# Patient Record
Sex: FEMALE | Race: WHITE | NOT HISPANIC OR LATINO
[De-identification: names, ages, dates, MRNs, and addresses within clinical notes are randomized per-mention and may not be internally consistent; named-entity substitution may affect disease eponyms.]

---

## 2017-01-26 ENCOUNTER — APPOINTMENT (OUTPATIENT)
Dept: HEMATOLOGY ONCOLOGY | Facility: CLINIC | Age: 72
End: 2017-01-26

## 2017-01-26 VITALS
SYSTOLIC BLOOD PRESSURE: 150 MMHG | WEIGHT: 175 LBS | DIASTOLIC BLOOD PRESSURE: 63 MMHG | TEMPERATURE: 98.2 F | RESPIRATION RATE: 12 BRPM

## 2017-01-26 DIAGNOSIS — Z87.09 PERSONAL HISTORY OF OTHER DISEASES OF THE RESPIRATORY SYSTEM: ICD-10-CM

## 2017-01-26 LAB
BASOPHILS # BLD: 0.05 TH/MM3
BASOPHILS NFR BLD: 0.6 %
EOSINOPHIL # BLD: 0.22 TH/MM3
EOSINOPHIL NFR BLD: 2.5 %
ERYTHROCYTE [DISTWIDTH] IN BLOOD BY AUTOMATED COUNT: 12.6 %
GRANULOCYTES # BLD: 4.49 TH/MM3
GRANULOCYTES NFR BLD: 51.9 %
HCT VFR BLD AUTO: 35.1 %
HGB BLD-MCNC: 12.2 G/DL
IMM GRANULOCYTES # BLD: 0.02 TH/MM3
IMM GRANULOCYTES NFR BLD: 0.2 %
LYMPHOCYTES # BLD: 2.78 TH/MM3
LYMPHOCYTES NFR BLD: 32.1 %
MCH RBC QN AUTO: 36.4 PG
MCHC RBC AUTO-ENTMCNC: 34.8 G/DL
MCV RBC AUTO: 104.8 FL
MONOCYTES # BLD: 1.1 TH/MM3
MONOCYTES NFR BLD: 12.7 %
PLATELET # BLD: 384 TH/MM3
PMV BLD AUTO: 9.9 FL
RBC # BLD AUTO: 3.35 MIL/MM3
WBC # BLD: 8.66 TH/MM3

## 2017-01-26 RX ORDER — ATENOLOL 50 MG/1
TABLET ORAL
Refills: 0 | Status: ACTIVE | COMMUNITY

## 2017-01-27 LAB
ALBUMIN SERPL-MCNC: 4 G/DL
ALBUMIN/GLOB SERPL: 1.03
ALP SERPL-CCNC: 54 IU/L
ALT SERPL-CCNC: 19 IU/L
ANION GAP SERPL CALC-SCNC: 10 MEQ/L
AST SERPL-CCNC: 25 IU/L
BILIRUB SERPL-MCNC: 0.6 MG/DL
BUN SERPL-MCNC: 27 MG/DL
BUN/CREAT SERPL: 24.8 %
CALCIUM SERPL-MCNC: 9.8 MG/DL
CHLORIDE SERPL-SCNC: 101 MEQ/L
CO2 SERPL-SCNC: 27 MEQ/L
CREAT SERPL-MCNC: 1.09 MG/DL
GFR SERPL CREATININE-BSD FRML MDRD: 49
GLUCOSE SERPL-MCNC: 94 MG/DL
POTASSIUM SERPL-SCNC: 4.8 MMOL/L
PROT SERPL-MCNC: 7.9 G/DL
SODIUM SERPL-SCNC: 138 MEQ/L

## 2017-03-07 ENCOUNTER — RX RENEWAL (OUTPATIENT)
Age: 72
End: 2017-03-07

## 2017-04-27 ENCOUNTER — APPOINTMENT (OUTPATIENT)
Dept: HEMATOLOGY ONCOLOGY | Facility: CLINIC | Age: 72
End: 2017-04-27

## 2017-04-27 VITALS
HEIGHT: 60 IN | SYSTOLIC BLOOD PRESSURE: 167 MMHG | RESPIRATION RATE: 14 BRPM | DIASTOLIC BLOOD PRESSURE: 73 MMHG | BODY MASS INDEX: 34.95 KG/M2 | TEMPERATURE: 97.3 F | HEART RATE: 79 BPM | WEIGHT: 178 LBS

## 2017-04-27 LAB
BASOPHILS # BLD: 0.1 TH/MM3
BASOPHILS NFR BLD: 0.8 %
EOSINOPHIL # BLD: 0.39 TH/MM3
EOSINOPHIL NFR BLD: 3 %
ERYTHROCYTE [DISTWIDTH] IN BLOOD BY AUTOMATED COUNT: 13.1 %
GRANULOCYTES # BLD: 7.4 TH/MM3
GRANULOCYTES NFR BLD: 57.9 %
HCT VFR BLD AUTO: 37.6 %
HGB BLD-MCNC: 13 G/DL
IMM GRANULOCYTES # BLD: 0.08 TH/MM3
IMM GRANULOCYTES NFR BLD: 0.6 %
LYMPHOCYTES # BLD: 3.27 TH/MM3
LYMPHOCYTES NFR BLD: 25.6 %
MCH RBC QN AUTO: 36.2 PG
MCHC RBC AUTO-ENTMCNC: 34.6 G/DL
MCV RBC AUTO: 104.7 FL
MONOCYTES # BLD: 1.55 TH/MM3
MONOCYTES NFR BLD: 12.1 %
PLATELET # BLD: 439 TH/MM3
PMV BLD AUTO: 10.5 FL
RBC # BLD AUTO: 3.59 MIL/MM3
WBC # BLD: 12.79 TH/MM3

## 2017-05-11 ENCOUNTER — APPOINTMENT (OUTPATIENT)
Dept: HEMATOLOGY ONCOLOGY | Facility: CLINIC | Age: 72
End: 2017-05-11

## 2017-05-11 LAB
BASOPHILS # BLD: 0.1 TH/MM3
BASOPHILS NFR BLD: 0.7 %
EOSINOPHIL # BLD: 0.41 TH/MM3
EOSINOPHIL NFR BLD: 3 %
ERYTHROCYTE [DISTWIDTH] IN BLOOD BY AUTOMATED COUNT: 12.9 %
GRANULOCYTES # BLD: 8.21 TH/MM3
GRANULOCYTES NFR BLD: 60.6 %
HCT VFR BLD AUTO: 35.6 %
HGB BLD-MCNC: 12.2 G/DL
IMM GRANULOCYTES # BLD: 0.14 TH/MM3
IMM GRANULOCYTES NFR BLD: 1 %
LYMPHOCYTES # BLD: 3.29 TH/MM3
LYMPHOCYTES NFR BLD: 24.2 %
MCH RBC QN AUTO: 36.3 PG
MCHC RBC AUTO-ENTMCNC: 34.3 G/DL
MCV RBC AUTO: 106 FL
MONOCYTES # BLD: 1.42 TH/MM3
MONOCYTES NFR BLD: 10.5 %
PLATELET # BLD: 489 TH/MM3
PMV BLD AUTO: 10 FL
RBC # BLD AUTO: 3.36 MIL/MM3
WBC # BLD: 13.57 TH/MM3

## 2017-05-25 ENCOUNTER — APPOINTMENT (OUTPATIENT)
Dept: HEMATOLOGY ONCOLOGY | Facility: CLINIC | Age: 72
End: 2017-05-25

## 2017-05-25 LAB
BASOPHILS # BLD: 0.06 TH/MM3
BASOPHILS NFR BLD: 0.6 %
EOSINOPHIL # BLD: 0.29 TH/MM3
EOSINOPHIL NFR BLD: 3.1 %
ERYTHROCYTE [DISTWIDTH] IN BLOOD BY AUTOMATED COUNT: 14 %
GRANULOCYTES # BLD: 5.61 TH/MM3
GRANULOCYTES NFR BLD: 59.6 %
HCT VFR BLD AUTO: 35.3 %
HGB BLD-MCNC: 12.2 G/DL
IMM GRANULOCYTES # BLD: 0.07 TH/MM3
IMM GRANULOCYTES NFR BLD: 0.7 %
LYMPHOCYTES # BLD: 2.45 TH/MM3
LYMPHOCYTES NFR BLD: 26 %
MCH RBC QN AUTO: 36.7 PG
MCHC RBC AUTO-ENTMCNC: 34.6 G/DL
MCV RBC AUTO: 106.3 FL
MONOCYTES # BLD: 0.94 TH/MM3
MONOCYTES NFR BLD: 10 %
PLATELET # BLD: 383 TH/MM3
PMV BLD AUTO: 10.2 FL
RBC # BLD AUTO: 3.32 MIL/MM3
WBC # BLD: 9.42 TH/MM3

## 2017-06-07 ENCOUNTER — OUTPATIENT (OUTPATIENT)
Dept: OUTPATIENT SERVICES | Facility: HOSPITAL | Age: 72
LOS: 1 days | Discharge: HOME | End: 2017-06-07

## 2017-06-28 DIAGNOSIS — R05 COUGH: ICD-10-CM

## 2017-07-06 ENCOUNTER — OUTPATIENT (OUTPATIENT)
Dept: OUTPATIENT SERVICES | Facility: HOSPITAL | Age: 72
LOS: 1 days | Discharge: HOME | End: 2017-07-06

## 2017-07-06 ENCOUNTER — APPOINTMENT (OUTPATIENT)
Dept: HEMATOLOGY ONCOLOGY | Facility: CLINIC | Age: 72
End: 2017-07-06

## 2017-07-06 DIAGNOSIS — D47.3 ESSENTIAL (HEMORRHAGIC) THROMBOCYTHEMIA: ICD-10-CM

## 2017-07-12 LAB
BASOPHILS # BLD: 0.05 TH/MM3
BASOPHILS NFR BLD: 0.7 %
EOSINOPHIL # BLD: 0.16 TH/MM3
EOSINOPHIL NFR BLD: 2.2 %
ERYTHROCYTE [DISTWIDTH] IN BLOOD BY AUTOMATED COUNT: 15.1 %
GRANULOCYTES # BLD: 3.74 TH/MM3
GRANULOCYTES NFR BLD: 50.3 %
HCT VFR BLD AUTO: 33.8 %
HGB BLD-MCNC: 11.6 G/DL
IMM GRANULOCYTES # BLD: 0.04 TH/MM3
IMM GRANULOCYTES NFR BLD: 0.5 %
LYMPHOCYTES # BLD: 2.64 TH/MM3
LYMPHOCYTES NFR BLD: 35.5 %
MCH RBC QN AUTO: 37.9 PG
MCHC RBC AUTO-ENTMCNC: 34.3 G/DL
MCV RBC AUTO: 110.5 FL
MONOCYTES # BLD: 0.8 TH/MM3
MONOCYTES NFR BLD: 10.8 %
PLATELET # BLD: 310 TH/MM3
PMV BLD AUTO: 9.9 FL
RBC # BLD AUTO: 3.06 MIL/MM3
WBC # BLD: 7.43 TH/MM3

## 2017-08-17 ENCOUNTER — APPOINTMENT (OUTPATIENT)
Dept: HEMATOLOGY ONCOLOGY | Facility: CLINIC | Age: 72
End: 2017-08-17

## 2017-08-17 LAB
BASOPHILS # BLD: 0.05 TH/MM3
BASOPHILS NFR BLD: 0.7 %
EOSINOPHIL # BLD: 0.14 TH/MM3
EOSINOPHIL NFR BLD: 2.1 %
ERYTHROCYTE [DISTWIDTH] IN BLOOD BY AUTOMATED COUNT: 13.3 %
GRANULOCYTES # BLD: 3.75 TH/MM3
GRANULOCYTES NFR BLD: 55.4 %
HCT VFR BLD AUTO: 33.5 %
HGB BLD-MCNC: 11.7 G/DL
IMM GRANULOCYTES # BLD: 0.08 TH/MM3
IMM GRANULOCYTES NFR BLD: 1.2 %
LYMPHOCYTES # BLD: 2.08 TH/MM3
LYMPHOCYTES NFR BLD: 30.7 %
MCH RBC QN AUTO: 39.8 PG
MCHC RBC AUTO-ENTMCNC: 34.9 G/DL
MCV RBC AUTO: 113.9 FL
MONOCYTES # BLD: 0.67 TH/MM3
MONOCYTES NFR BLD: 9.9 %
PLATELET # BLD: 290 TH/MM3
PMV BLD AUTO: 10.3 FL
RBC # BLD AUTO: 2.94 MIL/MM3
WBC # BLD: 6.77 TH/MM3

## 2017-10-12 ENCOUNTER — OUTPATIENT (OUTPATIENT)
Dept: OUTPATIENT SERVICES | Facility: HOSPITAL | Age: 72
LOS: 1 days | Discharge: HOME | End: 2017-10-12

## 2017-10-12 ENCOUNTER — APPOINTMENT (OUTPATIENT)
Dept: HEMATOLOGY ONCOLOGY | Facility: CLINIC | Age: 72
End: 2017-10-12

## 2017-10-12 DIAGNOSIS — D47.3 ESSENTIAL (HEMORRHAGIC) THROMBOCYTHEMIA: ICD-10-CM

## 2017-10-12 LAB
BASOPHILS # BLD: 0.04 TH/MM3
BASOPHILS NFR BLD: 0.5 %
EOSINOPHIL # BLD: 0.11 TH/MM3
EOSINOPHIL NFR BLD: 1.3 %
ERYTHROCYTE [DISTWIDTH] IN BLOOD BY AUTOMATED COUNT: 12.3 %
GRANULOCYTES # BLD: 4.27 TH/MM3
GRANULOCYTES NFR BLD: 50.9 %
HCT VFR BLD AUTO: 32.5 %
HGB BLD-MCNC: 11.5 G/DL
IMM GRANULOCYTES # BLD: 0.05 TH/MM3
IMM GRANULOCYTES NFR BLD: 0.6 %
LYMPHOCYTES # BLD: 2.83 TH/MM3
LYMPHOCYTES NFR BLD: 33.7 %
MCH RBC QN AUTO: 41.2 PG
MCHC RBC AUTO-ENTMCNC: 35.4 G/DL
MCV RBC AUTO: 116.5 FL
MONOCYTES # BLD: 1.09 TH/MM3
MONOCYTES NFR BLD: 13 %
PLATELET # BLD: 349 TH/MM3
PMV BLD AUTO: 10 FL
RBC # BLD AUTO: 2.79 MIL/MM3
WBC # BLD: 8.39 TH/MM3

## 2017-11-09 ENCOUNTER — OUTPATIENT (OUTPATIENT)
Dept: OUTPATIENT SERVICES | Facility: HOSPITAL | Age: 72
LOS: 1 days | Discharge: HOME | End: 2017-11-09

## 2017-11-09 DIAGNOSIS — H25.89 OTHER AGE-RELATED CATARACT: ICD-10-CM

## 2017-11-09 DIAGNOSIS — I10 ESSENTIAL (PRIMARY) HYPERTENSION: ICD-10-CM

## 2017-11-09 DIAGNOSIS — E66.9 OBESITY, UNSPECIFIED: ICD-10-CM

## 2017-11-16 ENCOUNTER — OUTPATIENT (OUTPATIENT)
Dept: OUTPATIENT SERVICES | Facility: HOSPITAL | Age: 72
LOS: 1 days | Discharge: HOME | End: 2017-11-16

## 2017-11-22 ENCOUNTER — RESULT REVIEW (OUTPATIENT)
Age: 72
End: 2017-11-22

## 2017-11-22 ENCOUNTER — APPOINTMENT (OUTPATIENT)
Dept: HEMATOLOGY ONCOLOGY | Facility: CLINIC | Age: 72
End: 2017-11-22

## 2017-11-27 DIAGNOSIS — I10 ESSENTIAL (PRIMARY) HYPERTENSION: ICD-10-CM

## 2017-11-27 DIAGNOSIS — H25.89 OTHER AGE-RELATED CATARACT: ICD-10-CM

## 2017-11-27 DIAGNOSIS — D69.6 THROMBOCYTOPENIA, UNSPECIFIED: ICD-10-CM

## 2017-11-27 DIAGNOSIS — E03.9 HYPOTHYROIDISM, UNSPECIFIED: ICD-10-CM

## 2018-02-01 ENCOUNTER — APPOINTMENT (OUTPATIENT)
Dept: HEMATOLOGY ONCOLOGY | Facility: CLINIC | Age: 73
End: 2018-02-01

## 2018-02-01 ENCOUNTER — RESULT REVIEW (OUTPATIENT)
Age: 73
End: 2018-02-01

## 2018-02-01 VITALS
WEIGHT: 179 LBS | SYSTOLIC BLOOD PRESSURE: 173 MMHG | BODY MASS INDEX: 35.14 KG/M2 | DIASTOLIC BLOOD PRESSURE: 79 MMHG | HEART RATE: 95 BPM | HEIGHT: 60 IN | RESPIRATION RATE: 15 BRPM | TEMPERATURE: 98.1 F

## 2018-03-20 ENCOUNTER — RX RENEWAL (OUTPATIENT)
Age: 73
End: 2018-03-20

## 2018-05-05 ENCOUNTER — OUTPATIENT (OUTPATIENT)
Dept: OUTPATIENT SERVICES | Facility: HOSPITAL | Age: 73
LOS: 1 days | Discharge: HOME | End: 2018-05-05

## 2018-05-05 DIAGNOSIS — Z12.31 ENCOUNTER FOR SCREENING MAMMOGRAM FOR MALIGNANT NEOPLASM OF BREAST: ICD-10-CM

## 2018-05-10 ENCOUNTER — LABORATORY RESULT (OUTPATIENT)
Age: 73
End: 2018-05-10

## 2018-05-10 ENCOUNTER — APPOINTMENT (OUTPATIENT)
Dept: HEMATOLOGY ONCOLOGY | Facility: CLINIC | Age: 73
End: 2018-05-10

## 2018-05-10 ENCOUNTER — OUTPATIENT (OUTPATIENT)
Dept: OUTPATIENT SERVICES | Facility: HOSPITAL | Age: 73
LOS: 1 days | Discharge: HOME | End: 2018-05-10

## 2018-05-10 VITALS
HEART RATE: 78 BPM | BODY MASS INDEX: 33.42 KG/M2 | SYSTOLIC BLOOD PRESSURE: 151 MMHG | DIASTOLIC BLOOD PRESSURE: 68 MMHG | RESPIRATION RATE: 14 BRPM | HEIGHT: 61 IN | WEIGHT: 177 LBS | TEMPERATURE: 97.7 F

## 2018-05-10 DIAGNOSIS — D47.3 ESSENTIAL (HEMORRHAGIC) THROMBOCYTHEMIA: ICD-10-CM

## 2018-05-10 DIAGNOSIS — D69.9 HEMORRHAGIC CONDITION, UNSPECIFIED: ICD-10-CM

## 2018-05-10 LAB
HCT VFR BLD CALC: 33.8 %
HGB BLD-MCNC: 12.2 G/DL
MCHC RBC-ENTMCNC: 36.1 G/DL
MCHC RBC-ENTMCNC: 41.5 PG
MCV RBC AUTO: 115 FL
PLATELET # BLD AUTO: 346 K/UL
PMV BLD: 10.7 FL
RBC # BLD: 2.94 M/UL
RBC # FLD: 12.2 %
WBC # FLD AUTO: 7.88 K/UL

## 2018-05-16 ENCOUNTER — OUTPATIENT (OUTPATIENT)
Dept: OUTPATIENT SERVICES | Facility: HOSPITAL | Age: 73
LOS: 1 days | Discharge: HOME | End: 2018-05-16

## 2018-05-16 DIAGNOSIS — R91.8 OTHER NONSPECIFIC ABNORMAL FINDING OF LUNG FIELD: ICD-10-CM

## 2018-06-15 ENCOUNTER — OUTPATIENT (OUTPATIENT)
Dept: OUTPATIENT SERVICES | Facility: HOSPITAL | Age: 73
LOS: 1 days | Discharge: HOME | End: 2018-06-15

## 2018-06-15 ENCOUNTER — RESULT REVIEW (OUTPATIENT)
Age: 73
End: 2018-06-15

## 2018-06-27 ENCOUNTER — RX RENEWAL (OUTPATIENT)
Age: 73
End: 2018-06-27

## 2018-06-27 DIAGNOSIS — Z01.419 ENCOUNTER FOR GYNECOLOGICAL EXAMINATION (GENERAL) (ROUTINE) WITHOUT ABNORMAL FINDINGS: ICD-10-CM

## 2018-09-13 ENCOUNTER — APPOINTMENT (OUTPATIENT)
Dept: HEMATOLOGY ONCOLOGY | Facility: CLINIC | Age: 73
End: 2018-09-13

## 2018-09-13 ENCOUNTER — LABORATORY RESULT (OUTPATIENT)
Age: 73
End: 2018-09-13

## 2018-09-13 VITALS
DIASTOLIC BLOOD PRESSURE: 67 MMHG | TEMPERATURE: 97.9 F | WEIGHT: 174 LBS | HEART RATE: 80 BPM | RESPIRATION RATE: 14 BRPM | BODY MASS INDEX: 32.85 KG/M2 | SYSTOLIC BLOOD PRESSURE: 168 MMHG | HEIGHT: 61 IN

## 2018-09-13 DIAGNOSIS — Z86.2 PERSONAL HISTORY OF DISEASES OF THE BLOOD AND BLOOD-FORMING ORGANS AND CERTAIN DISORDERS INVOLVING THE IMMUNE MECHANISM: ICD-10-CM

## 2018-09-13 LAB
HCT VFR BLD CALC: 32.6 %
HGB BLD-MCNC: 11.7 G/DL
MCHC RBC-ENTMCNC: 35.9 G/DL
MCHC RBC-ENTMCNC: 42.5 PG
MCV RBC AUTO: 118.5 FL
PLATELET # BLD AUTO: 375 K/UL
PMV BLD: 10.6 FL
RBC # BLD: 2.75 M/UL
RBC # FLD: 12.5 %
WBC # FLD AUTO: 7.57 K/UL

## 2018-11-15 ENCOUNTER — APPOINTMENT (OUTPATIENT)
Dept: HEMATOLOGY ONCOLOGY | Facility: CLINIC | Age: 73
End: 2018-11-15

## 2018-11-15 ENCOUNTER — LABORATORY RESULT (OUTPATIENT)
Age: 73
End: 2018-11-15

## 2018-11-15 LAB
HCT VFR BLD CALC: 33.1 %
HGB BLD-MCNC: 11.6 G/DL
MCHC RBC-ENTMCNC: 35 G/DL
MCHC RBC-ENTMCNC: 41.3 PG
MCV RBC AUTO: 117.8 FL
PLATELET # BLD AUTO: 397 K/UL
PMV BLD: 10.7 FL
RBC # BLD: 2.81 M/UL
RBC # FLD: 11.8 %
RETICS # AUTO: 2.5 %
RETICS AGGREG/RBC NFR: 71 K/UL
WBC # FLD AUTO: 8.7 K/UL

## 2018-11-16 LAB
FOLATE RBC-MCNC: 1404 NG/ML
FOLATE SERPL-MCNC: >20 NG/ML
HCT VFR BLD CALC: 33 %
VIT B12 SERPL-MCNC: 615 PG/ML

## 2018-11-19 LAB — METHYLMALONATE SERPL-SCNC: 173 NMOL/L

## 2018-12-18 ENCOUNTER — OUTPATIENT (OUTPATIENT)
Dept: OUTPATIENT SERVICES | Facility: HOSPITAL | Age: 73
LOS: 1 days | Discharge: HOME | End: 2018-12-18

## 2018-12-18 ENCOUNTER — LABORATORY RESULT (OUTPATIENT)
Age: 73
End: 2018-12-18

## 2018-12-18 ENCOUNTER — APPOINTMENT (OUTPATIENT)
Dept: HEMATOLOGY ONCOLOGY | Facility: CLINIC | Age: 73
End: 2018-12-18

## 2018-12-18 DIAGNOSIS — D47.3 ESSENTIAL (HEMORRHAGIC) THROMBOCYTHEMIA: ICD-10-CM

## 2018-12-18 LAB
HCT VFR BLD CALC: 33 %
HGB BLD-MCNC: 11.6 G/DL
MCHC RBC-ENTMCNC: 35.2 G/DL
MCHC RBC-ENTMCNC: 41.1 PG
MCV RBC AUTO: 117 FL
PLATELET # BLD AUTO: 339 K/UL
PMV BLD: 10.5 FL
RBC # BLD: 2.82 M/UL
RBC # FLD: 12.4 %
WBC # FLD AUTO: 7.48 K/UL

## 2018-12-18 NOTE — HISTORY OF PRESENT ILLNESS
[de-identified] : T [de-identified] : The patient is coming for her regularly scheduled follow up for her essential thrombocythemia.\par She has no new particular complaints. However, on occasion, she has been missing taking the Hydrea.\par Her CBC today showed a WBC count of 8.97, Hgb of 11.6, platelets of 397. The MCV has increased to above 117.\par \par The patient was told to continue with Hydrea 500 mg twice a day and try not to miss any doses.\par I will not increase the dose of the medication for the time being.\par \par She will be seen again in 3 months for follow up. All questions answered.

## 2018-12-18 NOTE — HISTORY OF PRESENT ILLNESS
[de-identified] : The patient is coming for her regularly scheduled follow up for her essential thrombocythemia. She feels a little tired and is concerned about some hair loss. She is on different medications also, including atenolol, for her high blood pressure. \par The CBC today showed a WBC count of 7.48, Hgb of 11.6 (stable) and platelets 339 K.\par The patient was reassured. \par She will continue on Hydrea 500 mg PO BID.\par \par All her questions answered.

## 2018-12-18 NOTE — REASON FOR VISIT
[Follow-Up Visit] : a follow-up visit for [Myeloproliferative Disorder] : myeloproliferative disorder [Family Member] : family member [FreeTextEntry2] : ET

## 2018-12-19 ENCOUNTER — RX RENEWAL (OUTPATIENT)
Age: 73
End: 2018-12-19

## 2019-02-14 ENCOUNTER — LABORATORY RESULT (OUTPATIENT)
Age: 74
End: 2019-02-14

## 2019-02-14 ENCOUNTER — APPOINTMENT (OUTPATIENT)
Dept: HEMATOLOGY ONCOLOGY | Facility: CLINIC | Age: 74
End: 2019-02-14

## 2019-02-14 VITALS
HEART RATE: 86 BPM | BODY MASS INDEX: 37.19 KG/M2 | SYSTOLIC BLOOD PRESSURE: 189 MMHG | DIASTOLIC BLOOD PRESSURE: 84 MMHG | WEIGHT: 197 LBS | RESPIRATION RATE: 14 BRPM | TEMPERATURE: 97 F | HEIGHT: 61 IN

## 2019-02-14 DIAGNOSIS — I10 ESSENTIAL (PRIMARY) HYPERTENSION: ICD-10-CM

## 2019-02-14 LAB
HCT VFR BLD CALC: 34.8 %
HGB BLD-MCNC: 12.2 G/DL
MCHC RBC-ENTMCNC: 35.1 G/DL
MCHC RBC-ENTMCNC: 41.4 PG
MCV RBC AUTO: 118 FL
PLATELET # BLD AUTO: 440 K/UL
PMV BLD: 9.8 FL
RBC # BLD: 2.95 M/UL
RBC # FLD: 12.4 %
WBC # FLD AUTO: 9.36 K/UL

## 2019-02-14 NOTE — REASON FOR VISIT
[Follow-Up Visit] : a follow-up visit for [Thrombocytosis] : thrombocytosis [Myeloproliferative Disorder] : myeloproliferative disorder [Family Member] : family member [FreeTextEntry2] : ET

## 2019-02-14 NOTE — ASSESSMENT
[FreeTextEntry1] : 72 yo F with Essential Thrombocytosis on Hydrea. Patient admits to skipping some doses of Hydrea as her supply was low.\par \par PLAN:\par Essential Thrombocytosis:\par - Patient to continue with Hydrea 500mg BID.\par - Today, platelet count increased to 440 from 339.\par \par Hair thinning\par - Possibly related to hypothyroidism. Doubt that it's from Hydrea although this cannot be ruled out with certainty.\par - Will check TSH, T4. \par \par Patient to follow up in 3 months for repeat CBC.\par  \par Patient seen and examined with Dr Denise, who agreed with the above assessment and plan.

## 2019-02-14 NOTE — HISTORY OF PRESENT ILLNESS
[de-identified] : The patient is coming for her regularly scheduled follow up for her essential thrombocythemia. She feels a little tired and is concerned about some hair loss. She is on different medications also, including atenolol, for her high blood pressure. \par The CBC today showed a WBC count of 7.48, Hgb of 11.6 (stable) and platelets 339 K.\par The patient was reassured. \par She will continue on Hydrea 500 mg PO BID.\par \par All her questions answered.\par \par 2/14/19\par Patient here for follow up of ET.\par She continues in Hydrea 500 mg PO BID, but has been running low on the medication and has skipped a few doses.\par Complains of hair thinning, chronic back pain, shortness of breath on exertion.

## 2019-02-14 NOTE — REVIEW OF SYSTEMS
[SOB on Exertion] : shortness of breath during exertion [Joint Pain] : joint pain [Negative] : Heme/Lymph [FreeTextEntry2] : Hair thinning.

## 2019-02-14 NOTE — PHYSICAL EXAM
[Ambulatory and capable of all self care but unable to carry out any work activities] : Status 2- Ambulatory and capable of all self care but unable to carry out any work activities. Up and about more than 50% of waking hours [Obese] : obese [Normal] : grossly intact [de-identified] : Mild edema bilaterally

## 2019-02-15 DIAGNOSIS — E03.9 HYPOTHYROIDISM, UNSPECIFIED: ICD-10-CM

## 2019-02-15 LAB — TSH SERPL-ACNC: 27.1 UIU/ML

## 2019-02-15 RX ORDER — LEVOTHYROXINE SODIUM 112 UG/1
112 TABLET ORAL DAILY
Qty: 30 | Refills: 3 | Status: ACTIVE | COMMUNITY
Start: 2019-02-15 | End: 1900-01-01

## 2019-05-07 ENCOUNTER — OUTPATIENT (OUTPATIENT)
Dept: OUTPATIENT SERVICES | Facility: HOSPITAL | Age: 74
LOS: 1 days | Discharge: HOME | End: 2019-05-07
Payer: MEDICARE

## 2019-05-07 DIAGNOSIS — Z12.31 ENCOUNTER FOR SCREENING MAMMOGRAM FOR MALIGNANT NEOPLASM OF BREAST: ICD-10-CM

## 2019-05-07 PROCEDURE — 77063 BREAST TOMOSYNTHESIS BI: CPT | Mod: 26

## 2019-05-07 PROCEDURE — 77067 SCR MAMMO BI INCL CAD: CPT | Mod: 26

## 2019-05-16 ENCOUNTER — APPOINTMENT (OUTPATIENT)
Dept: HEMATOLOGY ONCOLOGY | Facility: CLINIC | Age: 74
End: 2019-05-16

## 2019-05-16 ENCOUNTER — LABORATORY RESULT (OUTPATIENT)
Age: 74
End: 2019-05-16

## 2019-05-16 VITALS
SYSTOLIC BLOOD PRESSURE: 129 MMHG | RESPIRATION RATE: 14 BRPM | DIASTOLIC BLOOD PRESSURE: 63 MMHG | BODY MASS INDEX: 33.23 KG/M2 | WEIGHT: 176 LBS | HEART RATE: 83 BPM | TEMPERATURE: 97.3 F | HEIGHT: 61 IN

## 2019-05-16 DIAGNOSIS — Z63.4 DISAPPEARANCE AND DEATH OF FAMILY MEMBER: ICD-10-CM

## 2019-05-16 LAB
HCT VFR BLD CALC: 33.7 %
HGB BLD-MCNC: 11.7 G/DL
MCHC RBC-ENTMCNC: 34.7 G/DL
MCHC RBC-ENTMCNC: 40.6 PG
MCV RBC AUTO: 117 FL
PLATELET # BLD AUTO: 432 K/UL
PMV BLD: 10.2 FL
RBC # BLD: 2.88 M/UL
RBC # FLD: 13.2 %
WBC # FLD AUTO: 8.25 K/UL

## 2019-05-16 RX ORDER — LOSARTAN POTASSIUM AND HYDROCHLOROTHIAZIDE 25; 100 MG/1; MG/1
100-25 TABLET ORAL
Refills: 0 | Status: ACTIVE | COMMUNITY

## 2019-05-16 RX ORDER — AMLODIPINE BESYLATE 5 MG/1
5 TABLET ORAL
Refills: 0 | Status: ACTIVE | COMMUNITY

## 2019-05-16 SDOH — SOCIAL STABILITY - SOCIAL INSECURITY: DISSAPEARANCE AND DEATH OF FAMILY MEMBER: Z63.4

## 2019-05-16 NOTE — REVIEW OF SYSTEMS
[SOB on Exertion] : shortness of breath during exertion [Joint Pain] : no joint pain [Negative] : Heme/Lymph

## 2019-05-16 NOTE — PHYSICAL EXAM
[Fully active, able to carry on all pre-disease performance without restriction] : Status 0 - Fully active, able to carry on all pre-disease performance without restriction [Normal] : grossly intact [de-identified] : But somewhat overweight [de-identified] : Some arthritic changes

## 2019-05-16 NOTE — HISTORY OF PRESENT ILLNESS
[Disease:__________________________] : Disease: [unfilled] [de-identified] : The patient is coming for her regularly scheduled follow up for her essential thrombocythemia.\par Presently, she has no new particular complaints.\par She has her chronic mild dyspnea on exertion when she climbs stairs.\par Her Synthroid has been increased to 125 microgram. She is no longer feeling as tired as before.\par No problems with urine or bowel movements.

## 2019-05-16 NOTE — CONSULT LETTER
[Courtesy Letter:] : I had the pleasure of seeing your patient, [unfilled], in my office today. [Dear  ___] : Dear  [unfilled], [Please see my note below.] : Please see my note below. [Consult Closing:] : Thank you very much for allowing me to participate in the care of this patient.  If you have any questions, please do not hesitate to contact me. [Sincerely,] : Sincerely, [FreeTextEntry2] : Dr. Box [FreeTextEntry3] : Dr. SCOTTY Denise

## 2019-05-16 NOTE — REASON FOR VISIT
[Follow-Up Visit] : a follow-up visit for [Myeloproliferative Disorder] : myeloproliferative disorder [FreeTextEntry2] : ET

## 2019-05-16 NOTE — ASSESSMENT
[FreeTextEntry1] : Essential thrombocythemia, reasonably well controlled but still platelet slightly above 400 K.\par The situation was discussed with the patient. She was told to continue taking Hydrea 500 mg twice a day and 3 on Sundays. The patient is aware about the drug side effects.\par \par Will repeat the CBC in 2 months.\par \par She states that she is up to date with all her screenings including mammogram and colonoscopy.\par \par All questions answered.\par \par The patient doesn't need to be examined more often than twice a year and as needed.

## 2019-05-17 LAB
T4 SERPL-MCNC: 9.9 UG/DL
TSH SERPL-ACNC: 0.11 UIU/ML

## 2019-06-04 ENCOUNTER — OUTPATIENT (OUTPATIENT)
Dept: OUTPATIENT SERVICES | Facility: HOSPITAL | Age: 74
LOS: 1 days | Discharge: HOME | End: 2019-06-04

## 2019-06-04 DIAGNOSIS — D50.0 IRON DEFICIENCY ANEMIA SECONDARY TO BLOOD LOSS (CHRONIC): ICD-10-CM

## 2019-06-04 DIAGNOSIS — D64.9 ANEMIA, UNSPECIFIED: ICD-10-CM

## 2019-07-18 ENCOUNTER — LABORATORY RESULT (OUTPATIENT)
Age: 74
End: 2019-07-18

## 2019-07-18 ENCOUNTER — OUTPATIENT (OUTPATIENT)
Dept: OUTPATIENT SERVICES | Facility: HOSPITAL | Age: 74
LOS: 1 days | Discharge: HOME | End: 2019-07-18

## 2019-07-18 ENCOUNTER — APPOINTMENT (OUTPATIENT)
Dept: HEMATOLOGY ONCOLOGY | Facility: CLINIC | Age: 74
End: 2019-07-18

## 2019-07-18 DIAGNOSIS — D47.3 ESSENTIAL (HEMORRHAGIC) THROMBOCYTHEMIA: ICD-10-CM

## 2019-07-18 LAB
HCT VFR BLD CALC: 31 %
HGB BLD-MCNC: 11.1 G/DL
MCHC RBC-ENTMCNC: 35.8 G/DL
MCHC RBC-ENTMCNC: 42.5 PG
MCV RBC AUTO: 118.8 FL
PLATELET # BLD AUTO: 368 K/UL
PMV BLD: 10.2 FL
RBC # BLD: 2.61 M/UL
RBC # FLD: 12.4 %
WBC # FLD AUTO: 7.4 K/UL

## 2019-09-19 ENCOUNTER — APPOINTMENT (OUTPATIENT)
Dept: HEMATOLOGY ONCOLOGY | Facility: CLINIC | Age: 74
End: 2019-09-19

## 2019-10-17 ENCOUNTER — APPOINTMENT (OUTPATIENT)
Dept: HEMATOLOGY ONCOLOGY | Facility: CLINIC | Age: 74
End: 2019-10-17
Payer: MEDICARE

## 2019-10-17 ENCOUNTER — LABORATORY RESULT (OUTPATIENT)
Age: 74
End: 2019-10-17

## 2019-10-17 ENCOUNTER — OUTPATIENT (OUTPATIENT)
Dept: OUTPATIENT SERVICES | Facility: HOSPITAL | Age: 74
LOS: 1 days | Discharge: HOME | End: 2019-10-17

## 2019-10-17 DIAGNOSIS — Z00.00 ENCOUNTER FOR GENERAL ADULT MEDICAL EXAMINATION W/OUT ABNORMAL FINDINGS: ICD-10-CM

## 2019-10-17 LAB
ALBUMIN SERPL ELPH-MCNC: 4.6 G/DL
ALP BLD-CCNC: 54 U/L
ALT SERPL-CCNC: 15 U/L
ANION GAP SERPL CALC-SCNC: 15 MMOL/L
AST SERPL-CCNC: 26 U/L
BILIRUB SERPL-MCNC: 0.4 MG/DL
BUN SERPL-MCNC: 19 MG/DL
CALCIUM SERPL-MCNC: 9.7 MG/DL
CHLORIDE SERPL-SCNC: 100 MMOL/L
CO2 SERPL-SCNC: 24 MMOL/L
CREAT SERPL-MCNC: 1.1 MG/DL
GLUCOSE SERPL-MCNC: 132 MG/DL
HCT VFR BLD CALC: 30 %
HGB BLD-MCNC: 10.6 G/DL
LDH SERPL-CCNC: 291
MCHC RBC-ENTMCNC: 35.3 G/DL
MCHC RBC-ENTMCNC: 43.1 PG
MCV RBC AUTO: 122 FL
PLATELET # BLD AUTO: 339 K/UL
PMV BLD: 9.4 FL
POTASSIUM SERPL-SCNC: 4.6 MMOL/L
PROT SERPL-MCNC: 8 G/DL
RBC # BLD: 2.46 M/UL
RBC # FLD: 13 %
RETICS # AUTO: 2.5 %
RETICS AGGREG/RBC NFR: 59.5 K/UL
SODIUM SERPL-SCNC: 139 MMOL/L
WBC # FLD AUTO: 6.42 K/UL

## 2019-10-17 PROCEDURE — 99213 OFFICE O/P EST LOW 20 MIN: CPT

## 2019-10-17 NOTE — CONSULT LETTER
[Dear  ___] : Dear  [unfilled], [Courtesy Letter:] : I had the pleasure of seeing your patient, [unfilled], in my office today. [Please see my note below.] : Please see my note below. [Sincerely,] : Sincerely, [Consult Closing:] : Thank you very much for allowing me to participate in the care of this patient.  If you have any questions, please do not hesitate to contact me. [FreeTextEntry2] : Dr. VIELKA Blunt [FreeTextEntry3] : Dr. SCOTTY Denise

## 2019-10-17 NOTE — REASON FOR VISIT
[Myeloproliferative Disorder] : myeloproliferative disorder [Follow-Up Visit] : a follow-up visit for

## 2019-10-18 LAB — VIT B12 SERPL-MCNC: 733 PG/ML

## 2019-10-21 DIAGNOSIS — D64.9 ANEMIA, UNSPECIFIED: ICD-10-CM

## 2019-10-21 DIAGNOSIS — D47.3 ESSENTIAL (HEMORRHAGIC) THROMBOCYTHEMIA: ICD-10-CM

## 2019-10-22 LAB — METHYLMALONATE SERPL-SCNC: 180 NMOL/L

## 2019-11-18 ENCOUNTER — RX RENEWAL (OUTPATIENT)
Age: 74
End: 2019-11-18

## 2019-11-30 ENCOUNTER — OUTPATIENT (OUTPATIENT)
Dept: OUTPATIENT SERVICES | Facility: HOSPITAL | Age: 74
LOS: 1 days | Discharge: HOME | End: 2019-11-30

## 2019-12-03 DIAGNOSIS — N95.9 UNSPECIFIED MENOPAUSAL AND PERIMENOPAUSAL DISORDER: ICD-10-CM

## 2019-12-03 DIAGNOSIS — Z13.820 ENCOUNTER FOR SCREENING FOR OSTEOPOROSIS: ICD-10-CM

## 2020-01-23 ENCOUNTER — APPOINTMENT (OUTPATIENT)
Dept: HEMATOLOGY ONCOLOGY | Facility: CLINIC | Age: 75
End: 2020-01-23
Payer: MEDICARE

## 2020-01-23 ENCOUNTER — LABORATORY RESULT (OUTPATIENT)
Age: 75
End: 2020-01-23

## 2020-01-23 VITALS
HEART RATE: 88 BPM | WEIGHT: 171 LBS | SYSTOLIC BLOOD PRESSURE: 151 MMHG | TEMPERATURE: 97.9 F | HEIGHT: 61 IN | RESPIRATION RATE: 14 BRPM | BODY MASS INDEX: 32.28 KG/M2 | DIASTOLIC BLOOD PRESSURE: 68 MMHG

## 2020-01-23 LAB
ALBUMIN SERPL ELPH-MCNC: 4.7 G/DL
ALP BLD-CCNC: 54 U/L
ALT SERPL-CCNC: 16 U/L
ANION GAP SERPL CALC-SCNC: 15 MMOL/L
AST SERPL-CCNC: 29 U/L
BILIRUB SERPL-MCNC: 0.4 MG/DL
BUN SERPL-MCNC: 21 MG/DL
CALCIUM SERPL-MCNC: 9.8 MG/DL
CHLORIDE SERPL-SCNC: 101 MMOL/L
CO2 SERPL-SCNC: 25 MMOL/L
CREAT SERPL-MCNC: 1.2 MG/DL
GLUCOSE SERPL-MCNC: 105 MG/DL
HCT VFR BLD CALC: 31.4 %
HGB BLD-MCNC: 10.9 G/DL
LDH SERPL-CCNC: 323
MCHC RBC-ENTMCNC: 34.7 G/DL
MCHC RBC-ENTMCNC: 44.5 PG
MCV RBC AUTO: 128.2 FL
PLATELET # BLD AUTO: 335 K/UL
PMV BLD: 10.1 FL
POTASSIUM SERPL-SCNC: 4.8 MMOL/L
PROT SERPL-MCNC: 8.2 G/DL
RBC # BLD: 2.45 M/UL
RBC # FLD: 12.5 %
SODIUM SERPL-SCNC: 141 MMOL/L
WBC # FLD AUTO: 6.31 K/UL

## 2020-01-23 PROCEDURE — 99213 OFFICE O/P EST LOW 20 MIN: CPT

## 2020-01-23 NOTE — PHYSICAL EXAM
[Fully active, able to carry on all pre-disease performance without restriction] : Status 0 - Fully active, able to carry on all pre-disease performance without restriction [Obese] : obese [Normal] : affect appropriate [de-identified] : LE edema, may be trace (not new). [de-identified] : E [de-identified] : Eyeglasses noted.

## 2020-01-23 NOTE — HISTORY OF PRESENT ILLNESS
[de-identified] : P [de-identified] : The patient is coming for her regularly scheduled follow up for her ET.\par She has no new significant complaints. She is feeling well and remaining active.\par She is on no new medications.\par The CBC today showed a platelet count of 395 K, well-controlled.\par Her Hgb is stable at 10.7 with MCV risen to 129  the WBC count remaining within normal limits at 6.09. \par \par Her recent iron studies, B12, folate and methylmalonic acid were within normal.\par \par  Ferritin: 148, B12 845, folate > 20 and .

## 2020-01-23 NOTE — CONSULT LETTER
[Dear  ___] : Dear  [unfilled], [Courtesy Letter:] : I had the pleasure of seeing your patient, [unfilled], in my office today. [Consult Closing:] : Thank you very much for allowing me to participate in the care of this patient.  If you have any questions, please do not hesitate to contact me. [Please see my note below.] : Please see my note below. [FreeTextEntry2] : Dr. Angel Blunt [FreeTextEntry3] : Dr. SCOTTY Denise [Sincerely,] : Sincerely,

## 2020-01-23 NOTE — RESULTS/DATA
[FreeTextEntry1] : #Essential thrombocythemia: Will continue Hydrea at the same dose of 500 mg BID for 6 days and 3 capsules on Sunday\par -- Will repeat CBC, CMP, LDH\par \par #Macrocytic anemia: Most likely from Hydrea use. B 12, folate, ferritin, MMA were all within normal limits.\par --Will check myeloma panel today \par \par Recheck CBC in 3 months.\par Return to clinic for a full follow up in 6 months.\par \par All questions answered.\par

## 2020-01-24 LAB
ALBUMIN MFR SERPL ELPH: 53.5 %
ALBUMIN SERPL-MCNC: 4.2 G/DL
ALBUMIN/GLOB SERPL: 1.2 RATIO
ALPHA1 GLOB MFR SERPL ELPH: 3.5 %
ALPHA1 GLOB SERPL ELPH-MCNC: 0.3 G/DL
ALPHA2 GLOB MFR SERPL ELPH: 11.2 %
ALPHA2 GLOB SERPL ELPH-MCNC: 0.9 G/DL
B-GLOBULIN MFR SERPL ELPH: 12.7 %
B-GLOBULIN SERPL ELPH-MCNC: 1 G/DL
DEPRECATED KAPPA LC FREE/LAMBDA SER: 2.51 RATIO
GAMMA GLOB FLD ELPH-MCNC: 1.5 G/DL
GAMMA GLOB MFR SERPL ELPH: 19.1 %
IGA SER QL IEP: 358 MG/DL
IGG SER QL IEP: 1745 MG/DL
IGM SER QL IEP: 60 MG/DL
INTERPRETATION SERPL IEP-IMP: NORMAL
KAPPA LC CSF-MCNC: 2.37 MG/DL
KAPPA LC SERPL-MCNC: 5.94 MG/DL
M PROTEIN SPEC IFE-MCNC: NORMAL
PROT SERPL-MCNC: 7.8 G/DL
PROT SERPL-MCNC: 7.8 G/DL

## 2020-04-30 ENCOUNTER — APPOINTMENT (OUTPATIENT)
Dept: HEMATOLOGY ONCOLOGY | Facility: CLINIC | Age: 75
End: 2020-04-30

## 2020-04-30 ENCOUNTER — LABORATORY RESULT (OUTPATIENT)
Age: 75
End: 2020-04-30

## 2020-04-30 LAB
HCT VFR BLD CALC: 32.8 %
HGB BLD-MCNC: 11.4 G/DL
MCHC RBC-ENTMCNC: 34.8 G/DL
MCHC RBC-ENTMCNC: 42.1 PG
MCV RBC AUTO: 121 FL
PLATELET # BLD AUTO: 407 K/UL
PMV BLD: 10.7 FL
RBC # BLD: 2.71 M/UL
RBC # FLD: 12.6 %
WBC # FLD AUTO: 6.93 K/UL

## 2020-06-04 ENCOUNTER — OUTPATIENT (OUTPATIENT)
Dept: OUTPATIENT SERVICES | Facility: HOSPITAL | Age: 75
LOS: 1 days | Discharge: HOME | End: 2020-06-04
Payer: MEDICARE

## 2020-06-04 ENCOUNTER — RESULT REVIEW (OUTPATIENT)
Age: 75
End: 2020-06-04

## 2020-06-04 DIAGNOSIS — Z12.31 ENCOUNTER FOR SCREENING MAMMOGRAM FOR MALIGNANT NEOPLASM OF BREAST: ICD-10-CM

## 2020-06-04 PROCEDURE — 77067 SCR MAMMO BI INCL CAD: CPT | Mod: 26

## 2020-06-04 PROCEDURE — 77063 BREAST TOMOSYNTHESIS BI: CPT | Mod: 26

## 2020-07-13 ENCOUNTER — RX RENEWAL (OUTPATIENT)
Age: 75
End: 2020-07-13

## 2020-07-23 ENCOUNTER — LABORATORY RESULT (OUTPATIENT)
Age: 75
End: 2020-07-23

## 2020-07-23 ENCOUNTER — OUTPATIENT (OUTPATIENT)
Dept: OUTPATIENT SERVICES | Facility: HOSPITAL | Age: 75
LOS: 1 days | Discharge: HOME | End: 2020-07-23

## 2020-07-23 ENCOUNTER — APPOINTMENT (OUTPATIENT)
Dept: HEMATOLOGY ONCOLOGY | Facility: CLINIC | Age: 75
End: 2020-07-23
Payer: MEDICARE

## 2020-07-23 VITALS
RESPIRATION RATE: 14 BRPM | BODY MASS INDEX: 32.66 KG/M2 | SYSTOLIC BLOOD PRESSURE: 158 MMHG | HEART RATE: 91 BPM | WEIGHT: 173 LBS | DIASTOLIC BLOOD PRESSURE: 67 MMHG | HEIGHT: 61 IN | TEMPERATURE: 98.1 F

## 2020-07-23 DIAGNOSIS — D47.3 ESSENTIAL (HEMORRHAGIC) THROMBOCYTHEMIA: ICD-10-CM

## 2020-07-23 LAB
HCT VFR BLD CALC: 32.8 %
HGB BLD-MCNC: 11.5 G/DL
MCHC RBC-ENTMCNC: 35.1 G/DL
MCHC RBC-ENTMCNC: 41.7 PG
MCV RBC AUTO: 118.8 FL
PLATELET # BLD AUTO: 447 K/UL
PMV BLD: 10.2 FL
RBC # BLD: 2.76 M/UL
RBC # FLD: 13 %
WBC # FLD AUTO: 6.98 K/UL

## 2020-07-23 PROCEDURE — 99213 OFFICE O/P EST LOW 20 MIN: CPT

## 2020-07-23 RX ORDER — LEVOTHYROXINE SODIUM 0.17 MG/1
TABLET ORAL
Refills: 0 | Status: DISCONTINUED | COMMUNITY
End: 2020-07-23

## 2020-07-23 RX ORDER — LOSARTAN POTASSIUM AND HYDROCHLOROTHIAZIDE 100; 12.5 MG/1; MG/1
TABLET, FILM COATED ORAL
Refills: 0 | Status: DISCONTINUED | COMMUNITY
End: 2020-07-23

## 2020-07-23 NOTE — CONSULT LETTER
[Dear  ___] : Dear  [unfilled], [Courtesy Letter:] : I had the pleasure of seeing your patient, [unfilled], in my office today. [Please see my note below.] : Please see my note below. [Consult Closing:] : Thank you very much for allowing me to participate in the care of this patient.  If you have any questions, please do not hesitate to contact me. [Sincerely,] : Sincerely, [FreeTextEntry2] : Dr. VILEKA Blunt [FreeTextEntry3] : Dr. SCOTTY Denise

## 2020-07-23 NOTE — ASSESSMENT
[FreeTextEntry1] : # Essential thrombocythemia: Platelet count increased from 407 to 447 from April 2020 to July 2020. Will increase Hydrea to 1500 mg on Sundays and Thursdays and 1000 mg on the other 5 days.\par \par # Macrocytic anemia: Stable, likely secondary to Hydrea use. Will periodically monitor B12, folate, MMA, ferritin levels.\par \par Recheck CBC in 3 months. Follow up with Dr. Denise in 6 months.\par \par All questions answered.

## 2020-07-23 NOTE — HISTORY OF PRESENT ILLNESS
[de-identified] : The patient is coming for her regularly scheduled follow up for her ET. She has no new complaints and is taking no new medications. Has been staying at home and only going to doctor's appointments during COVID-19 pandemic. She is compliant with Hydrea 1000 mg 6 days a week and Hydrea 1500 mg on Sundays. Her platelet count increased from 407 to 447 from 4/30/2020 to 7/23/2020. Her WBC and hemoglobin are essentially stable. Mammogram is up-to-date. She was told that she did not need a repeat colonoscopy.\par \par Patient brought results of June 2020 labs performed by PMD Dr. Blunt. CMP, B12, and TSH within normal limits.

## 2020-07-23 NOTE — PHYSICAL EXAM
[Fully active, able to carry on all pre-disease performance without restriction] : Status 0 - Fully active, able to carry on all pre-disease performance without restriction [Obese] : obese [Normal] : normal appearance, no rash, nodules, vesicles, ulcers, erythema [de-identified] : Questionable mild thyromegaly, followed by her endocrinologist. [de-identified] : Some arthritic changes noted [de-identified] : Chronic LE edema, not significantly changed per patient. [de-identified] : But whole skin not examined

## 2020-07-23 NOTE — REVIEW OF SYSTEMS
[Negative] : Allergic/Immunologic [Recent Change In Weight] : ~T recent weight change [FreeTextEntry2] : Gained some weight while remaining at home during COVID-19 pandemic [FreeTextEntry5] : Chronic LE edema, almost no-pitting.

## 2020-07-24 DIAGNOSIS — D53.9 NUTRITIONAL ANEMIA, UNSPECIFIED: ICD-10-CM

## 2020-10-07 ENCOUNTER — RX RENEWAL (OUTPATIENT)
Age: 75
End: 2020-10-07

## 2020-10-22 ENCOUNTER — OUTPATIENT (OUTPATIENT)
Dept: OUTPATIENT SERVICES | Facility: HOSPITAL | Age: 75
LOS: 1 days | Discharge: HOME | End: 2020-10-22

## 2020-10-22 ENCOUNTER — LABORATORY RESULT (OUTPATIENT)
Age: 75
End: 2020-10-22

## 2020-10-22 ENCOUNTER — APPOINTMENT (OUTPATIENT)
Dept: HEMATOLOGY ONCOLOGY | Facility: CLINIC | Age: 75
End: 2020-10-22
Payer: MEDICARE

## 2020-10-22 LAB
HCT VFR BLD CALC: 30.5 %
HGB BLD-MCNC: 10.6 G/DL
MCHC RBC-ENTMCNC: 34.8 G/DL
MCHC RBC-ENTMCNC: 42.4 PG
MCV RBC AUTO: 122 FL
PLATELET # BLD AUTO: 349 K/UL
PMV BLD: 10.5 FL
RBC # BLD: 2.5 M/UL
RBC # FLD: 14.1 %
WBC # FLD AUTO: 6.6 K/UL

## 2020-10-22 PROCEDURE — 99212 OFFICE O/P EST SF 10 MIN: CPT

## 2020-10-22 NOTE — CONSULT LETTER
[Dear  ___] : Dear  [unfilled], [Please see my note below.] : Please see my note below. [Sincerely,] : Sincerely, [FreeTextEntry2] : Dr. VIELKA Blunt [FreeTextEntry3] : Dr. SCOTTY Denise

## 2020-10-22 NOTE — HISTORY OF PRESENT ILLNESS
[de-identified] : The patient is coming for her regularly scheduled follow up for her ET.\par Presently she has no new particular complaints.\par The CBC showed a WBC count of 6.6, Hgb 10.6 and platelets of 349 K.\par The situation was discussed with the patient.\par She should continue with Hydrea 500 mg PO TID 2 days a week and BID 5 days a week. \par F/U CBC in 3 months.\par \par All questions answered.

## 2020-11-02 DIAGNOSIS — D47.3 ESSENTIAL (HEMORRHAGIC) THROMBOCYTHEMIA: ICD-10-CM

## 2020-12-14 ENCOUNTER — RX RENEWAL (OUTPATIENT)
Age: 75
End: 2020-12-14

## 2021-01-28 ENCOUNTER — LABORATORY RESULT (OUTPATIENT)
Age: 76
End: 2021-01-28

## 2021-01-28 ENCOUNTER — OUTPATIENT (OUTPATIENT)
Dept: OUTPATIENT SERVICES | Facility: HOSPITAL | Age: 76
LOS: 1 days | Discharge: HOME | End: 2021-01-28

## 2021-01-28 ENCOUNTER — APPOINTMENT (OUTPATIENT)
Dept: HEMATOLOGY ONCOLOGY | Facility: CLINIC | Age: 76
End: 2021-01-28
Payer: MEDICARE

## 2021-01-28 VITALS
HEIGHT: 61 IN | RESPIRATION RATE: 14 BRPM | WEIGHT: 171 LBS | BODY MASS INDEX: 32.28 KG/M2 | DIASTOLIC BLOOD PRESSURE: 87 MMHG | SYSTOLIC BLOOD PRESSURE: 162 MMHG | TEMPERATURE: 97.3 F | HEART RATE: 96 BPM

## 2021-01-28 LAB
HCT VFR BLD CALC: 30.7 %
HGB BLD-MCNC: 10.5 G/DL
MCHC RBC-ENTMCNC: 34.2 G/DL
MCHC RBC-ENTMCNC: 40.5 PG
MCV RBC AUTO: 118.5 FL
PLATELET # BLD AUTO: 384 K/UL
PMV BLD: 10.8 FL
RBC # BLD: 2.59 M/UL
RBC # FLD: 17.6 %
WBC # FLD AUTO: 6.37 K/UL

## 2021-01-28 PROCEDURE — 99213 OFFICE O/P EST LOW 20 MIN: CPT

## 2021-01-28 NOTE — HISTORY OF PRESENT ILLNESS
[de-identified] : 1/28/2021: The patient is coming for her regularly scheduled follow up for her ET. \par The CBC today showed a WBC count of 6.37 , Hgb 10.5  and platelets of 384 K.\par She is currently on Hydrea 500 mg PO TID 2 days a week and BID 5 days a week. She has no complaints today\par \par \par

## 2021-01-28 NOTE — REVIEW OF SYSTEMS
[Recent Change In Weight] : ~T recent weight change [SOB on Exertion] : shortness of breath during exertion [Negative] : Psychiatric [FreeTextEntry2] : Nori gain

## 2021-01-28 NOTE — PHYSICAL EXAM
[Restricted in physically strenuous activity but ambulatory and able to carry out work of a light or sedentary nature] : Status 1- Restricted in physically strenuous activity but ambulatory and able to carry out work of a light or sedentary nature, e.g., light house work, office work [Normal] : affect appropriate [de-identified] : Arthritic changes noted.

## 2021-01-28 NOTE — ASSESSMENT
[FreeTextEntry1] : # Essential thrombocythemia.\par - Today' CBC showing stable counts, with macrocytic anemia secondary to Hydrea.\par - Continue with Hydrea 500 mg PO TID 2 days a week and BID 5 days a week since this dose is keeping the platelet count at slightly below 400 K.\par - F/U CBC in 3 months.\par \par All questions answered.\par \par The patient was seen and examined by Dr Denise who agreed with the above plan.

## 2021-01-28 NOTE — CONSULT LETTER
[Dear  ___] : Dear  [unfilled], [Please see my note below.] : Please see my note below. [Sincerely,] : Sincerely, [Courtesy Letter:] : I had the pleasure of seeing your patient, [unfilled], in my office today. [FreeTextEntry2] : Dr. VIELKA Blunt [FreeTextEntry3] : Dr. SCOTTY Denise

## 2021-02-03 DIAGNOSIS — D47.3 ESSENTIAL (HEMORRHAGIC) THROMBOCYTHEMIA: ICD-10-CM

## 2021-02-03 DIAGNOSIS — D53.9 NUTRITIONAL ANEMIA, UNSPECIFIED: ICD-10-CM

## 2021-03-15 ENCOUNTER — RX RENEWAL (OUTPATIENT)
Age: 76
End: 2021-03-15

## 2021-03-18 ENCOUNTER — APPOINTMENT (OUTPATIENT)
Dept: PULMONOLOGY | Facility: CLINIC | Age: 76
End: 2021-03-18
Payer: MEDICARE

## 2021-03-18 VITALS
HEART RATE: 109 BPM | BODY MASS INDEX: 34.27 KG/M2 | OXYGEN SATURATION: 92 % | HEIGHT: 59 IN | WEIGHT: 170 LBS | SYSTOLIC BLOOD PRESSURE: 134 MMHG | RESPIRATION RATE: 14 BRPM | DIASTOLIC BLOOD PRESSURE: 80 MMHG

## 2021-03-18 PROCEDURE — 99203 OFFICE O/P NEW LOW 30 MIN: CPT

## 2021-03-18 NOTE — HISTORY OF PRESENT ILLNESS
[Dyspnea on Exertion] : dyspnea on exertion [Shortness of Breath] : Shortness of Breath [Initial Evaluation] : an initial evaluation of [Excessive Daytime Sleepiness] : excessive daytime sleepiness [Snoring] : snoring [Unrefreshing Sleep] : unrefreshing sleep [Sleepy When Sedentary] : sleepy when sedentary [Currently Experiencing] : The patient is currently experiencing symptoms. [None] : No associated symptoms are reported [Good Sleep Hygiene] : good sleep hygiene

## 2021-03-18 NOTE — REASON FOR VISIT
[Initial] : an initial visit [Abnormal CXR/ Chest CT] : an abnormal CXR/ chest CT [Shortness of Breath] : shortness of breath [ILD] : ILD [Family Member] : family member

## 2021-03-19 ENCOUNTER — LABORATORY RESULT (OUTPATIENT)
Age: 76
End: 2021-03-19

## 2021-04-07 ENCOUNTER — OUTPATIENT (OUTPATIENT)
Dept: OUTPATIENT SERVICES | Facility: HOSPITAL | Age: 76
LOS: 1 days | Discharge: HOME | End: 2021-04-07
Payer: MEDICARE

## 2021-04-07 ENCOUNTER — RESULT REVIEW (OUTPATIENT)
Age: 76
End: 2021-04-07

## 2021-04-07 DIAGNOSIS — J84.112 IDIOPATHIC PULMONARY FIBROSIS: ICD-10-CM

## 2021-04-07 PROCEDURE — 71250 CT THORAX DX C-: CPT | Mod: 26,MH

## 2021-04-13 ENCOUNTER — LABORATORY RESULT (OUTPATIENT)
Age: 76
End: 2021-04-13

## 2021-04-13 ENCOUNTER — OUTPATIENT (OUTPATIENT)
Dept: OUTPATIENT SERVICES | Facility: HOSPITAL | Age: 76
LOS: 1 days | Discharge: HOME | End: 2021-04-13

## 2021-04-13 DIAGNOSIS — Z11.59 ENCOUNTER FOR SCREENING FOR OTHER VIRAL DISEASES: ICD-10-CM

## 2021-04-16 ENCOUNTER — OUTPATIENT (OUTPATIENT)
Dept: OUTPATIENT SERVICES | Facility: HOSPITAL | Age: 76
LOS: 1 days | Discharge: HOME | End: 2021-04-16
Payer: MEDICARE

## 2021-04-16 DIAGNOSIS — R06.02 SHORTNESS OF BREATH: ICD-10-CM

## 2021-04-16 PROCEDURE — 94727 GAS DIL/WSHOT DETER LNG VOL: CPT | Mod: 26

## 2021-04-16 PROCEDURE — 94060 EVALUATION OF WHEEZING: CPT | Mod: 26

## 2021-04-16 PROCEDURE — 94729 DIFFUSING CAPACITY: CPT | Mod: 26

## 2021-04-25 ENCOUNTER — EMERGENCY (EMERGENCY)
Facility: HOSPITAL | Age: 76
LOS: 0 days | Discharge: HOME | End: 2021-04-25
Attending: EMERGENCY MEDICINE | Admitting: EMERGENCY MEDICINE
Payer: MEDICARE

## 2021-04-25 VITALS
OXYGEN SATURATION: 91 % | TEMPERATURE: 98 F | DIASTOLIC BLOOD PRESSURE: 66 MMHG | SYSTOLIC BLOOD PRESSURE: 146 MMHG | HEART RATE: 95 BPM | RESPIRATION RATE: 18 BRPM

## 2021-04-25 DIAGNOSIS — Z86.2 PERSONAL HISTORY OF DISEASES OF THE BLOOD AND BLOOD-FORMING ORGANS AND CERTAIN DISORDERS INVOLVING THE IMMUNE MECHANISM: ICD-10-CM

## 2021-04-25 DIAGNOSIS — I10 ESSENTIAL (PRIMARY) HYPERTENSION: ICD-10-CM

## 2021-04-25 DIAGNOSIS — Z86.59 PERSONAL HISTORY OF OTHER MENTAL AND BEHAVIORAL DISORDERS: ICD-10-CM

## 2021-04-25 DIAGNOSIS — R42 DIZZINESS AND GIDDINESS: ICD-10-CM

## 2021-04-25 DIAGNOSIS — E03.9 HYPOTHYROIDISM, UNSPECIFIED: ICD-10-CM

## 2021-04-25 PROCEDURE — 70450 CT HEAD/BRAIN W/O DYE: CPT | Mod: 26,MA

## 2021-04-25 PROCEDURE — 99284 EMERGENCY DEPT VISIT MOD MDM: CPT

## 2021-04-25 RX ORDER — MECLIZINE HCL 12.5 MG
1 TABLET ORAL
Qty: 14 | Refills: 0
Start: 2021-04-25 | End: 2021-05-01

## 2021-04-25 NOTE — ED ADULT NURSE NOTE - NSIMPLEMENTINTERV_GEN_ALL_ED
Implemented All Fall with Harm Risk Interventions:  [employer] to call system. Call bell, personal items and telephone within reach. Instruct patient to call for assistance. Room bathroom lighting operational. Non-slip footwear when patient is off stretcher. Physically safe environment: no spills, clutter or unnecessary equipment. Stretcher in lowest position, wheels locked, appropriate side rails in place. Provide visual cue, wrist band, yellow gown, etc. Monitor gait and stability. Monitor for mental status changes and reorient to person, place, and time. Review medications for side effects contributing to fall risk. Reinforce activity limits and safety measures with patient and family. Provide visual clues: red socks.

## 2021-04-25 NOTE — ED PROVIDER NOTE - PATIENT PORTAL LINK FT
You can access the FollowMyHealth Patient Portal offered by [facility] by registering at the following website: [email or web address]. By joining [facility]’s FollowMyHealth portal, you will also be able to view your health information using other applications (apps) compatible with our system.

## 2021-04-25 NOTE — ED PROVIDER NOTE - CLINICAL SUMMARY MEDICAL DECISION MAKING FREE TEXT BOX
Patient presented with episode of vertigo last night which has since resolved. Otherwise afebrile, HD stable, neurovascularly intact. Obtained CT head which was negative for emergent findings. Patient remained asymptomatic during ED course. Ambulatory, tolerates PO. Given the above, will discharge home with outpatient follow up. Patient agreeable with plan. Agrees to return to ED for any new or worsening symptoms.

## 2021-04-25 NOTE — ED PROVIDER NOTE - PROVIDER TOKENS
PROVIDER:[TOKEN:[[id number]],FOLLOWUP:[Routine]],PROVIDER:[TOKEN:[[id number]],FOLLOWUP:[Routine]]

## 2021-04-25 NOTE — ED PROVIDER NOTE - NS ED ROS FT
Review of Systems:  CONSTITUTIONAL: No fever, No diaphoresis   SKIN: No rash  HEMATOLOGIC: No abnormal bleeding or bruising  EYES: No eye pain, No blurred vision  ENT:  No sore throat, No neck pain, No rhinorrhea   RESPIRATORY: No shortness of breath, No cough  CARDIAC: No chest pain, No palpitations  GI: No abdominal pain, No nausea, No vomiting, No diarrhea, No constipation, No bright red blood per rectum or melena. No flank pain  : No dysuria, frequency, hematuria.   MUSCULOSKELETAL: No joint paint, No swelling, No back pain  NEUROLOGIC: No numbness, No focal weakness, No headache, +dizziness  All other systems negative, unless specified in HPI

## 2021-04-25 NOTE — ED PROVIDER NOTE - PHYSICAL EXAMINATION
CONSTITUTIONAL: Well-developed; well-nourished; in no acute distress.   SKIN: warm, dry  HEAD: Normocephalic; atraumatic.  EYES: no conjunctival injection. PERRLA. EOMI.   ENT: No nasal discharge; airway clear.  NECK: Supple; non tender.  CARD: S1, S2 normal; Regular rate and rhythm.   RESP: No wheezes, rales or rhonchi.  ABD: soft ntnd. No CVA TTP.   EXT: Normal ROM. No LE edema.   LYMPH: No acute cervical adenopathy.  NEURO: AOx3, CN 2-12 grossly intact. +5/5 strength and sensation wnl in all extremities. No pronator drift, no dysarthria, no ataxia, normal gait, no DM/DDK, negative romberg.   PSYCH: Cooperative, appropriate.

## 2021-04-25 NOTE — ED PROVIDER NOTE - OBJECTIVE STATEMENT
74 y/o F PMHx HTN, essential thrombocytosis on hydroxyurea, hypothyroidism, anemia, anxiety/depression presents to ED after a brief episode of dizziness last night. Patient reports she was going back to her bed from the bathroom, felt like she layed down too quickly then felt the room spinning with associated nausea for about 5 minutes until it self resolved. Patient currently is asymptomatic. Denies fevers, chills, vision changes, dizziness, nausea, vomiting.

## 2021-04-25 NOTE — ED PROVIDER NOTE - CARE PROVIDER_API CALL
Angel Blunt  INFECTIOUS DISEASE  [address] [place] [id number]  Phone: [phone]  Fax: [phone]  Follow Up Time: Routine    Bay Contreras  NEUROLOGY  [address] [place] [id number]  Phone: [phone]  Fax: [phone]  Follow Up Time: Routine

## 2021-04-25 NOTE — ED PROVIDER NOTE - ATTENDING CONTRIBUTION TO CARE
75 year old female, pmhx as documented presenting with episode of dizziness (room spinning) last night which has since resolved. States she laid down too quickly and felt the symptoms which resolved after a few minutes. (+) nausea at the time but otherwise denies headache, vision changes, numbness/weakness, neck pain, ear fullness, vomiting or any other symptoms. Currently asymptomatic.    Vital Signs: I have reviewed the initial vital signs.  Constitutional: NAD, well-nourished, appears stated age, no acute distress.  HEENT: Airway patent, moist MM, no erythema/swelling/deformity of oral structures. EOMI, PERRLA.  CV: regular rate, regular rhythm, well-perfused extremities, 2+ b/l DP and radial pulses equal.  Lungs: BCTA, no increased WOB.  ABD: NTND, no guarding or rebound, no pulsatile mass, no hernias.   MSK: Neck supple, nontender, nl ROM, no stepoff. Chest nontender. Back nontender in TLS spine or to b/l bony structures or flanks. Ext nontender, nl rom, no deformity.   INTEG: Skin warm, dry, no rash.  NEURO: A&Ox3, normal strength, nl sensation throughout, normal speech.   PSYCH: Calm, cooperative, normal affect and interaction.    Neurovascularly intact. Will obtain CT head, re-eval.

## 2021-04-29 ENCOUNTER — LABORATORY RESULT (OUTPATIENT)
Age: 76
End: 2021-04-29

## 2021-04-29 ENCOUNTER — APPOINTMENT (OUTPATIENT)
Dept: HEMATOLOGY ONCOLOGY | Facility: CLINIC | Age: 76
End: 2021-04-29
Payer: MEDICARE

## 2021-04-29 PROBLEM — E03.9 HYPOTHYROIDISM, UNSPECIFIED: Chronic | Status: ACTIVE | Noted: 2021-04-25

## 2021-04-29 PROBLEM — F32.9 MAJOR DEPRESSIVE DISORDER, SINGLE EPISODE, UNSPECIFIED: Chronic | Status: ACTIVE | Noted: 2021-04-25

## 2021-04-29 PROBLEM — I10 ESSENTIAL (PRIMARY) HYPERTENSION: Chronic | Status: ACTIVE | Noted: 2021-04-25

## 2021-04-29 LAB
HCT VFR BLD CALC: 29.1 %
HGB BLD-MCNC: 10 G/DL
MCHC RBC-ENTMCNC: 34.4 G/DL
MCHC RBC-ENTMCNC: 38.8 PG
MCV RBC AUTO: 112.8 FL
PLATELET # BLD AUTO: 255 K/UL
PMV BLD: 10.9 FL
RBC # BLD: 2.58 M/UL
RBC # FLD: 24.7 %
WBC # FLD AUTO: 7.16 K/UL

## 2021-04-29 PROCEDURE — 99212 OFFICE O/P EST SF 10 MIN: CPT

## 2021-04-29 RX ORDER — ASPIRIN 81 MG
81 TABLET,CHEWABLE ORAL
Refills: 0 | Status: DISCONTINUED | COMMUNITY
End: 2021-04-29

## 2021-04-29 NOTE — ASSESSMENT
[FreeTextEntry1] : # Essential thrombocythemia.\par - Today' CBC showing stable counts, with macrocytic anemia secondary to Hydrea.\par - Continue with Hydrea 500 mg PO TID 1 day a week and BID 6 days a week since this dose is keeping the platelet count at slightly below 400 K.\par - F/U CBC in 3 months.\par \par All questions answered.\par \par The patient was seen and examined by Dr Denise who agreed with the above plan.

## 2021-04-29 NOTE — HISTORY OF PRESENT ILLNESS
[de-identified] : 4/29/2021: The patient is coming for her regularly scheduled follow up for her ET. \par The CBC today showed a WBC count of  , Hgb   and platelets of  K.\par She is currently on Hydrea 500 mg PO TID 2 days a week and BID 5 days a week. She has no complaints today.\par She is following regularly with all her other physicians. Recently, she was told by her cardiologist to take an iron pill 3 times a week for her anemia (although that is most likely from the Hydrea since her iron studies were within acceptable limits.\par The CBC today showed a WBC count of 7.16, Hgb of 10.0 with .8, platelets 255 K.\par The patient was told to continue with Hydrea 500 mg PO BID except on Sunday when she will continue taking 3 times a day. We are essentially decreasing the Saturday dose from 3 to 2 a day.\par Will repeat the counts in 3 months.\par The patient was told to take her medication regularly more or less at the same time every day to avoid artifactual differences.\par \par All questions answered.\par \par \par \par \par

## 2021-04-29 NOTE — PHYSICAL EXAM
[Restricted in physically strenuous activity but ambulatory and able to carry out work of a light or sedentary nature] : Status 1- Restricted in physically strenuous activity but ambulatory and able to carry out work of a light or sedentary nature, e.g., light house work, office work [Normal] : affect appropriate [de-identified] : Arthritic changes noted.

## 2021-04-29 NOTE — REVIEW OF SYSTEMS
[Recent Change In Weight] : ~T recent weight change [SOB on Exertion] : shortness of breath during exertion [Negative] : Heme/Lymph [FreeTextEntry2] : Nori gain

## 2021-04-29 NOTE — CONSULT LETTER
[Dear  ___] : Dear  [unfilled], [Courtesy Letter:] : I had the pleasure of seeing your patient, [unfilled], in my office today. [Please see my note below.] : Please see my note below. [Sincerely,] : Sincerely, [FreeTextEntry2] : Dr. VIELKA Blunt [FreeTextEntry3] : Dr. SCOTTY Denise

## 2021-05-10 ENCOUNTER — APPOINTMENT (OUTPATIENT)
Dept: PULMONOLOGY | Facility: CLINIC | Age: 76
End: 2021-05-10
Payer: MEDICARE

## 2021-05-10 VITALS
WEIGHT: 170 LBS | RESPIRATION RATE: 14 BRPM | HEART RATE: 80 BPM | OXYGEN SATURATION: 94 % | SYSTOLIC BLOOD PRESSURE: 126 MMHG | DIASTOLIC BLOOD PRESSURE: 80 MMHG | HEIGHT: 59 IN | BODY MASS INDEX: 34.27 KG/M2

## 2021-05-10 PROCEDURE — 99214 OFFICE O/P EST MOD 30 MIN: CPT

## 2021-05-10 NOTE — HISTORY OF PRESENT ILLNESS
[Follow-Up - Routine Clinic] : a routine clinic follow-up of [Dyspnea on Exertion] : dyspnea on exertion [Shortness of Breath] : Shortness of Breath [Initial Evaluation] : an initial evaluation of [Excessive Daytime Sleepiness] : excessive daytime sleepiness [Snoring] : snoring [Unrefreshing Sleep] : unrefreshing sleep [Sleepy When Sedentary] : sleepy when sedentary [Currently Experiencing] : The patient is currently experiencing symptoms. [None] : No associated symptoms are reported [Good Sleep Hygiene] : good sleep hygiene

## 2021-05-10 NOTE — ASSESSMENT
[FreeTextEntry1] : ILD likely NSIP ( could be related to Hydrourea?) \par Rheum screen negative \par Possible SEEMA

## 2021-05-11 ENCOUNTER — LABORATORY RESULT (OUTPATIENT)
Age: 76
End: 2021-05-11

## 2021-05-11 ENCOUNTER — APPOINTMENT (OUTPATIENT)
Dept: OBGYN | Facility: CLINIC | Age: 76
End: 2021-05-11
Payer: MEDICARE

## 2021-05-11 VITALS
SYSTOLIC BLOOD PRESSURE: 131 MMHG | WEIGHT: 170 LBS | BODY MASS INDEX: 34.27 KG/M2 | DIASTOLIC BLOOD PRESSURE: 58 MMHG | HEIGHT: 59 IN

## 2021-05-11 DIAGNOSIS — N95.2 POSTMENOPAUSAL ATROPHIC VAGINITIS: ICD-10-CM

## 2021-05-11 DIAGNOSIS — Z01.419 ENCOUNTER FOR GYNECOLOGICAL EXAMINATION (GENERAL) (ROUTINE) W/OUT ABNORMAL FINDINGS: ICD-10-CM

## 2021-05-11 PROCEDURE — 99203 OFFICE O/P NEW LOW 30 MIN: CPT

## 2021-05-11 PROCEDURE — 99213 OFFICE O/P EST LOW 20 MIN: CPT

## 2021-05-15 ENCOUNTER — LABORATORY RESULT (OUTPATIENT)
Age: 76
End: 2021-05-15

## 2021-05-15 ENCOUNTER — OUTPATIENT (OUTPATIENT)
Dept: OUTPATIENT SERVICES | Facility: HOSPITAL | Age: 76
LOS: 1 days | Discharge: HOME | End: 2021-05-15

## 2021-05-15 DIAGNOSIS — Z11.59 ENCOUNTER FOR SCREENING FOR OTHER VIRAL DISEASES: ICD-10-CM

## 2021-05-18 ENCOUNTER — TRANSCRIPTION ENCOUNTER (OUTPATIENT)
Age: 76
End: 2021-05-18

## 2021-05-18 ENCOUNTER — RESULT REVIEW (OUTPATIENT)
Age: 76
End: 2021-05-18

## 2021-05-18 ENCOUNTER — OUTPATIENT (OUTPATIENT)
Dept: OUTPATIENT SERVICES | Facility: HOSPITAL | Age: 76
LOS: 1 days | Discharge: HOME | End: 2021-05-18
Payer: MEDICARE

## 2021-05-18 VITALS
TEMPERATURE: 98 F | WEIGHT: 169.98 LBS | RESPIRATION RATE: 18 BRPM | HEART RATE: 94 BPM | DIASTOLIC BLOOD PRESSURE: 72 MMHG | SYSTOLIC BLOOD PRESSURE: 152 MMHG

## 2021-05-18 VITALS
SYSTOLIC BLOOD PRESSURE: 101 MMHG | RESPIRATION RATE: 18 BRPM | HEART RATE: 86 BPM | DIASTOLIC BLOOD PRESSURE: 52 MMHG | OXYGEN SATURATION: 95 %

## 2021-05-18 DIAGNOSIS — Z87.42 PERSONAL HISTORY OF OTHER DISEASES OF THE FEMALE GENITAL TRACT: Chronic | ICD-10-CM

## 2021-05-18 LAB
B PERT IGG+IGM PNL SER: CLEAR — SIGNIFICANT CHANGE UP
COLOR FLD: SIGNIFICANT CHANGE UP
FLUID INTAKE SUBSTANCE CLASS: SIGNIFICANT CHANGE UP
FLUID SEGMENTED GRANULOCYTES: 77 % — SIGNIFICANT CHANGE UP
LYMPHOCYTES # FLD: 20 — SIGNIFICANT CHANGE UP
MONOS+MACROS # FLD: 3 % — SIGNIFICANT CHANGE UP
RCV VOL RI: 0 /UL — SIGNIFICANT CHANGE UP (ref 0–0)
SPECIMEN SOURCE FLD: SIGNIFICANT CHANGE UP
TOTAL NUCLEATED CELL COUNT, BODY FLUID: 78 /UL — SIGNIFICANT CHANGE UP
TUBE TYPE: SIGNIFICANT CHANGE UP

## 2021-05-18 PROCEDURE — 88112 CYTOPATH CELL ENHANCE TECH: CPT | Mod: 26

## 2021-05-18 PROCEDURE — 31624 DX BRONCHOSCOPE/LAVAGE: CPT

## 2021-05-18 PROCEDURE — 88312 SPECIAL STAINS GROUP 1: CPT | Mod: 26

## 2021-05-18 RX ORDER — MEPERIDINE HYDROCHLORIDE 50 MG/ML
50 INJECTION INTRAMUSCULAR; INTRAVENOUS; SUBCUTANEOUS ONCE
Refills: 0 | Status: COMPLETED | OUTPATIENT
Start: 2021-05-18 | End: 2021-05-18

## 2021-05-18 RX ORDER — MIDAZOLAM HYDROCHLORIDE 1 MG/ML
3 INJECTION, SOLUTION INTRAMUSCULAR; INTRAVENOUS ONCE
Refills: 0 | Status: COMPLETED | OUTPATIENT
Start: 2021-05-18 | End: 2021-05-18

## 2021-05-18 NOTE — H&P PST ADULT - RADIOLOGY RESULTS AND INTERPRETATION
< from: CT Chest No Cont (04.07.21 @ 08:24) >  Lungs, Pleura, and Airways: Patent central tracheobronchial tree.  Diffuse widespread mosaic attenuation/architectural distortion/subpleural reticulations without dominant honeycombing or central traction bronchiectasis is noted, increased significantly from previous study of May 16, 2018. No significant pattern difference on expiratory imaging is noted. The possibility of fibrotic hypersensitivity pneumonitis should be considered in differential diagnosis.  Stable calcified pleural plaque formation is noted  No suspicious pulmonary nodules.  Mediastinum/Lymph Nodes: Stable scattered mediastinal lymph nodes largest measuring approximately 1.2 x 0.9 cm, right paratracheal compartment (series 4/74). Stable calcified subcarinal lymph nodes.  Heart/Great Vessels: No pericardial effusions. Great vessels are normal in caliber. Scattered aortic calcifications. Great vessels are normal in caliber.  Abdomen: Punctate cholelithiasis (4/205). Stable approximate 1.6 cm gastrohepatic lymph node (7/23  Bones and soft tissues: Severe degenerative changes thoracic spine. Stable left chest wall calcification.

## 2021-05-18 NOTE — ASU PATIENT PROFILE, ADULT - PSH
Use warm compresses over the affected area to promote drainage. Take the antibiotics as prescribed. Keep the area clean with soap and water. Follow-up with your pediatrician early next week for repeat evaluation. Keep it covered while working out. History of ovarian cyst

## 2021-05-18 NOTE — ASU DISCHARGE PLAN (ADULT/PEDIATRIC) - CARE PROVIDER_API CALL
Cameron Stephens)  Critical Care Medicine; Pulmonary Disease; Sleep Medicine  [address] [place] [address]  Phone: [phone]  Fax: [phone]  Established Patient  Follow Up Time: 2 weeks   Cameron Stephens)  Critical Care Medicine; Pulmonary Disease; Sleep Medicine  [address] [place] [address]  Phone: [phone]  Fax: [phone]  Established Patient  Scheduled Appointment: 06/07/2021 01:45 PM

## 2021-05-18 NOTE — H&P PST ADULT - HISTORY OF PRESENT ILLNESS
75 year old female with a past medical history of ILD likely NSIP, possible SEEMA, Essential Thrombocytosis on Hydroxyurea, Hypothyroidism, Anemia, presenting for Bronchoscopy with BAL.  Patient has been complaining of mild dyspnea on exertion.  Recent CT chest shows diffuse widespread mosaic attenuation/architectural distortion/subpleural reticulations without dominant honeycombing or central traction bronchiectasis.

## 2021-05-18 NOTE — H&P PST ADULT - NSICDXPASTMEDICALHX_GEN_ALL_CORE_FT
PAST MEDICAL HISTORY:  Depression     Essential thrombocytopenia     HTN (hypertension)     Hypothyroid     Interstitial lung disease

## 2021-05-18 NOTE — ASU DISCHARGE PLAN (ADULT/PEDIATRIC) - PROVIDER TOKENS
PROVIDER:[TOKEN:[[id number]],FOLLOWUP:[2 weeks],ESTABLISHEDPATIENT:[T]] PROVIDER:[TOKEN:[[id number]],SCHEDULEDAPPT:[06/07/2021],SCHEDULEDAPPTTIME:[01:45 PM],ESTABLISHEDPATIENT:[T]]

## 2021-05-19 LAB
GRAM STN FLD: SIGNIFICANT CHANGE UP
GRAM STN FLD: SIGNIFICANT CHANGE UP
NIGHT BLUE STAIN TISS: SIGNIFICANT CHANGE UP
NIGHT BLUE STAIN TISS: SIGNIFICANT CHANGE UP
NON-GYNECOLOGICAL CYTOLOGY STUDY: SIGNIFICANT CHANGE UP
SPECIMEN SOURCE: SIGNIFICANT CHANGE UP

## 2021-05-20 ENCOUNTER — OUTPATIENT (OUTPATIENT)
Dept: OUTPATIENT SERVICES | Facility: HOSPITAL | Age: 76
LOS: 1 days | Discharge: HOME | End: 2021-05-20
Payer: MEDICARE

## 2021-05-20 DIAGNOSIS — Z87.42 PERSONAL HISTORY OF OTHER DISEASES OF THE FEMALE GENITAL TRACT: Chronic | ICD-10-CM

## 2021-05-20 LAB
CULTURE RESULTS: SIGNIFICANT CHANGE UP
CULTURE RESULTS: SIGNIFICANT CHANGE UP
SPECIMEN SOURCE: SIGNIFICANT CHANGE UP
SPECIMEN SOURCE: SIGNIFICANT CHANGE UP

## 2021-05-20 PROCEDURE — 95810 POLYSOM 6/> YRS 4/> PARAM: CPT | Mod: 26

## 2021-05-21 DIAGNOSIS — G47.33 OBSTRUCTIVE SLEEP APNEA (ADULT) (PEDIATRIC): ICD-10-CM

## 2021-05-21 PROBLEM — D69.3 IMMUNE THROMBOCYTOPENIC PURPURA: Chronic | Status: ACTIVE | Noted: 2021-05-18

## 2021-05-21 PROBLEM — J84.9 INTERSTITIAL PULMONARY DISEASE, UNSPECIFIED: Chronic | Status: ACTIVE | Noted: 2021-05-18

## 2021-05-25 LAB — CYTOLOGY CVX/VAG DOC THIN PREP: ABNORMAL

## 2021-05-27 DIAGNOSIS — J84.9 INTERSTITIAL PULMONARY DISEASE, UNSPECIFIED: ICD-10-CM

## 2021-05-27 DIAGNOSIS — F32.9 MAJOR DEPRESSIVE DISORDER, SINGLE EPISODE, UNSPECIFIED: ICD-10-CM

## 2021-05-27 DIAGNOSIS — I10 ESSENTIAL (PRIMARY) HYPERTENSION: ICD-10-CM

## 2021-05-27 DIAGNOSIS — E03.9 HYPOTHYROIDISM, UNSPECIFIED: ICD-10-CM

## 2021-05-27 DIAGNOSIS — D64.9 ANEMIA, UNSPECIFIED: ICD-10-CM

## 2021-06-07 ENCOUNTER — APPOINTMENT (OUTPATIENT)
Age: 76
End: 2021-06-07

## 2021-06-07 ENCOUNTER — RX RENEWAL (OUTPATIENT)
Age: 76
End: 2021-06-07

## 2021-07-01 ENCOUNTER — APPOINTMENT (OUTPATIENT)
Age: 76
End: 2021-07-01
Payer: MEDICARE

## 2021-07-01 VITALS
SYSTOLIC BLOOD PRESSURE: 140 MMHG | BODY MASS INDEX: 34.07 KG/M2 | HEIGHT: 59 IN | RESPIRATION RATE: 12 BRPM | OXYGEN SATURATION: 93 % | DIASTOLIC BLOOD PRESSURE: 70 MMHG | WEIGHT: 169 LBS | HEART RATE: 92 BPM

## 2021-07-01 PROCEDURE — 99214 OFFICE O/P EST MOD 30 MIN: CPT

## 2021-07-01 NOTE — ASSESSMENT
[FreeTextEntry1] : ILD likely NSIP ( could be related to Hydrourea?) \par Rheum screen negative \par Severe SEEMA

## 2021-07-01 NOTE — REASON FOR VISIT
[Follow-Up] : a follow-up visit [Abnormal CXR/ Chest CT] : an abnormal CXR/ chest CT [Shortness of Breath] : shortness of breath [ILD] : ILD [Family Member] : family member

## 2021-07-12 ENCOUNTER — APPOINTMENT (OUTPATIENT)
Dept: HEMATOLOGY ONCOLOGY | Facility: CLINIC | Age: 76
End: 2021-07-12
Payer: MEDICARE

## 2021-07-12 ENCOUNTER — LABORATORY RESULT (OUTPATIENT)
Age: 76
End: 2021-07-12

## 2021-07-12 VITALS
SYSTOLIC BLOOD PRESSURE: 168 MMHG | HEIGHT: 58 IN | BODY MASS INDEX: 36.53 KG/M2 | TEMPERATURE: 97.3 F | DIASTOLIC BLOOD PRESSURE: 77 MMHG | WEIGHT: 174 LBS | HEART RATE: 89 BPM

## 2021-07-12 LAB
HCT VFR BLD CALC: 32.8 %
HGB BLD-MCNC: 10.8 G/DL
MCHC RBC-ENTMCNC: 32.9 G/DL
MCHC RBC-ENTMCNC: 35.9 PG
MCV RBC AUTO: 109 FL
PLATELET # BLD AUTO: 501 K/UL
PMV BLD: 10.4 FL
RBC # BLD: 3.01 M/UL
RBC # FLD: 28 %
WBC # FLD AUTO: 12.42 K/UL

## 2021-07-12 PROCEDURE — 99214 OFFICE O/P EST MOD 30 MIN: CPT

## 2021-07-12 NOTE — HISTORY OF PRESENT ILLNESS
[Disease:__________________________] : Disease: [unfilled] [de-identified] : The patient is coming for her regularly scheduled follow up for her ET.\par She was recently seen by the pulmonary specialist, Dr. Stephens, for her pulmonary infiltrates. She was investigated extensively, including lung biopsy, and she was found to have changes that could be associated with Hydrea. There was no malignancy.\par The patient complains of SOB on exertion but not different from a few months ago. The patient has not stopped the Hydrea yet.\par Apparently, the patient was also found to have sleep apnea.

## 2021-07-12 NOTE — REVIEW OF SYSTEMS
[Fatigue] : fatigue [Recent Change In Weight] : ~T recent weight change [Loss of Hearing] : loss of hearing [Lower Ext Edema] : lower extremity edema [SOB on Exertion] : shortness of breath during exertion [Joint Pain] : joint pain [Joint Stiffness] : joint stiffness [Insomnia] : insomnia [Negative] : Heme/Lymph [FreeTextEntry2] : Gained more weight [FreeTextEntry9] : Low back especially when she walks a distance

## 2021-07-12 NOTE — PHYSICAL EXAM
[Restricted in physically strenuous activity but ambulatory and able to carry out work of a light or sedentary nature] : Status 1- Restricted in physically strenuous activity but ambulatory and able to carry out work of a light or sedentary nature, e.g., light house work, office work [Obese] : obese [Normal] : affect appropriate [de-identified] : Some arthritic changes

## 2021-07-12 NOTE — REASON FOR VISIT
[Follow-Up Visit] : a follow-up visit for [Thrombocytosis] : thrombocytosis [Family Member] : family member [FreeTextEntry2] : ET

## 2021-07-12 NOTE — ASSESSMENT
[FreeTextEntry1] : 1. Essential Thrombocythemia. The platelets are up today to 501 K.\par 2. Pulmonary infiltrates, thought to be secondary to Hydrea.\par \par Will discontinue Hydrea and switch the patient to anagrelide 0.5 mg PO BID. The patient will have her CBC monitored 2 weeks into the treatment with anagrelide. Depending on the response, we will decide about the periodicity of the follow ups.\par \par All questions answered.

## 2021-07-13 ENCOUNTER — OUTPATIENT (OUTPATIENT)
Dept: OUTPATIENT SERVICES | Facility: HOSPITAL | Age: 76
LOS: 1 days | Discharge: HOME | End: 2021-07-13
Payer: MEDICARE

## 2021-07-13 DIAGNOSIS — Z87.42 PERSONAL HISTORY OF OTHER DISEASES OF THE FEMALE GENITAL TRACT: Chronic | ICD-10-CM

## 2021-07-13 LAB
ALBUMIN SERPL ELPH-MCNC: 4.7 G/DL
ALP BLD-CCNC: 43 U/L
ALT SERPL-CCNC: 13 U/L
ANION GAP SERPL CALC-SCNC: 14 MMOL/L
AST SERPL-CCNC: 23 U/L
BILIRUB SERPL-MCNC: 0.4 MG/DL
BUN SERPL-MCNC: 30 MG/DL
CALCIUM SERPL-MCNC: 10.1 MG/DL
CHLORIDE SERPL-SCNC: 94 MMOL/L
CO2 SERPL-SCNC: 25 MMOL/L
CREAT SERPL-MCNC: 1.6 MG/DL
GLUCOSE SERPL-MCNC: 104 MG/DL
LDH SERPL-CCNC: 372
POTASSIUM SERPL-SCNC: 5.4 MMOL/L
PROT SERPL-MCNC: 8.1 G/DL
SODIUM SERPL-SCNC: 133 MMOL/L

## 2021-07-13 PROCEDURE — 95811 POLYSOM 6/>YRS CPAP 4/> PARM: CPT | Mod: 26

## 2021-07-14 DIAGNOSIS — G47.33 OBSTRUCTIVE SLEEP APNEA (ADULT) (PEDIATRIC): ICD-10-CM

## 2021-07-16 ENCOUNTER — APPOINTMENT (OUTPATIENT)
Age: 76
End: 2021-07-16

## 2021-07-29 ENCOUNTER — LABORATORY RESULT (OUTPATIENT)
Age: 76
End: 2021-07-29

## 2021-07-29 ENCOUNTER — APPOINTMENT (OUTPATIENT)
Dept: HEMATOLOGY ONCOLOGY | Facility: CLINIC | Age: 76
End: 2021-07-29
Payer: MEDICARE

## 2021-07-29 ENCOUNTER — OUTPATIENT (OUTPATIENT)
Dept: OUTPATIENT SERVICES | Facility: HOSPITAL | Age: 76
LOS: 1 days | Discharge: HOME | End: 2021-07-29

## 2021-07-29 DIAGNOSIS — D47.3 ESSENTIAL (HEMORRHAGIC) THROMBOCYTHEMIA: ICD-10-CM

## 2021-07-29 DIAGNOSIS — Z87.42 PERSONAL HISTORY OF OTHER DISEASES OF THE FEMALE GENITAL TRACT: Chronic | ICD-10-CM

## 2021-07-29 LAB
HCT VFR BLD CALC: 32 %
HGB BLD-MCNC: 10.5 G/DL
MCHC RBC-ENTMCNC: 32.8 G/DL
MCHC RBC-ENTMCNC: 35.1 PG
MCV RBC AUTO: 107 FL
PLATELET # BLD AUTO: 416 K/UL
PMV BLD: 10.3 FL
RBC # BLD: 2.99 M/UL
RBC # FLD: 28.1 %
WBC # FLD AUTO: 7.32 K/UL

## 2021-07-29 PROCEDURE — 99212 OFFICE O/P EST SF 10 MIN: CPT

## 2021-07-29 NOTE — HISTORY OF PRESENT ILLNESS
[de-identified] : The patient came today for monitoring of her CBC. \par She has not started taking anagrelide yet although it was prescribed. She has been continuing with Hydrea.\par At present, she has no new complaints.\par The CBC today showed a WBC count of 7.32, Hgb 10.5 and platelets 416 K.\par She was told to stop the Hydrea when she starts taking the anagrelide at 0.5 PO BID.\par We will repeat the CBC in 2-3 weeks to adjust the dose of anagrelide as needed.\par \par All questions answered.\par \par

## 2021-08-10 ENCOUNTER — RESULT REVIEW (OUTPATIENT)
Age: 76
End: 2021-08-10

## 2021-08-10 ENCOUNTER — OUTPATIENT (OUTPATIENT)
Dept: OUTPATIENT SERVICES | Facility: HOSPITAL | Age: 76
LOS: 1 days | Discharge: HOME | End: 2021-08-10
Payer: MEDICARE

## 2021-08-10 DIAGNOSIS — Z87.42 PERSONAL HISTORY OF OTHER DISEASES OF THE FEMALE GENITAL TRACT: Chronic | ICD-10-CM

## 2021-08-10 DIAGNOSIS — Z12.31 ENCOUNTER FOR SCREENING MAMMOGRAM FOR MALIGNANT NEOPLASM OF BREAST: ICD-10-CM

## 2021-08-10 PROCEDURE — 77063 BREAST TOMOSYNTHESIS BI: CPT | Mod: 26

## 2021-08-10 PROCEDURE — 77067 SCR MAMMO BI INCL CAD: CPT | Mod: 26

## 2021-08-20 ENCOUNTER — APPOINTMENT (OUTPATIENT)
Dept: HEMATOLOGY ONCOLOGY | Facility: CLINIC | Age: 76
End: 2021-08-20

## 2021-08-20 ENCOUNTER — LABORATORY RESULT (OUTPATIENT)
Age: 76
End: 2021-08-20

## 2021-08-20 LAB
HCT VFR BLD CALC: 32.6 %
HGB BLD-MCNC: 10.7 G/DL
MCHC RBC-ENTMCNC: 32.8 G/DL
MCHC RBC-ENTMCNC: 33.8 PG
MCV RBC AUTO: 102.8 FL
PLATELET # BLD AUTO: 269 K/UL
PMV BLD: NORMAL
RBC # BLD: 3.17 M/UL
RBC # FLD: 26.9 %
WBC # FLD AUTO: 14.37 K/UL

## 2021-08-20 RX ORDER — ANAGRELIDE 0.5 MG/1
0.5 CAPSULE ORAL
Qty: 180 | Refills: 3 | Status: ACTIVE | COMMUNITY
Start: 2021-08-20 | End: 1900-01-01

## 2021-08-23 ENCOUNTER — APPOINTMENT (OUTPATIENT)
Age: 76
End: 2021-08-23
Payer: MEDICARE

## 2021-08-23 VITALS
HEART RATE: 95 BPM | RESPIRATION RATE: 16 BRPM | BODY MASS INDEX: 35.68 KG/M2 | HEIGHT: 58 IN | DIASTOLIC BLOOD PRESSURE: 80 MMHG | WEIGHT: 170 LBS | SYSTOLIC BLOOD PRESSURE: 130 MMHG | OXYGEN SATURATION: 91 %

## 2021-08-23 PROCEDURE — 99214 OFFICE O/P EST MOD 30 MIN: CPT | Mod: 25

## 2021-08-23 PROCEDURE — G0447 BEHAVIOR COUNSEL OBESITY 15M: CPT | Mod: 59

## 2021-08-23 RX ORDER — HYDROXYUREA 500 MG/1
500 CAPSULE ORAL
Qty: 180 | Refills: 1 | Status: DISCONTINUED | COMMUNITY
Start: 2017-10-12 | End: 2021-08-23

## 2021-08-23 RX ORDER — PREDNISONE 20 MG/1
20 TABLET ORAL
Qty: 40 | Refills: 0 | Status: ACTIVE | COMMUNITY
Start: 2021-08-23 | End: 1900-01-01

## 2021-08-23 NOTE — COUNSELING
[Potential consequences of obesity discussed] : Potential consequences of obesity discussed [Benefits of weight loss discussed] : Benefits of weight loss discussed [Encouraged to maintain food diary] : Encouraged to maintain food diary [Good understanding] : Patient has a good understanding of disease, goals and obesity follow-up plan [FreeTextEntry4] : 15

## 2021-08-23 NOTE — PROCEDURE
[FreeTextEntry1] : CXR PA and Lateral \par The costophrenic and cardiophrenic angles are sharp\par The xochilt parenchyma shows no infiltrates, consolidations, or nodules \par The Mediastinum is within normal limits\par No pleural effusions\par

## 2021-08-23 NOTE — ASSESSMENT
[FreeTextEntry1] : ILD likely NSIP ( could be related to Hydroxyurea?) \par Hydroxyurea stopped 3 weeks ago \par Rheum screen negative \par Severe SEEMA optimum CPAP 10 cm H2O.  Awaiting CPAP Delivery

## 2021-09-09 ENCOUNTER — APPOINTMENT (OUTPATIENT)
Age: 76
End: 2021-09-09
Payer: MEDICARE

## 2021-09-09 VITALS
HEIGHT: 58 IN | HEART RATE: 91 BPM | WEIGHT: 173 LBS | OXYGEN SATURATION: 93 % | RESPIRATION RATE: 12 BRPM | BODY MASS INDEX: 36.31 KG/M2 | DIASTOLIC BLOOD PRESSURE: 60 MMHG | SYSTOLIC BLOOD PRESSURE: 120 MMHG

## 2021-09-09 DIAGNOSIS — R91.8 OTHER NONSPECIFIC ABNORMAL FINDING OF LUNG FIELD: ICD-10-CM

## 2021-09-09 DIAGNOSIS — R06.02 SHORTNESS OF BREATH: ICD-10-CM

## 2021-09-09 PROCEDURE — 99214 OFFICE O/P EST MOD 30 MIN: CPT | Mod: 25

## 2021-09-09 PROCEDURE — G0447 BEHAVIOR COUNSEL OBESITY 15M: CPT | Mod: 59

## 2021-09-09 NOTE — ASSESSMENT
[FreeTextEntry1] : ILD likely NSIP ( could be related to Hydroxyurea?) \par Significantly improved symptoms with Prednisone \par Hydroxyurea stopped End July  \par Rheum screen negative \par Severe SEEMA optimum CPAP 10 cm H2O.  Awaiting CPAP Delivery

## 2021-09-09 NOTE — COUNSELING
[Potential consequences of obesity discussed] : Potential consequences of obesity discussed [Benefits of weight loss discussed] : Benefits of weight loss discussed [Encouraged to maintain food diary] : Encouraged to maintain food diary [FreeTextEntry4] : 15 [Good understanding] : Patient has a good understanding of disease, goals and obesity follow-up plan

## 2021-09-13 ENCOUNTER — APPOINTMENT (OUTPATIENT)
Dept: HEMATOLOGY ONCOLOGY | Facility: CLINIC | Age: 76
End: 2021-09-13

## 2021-09-13 ENCOUNTER — LABORATORY RESULT (OUTPATIENT)
Age: 76
End: 2021-09-13

## 2021-09-13 LAB
HCT VFR BLD CALC: 35.1 %
HGB BLD-MCNC: 11.3 G/DL
MCHC RBC-ENTMCNC: 32.2 G/DL
MCHC RBC-ENTMCNC: 32.2 PG
MCV RBC AUTO: 100 FL
PLATELET # BLD AUTO: 825 K/UL
PMV BLD: 10.8 FL
RBC # BLD: 3.51 M/UL
RBC # FLD: 26.3 %
WBC # FLD AUTO: 18.63 K/UL

## 2021-09-13 NOTE — HISTORY OF PRESENT ILLNESS
[de-identified] : Spoke with daughter. Results of CBC given. Recommended increasing the dose of anagrelide to 0.5 BID every day.\par Repeat platelet count in a week. We may have to go higher.

## 2021-09-14 LAB
ANION GAP SERPL CALC-SCNC: 16 MMOL/L
BUN SERPL-MCNC: 41 MG/DL
CALCIUM SERPL-MCNC: 10.2 MG/DL
CHLORIDE SERPL-SCNC: 101 MMOL/L
CO2 SERPL-SCNC: 23 MMOL/L
CREAT SERPL-MCNC: 1.5 MG/DL
DEPRECATED KAPPA LC FREE/LAMBDA SER: 1.23 RATIO
GLUCOSE SERPL-MCNC: 103 MG/DL
KAPPA LC CSF-MCNC: 1.51 MG/DL
KAPPA LC SERPL-MCNC: 1.86 MG/DL
POTASSIUM SERPL-SCNC: 4.6 MMOL/L
SODIUM SERPL-SCNC: 140 MMOL/L

## 2021-09-23 ENCOUNTER — LABORATORY RESULT (OUTPATIENT)
Age: 76
End: 2021-09-23

## 2021-09-23 ENCOUNTER — APPOINTMENT (OUTPATIENT)
Dept: HEMATOLOGY ONCOLOGY | Facility: CLINIC | Age: 76
End: 2021-09-23

## 2021-09-23 ENCOUNTER — RX RENEWAL (OUTPATIENT)
Age: 76
End: 2021-09-23

## 2021-09-23 LAB
HCT VFR BLD CALC: 35.3 %
HGB BLD-MCNC: 11.4 G/DL
MCHC RBC-ENTMCNC: 31.9 PG
MCHC RBC-ENTMCNC: 32.3 G/DL
MCV RBC AUTO: 98.9 FL
PLATELET # BLD AUTO: 586 K/UL
PMV BLD: 11.6 FL
RBC # BLD: 3.57 M/UL
RBC # FLD: 25.5 %
WBC # FLD AUTO: 21.11 K/UL

## 2021-09-23 RX ORDER — PREDNISONE 10 MG/1
10 TABLET ORAL
Qty: 21 | Refills: 0 | Status: ACTIVE | COMMUNITY
Start: 2021-09-09 | End: 1900-01-01

## 2021-10-06 PROBLEM — I10 ESSENTIAL HYPERTENSION: Status: ACTIVE | Noted: 2017-01-26

## 2021-10-13 ENCOUNTER — APPOINTMENT (OUTPATIENT)
Age: 76
End: 2021-10-13
Payer: MEDICARE

## 2021-10-13 VITALS
OXYGEN SATURATION: 95 % | WEIGHT: 173 LBS | SYSTOLIC BLOOD PRESSURE: 150 MMHG | BODY MASS INDEX: 33.96 KG/M2 | HEIGHT: 60 IN | DIASTOLIC BLOOD PRESSURE: 70 MMHG | HEART RATE: 101 BPM

## 2021-10-13 PROCEDURE — 99213 OFFICE O/P EST LOW 20 MIN: CPT

## 2021-10-21 ENCOUNTER — APPOINTMENT (OUTPATIENT)
Dept: HEMATOLOGY ONCOLOGY | Facility: CLINIC | Age: 76
End: 2021-10-21

## 2021-10-21 ENCOUNTER — LABORATORY RESULT (OUTPATIENT)
Age: 76
End: 2021-10-21

## 2021-10-21 ENCOUNTER — OUTPATIENT (OUTPATIENT)
Dept: OUTPATIENT SERVICES | Facility: HOSPITAL | Age: 76
LOS: 1 days | Discharge: HOME | End: 2021-10-21

## 2021-10-21 DIAGNOSIS — D47.3 ESSENTIAL (HEMORRHAGIC) THROMBOCYTHEMIA: ICD-10-CM

## 2021-10-21 DIAGNOSIS — Z87.42 PERSONAL HISTORY OF OTHER DISEASES OF THE FEMALE GENITAL TRACT: Chronic | ICD-10-CM

## 2021-10-21 LAB
HCT VFR BLD CALC: 36 %
HGB BLD-MCNC: 11.8 G/DL
MCHC RBC-ENTMCNC: 31.2 PG
MCHC RBC-ENTMCNC: 32.8 G/DL
MCV RBC AUTO: 95.2 FL
PLATELET # BLD AUTO: 679 K/UL
PMV BLD: 11.1 FL
RBC # BLD: 3.78 M/UL
RBC # FLD: 23.7 %
WBC # FLD AUTO: 25.12 K/UL

## 2021-10-27 NOTE — ASSESSMENT
[FreeTextEntry1] : ILD likely NSIP ( could be related to Hydroxyurea?) \par Significantly improved symptoms with Prednisone.  Now off Prednisone \par Hydroxyurea stopped End July  \par Rheum screen negative \par Severe SEEMA optimum CPAP 10 cm H2O.  Awaiting CPAP Delivery

## 2021-11-02 RX ORDER — ANAGRELIDE 0.5 MG/1
0.5 CAPSULE ORAL
Qty: 60 | Refills: 3 | Status: ACTIVE | COMMUNITY
Start: 2021-07-28 | End: 1900-01-01

## 2021-11-04 ENCOUNTER — LABORATORY RESULT (OUTPATIENT)
Age: 76
End: 2021-11-04

## 2021-11-04 ENCOUNTER — APPOINTMENT (OUTPATIENT)
Dept: HEMATOLOGY ONCOLOGY | Facility: CLINIC | Age: 76
End: 2021-11-04
Payer: MEDICARE

## 2021-11-04 DIAGNOSIS — D53.9 NUTRITIONAL ANEMIA, UNSPECIFIED: ICD-10-CM

## 2021-11-04 LAB
HCT VFR BLD CALC: 35.4 %
HGB BLD-MCNC: 11.4 G/DL
MCHC RBC-ENTMCNC: 31.1 PG
MCHC RBC-ENTMCNC: 32.2 G/DL
MCV RBC AUTO: 96.5 FL
PLATELET # BLD AUTO: 451 K/UL
PMV BLD: 11.6 FL
RBC # BLD: 3.67 M/UL
RBC # FLD: 22.1 %
WBC # FLD AUTO: 24.7 K/UL

## 2021-11-04 PROCEDURE — 99212 OFFICE O/P EST SF 10 MIN: CPT

## 2021-11-18 ENCOUNTER — APPOINTMENT (OUTPATIENT)
Dept: HEMATOLOGY ONCOLOGY | Facility: CLINIC | Age: 76
End: 2021-11-18

## 2021-11-18 ENCOUNTER — LABORATORY RESULT (OUTPATIENT)
Age: 76
End: 2021-11-18

## 2021-11-18 LAB
HCT VFR BLD CALC: 33.2 %
HGB BLD-MCNC: 11.5 G/DL
MCHC RBC-ENTMCNC: 30.7 PG
MCHC RBC-ENTMCNC: 34.6 G/DL
MCV RBC AUTO: 88.8 FL
PLATELET # BLD AUTO: 346 K/UL
PMV BLD: NORMAL
RBC # BLD: 3.74 M/UL
RBC # FLD: 19.5 %
WBC # FLD AUTO: 21.37 K/UL

## 2021-11-23 ENCOUNTER — OUTPATIENT (OUTPATIENT)
Dept: OUTPATIENT SERVICES | Facility: HOSPITAL | Age: 76
LOS: 1 days | Discharge: HOME | End: 2021-11-23
Payer: MEDICARE

## 2021-11-23 ENCOUNTER — RESULT REVIEW (OUTPATIENT)
Age: 76
End: 2021-11-23

## 2021-11-23 DIAGNOSIS — Z87.42 PERSONAL HISTORY OF OTHER DISEASES OF THE FEMALE GENITAL TRACT: Chronic | ICD-10-CM

## 2021-11-23 DIAGNOSIS — R91.8 OTHER NONSPECIFIC ABNORMAL FINDING OF LUNG FIELD: ICD-10-CM

## 2021-11-23 PROCEDURE — 71250 CT THORAX DX C-: CPT | Mod: 26,MH

## 2021-12-02 ENCOUNTER — LABORATORY RESULT (OUTPATIENT)
Age: 76
End: 2021-12-02

## 2021-12-02 ENCOUNTER — APPOINTMENT (OUTPATIENT)
Age: 76
End: 2021-12-02
Payer: MEDICARE

## 2021-12-02 ENCOUNTER — APPOINTMENT (OUTPATIENT)
Dept: HEMATOLOGY ONCOLOGY | Facility: CLINIC | Age: 76
End: 2021-12-02
Payer: MEDICARE

## 2021-12-02 VITALS
HEIGHT: 60 IN | RESPIRATION RATE: 14 BRPM | WEIGHT: 176 LBS | BODY MASS INDEX: 34.55 KG/M2 | DIASTOLIC BLOOD PRESSURE: 76 MMHG | SYSTOLIC BLOOD PRESSURE: 124 MMHG

## 2021-12-02 DIAGNOSIS — D47.3 ESSENTIAL (HEMORRHAGIC) THROMBOCYTHEMIA: ICD-10-CM

## 2021-12-02 LAB
HCT VFR BLD CALC: 31.7 %
HGB BLD-MCNC: 10.8 G/DL
MCHC RBC-ENTMCNC: 30.5 PG
MCHC RBC-ENTMCNC: 34.1 G/DL
MCV RBC AUTO: 89.5 FL
PLATELET # BLD AUTO: 239 K/UL
PMV BLD: NORMAL
RBC # BLD: 3.54 M/UL
RBC # FLD: 19.1 %
WBC # FLD AUTO: 20.81 K/UL

## 2021-12-02 PROCEDURE — 99214 OFFICE O/P EST MOD 30 MIN: CPT

## 2021-12-02 PROCEDURE — 99212 OFFICE O/P EST SF 10 MIN: CPT

## 2021-12-02 NOTE — ASSESSMENT
[FreeTextEntry1] : ILD likely NSIP.   \par Significantly improved symptoms with Prednisone.  Now off Prednisone \par Hydroxyurea stopped End July  \par Rheum screen negative \par Severe SEEMA optimum CPAP 10 cm H2O.  Returned her CPAP

## 2021-12-16 ENCOUNTER — APPOINTMENT (OUTPATIENT)
Dept: HEMATOLOGY ONCOLOGY | Facility: CLINIC | Age: 76
End: 2021-12-16
Payer: MEDICARE

## 2021-12-16 ENCOUNTER — OUTPATIENT (OUTPATIENT)
Dept: OUTPATIENT SERVICES | Facility: HOSPITAL | Age: 76
LOS: 1 days | Discharge: HOME | End: 2021-12-16

## 2021-12-16 ENCOUNTER — LABORATORY RESULT (OUTPATIENT)
Age: 76
End: 2021-12-16

## 2021-12-16 ENCOUNTER — RESULT REVIEW (OUTPATIENT)
Age: 76
End: 2021-12-16

## 2021-12-16 DIAGNOSIS — Z87.42 PERSONAL HISTORY OF OTHER DISEASES OF THE FEMALE GENITAL TRACT: Chronic | ICD-10-CM

## 2021-12-16 DIAGNOSIS — D53.9 NUTRITIONAL ANEMIA, UNSPECIFIED: ICD-10-CM

## 2021-12-16 DIAGNOSIS — D47.3 ESSENTIAL (HEMORRHAGIC) THROMBOCYTHEMIA: ICD-10-CM

## 2021-12-16 LAB
ALBUMIN SERPL ELPH-MCNC: 4.2 G/DL
ALP BLD-CCNC: 46 U/L
ALT SERPL-CCNC: 21 U/L
ANION GAP SERPL CALC-SCNC: 19 MMOL/L
AST SERPL-CCNC: 29 U/L
BILIRUB SERPL-MCNC: 0.4 MG/DL
BUN SERPL-MCNC: 33 MG/DL
CALCIUM SERPL-MCNC: 9.7 MG/DL
CHLORIDE SERPL-SCNC: 95 MMOL/L
CO2 SERPL-SCNC: 20 MMOL/L
CREAT SERPL-MCNC: 1.2 MG/DL
GLUCOSE SERPL-MCNC: 88 MG/DL
HCT VFR BLD CALC: 32.4 %
HGB BLD-MCNC: 10.9 G/DL
LDH SERPL-CCNC: 585
MCHC RBC-ENTMCNC: 30.6 PG
MCHC RBC-ENTMCNC: 33.6 G/DL
MCV RBC AUTO: 91 FL
PLATELET # BLD AUTO: 460 K/UL
PMV BLD: 12.8 FL
POTASSIUM SERPL-SCNC: 4.7 MMOL/L
PROT SERPL-MCNC: 7 G/DL
RBC # BLD: 3.56 M/UL
RBC # FLD: 19.9 %
SODIUM SERPL-SCNC: 134 MMOL/L
WBC # FLD AUTO: 34.17 K/UL

## 2021-12-16 PROCEDURE — 99213 OFFICE O/P EST LOW 20 MIN: CPT

## 2021-12-17 DIAGNOSIS — Z13.820 ENCOUNTER FOR SCREENING FOR OSTEOPOROSIS: ICD-10-CM

## 2021-12-17 DIAGNOSIS — D72.829 ELEVATED WHITE BLOOD CELL COUNT, UNSPECIFIED: ICD-10-CM

## 2021-12-17 DIAGNOSIS — N95.9 UNSPECIFIED MENOPAUSAL AND PERIMENOPAUSAL DISORDER: ICD-10-CM

## 2021-12-20 DIAGNOSIS — Z02.9 ENCOUNTER FOR ADMINISTRATIVE EXAMINATIONS, UNSPECIFIED: ICD-10-CM

## 2021-12-20 DIAGNOSIS — Z13.820 ENCOUNTER FOR SCREENING FOR OSTEOPOROSIS: ICD-10-CM

## 2021-12-20 DIAGNOSIS — N95.9 UNSPECIFIED MENOPAUSAL AND PERIMENOPAUSAL DISORDER: ICD-10-CM

## 2021-12-20 LAB — T(9;22)(ABL1,BCR)/CONTROL BLD/T: NORMAL

## 2021-12-30 NOTE — HISTORY OF PRESENT ILLNESS
[de-identified] : The patient is coming for her regularly scheduled follow up for her ET and monitoring of the hemogram.\par At present, she feels a little tired and weak with mild SOB on exertion, although she states that she has been feeling a little better over the last few days. She has been followed by her pulmonary specialist. A CT scan of the chest was ordered and no change was reported. She was put on prednisone for her interstitial lung disease.\par She has no pains or aches. No fever but has an occasional night sweat.\par She has completed her Covid-19 vaccinations including the booster shot.\par The CBC today shows a WBC count of 34.17, Hgb 10.9, platelets 460 K.\par The patient was given the results and recommended to increase her anagrelide 0.5 to 2 tab twice a day.\par Will repeat the CBC in 1 month.\par Unfortunately we could not continue with Hydrea because of pulmonary toxicity. Alternative treatments will be IFN or busulfan. \par Will also draw BCR-ABL,  CMP and LDH, although the likelihood of having both JAK2 and BCR-ABL is extremely rare since they may be mutually exclusive.\par Further recommendations after the above completed.\par Otherwise, follow up CBC in 1 month.\par All questions answered.

## 2022-02-24 ENCOUNTER — APPOINTMENT (OUTPATIENT)
Age: 77
End: 2022-02-24
Payer: MEDICARE

## 2022-02-24 VITALS
HEIGHT: 60 IN | OXYGEN SATURATION: 93 % | DIASTOLIC BLOOD PRESSURE: 80 MMHG | RESPIRATION RATE: 12 BRPM | HEART RATE: 94 BPM | BODY MASS INDEX: 34.36 KG/M2 | SYSTOLIC BLOOD PRESSURE: 120 MMHG | WEIGHT: 175 LBS

## 2022-02-24 DIAGNOSIS — G47.33 OBSTRUCTIVE SLEEP APNEA (ADULT) (PEDIATRIC): ICD-10-CM

## 2022-02-24 DIAGNOSIS — J84.9 INTERSTITIAL PULMONARY DISEASE, UNSPECIFIED: ICD-10-CM

## 2022-02-24 PROCEDURE — 99214 OFFICE O/P EST MOD 30 MIN: CPT

## 2022-02-24 RX ORDER — PREDNISONE 10 MG/1
10 TABLET ORAL
Qty: 90 | Refills: 2 | Status: ACTIVE | COMMUNITY
Start: 2022-02-24 | End: 1900-01-01

## 2022-02-24 NOTE — ASSESSMENT
[FreeTextEntry1] : ILD likely NSIP.   \par Significantly improved symptoms with Prednisone\par Hydroxyurea stopped End July  \par Rheum screen negative.  HP Panel negative \par Severe SEEMA optimum CPAP 10 cm H2O.  Returned her CPAP

## 2022-03-07 ENCOUNTER — APPOINTMENT (OUTPATIENT)
Dept: HEMATOLOGY ONCOLOGY | Facility: CLINIC | Age: 77
End: 2022-03-07
Payer: MEDICARE

## 2022-03-07 ENCOUNTER — LABORATORY RESULT (OUTPATIENT)
Age: 77
End: 2022-03-07

## 2022-03-07 ENCOUNTER — OUTPATIENT (OUTPATIENT)
Dept: OUTPATIENT SERVICES | Facility: HOSPITAL | Age: 77
LOS: 1 days | Discharge: HOME | End: 2022-03-07

## 2022-03-07 VITALS
BODY MASS INDEX: 34.95 KG/M2 | WEIGHT: 178 LBS | DIASTOLIC BLOOD PRESSURE: 70 MMHG | RESPIRATION RATE: 14 BRPM | HEIGHT: 60 IN | HEART RATE: 97 BPM | TEMPERATURE: 97.3 F | SYSTOLIC BLOOD PRESSURE: 156 MMHG

## 2022-03-07 DIAGNOSIS — Z87.42 PERSONAL HISTORY OF OTHER DISEASES OF THE FEMALE GENITAL TRACT: Chronic | ICD-10-CM

## 2022-03-07 DIAGNOSIS — K27.9 PEPTIC ULCER, SITE UNSPECIFIED, UNSPECIFIED AS ACUTE OR CHRONIC, W/OUT HEMORRHAGE OR PERFORATION: ICD-10-CM

## 2022-03-07 LAB
HCT VFR BLD CALC: 32.2 %
HGB BLD-MCNC: 10.7 G/DL
MCHC RBC-ENTMCNC: 29.6 PG
MCHC RBC-ENTMCNC: 33.2 G/DL
MCV RBC AUTO: 89 FL
PLATELET # BLD AUTO: 232 K/UL
PMV BLD: 13.6 FL
RBC # BLD: 3.62 M/UL
RBC # FLD: 20.2 %
WBC # FLD AUTO: 21.22 K/UL

## 2022-03-07 PROCEDURE — 99213 OFFICE O/P EST LOW 20 MIN: CPT

## 2022-03-08 DIAGNOSIS — D47.3 ESSENTIAL (HEMORRHAGIC) THROMBOCYTHEMIA: ICD-10-CM

## 2022-03-08 NOTE — HISTORY OF PRESENT ILLNESS
[Disease:__________________________] : Disease: [unfilled] [de-identified] : The patient is coming for her regularly scheduled follow up for her ET.\par She was hospitalized this past February with a bleeding ulcer. She bled apparently massively from the rectum. She was taken to [facility]. She was transfused with 2 units of PRBC. She then had an EGD which showed an ulcer which was benign. She was put on Sucralfate for a month and pantoprazole which she will be finishing this week.\par Now, she is feeling better. She was weak and recovered. She is taking also Ensure. She did not have a colonoscopy.\par Recent Hgb from a week ago was 11. 5 and platelets were 281 K. The WBC count was 28 K. \par

## 2022-03-08 NOTE — REVIEW OF SYSTEMS
[Fatigue] : fatigue [Lower Ext Edema] : lower extremity edema [SOB on Exertion] : shortness of breath during exertion [Joint Pain] : joint pain [Joint Stiffness] : joint stiffness [Negative] : Heme/Lymph [FreeTextEntry5] : Swelling went away after stopping amlodipine and switching to Losartan with HCTZ. [de-identified] : Pinched nerve at lumbar spine

## 2022-03-08 NOTE — CONSULT LETTER
[Dear  ___] : Dear  [unfilled], [Courtesy Letter:] : I had the pleasure of seeing your patient, [unfilled], in my office today. [Please see my note below.] : Please see my note below. [Consult Closing:] : Thank you very much for allowing me to participate in the care of this patient.  If you have any questions, please do not hesitate to contact me. [Sincerely,] : Sincerely, [FreeTextEntry3] : Dr. SCOTTY Denise [FreeTextEntry2] : Dr. VIELKA Blunt

## 2022-03-08 NOTE — ASSESSMENT
[FreeTextEntry1] : 1. ET, clinically now stable, tolerating anagrelide 0.5 mg 2 tab twice a day.\par 2. Recent duodenal bleeding ulcer, followed by GI, presently stable. On sucralfate and pantoprazole.\par \par The situation was discussed with the patient and her niece who was accompanying her.\par Will obtain CBC and CMP.\par Further recommendations after those results are available.\par If all stable, the patient will be seen in 2 months for monitoring of her hemogram.\par \par All questions answered.

## 2022-03-08 NOTE — PHYSICAL EXAM
[Ambulatory and capable of all self care but unable to carry out any work activities] : Status 2- Ambulatory and capable of all self care but unable to carry out any work activities. Up and about more than 50% of waking hours [Obese] : obese [Normal] : affect appropriate [de-identified] : Rare basilar crackles [de-identified] : 2 + peripheral edema [de-identified] : But obesity limiting the sensitivity of the exan [de-identified] : Some arthritic changes.

## 2022-04-05 ENCOUNTER — RX RENEWAL (OUTPATIENT)
Age: 77
End: 2022-04-05

## 2022-04-05 RX ORDER — PREDNISONE 20 MG/1
20 TABLET ORAL
Qty: 90 | Refills: 3 | Status: ACTIVE | COMMUNITY
Start: 2021-12-02 | End: 1900-01-01

## 2022-04-09 ENCOUNTER — EMERGENCY (EMERGENCY)
Facility: HOSPITAL | Age: 77
LOS: 0 days | Discharge: HOME | End: 2022-04-09
Attending: EMERGENCY MEDICINE | Admitting: EMERGENCY MEDICINE
Payer: MEDICARE

## 2022-04-09 VITALS
SYSTOLIC BLOOD PRESSURE: 199 MMHG | RESPIRATION RATE: 16 BRPM | OXYGEN SATURATION: 98 % | WEIGHT: 169.98 LBS | HEART RATE: 80 BPM | TEMPERATURE: 98 F | DIASTOLIC BLOOD PRESSURE: 92 MMHG

## 2022-04-09 VITALS
DIASTOLIC BLOOD PRESSURE: 81 MMHG | SYSTOLIC BLOOD PRESSURE: 188 MMHG | RESPIRATION RATE: 16 BRPM | HEART RATE: 79 BPM | TEMPERATURE: 97 F

## 2022-04-09 DIAGNOSIS — R06.00 DYSPNEA, UNSPECIFIED: ICD-10-CM

## 2022-04-09 DIAGNOSIS — M54.9 DORSALGIA, UNSPECIFIED: ICD-10-CM

## 2022-04-09 DIAGNOSIS — R10.9 UNSPECIFIED ABDOMINAL PAIN: ICD-10-CM

## 2022-04-09 DIAGNOSIS — E03.9 HYPOTHYROIDISM, UNSPECIFIED: ICD-10-CM

## 2022-04-09 DIAGNOSIS — Z87.42 PERSONAL HISTORY OF OTHER DISEASES OF THE FEMALE GENITAL TRACT: Chronic | ICD-10-CM

## 2022-04-09 DIAGNOSIS — D64.9 ANEMIA, UNSPECIFIED: ICD-10-CM

## 2022-04-09 DIAGNOSIS — I10 ESSENTIAL (PRIMARY) HYPERTENSION: ICD-10-CM

## 2022-04-09 DIAGNOSIS — D47.3 ESSENTIAL (HEMORRHAGIC) THROMBOCYTHEMIA: ICD-10-CM

## 2022-04-09 LAB
ALBUMIN SERPL ELPH-MCNC: 4.3 G/DL — SIGNIFICANT CHANGE UP (ref 3.5–5.2)
ALP SERPL-CCNC: 55 U/L — SIGNIFICANT CHANGE UP (ref 30–115)
ALT FLD-CCNC: 29 U/L — SIGNIFICANT CHANGE UP (ref 0–41)
ANION GAP SERPL CALC-SCNC: 17 MMOL/L — HIGH (ref 7–14)
ANISOCYTOSIS BLD QL: SLIGHT — SIGNIFICANT CHANGE UP
AST SERPL-CCNC: 85 U/L — HIGH (ref 0–41)
BASOPHILS # BLD AUTO: 0.18 K/UL — SIGNIFICANT CHANGE UP (ref 0–0.2)
BASOPHILS NFR BLD AUTO: 0.9 % — SIGNIFICANT CHANGE UP (ref 0–1)
BILIRUB SERPL-MCNC: 0.6 MG/DL — SIGNIFICANT CHANGE UP (ref 0.2–1.2)
BUN SERPL-MCNC: 24 MG/DL — HIGH (ref 10–20)
CALCIUM SERPL-MCNC: 10 MG/DL — SIGNIFICANT CHANGE UP (ref 8.5–10.1)
CHLORIDE SERPL-SCNC: 90 MMOL/L — LOW (ref 98–110)
CO2 SERPL-SCNC: 28 MMOL/L — SIGNIFICANT CHANGE UP (ref 17–32)
CREAT SERPL-MCNC: 1.1 MG/DL — SIGNIFICANT CHANGE UP (ref 0.7–1.5)
EGFR: 52 ML/MIN/1.73M2 — LOW
EOSINOPHIL # BLD AUTO: 0 K/UL — SIGNIFICANT CHANGE UP (ref 0–0.7)
EOSINOPHIL NFR BLD AUTO: 0 % — SIGNIFICANT CHANGE UP (ref 0–8)
GIANT PLATELETS BLD QL SMEAR: PRESENT — SIGNIFICANT CHANGE UP
GLUCOSE SERPL-MCNC: 99 MG/DL — SIGNIFICANT CHANGE UP (ref 70–99)
HCT VFR BLD CALC: 36 % — LOW (ref 37–47)
HGB BLD-MCNC: 12 G/DL — SIGNIFICANT CHANGE UP (ref 12–16)
LYMPHOCYTES # BLD AUTO: 1.04 K/UL — LOW (ref 1.2–3.4)
LYMPHOCYTES # BLD AUTO: 5.2 % — LOW (ref 20.5–51.1)
MANUAL SMEAR VERIFICATION: SIGNIFICANT CHANGE UP
MCHC RBC-ENTMCNC: 30.1 PG — SIGNIFICANT CHANGE UP (ref 27–31)
MCHC RBC-ENTMCNC: 33.3 G/DL — SIGNIFICANT CHANGE UP (ref 32–37)
MCV RBC AUTO: 90.2 FL — SIGNIFICANT CHANGE UP (ref 81–99)
METAMYELOCYTES # FLD: 0.9 % — HIGH (ref 0–0)
MICROCYTES BLD QL: SLIGHT — SIGNIFICANT CHANGE UP
MONOCYTES # BLD AUTO: 1.54 K/UL — HIGH (ref 0.1–0.6)
MONOCYTES NFR BLD AUTO: 7.7 % — SIGNIFICANT CHANGE UP (ref 1.7–9.3)
MYELOCYTES NFR BLD: 3.4 % — HIGH (ref 0–0)
NEUTROPHILS # BLD AUTO: 16.43 K/UL — HIGH (ref 1.4–6.5)
NEUTROPHILS NFR BLD AUTO: 81.9 % — HIGH (ref 42.2–75.2)
PLAT MORPH BLD: NORMAL — SIGNIFICANT CHANGE UP
PLATELET # BLD AUTO: 262 K/UL — SIGNIFICANT CHANGE UP (ref 130–400)
POLYCHROMASIA BLD QL SMEAR: SIGNIFICANT CHANGE UP
POTASSIUM SERPL-MCNC: 5.7 MMOL/L — HIGH (ref 3.5–5)
POTASSIUM SERPL-SCNC: 5.7 MMOL/L — HIGH (ref 3.5–5)
PROT SERPL-MCNC: 7.5 G/DL — SIGNIFICANT CHANGE UP (ref 6–8)
RBC # BLD: 3.99 M/UL — LOW (ref 4.2–5.4)
RBC # FLD: 19.7 % — HIGH (ref 11.5–14.5)
RBC BLD AUTO: ABNORMAL
SODIUM SERPL-SCNC: 135 MMOL/L — SIGNIFICANT CHANGE UP (ref 135–146)
TROPONIN T SERPL-MCNC: <0.01 NG/ML — SIGNIFICANT CHANGE UP
WBC # BLD: 20.06 K/UL — HIGH (ref 4.8–10.8)
WBC # FLD AUTO: 20.06 K/UL — HIGH (ref 4.8–10.8)

## 2022-04-09 PROCEDURE — 74177 CT ABD & PELVIS W/CONTRAST: CPT | Mod: 26,MA

## 2022-04-09 PROCEDURE — 99285 EMERGENCY DEPT VISIT HI MDM: CPT

## 2022-04-09 PROCEDURE — 93970 EXTREMITY STUDY: CPT | Mod: 26

## 2022-04-09 PROCEDURE — 93010 ELECTROCARDIOGRAM REPORT: CPT

## 2022-04-09 RX ORDER — ONDANSETRON 8 MG/1
4 TABLET, FILM COATED ORAL ONCE
Refills: 0 | Status: COMPLETED | OUTPATIENT
Start: 2022-04-09 | End: 2022-04-09

## 2022-04-09 RX ORDER — ACETAMINOPHEN 500 MG
650 TABLET ORAL ONCE
Refills: 0 | Status: DISCONTINUED | OUTPATIENT
Start: 2022-04-09 | End: 2022-04-09

## 2022-04-09 RX ORDER — MORPHINE SULFATE 50 MG/1
4 CAPSULE, EXTENDED RELEASE ORAL ONCE
Refills: 0 | Status: DISCONTINUED | OUTPATIENT
Start: 2022-04-09 | End: 2022-04-09

## 2022-04-09 RX ORDER — ACETAMINOPHEN 500 MG
975 TABLET ORAL ONCE
Refills: 0 | Status: COMPLETED | OUTPATIENT
Start: 2022-04-09 | End: 2022-04-09

## 2022-04-09 RX ORDER — METHOCARBAMOL 500 MG/1
1500 TABLET, FILM COATED ORAL ONCE
Refills: 0 | Status: COMPLETED | OUTPATIENT
Start: 2022-04-09 | End: 2022-04-09

## 2022-04-09 RX ORDER — SODIUM CHLORIDE 9 MG/ML
500 INJECTION INTRAMUSCULAR; INTRAVENOUS; SUBCUTANEOUS ONCE
Refills: 0 | Status: COMPLETED | OUTPATIENT
Start: 2022-04-09 | End: 2022-04-09

## 2022-04-09 RX ADMIN — ONDANSETRON 4 MILLIGRAM(S): 8 TABLET, FILM COATED ORAL at 10:29

## 2022-04-09 RX ADMIN — Medication 975 MILLIGRAM(S): at 11:02

## 2022-04-09 RX ADMIN — MORPHINE SULFATE 4 MILLIGRAM(S): 50 CAPSULE, EXTENDED RELEASE ORAL at 12:54

## 2022-04-09 RX ADMIN — Medication 975 MILLIGRAM(S): at 10:29

## 2022-04-09 RX ADMIN — SODIUM CHLORIDE 500 MILLILITER(S): 9 INJECTION INTRAMUSCULAR; INTRAVENOUS; SUBCUTANEOUS at 11:39

## 2022-04-09 RX ADMIN — METHOCARBAMOL 1500 MILLIGRAM(S): 500 TABLET, FILM COATED ORAL at 10:29

## 2022-04-09 NOTE — ED PROVIDER NOTE - ADDITIONAL NOTES AND INSTRUCTIONS:
This patient was seen in our Emergency Department today for an urgent issue.   Please excuse her niece from work today.

## 2022-04-09 NOTE — ED PROVIDER NOTE - ATTENDING CONTRIBUTION TO CARE
76 y.o. F, PMH of essential thrombocytosis, hypothyroidism, anemia, upper GI ulcer, ILD (?NSIP), ?SEEMA, comes in c/o exacerbation of her chronic back pain. Pt follows with pain management doctor and had an epidural injection done on 3/31, the pain did not improve, and she has an additional injection scheduled for 4/18 to address a possible SI joint pain. In a meantime her PMR doctor gave her tramadol 100mg for pain. Pt states every time she takes it she gets very nauseous and gags or vomits. No headache, fever/chills, CP/SOB/abdominal pain. Pain is mostly in her right lower back, going into groin and RLE. No urinary symptoms. No bowel/bladder incontinence. Ambulation at baseline. No numbness to LE. On exam, pt in NAD, AAOx3, head NC/AT, CN II-XII intact, lungs CTA B/L, CV S1S2 regular, abdomen soft/NT/ND/(+)BS, back (+) TTP over right SI joint, (+) TTP over right hip, FROM of B/L LE at the hip/knee/ankle, (-) saddle anesthesia, ext (-) edema, skin (-) bruising/rash, motor 5/5x4, sensation intact, pulses intact. Labs/CT/UA reviewed. Pain controlled. Will d/c. Advised to follow up with PMR and PMD. Pt has Zofran at home. Advised to return for worsening of symptoms.

## 2022-04-09 NOTE — ED PROVIDER NOTE - PATIENT PORTAL LINK FT
You can access the FollowMyHealth Patient Portal offered by [facility] by registering at the following website: [email or web address]. By joining [email or web address]’s FollowMyHealth portal, you will also be able to view your health information using other applications (apps) compatible with our system.

## 2022-04-09 NOTE — ED PROVIDER NOTE - PHYSICAL EXAMINATION
_  Vital signs reviewed; ABCs intact  GENERAL: Well nourished, comfortable  SKIN: Warm, dry  HEAD & NECK: NCAT, supple neck  EYES: EOMI, PER B/L, no scleral icterus, no conjunctival injection  ENT: MMM; uvula midline; no oropharyngeal erythema or exudates  CARD: RRR, S1, S2; no murmurs, no rubs, no gallops  RESP: Normal respiratory effort, CTAB, no rales, no wheezing  ABD: Soft, ND, NT, no rebound, no guarding, +BS; no CVAT  EXT: +B/L LE non-pitting edema; pulses palpable distally; no calf tenderness; symmetrical calf circumferences  MSK: Normal tone and bulk, no bony tenderness, full ROM, motor strength 5/5 of B/L LEs  NEURO: CN II-XII intact; normal cerebellar testing (finger-to-nose, heel-to-shin); no pronator drift; sensation intact (b/l C5-T1, L3-S1)  PSYCH: AAOx3, cooperative, appropriate

## 2022-04-09 NOTE — ED PROVIDER NOTE - CARE PLAN
1 Principal Discharge DX:	Groin pain, right   Principal Discharge DX:	Groin pain, right  Secondary Diagnosis:	Back pain

## 2022-04-09 NOTE — ED PROVIDER NOTE - PROGRESS NOTE DETAILS
Resident AO: Patient has received Tylenol 975 mg & Robaxin 1500 mg without relief. Given morphine 4 mg IVP without improvement. Reassessed, R groin is now tender - ordered CT abdomen (performed, pending read). Contraindication for Toradol given h/o GI ulcer with bleeding.   Wet read of Duplex is negative for DVT (verbally obtained from technician). Resident AO: CT abdomen pelvis without any acute findings. Discussed with patient and niece - agreeable with discharge and follow-up with PMD and pain management. Return precautions given. Resident AO: CT abdomen pelvis without any acute findings. Discussed with patient - agreeable with discharge and follow-up with PMD and pain management. Return precautions given.

## 2022-04-09 NOTE — ED ADULT TRIAGE NOTE - CHIEF COMPLAINT QUOTE
pt had epidural last Thursday for spinal compression pain , pt states since last Thursday her back pain has worsened and going down right leg; pt having increased trouble ambulating

## 2022-04-09 NOTE — ED PROVIDER NOTE - OBJECTIVE STATEMENT
Pt is a 77 yo F, h/o essential thrombocytosis, hypothyroidism, anemia, upper GI ulcer, ILD (?NSIP), ?SEEMA. Patient presents for back pain x ~3mos, and nausea/vomiting x 2d. Pt follows pain management and had an epidural done on 3/31 (9d ago); the pain did not improve, and she has an additional injection scheduled for 4/18 (cannot have it sooner) to address a possible SI joint pain. In the meanwhile, she has been prescribed tramadol 100 mg, which she took for the first time yesterday morning, then yesterday evening, and this morning. Since then, she has been vomiting (NBNB), primarily in the morning (awakened at ~0400 today, vomited once in the ED during interview). No headache, FND, seizures, ataxia. No abdominal pain.   On further questioning, the patient revealed that the pain is in the right groin, radiating down her anterior thigh. No personal or known family h/o VTE, hormone or tobacco use, prolonged immobilization, recent surgeries, malignancy, new LE swelling, hemoptysis. Patient does endorse +RUBIO, but states this is chronic secondary to her ILD. She reports having had a cardiac stress done several months ago and it was normal. No chest pain, palpitations.  No other complaints - no fever/chills, nasal discharge/sore throat, CP, palpitations, cough, abd pain, diarrhea, dysuria, hematuria, FND, rashes, trauma.

## 2022-04-09 NOTE — ED PROVIDER NOTE - NSFOLLOWUPINSTRUCTIONS_ED_ALL_ED_FT
Your visit in the emergency department today did not reveal anything immediately life-threatening.    However, it is important that you follow-up with your PRIMARY CARE PHYSICIAN within 3-5 days.    Additionally, it is important that you follow-up with PAIN MANAGEMENT.    ---------------------------------------------------------------------------------------------------    Radicular Pain     Radicular pain is a type of pain that spreads from your back or neck along a spinal nerve. Spinal nerves are nerves that leave the spinal cord and go to the muscles. Radicular pain is sometimes called radiculopathy, radiculitis, or a pinched nerve. When you have this type of pain, you may also have weakness, numbness, or tingling in the area of your body that is supplied by the nerve. The pain may feel sharp and burning. Depending on which spinal nerve is affected, the pain may occur in the:    - Neck area (cervical radicular pain). You may also feel pain, numbness, weakness, or tingling in the arms.  - Mid-spine area (thoracic radicular pain). You would feel this pain in the back and chest. This type is rare.  - Lower back area (lumbar radicular pain). You would feel this pain as low back pain. You may feel pain, numbness, weakness, or tingling in the buttocks or legs. Sciatica is a type of lumbar radicular pain that shoots down the back of the leg.      Radicular pain occurs when one of the spinal nerves becomes irritated or squeezed (compressed). It is often caused by something pushing on a spinal nerve, such as one of the bones of the spine (vertebrae) or one of the round cushions between vertebrae (intervertebral disks). This can result from:  - An injury.   - Wear and tear or aging of a disk.   - The growth of a bone spur that pushes on the nerve.    Radicular pain often goes away when you follow instructions from your health care provider for relieving pain at home.    Follow these instructions at home:    Managing pain      - If directed, put ice on the affected area:  - Put ice in a plastic bag.  - Place a towel between your skin and the bag.  - Leave the ice on for 20 minutes, 2–3 times a day.    - If directed, apply heat to the affected area as often as told by your health care provider. Use the heat source that your health care provider recommends, such as a moist heat pack or a heating pad.  - Place a towel between your skin and the heat source.  - Leave the heat on for 20–30 minutes.  - Remove the heat if your skin turns bright red. This is especially important if you are unable to feel pain, heat, or cold. You may have a greater risk of getting burned.        Activity    -  Do not sit or rest in bed for long periods of time.  - Try to stay as active as possible. Ask your health care provider what type of exercise or activity is best for you.  - Avoid activities that make your pain worse, such as bending and lifting.  -  Do not lift anything that is heavier than 10 lb (4.5 kg), or the limit that you are told, until your health care provider says that it is safe.  - Practice using proper technique when lifting items. Proper lifting technique involves bending your knees and rising up.  - Do strength and range-of-motion exercises only as told by your health care provider or physical therapist.      General instructions   - Take over-the-counter and prescription medicines only as told by your health care provider.  - Pay attention to any changes in your symptoms.  - Keep all follow-up visits as told by your health care provider. This is important.  - Your health care provider may send you to a physical therapist to help with this pain.      Contact a health care provider if:  - Your pain and other symptoms get worse.  - Your pain medicine is not helping.  - Your pain has not improved after a few weeks of home care.  - You have a fever.      Get help right away if:  - You have severe pain, weakness, or numbness.  - You have difficulty with bladder or bowel control.      Summary  - Radicular pain is a type of pain that spreads from your back or neck along a spinal nerve.  - When you have radicular pain, you may also have weakness, numbness, or tingling in the area of your body that is supplied by the nerve.  - The pain may feel sharp or burning.  - Radicular pain may be treated with ice, heat, medicines, or physical therapy.      This information is not intended to replace advice given to you by your health care provider. Make sure you discuss any questions you have with your health care provider.      Document Revised: 07/02/2019 Document Reviewed: 07/02/2019    Elsevier Patient Education © 2022 [employer]

## 2022-04-13 ENCOUNTER — RX RENEWAL (OUTPATIENT)
Age: 77
End: 2022-04-13

## 2022-04-13 RX ORDER — ANAGRELIDE 0.5 MG/1
0.5 CAPSULE ORAL
Qty: 240 | Refills: 1 | Status: ACTIVE | COMMUNITY
Start: 2021-12-16 | End: 1900-01-01

## 2022-04-24 ENCOUNTER — APPOINTMENT (OUTPATIENT)
Dept: AFTER HOURS CARE | Facility: EMERGENCY ROOM | Age: 77
End: 2022-04-24
Payer: MEDICARE

## 2022-04-24 ENCOUNTER — TRANSCRIPTION ENCOUNTER (OUTPATIENT)
Age: 77
End: 2022-04-24

## 2022-04-24 PROCEDURE — 99442: CPT | Mod: 95

## 2022-04-25 NOTE — PLAN
[No new medications perscribed] : Treat in place: No new medications prescribed [FreeTextEntry1] : Patient does not wish to go to ED at this time. Will try Oxycodone with Tylenol and f/u with her pain management doctor. Warning signs for going to ED discussed.

## 2022-04-25 NOTE — HISTORY OF PRESENT ILLNESS
[Home] : at home, [unfilled] , at the time of the visit. [Other Location: e.g. Home (Enter Location, City,State)___] : at [unfilled] [Verbal consent obtained from patient] : the patient, [unfilled] [FreeTextEntry8] : 76F p/w chronic low back and SI joint pain for one month, persistent, aching. Pt was seen in [facility] ED on April 9th and prescribed Oxycodone 10 mg which gave her minimal relief. PCP advised stopping Oxycodone. Pt is prescribed cyclobenzaprine and Toradol injections by PCP. Cannot take NSAIDs 2/2 PUD. Pt had cortisone injection 1 week ago and "epidural" 2 weeks ago. No bowel/bladder incontinence. No LE weakness or loss of sensation.NO fever. No trauma.\par \par (ERID Telephone)

## 2022-05-02 ENCOUNTER — APPOINTMENT (OUTPATIENT)
Dept: HEMATOLOGY ONCOLOGY | Facility: CLINIC | Age: 77
End: 2022-05-02

## 2022-05-09 ENCOUNTER — EMERGENCY (EMERGENCY)
Facility: HOSPITAL | Age: 77
LOS: 0 days | Discharge: HOME | End: 2022-05-10
Attending: EMERGENCY MEDICINE | Admitting: EMERGENCY MEDICINE
Payer: MEDICARE

## 2022-05-09 VITALS
DIASTOLIC BLOOD PRESSURE: 119 MMHG | HEART RATE: 89 BPM | SYSTOLIC BLOOD PRESSURE: 191 MMHG | OXYGEN SATURATION: 99 % | RESPIRATION RATE: 16 BRPM | TEMPERATURE: 98 F

## 2022-05-09 VITALS
DIASTOLIC BLOOD PRESSURE: 79 MMHG | HEART RATE: 91 BPM | OXYGEN SATURATION: 92 % | TEMPERATURE: 97 F | SYSTOLIC BLOOD PRESSURE: 184 MMHG

## 2022-05-09 DIAGNOSIS — I10 ESSENTIAL (PRIMARY) HYPERTENSION: ICD-10-CM

## 2022-05-09 DIAGNOSIS — J84.9 INTERSTITIAL PULMONARY DISEASE, UNSPECIFIED: ICD-10-CM

## 2022-05-09 DIAGNOSIS — M54.50 LOW BACK PAIN, UNSPECIFIED: ICD-10-CM

## 2022-05-09 DIAGNOSIS — G47.33 OBSTRUCTIVE SLEEP APNEA (ADULT) (PEDIATRIC): ICD-10-CM

## 2022-05-09 DIAGNOSIS — F32.A DEPRESSION, UNSPECIFIED: ICD-10-CM

## 2022-05-09 DIAGNOSIS — Z87.42 PERSONAL HISTORY OF OTHER DISEASES OF THE FEMALE GENITAL TRACT: ICD-10-CM

## 2022-05-09 DIAGNOSIS — G89.29 OTHER CHRONIC PAIN: ICD-10-CM

## 2022-05-09 DIAGNOSIS — Z86.2 PERSONAL HISTORY OF DISEASES OF THE BLOOD AND BLOOD-FORMING ORGANS AND CERTAIN DISORDERS INVOLVING THE IMMUNE MECHANISM: ICD-10-CM

## 2022-05-09 DIAGNOSIS — Z87.11 PERSONAL HISTORY OF PEPTIC ULCER DISEASE: ICD-10-CM

## 2022-05-09 DIAGNOSIS — Z87.42 PERSONAL HISTORY OF OTHER DISEASES OF THE FEMALE GENITAL TRACT: Chronic | ICD-10-CM

## 2022-05-09 DIAGNOSIS — E03.9 HYPOTHYROIDISM, UNSPECIFIED: ICD-10-CM

## 2022-05-09 PROCEDURE — 99284 EMERGENCY DEPT VISIT MOD MDM: CPT | Mod: FS

## 2022-05-09 PROCEDURE — 93010 ELECTROCARDIOGRAM REPORT: CPT

## 2022-05-09 RX ORDER — OXYCODONE AND ACETAMINOPHEN 5; 325 MG/1; MG/1
1 TABLET ORAL ONCE
Refills: 0 | Status: DISCONTINUED | OUTPATIENT
Start: 2022-05-09 | End: 2022-05-09

## 2022-05-09 RX ORDER — METHOCARBAMOL 500 MG/1
1000 TABLET, FILM COATED ORAL ONCE
Refills: 0 | Status: COMPLETED | OUTPATIENT
Start: 2022-05-09 | End: 2022-05-09

## 2022-05-09 RX ORDER — LIDOCAINE 4 G/100G
1 CREAM TOPICAL ONCE
Refills: 0 | Status: COMPLETED | OUTPATIENT
Start: 2022-05-09 | End: 2022-05-09

## 2022-05-09 RX ORDER — KETOROLAC TROMETHAMINE 30 MG/ML
30 SYRINGE (ML) INJECTION ONCE
Refills: 0 | Status: DISCONTINUED | OUTPATIENT
Start: 2022-05-09 | End: 2022-05-09

## 2022-05-09 RX ADMIN — Medication 30 MILLIGRAM(S): at 21:48

## 2022-05-09 RX ADMIN — OXYCODONE AND ACETAMINOPHEN 1 TABLET(S): 5; 325 TABLET ORAL at 22:01

## 2022-05-09 RX ADMIN — LIDOCAINE 1 PATCH: 4 CREAM TOPICAL at 21:56

## 2022-05-09 RX ADMIN — METHOCARBAMOL 1000 MILLIGRAM(S): 500 TABLET, FILM COATED ORAL at 21:46

## 2022-05-09 NOTE — ED ADULT TRIAGE NOTE - CHIEF COMPLAINT QUOTE
Back pain 10/10 with chronic back pain history. Denies chest pain and SOB. Pt was supposed to go for epidural but told to come to ER due to elevated BP.

## 2022-05-09 NOTE — ED PROVIDER NOTE - NS ED ROS FT
Review of Systems:  	•	CONSTITUTIONAL: no fever   	•	SKIN: no rash   	•	RESPIRATORY: no shortness of breath, no cough  	•	CARDIAC: no chest pain, no palpitations  	•	GI: no abd pain, no nausea, no vomiting, no diarrhea   	•	GENITO-URINARY: no discharge, no dysuria; no hematuria, no increased urinary frequency  	•	MUSCULOSKELETAL: +back pain, no joint swelling/redness  	•	NEUROLOGIC: no weakness, no numbness, no urinary/bowel incontinence   	•	PSYCH: no anxiety, non suicidal, non homicidal, no hallucination, no depression

## 2022-05-09 NOTE — ED PROVIDER NOTE - ATTENDING APP SHARED VISIT CONTRIBUTION OF CARE
76 y.o. F, PMH of essential thrombocytosis, hypothyroidism, anemia, upper GI ulcer, ILD (?NSIP), ?SEEMA, comes in c/o exacerbation of her chronic back pain 76 y.o. F, PMH of essential thrombocytosis, hypothyroidism, anemia, upper GI ulcer, ILD (?NSIP), ?SEEMA, comes in c/o exacerbation of her chronic back pain. Pt follows with pain management doctor, Dr. العلي, had nerve ablasion scheduled today but when came in, had her BP changed and it was in 230s systolic. Procedure was cancelled and pt was sent to the ED. Pt admits she was very anxious prior to procedure and was in pain. Pt denies any symptoms. No headache, fever/chills, CP/SOB/abdominal pain. Pain is mostly in her right lower back. No urinary symptoms. No bowel/bladder incontinence. Ambulation at baseline. No numbness to LE. On exam, pt in NAD, AAOx3, head NC/AT, CN II-XII intact, lungs CTA B/L, CV S1S2 regular, abdomen soft/NT/ND/(+)BS, back (+) TTP over right SI joint, FROM of B/L LE at the hip/knee/ankle, (-) saddle anesthesia, ext (-) edema, skin (+) darkening of skin over right lower back and buttock due to continuous patch use, motor 5/5x4, sensation intact, pulses intact. Pain controlled. BP improved. Will d/c. Advised to follow up with PMR and PMD. Advised to return for worsening of symptoms.

## 2022-05-09 NOTE — ED ADULT NURSE NOTE - NSNEURBEHYESNO_ED_P_ED
Status post Uro lift  Discharging home with Hairston catheter  Please schedule Hairston removal and return for PVR on Tuesday  Otherwise he will follow-up as scheduled    Thank you no

## 2022-05-09 NOTE — ED PROVIDER NOTE - WORK/EXCUSE FORM DATE
Spoke with patient who requested lab work be faxed to [facility] location. Lab orders faxed. Will await results. Lizandro Patrick RN. 11-May-2022

## 2022-05-09 NOTE — ED PROVIDER NOTE - NSFOLLOWUPCLINICS_GEN_ALL_ED_FT
[facility]  Medicine  [address], [place]   Phone: [phone]  Fax:   Follow Up Time: 1-3 Days

## 2022-05-09 NOTE — ED PROVIDER NOTE - PATIENT PORTAL LINK FT
You can access the FollowMyHealth Patient Portal offered by [facility] by registering at the following website: [email or web address]. By joining [employer]’s FollowMyHealth portal, you will also be able to view your health information using other applications (apps) compatible with our system.

## 2022-05-09 NOTE — ED PROVIDER NOTE - NS ED ATTENDING STATEMENT MOD
This was a shared visit with the DEBORAH. I reviewed and verified the documentation and independently performed the documented:

## 2022-05-09 NOTE — ED PROVIDER NOTE - OBJECTIVE STATEMENT
76 year old female, past medical history htn, hypothyroidism, anemia, ild, justice, chronic back pain, who presents with back pain. patient had scheduled appointment with dr bassett for nerve ablation for back pain treatment, however, noticed BP to be elevated sent to ed for eval. patient reports feeling b/l lower back pain, feels same as hx, no recent falls/trauma. denies fever, chills, chest pain, shortness of breath, abd pain, nausea/vomiting/diarrhea, urinary/bowel changes, numbness/weakness. patient has been compliant with anti-htn meds.

## 2022-05-09 NOTE — ED PROVIDER NOTE - PHYSICAL EXAMINATION
CONSTITUTIONAL: Well-developed; well-nourished; in no acute distress, nontoxic appearing  SKIN: superficial skin tear to b/l lower back, no overlying erythema/warmth/drainage/fluctuance  HEAD: Normocephalic; atraumatic  NECK: No c-spine tenderness  CARD: S1, S2 normal, no murmur  RESP: No wheezes, rales or rhonchi. Good air movement bilaterally  ABD: soft; non-distended; non-tender.   EXT: +b/l lower paraspinal ttp overlying lumbar region, no deformity  NEURO: awake, alert, following commands, oriented, grossly unremarkable. No Focal deficits. GCS 15.   PSYCH: Cooperative, appropriate.

## 2022-05-09 NOTE — ED PROVIDER NOTE - CLINICAL SUMMARY MEDICAL DECISION MAKING FREE TEXT BOX
76 y.o. F, PMH of essential thrombocytosis, hypothyroidism, anemia, upper GI ulcer, ILD (?NSIP), ?SEEMA, comes in c/o exacerbation of her chronic back pain. Pt follows with pain management doctor, Dr. العلي, had nerve ablasion scheduled today but when came in, had her BP changed and it was in 230s systolic. Procedure was cancelled and pt was sent to the ED. Pt admits she was very anxious prior to procedure and was in pain. Pt denies any symptoms. No headache, fever/chills, CP/SOB/abdominal pain. Pain is mostly in her right lower back. No urinary symptoms. No bowel/bladder incontinence. Ambulation at baseline. No numbness to LE. On exam, pt in NAD, AAOx3, head NC/AT, CN II-XII intact, lungs CTA B/L, CV S1S2 regular, abdomen soft/NT/ND/(+)BS, back (+) TTP over right SI joint, FROM of B/L LE at the hip/knee/ankle, (-) saddle anesthesia, ext (-) edema, skin (+) darkening of skin over right lower back and buttock due to continuous patch use, motor 5/5x4, sensation intact, pulses intact. Pain controlled. BP improved. Will d/c. Advised to follow up with PMR and PMD. Advised to return for worsening of symptoms.

## 2022-05-18 ENCOUNTER — INPATIENT (INPATIENT)
Facility: HOSPITAL | Age: 77
LOS: 19 days | Discharge: HOME HEALTH | End: 2022-06-07
Attending: PSYCHIATRY & NEUROLOGY | Admitting: PSYCHIATRY & NEUROLOGY
Payer: MEDICARE

## 2022-05-18 VITALS
OXYGEN SATURATION: 94 % | DIASTOLIC BLOOD PRESSURE: 104 MMHG | SYSTOLIC BLOOD PRESSURE: 187 MMHG | HEART RATE: 103 BPM | WEIGHT: 169.98 LBS | TEMPERATURE: 97 F | RESPIRATION RATE: 20 BRPM

## 2022-05-18 DIAGNOSIS — Z87.42 PERSONAL HISTORY OF OTHER DISEASES OF THE FEMALE GENITAL TRACT: Chronic | ICD-10-CM

## 2022-05-18 LAB
ALBUMIN SERPL ELPH-MCNC: 4.8 G/DL — SIGNIFICANT CHANGE UP (ref 3.5–5.2)
ALP SERPL-CCNC: 61 U/L — SIGNIFICANT CHANGE UP (ref 30–115)
ALT FLD-CCNC: 27 U/L — SIGNIFICANT CHANGE UP (ref 0–41)
ANION GAP SERPL CALC-SCNC: 20 MMOL/L — HIGH (ref 7–14)
ANISOCYTOSIS BLD QL: SLIGHT — SIGNIFICANT CHANGE UP
APPEARANCE UR: CLEAR — SIGNIFICANT CHANGE UP
APTT BLD: 24.8 SEC — LOW (ref 27–39.2)
AST SERPL-CCNC: 36 U/L — SIGNIFICANT CHANGE UP (ref 0–41)
BACTERIA # UR AUTO: NEGATIVE — SIGNIFICANT CHANGE UP
BASE EXCESS BLDV CALC-SCNC: 7.8 MMOL/L — HIGH (ref -2–3)
BASOPHILS # BLD AUTO: 0.38 K/UL — HIGH (ref 0–0.2)
BASOPHILS NFR BLD AUTO: 1.8 % — HIGH (ref 0–1)
BILIRUB SERPL-MCNC: 0.6 MG/DL — SIGNIFICANT CHANGE UP (ref 0.2–1.2)
BILIRUB UR-MCNC: NEGATIVE — SIGNIFICANT CHANGE UP
BUN SERPL-MCNC: 48 MG/DL — HIGH (ref 10–20)
CA-I SERPL-SCNC: 1.14 MMOL/L — LOW (ref 1.15–1.33)
CALCIUM SERPL-MCNC: 10.4 MG/DL — HIGH (ref 8.5–10.1)
CHLORIDE SERPL-SCNC: 96 MMOL/L — LOW (ref 98–110)
CO2 SERPL-SCNC: 24 MMOL/L — SIGNIFICANT CHANGE UP (ref 17–32)
COLOR SPEC: YELLOW — SIGNIFICANT CHANGE UP
CREAT SERPL-MCNC: 2.1 MG/DL — HIGH (ref 0.7–1.5)
DIFF PNL FLD: NEGATIVE — SIGNIFICANT CHANGE UP
EGFR: 24 ML/MIN/1.73M2 — LOW
EOSINOPHIL # BLD AUTO: 0 K/UL — SIGNIFICANT CHANGE UP (ref 0–0.7)
EOSINOPHIL NFR BLD AUTO: 0 % — SIGNIFICANT CHANGE UP (ref 0–8)
EPI CELLS # UR: 1 /HPF — SIGNIFICANT CHANGE UP (ref 0–5)
GAS PNL BLDV: 133 MMOL/L — LOW (ref 136–145)
GAS PNL BLDV: SIGNIFICANT CHANGE UP
GIANT PLATELETS BLD QL SMEAR: PRESENT — SIGNIFICANT CHANGE UP
GLUCOSE SERPL-MCNC: 109 MG/DL — HIGH (ref 70–99)
GLUCOSE UR QL: NEGATIVE — SIGNIFICANT CHANGE UP
HCO3 BLDV-SCNC: 33 MMOL/L — HIGH (ref 22–29)
HCT VFR BLD CALC: 34.1 % — LOW (ref 37–47)
HCT VFR BLDA CALC: 49 % — SIGNIFICANT CHANGE UP (ref 39–51)
HGB BLD CALC-MCNC: 16.3 G/DL — SIGNIFICANT CHANGE UP (ref 12.6–17.4)
HGB BLD-MCNC: 11.6 G/DL — LOW (ref 12–16)
HYALINE CASTS # UR AUTO: 1 /LPF — SIGNIFICANT CHANGE UP (ref 0–7)
INR BLD: 1.07 RATIO — SIGNIFICANT CHANGE UP (ref 0.65–1.3)
KETONES UR-MCNC: SIGNIFICANT CHANGE UP
LACTATE BLDV-MCNC: 1.3 MMOL/L — SIGNIFICANT CHANGE UP (ref 0.5–2)
LEUKOCYTE ESTERASE UR-ACNC: NEGATIVE — SIGNIFICANT CHANGE UP
LYMPHOCYTES # BLD AUTO: 1.28 K/UL — SIGNIFICANT CHANGE UP (ref 1.2–3.4)
LYMPHOCYTES # BLD AUTO: 6.1 % — LOW (ref 20.5–51.1)
MANUAL SMEAR VERIFICATION: SIGNIFICANT CHANGE UP
MCHC RBC-ENTMCNC: 31.9 PG — HIGH (ref 27–31)
MCHC RBC-ENTMCNC: 34 G/DL — SIGNIFICANT CHANGE UP (ref 32–37)
MCV RBC AUTO: 93.7 FL — SIGNIFICANT CHANGE UP (ref 81–99)
METAMYELOCYTES # FLD: 1.7 % — HIGH (ref 0–0)
MICROCYTES BLD QL: SLIGHT — SIGNIFICANT CHANGE UP
MONOCYTES # BLD AUTO: 1.09 K/UL — HIGH (ref 0.1–0.6)
MONOCYTES NFR BLD AUTO: 5.2 % — SIGNIFICANT CHANGE UP (ref 1.7–9.3)
MYELOCYTES NFR BLD: 2.6 % — HIGH (ref 0–0)
NEUTROPHILS # BLD AUTO: 17.3 K/UL — HIGH (ref 1.4–6.5)
NEUTROPHILS NFR BLD AUTO: 82.6 % — HIGH (ref 42.2–75.2)
NITRITE UR-MCNC: NEGATIVE — SIGNIFICANT CHANGE UP
PCO2 BLDV: 45 MMHG — HIGH (ref 39–42)
PH BLDV: 7.47 — HIGH (ref 7.32–7.43)
PH UR: 6.5 — SIGNIFICANT CHANGE UP (ref 5–8)
PLAT MORPH BLD: ABNORMAL
PLATELET # BLD AUTO: 188 K/UL — SIGNIFICANT CHANGE UP (ref 130–400)
PO2 BLDV: 25 MMHG — SIGNIFICANT CHANGE UP
POLYCHROMASIA BLD QL SMEAR: SLIGHT — SIGNIFICANT CHANGE UP
POTASSIUM BLDV-SCNC: 3.8 MMOL/L — SIGNIFICANT CHANGE UP (ref 3.5–5.1)
POTASSIUM SERPL-MCNC: 4.3 MMOL/L — SIGNIFICANT CHANGE UP (ref 3.5–5)
POTASSIUM SERPL-SCNC: 4.3 MMOL/L — SIGNIFICANT CHANGE UP (ref 3.5–5)
PROT SERPL-MCNC: 7.3 G/DL — SIGNIFICANT CHANGE UP (ref 6–8)
PROT UR-MCNC: ABNORMAL
PROTHROM AB SERPL-ACNC: 12.3 SEC — SIGNIFICANT CHANGE UP (ref 9.95–12.87)
RBC # BLD: 3.64 M/UL — LOW (ref 4.2–5.4)
RBC # FLD: 18.4 % — HIGH (ref 11.5–14.5)
RBC BLD AUTO: ABNORMAL
RBC CASTS # UR COMP ASSIST: 6 /HPF — HIGH (ref 0–4)
SAO2 % BLDV: 37.1 % — SIGNIFICANT CHANGE UP
SARS-COV-2 RNA SPEC QL NAA+PROBE: SIGNIFICANT CHANGE UP
SODIUM SERPL-SCNC: 140 MMOL/L — SIGNIFICANT CHANGE UP (ref 135–146)
SP GR SPEC: 1.02 — SIGNIFICANT CHANGE UP (ref 1.01–1.03)
UROBILINOGEN FLD QL: SIGNIFICANT CHANGE UP
WBC # BLD: 20.94 K/UL — HIGH (ref 4.8–10.8)
WBC # FLD AUTO: 20.94 K/UL — HIGH (ref 4.8–10.8)
WBC UR QL: 4 /HPF — SIGNIFICANT CHANGE UP (ref 0–5)

## 2022-05-18 PROCEDURE — 74177 CT ABD & PELVIS W/CONTRAST: CPT | Mod: 26,MA

## 2022-05-18 PROCEDURE — 93010 ELECTROCARDIOGRAM REPORT: CPT

## 2022-05-18 PROCEDURE — 99285 EMERGENCY DEPT VISIT HI MDM: CPT | Mod: FS

## 2022-05-18 PROCEDURE — 71045 X-RAY EXAM CHEST 1 VIEW: CPT | Mod: 26

## 2022-05-18 RX ORDER — CYCLOBENZAPRINE HYDROCHLORIDE 10 MG/1
10 TABLET, FILM COATED ORAL THREE TIMES A DAY
Refills: 0 | Status: DISCONTINUED | OUTPATIENT
Start: 2022-05-18 | End: 2022-06-07

## 2022-05-18 RX ORDER — SENNA PLUS 8.6 MG/1
2 TABLET ORAL AT BEDTIME
Refills: 0 | Status: DISCONTINUED | OUTPATIENT
Start: 2022-05-18 | End: 2022-05-31

## 2022-05-18 RX ORDER — HYDROXYZINE HCL 10 MG
25 TABLET ORAL ONCE
Refills: 0 | Status: COMPLETED | OUTPATIENT
Start: 2022-05-18 | End: 2022-05-18

## 2022-05-18 RX ORDER — PANTOPRAZOLE SODIUM 20 MG/1
40 TABLET, DELAYED RELEASE ORAL
Refills: 0 | Status: DISCONTINUED | OUTPATIENT
Start: 2022-05-18 | End: 2022-05-23

## 2022-05-18 RX ORDER — SODIUM CHLORIDE 9 MG/ML
1400 INJECTION, SOLUTION INTRAVENOUS ONCE
Refills: 0 | Status: COMPLETED | OUTPATIENT
Start: 2022-05-18 | End: 2022-05-18

## 2022-05-18 RX ORDER — LIDOCAINE 4 G/100G
1 CREAM TOPICAL DAILY
Refills: 0 | Status: DISCONTINUED | OUTPATIENT
Start: 2022-05-18 | End: 2022-06-07

## 2022-05-18 RX ORDER — POLYETHYLENE GLYCOL 3350 17 G/17G
17 POWDER, FOR SOLUTION ORAL DAILY
Refills: 0 | Status: DISCONTINUED | OUTPATIENT
Start: 2022-05-18 | End: 2022-05-31

## 2022-05-18 RX ORDER — HEPARIN SODIUM 5000 [USP'U]/ML
5000 INJECTION INTRAVENOUS; SUBCUTANEOUS EVERY 8 HOURS
Refills: 0 | Status: DISCONTINUED | OUTPATIENT
Start: 2022-05-18 | End: 2022-05-23

## 2022-05-18 RX ORDER — CHLORHEXIDINE GLUCONATE 213 G/1000ML
1 SOLUTION TOPICAL
Refills: 0 | Status: DISCONTINUED | OUTPATIENT
Start: 2022-05-18 | End: 2022-06-07

## 2022-05-18 RX ORDER — ALPRAZOLAM 0.25 MG
1 TABLET ORAL ONCE
Refills: 0 | Status: DISCONTINUED | OUTPATIENT
Start: 2022-05-18 | End: 2022-05-18

## 2022-05-18 RX ORDER — LOSARTAN/HYDROCHLOROTHIAZIDE 100MG-25MG
1 TABLET ORAL
Qty: 0 | Refills: 0 | DISCHARGE

## 2022-05-18 RX ORDER — SODIUM CHLORIDE 9 MG/ML
1000 INJECTION, SOLUTION INTRAVENOUS
Refills: 0 | Status: DISCONTINUED | OUTPATIENT
Start: 2022-05-18 | End: 2022-05-19

## 2022-05-18 RX ORDER — ACETAMINOPHEN 500 MG
650 TABLET ORAL EVERY 6 HOURS
Refills: 0 | Status: DISCONTINUED | OUTPATIENT
Start: 2022-05-18 | End: 2022-06-07

## 2022-05-18 RX ORDER — MORPHINE SULFATE 50 MG/1
2 CAPSULE, EXTENDED RELEASE ORAL EVERY 6 HOURS
Refills: 0 | Status: DISCONTINUED | OUTPATIENT
Start: 2022-05-18 | End: 2022-05-23

## 2022-05-18 RX ORDER — ATENOLOL 25 MG/1
25 TABLET ORAL DAILY
Refills: 0 | Status: DISCONTINUED | OUTPATIENT
Start: 2022-05-18 | End: 2022-05-20

## 2022-05-18 RX ORDER — CEFEPIME 1 G/1
2000 INJECTION, POWDER, FOR SOLUTION INTRAMUSCULAR; INTRAVENOUS ONCE
Refills: 0 | Status: COMPLETED | OUTPATIENT
Start: 2022-05-18 | End: 2022-05-18

## 2022-05-18 RX ADMIN — Medication 25 MILLIGRAM(S): at 17:10

## 2022-05-18 RX ADMIN — Medication 1 MILLIGRAM(S): at 23:38

## 2022-05-18 RX ADMIN — CEFEPIME 100 MILLIGRAM(S): 1 INJECTION, POWDER, FOR SOLUTION INTRAMUSCULAR; INTRAVENOUS at 15:24

## 2022-05-18 RX ADMIN — SODIUM CHLORIDE 1400 MILLILITER(S): 9 INJECTION, SOLUTION INTRAVENOUS at 14:33

## 2022-05-18 NOTE — ED PROVIDER NOTE - CARE PLAN
1 Principal Discharge DX:	AMS (altered mental status)  Secondary Diagnosis:	JEOVANY (acute kidney injury)

## 2022-05-18 NOTE — ED PROVIDER NOTE - CLINICAL SUMMARY MEDICAL DECISION MAKING FREE TEXT BOX
Intractable back pain.  Septic parameters.  Abdominal pain.  With negative imaging.  Leukocytosis is persistent from prior admissions as well.  Family unable to take care of patient at home.  Admission

## 2022-05-18 NOTE — ED ADULT NURSE NOTE - OBJECTIVE STATEMENT
Patient brought in by family for decrease in functional status, as per family patient has been crying and not wanting to perform ADLs, patient having abdominal pain, back pain and burning upon urination. On assessment abdomen soft nontender, denies fever, chills, SOB, CP, nausea, vomiting.

## 2022-05-18 NOTE — ED PROVIDER NOTE - PROGRESS NOTE DETAILS
What Type Of Note Output Would You Prefer (Optional)?: Standard Output How Severe Is Your Skin Lesion?: mild Has Your Skin Lesion Been Treated?: not been treated Is This A New Presentation, Or A Follow-Up?: Skin Lesions Authored by Dr. Hemphill: SEPSIS is suspected. IVF ordered. IV abx ordered. nl lactate

## 2022-05-18 NOTE — H&P ADULT - HISTORY OF PRESENT ILLNESS
History of Present Illness  Ms. Shook is a 76 year old female patient known to have:  - Baseline AO3, lives with niece, ambulates with a walker  - SEEMA and ILD. Follows with Dr Stephens. Not on CPAP or home O2. On Prednisone 20mg QD  - Depression  - HTN. Recent Admission for HTN Urgency. Follows with Dr Moses  - Hypothyroidism   - Essential Thrombocytosis. Follows with Dr Denise  - Chronic Back Pain for years secondary to Spinal Stenosis per marquise. s/p Spinal Epidural Injection. Was on PT 3x/ week but has been non compliant for 2-3 months. Follows with Dr Jaimes. Was supposed to go for a nerve ablation last Monday (cancelled due to poorly controlled HTN s/p admission)       She was brought to the ED by marquise on  for evaluation of worsening back pain and confusion x2 days.  History goes back to 2 days PTP when the patient started complaining of worsening of her chronic dull lower back pain that radiates down to her right medial aspect of thigh.  This has resulted in limited ambulation (patient refusing to leave her bed) and to being non compliant with Physical Therapy in the absence of leg weakness or paresthesias or numbness or urinary incontinence.  Per marquise, patient has been feeling down x few days with reduced PO intake, reduced sleep, loss of interest, thoughts that her family doesn't like/ support her, and confusion (some times would not recognize niece).  She has an instance where she had fecal incontinence on way to bathroom 2 days ago.  In the setting of confusion and increased pain, marquise decided to bring patient to ED for evaluation.    On review of systems, marquise denies any recent fever, chills, night sweats, URTI symptoms (cough, rhinorrhea, sore throat), urinary symptoms (urinary frequency, urgency, intermittence, dysuria, foul smelling urine, cloudy urine), abdominal pain, headache, nausea, or vomiting.   No sick contacts.   No recent travel or exposure to recent travelers.      Upon presentation to the ED, the patient was hemodynamically stable:  Vital Signs in ED   - /104 mmHg  -   - RR 20  - T97.1  - SaO2 94% on RA      Investigations   Laboratory Workup  - CBC:                        11.6   20.94 )-----------( 188      ( 18 May 2022 10:50 )             34.1     - Chemistry:      140  |  96<L>  |  48<H>  ----------------------------<  109<H>  4.3   |  24  |  2.1<H>    Ca    10.4<H>      18 May 2022 10:50    TPro  7.3  /  Alb  4.8  /  TBili  0.6  /  DBili  x   /  AST  36  /  ALT  27  /  AlkPhos  61      - Coagulation Studies:  PT/INR - ( 18 May 2022 10:50 )   PT: 12.30 sec;   INR: 1.07 ratio    PTT - ( 18 May 2022 10:50 )  PTT:24.8 sec      Microbiological Workup  Urinalysis Basic - ( 18 May 2022 14:07 )    Color: Yellow / Appearance: Clear / S.021 / pH: x  Gluc: x / Ketone: Trace  / Bili: Negative / Urobili: <2 mg/dL   Blood: x / Protein: 30 mg/dL / Nitrite: Negative   Leuk Esterase: Negative / RBC: 6 /HPF / WBC 4 /HPF   Sq Epi: x / Non Sq Epi: 1 /HPF / Bacteria: Negative          Radiological Workup  * CT Abdomen and Pelvis w/ IV Cont (22 @ 14:00) No acute abnormality within the abdomen and pelvis. Age-indeterminate moderate compression fracture seen at L1, new since 2022.  * CXR CM and CHF      - Patient was given IV Cefepime 2g x1 dose in ED  - She was also given 1.4 L LR bolus   - She will be admitted for further investigations, management, and monitoring             History of Present Illness  Ms. Shook is a 76 year old female patient known to have:  - Baseline AO3, lives with niece, ambulates with a walker  - SEEMA and ILD. Follows with Dr Stephens. Not on CPAP or home O2. On Prednisone 20mg QD  - Depression  - HTN. Recent Admission for HTN Urgency. Follows with Dr Moses  - Hypothyroidism   - Recent admission for a bleeding duodenal ulcer at [facility] (melena). Home med Protonix 20mg QD  - Essential Thrombocytosis. Follows with Dr Denise  - Chronic Back Pain for years secondary to Spinal Stenosis per marquise. s/p Spinal Epidural Injection. Was on PT 3x/ week but has been non compliant for 2-3 months. Follows with Dr Jaimes. Was supposed to go for a nerve ablation last Monday (cancelled due to poorly controlled HTN s/p admission)       She was brought to the ED by marquise on  for evaluation of worsening back pain and confusion x2 days.  History goes back to 2 days PTP when the patient started complaining of worsening of her chronic dull lower back pain that radiates down to her right medial aspect of thigh.  This has resulted in limited ambulation (patient refusing to leave her bed) and to being non compliant with Physical Therapy in the absence of leg weakness or paresthesias or numbness or urinary incontinence.  Per marquise, patient has been feeling down x few days with reduced PO intake, reduced sleep, loss of interest, thoughts that her family doesn't like/ support her, and confusion (some times would not recognize niece).  She has an instance where she had fecal incontinence on way to bathroom 2 days ago.  In the setting of confusion and increased pain, marquise decided to bring patient to ED for evaluation.    On review of systems, marquise denies any recent fever, chills, night sweats, URTI symptoms (cough, rhinorrhea, sore throat), urinary symptoms (urinary frequency, urgency, intermittence, dysuria, foul smelling urine, cloudy urine), abdominal pain, headache, nausea, or vomiting.   No sick contacts.   No recent travel or exposure to recent travelers.      Upon presentation to the ED, the patient was hemodynamically stable:  Vital Signs in ED   - /104 mmHg  -   - RR 20  - T97.1  - SaO2 94% on RA      Investigations   Laboratory Workup  - CBC:                        11.6   20.94 )-----------( 188      ( 18 May 2022 10:50 )             34.1     - Chemistry:      140  |  96<L>  |  48<H>  ----------------------------<  109<H>  4.3   |  24  |  2.1<H>    Ca    10.4<H>      18 May 2022 10:50    TPro  7.3  /  Alb  4.8  /  TBili  0.6  /  DBili  x   /  AST  36  /  ALT  27  /  AlkPhos  61      - Coagulation Studies:  PT/INR - ( 18 May 2022 10:50 )   PT: 12.30 sec;   INR: 1.07 ratio    PTT - ( 18 May 2022 10:50 )  PTT:24.8 sec      Microbiological Workup  Urinalysis Basic - ( 18 May 2022 14:07 )    Color: Yellow / Appearance: Clear / S.021 / pH: x  Gluc: x / Ketone: Trace  / Bili: Negative / Urobili: <2 mg/dL   Blood: x / Protein: 30 mg/dL / Nitrite: Negative   Leuk Esterase: Negative / RBC: 6 /HPF / WBC 4 /HPF   Sq Epi: x / Non Sq Epi: 1 /HPF / Bacteria: Negative          Radiological Workup  * CT Abdomen and Pelvis w/ IV Cont (22 @ 14:00) No acute abnormality within the abdomen and pelvis. Age-indeterminate moderate compression fracture seen at L1, new since 2022.  * CXR CM and CHF      - Patient was given IV Cefepime 2g x1 dose in ED  - She was also given 1.4 L LR bolus   - She will be admitted for further investigations, management, and monitoring

## 2022-05-18 NOTE — H&P ADULT - ASSESSMENT
Assessment and Plan  Case of a 75 yo female patient with multiple comorbidities listed above who was brought to ED by her niece on 05/18 for evaluation of recent worsening of lower back pain and confusion x2 days, found to be hemodynamically stable and to have age indeterminate L1 fracture, to be admitted for further investigations, management, and monitoring. Currently hemodynamically stable.      Worsening Lower Back Pain Likely Secondary to New L1 Compression Fracture  History of Chronic Back Pain for years secondary to Spinal Stenosis   * Chronic Back Pain for years secondary to Spinal Stenosis per niece. s/p Spinal Epidural Injection. Was on PT 3x/ week but has been non compliant for 2-3 months.   * Dexa scan 2021: normal  * Follows with Dr Jaimes. Was supposed to go for a nerve ablation last Monday (cancelled due to poorly controlled HTN s/p admission)  * CT Abdomen and Pelvis w/ IV Cont (05.18.22 @ 14:00) No acute abnormality within the abdomen and pelvis. Age-indeterminate moderate compression fracture seen at L1, new since 4/9/2022.    - Neurosurgery team consulted for new L1 compression fracture  - Check XR TLS  - Monitor pain and keep score 01/10: tylenol PRN and morphine PRN  - Start Cyclobenzaprine 10mg TID PRN  - Apply lidocaine patch  - Resume home prednisone 20mg QD (for ILD per Dr Stephens)  - In case of no pain control with above regimen, consider adding GP or consulting pain management team. Patient was supposed to go for a nerve ablation last Monday (cancelled due to poorly controlled HTN s/p admission)  - PT/OT evaluation if cleared for OOBTC by neurosurgery  - Check Vitamin D levels   - NPO pending speech and swallow. IVF hydration for now      Possible Adjustment Disorder in Setting of Worsening Back Pain  Metabolic Encephalopathy  History of Depression  * Baseline AO3, lives with niece, ambulates with a walker  * Not on home meds  * Symptoms as above  - Consider starting sertraline low dose  - Consider consulting psychiatry team if symptoms persist despite pain control  - Check TSH, RPR, NH3, folate, and B12  - Perform non urgent CT head  - Infectious workup ordered  - PT/OT and SW consult  - NPO pending speech and swallow. IVF hydration for now      Constipation  * Last BM 2 days ago  * CT Abdomen and Pelvis w/ IV Cont (05.18.22 @ 14:00) No acute abnormality within the abdomen and pelvis. Age-indeterminate moderate compression fracture seen at L1, new since 4/9/2022.  - Monitor BM  - Start senna and PEG 17mg QD  - KUB in AM  - Check TSH      HTN Urgency   History of Poorly controlled HTN  * Recent presentation due to uncontrolled BP leading to cancellation of nerve ablation  * Home meds Losartan/HCTZ 100/25mg QD and atenolol 25mg QD  * Follows with Dr Moses  * BP /104 mmHg  - Outpatient follow up with Dr Moses  - Monitor BP closely  - Resume atenolol 25mg QD  - Hold Losartan/HCTZ 100/25mg QD in setting of JEOVANY  - Start hydralazine 50mg Q8h for now      Acute Kidney Injury  Likely Pre-Renal in Setting of BUN: Cr >20:1 and Reduced PO intake  Metabolic Alkalosis  * Recent labs Cr 1.1  * ED labs BUN 48, Cr 2.1    - Trend BUN and Cr  - IV fluid hydration   - Monitor BP and avoid hypotension  - Check urine studies and urine eosinophils  - Avoid nephrotoxic agents  - Hold HCTZ and Losartan  - Check ultrasound kidney bladder for now      SEEMA   ILD  * Follows with Dr Stephens  * Not on CPAP or home O2  * On Prednisone 20mg QD  - Outpatient follow up with Dr Stephens  - Resume Prednisone 20mg QD      Hypothyroidism   * No TSH in chart  * Home meds Levothyroxine 100mcg QD  - Check TSH  - Resume Levothyroxine 100mcg QD      Leukocytosis in Setting of Steroid use and History of Essential Thrombocytosis  * Follows with Dr Denise  - Outpatient follow up with Dr Denise      Others  - DVT Prophylaxis: Heparin Subcutaneously   - GI Prophylaxis: Pantoprazole 40mg PO QD  - Diet: NPO pending speech and swallow  - Code Status: Full   - Med rec confirmed with niece at bedside      Barriers to learning: NO  Discharge Planning: Patient will be discharged once stable   Plan was communicated with patient and medical team       Rui Whittington MD  PGY - 2 Internal Medicine   [facility]   Assessment and Plan  Case of a 77 yo female patient with multiple comorbidities listed above who was brought to ED by her niece on 05/18 for evaluation of recent worsening of lower back pain and confusion x2 days, found to be hemodynamically stable and to have age indeterminate L1 fracture, to be admitted for further investigations, management, and monitoring. Currently hemodynamically stable.      Worsening Lower Back Pain Likely Secondary to New L1 Compression Fracture  History of Chronic Back Pain for years secondary to Spinal Stenosis   * Chronic Back Pain for years secondary to Spinal Stenosis per niece. s/p Spinal Epidural Injection. Was on PT 3x/ week but has been non compliant for 2-3 months.   * Dexa scan 2021: normal  * Follows with Dr Jaimes. Was supposed to go for a nerve ablation last Monday (cancelled due to poorly controlled HTN s/p admission)  * CT Abdomen and Pelvis w/ IV Cont (05.18.22 @ 14:00) No acute abnormality within the abdomen and pelvis. Age-indeterminate moderate compression fracture seen at L1, new since 4/9/2022.    - Neurosurgery team consulted for new L1 compression fracture  - Check XR TLS  - Monitor pain and keep score 01/10: tylenol PRN and morphine PRN  - Start Cyclobenzaprine 10mg TID PRN  - Apply lidocaine patch  - Resume home prednisone 20mg QD (for ILD per Dr Stephens)  - In case of no pain control with above regimen, consider adding GP or consulting pain management team. Patient was supposed to go for a nerve ablation last Monday (cancelled due to poorly controlled HTN s/p admission)  - PT/OT evaluation if cleared for OOBTC by neurosurgery  - Check Vitamin D levels   - NPO pending speech and swallow. IVF hydration for now      Possible Adjustment Disorder in Setting of Worsening Back Pain  Metabolic Encephalopathy  History of Depression  * Baseline AO3, lives with niece, ambulates with a walker  * Not on home meds  * Symptoms as above  - Consider starting sertraline low dose  - Consider consulting psychiatry team if symptoms persist despite pain control  - Check TSH, RPR, NH3, folate, and B12  - Perform non urgent CT head  - Infectious workup ordered  - PT/OT and SW consult  - NPO pending speech and swallow. IVF hydration for now      Constipation  * Last BM 2 days ago  * CT Abdomen and Pelvis w/ IV Cont (05.18.22 @ 14:00) No acute abnormality within the abdomen and pelvis. Age-indeterminate moderate compression fracture seen at L1, new since 4/9/2022.  - Monitor BM  - Start senna and PEG 17mg QD  - KUB in AM  - Check TSH      HTN Urgency   History of Poorly controlled HTN  * Recent presentation due to uncontrolled BP leading to cancellation of nerve ablation  * Home meds Losartan/HCTZ 100/25mg QD and atenolol 25mg QD  * Follows with Dr Moses  * BP /104 mmHg  - Outpatient follow up with Dr Moses  - Monitor BP closely  - Resume atenolol 25mg QD  - Hold Losartan/HCTZ 100/25mg QD in setting of JEOVANY  - Start hydralazine 50mg Q8h for now      Acute Kidney Injury  Likely Pre-Renal in Setting of BUN: Cr >20:1 and Reduced PO intake  Metabolic Alkalosis  * Recent labs Cr 1.1  * ED labs BUN 48, Cr 2.1    - Trend BUN and Cr  - IV fluid hydration   - Monitor BP and avoid hypotension  - Check urine studies and urine eosinophils  - Avoid nephrotoxic agents  - Hold HCTZ and Losartan  - Check ultrasound kidney bladder for now      History of UGIB Due to Bleeding Duodenal Ulcer  * Recent admission for a bleeding duodenal ulcer at [facility] (melena)  * Home med Protonix 20mg QD  * Hb ED 11.6  - Trend CBC  - Monitor BM  - Protonix 40mg QD  - T/S  - Check Fe studies  - OP follow up with GI      SEEMA   ILD  * Follows with Dr Stephens  * Not on CPAP or home O2  * On Prednisone 20mg QD  - Outpatient follow up with Dr Stephens  - Resume Prednisone 20mg QD      Hypothyroidism   * No TSH in chart  * Home meds Levothyroxine 100mcg QD  - Check TSH  - Resume Levothyroxine 100mcg QD      Leukocytosis in Setting of Steroid use and History of Essential Thrombocytosis  * Follows with Dr Denise  - Outpatient follow up with Dr Denise      Others  - DVT Prophylaxis: Heparin Subcutaneously   - GI Prophylaxis: Pantoprazole 40mg PO QD  - Diet: NPO pending speech and swallow  - Code Status: Full   - Med rec confirmed with niece at bedside      Barriers to learning: NO  Discharge Planning: Patient will be discharged once stable   Plan was communicated with patient and medical team       Rui Whittington MD  PGY - 2 Internal Medicine   [facility]   Assessment and Plan  Case of a 77 yo female patient with multiple comorbidities listed above who was brought to ED by her niece on 05/18 for evaluation of recent worsening of lower back pain and confusion x2 days, found to be hemodynamically stable and to have age indeterminate L1 fracture, to be admitted for further investigations, management, and monitoring. Currently hemodynamically stable.      Worsening Lower Back Pain Likely Secondary to New L1 Compression Fracture  History of Chronic Back Pain for years secondary to Spinal Stenosis   * Chronic Back Pain for years secondary to Spinal Stenosis per niece. s/p Spinal Epidural Injection. Was on PT 3x/ week but has been non compliant for 2-3 months.   * Dexa scan 2021: normal  * Follows with Dr Jaimes. Was supposed to go for a nerve ablation last Monday (cancelled due to poorly controlled HTN s/p admission)  * CT Abdomen and Pelvis w/ IV Cont (05.18.22 @ 14:00) No acute abnormality within the abdomen and pelvis. Age-indeterminate moderate compression fracture seen at L1, new since 4/9/2022.    - Neurosurgery team consulted for new L1 compression fracture  - Check XR TLS  - Monitor pain and keep score 01/10: tylenol PRN and morphine PRN  - Start Cyclobenzaprine 10mg TID PRN  - Apply lidocaine patch  - Resume home prednisone 20mg QD (for ILD per Dr Stephens)  - In case of no pain control with above regimen, consider adding GP or consulting pain management team. Patient was supposed to go for a nerve ablation last Monday (cancelled due to poorly controlled HTN s/p admission)  - PT/OT evaluation if cleared for OOBTC by neurosurgery  - Check Vitamin D levels   - NPO pending speech and swallow. IVF hydration for now      Possible Adjustment Disorder in Setting of Worsening Back Pain  Metabolic Encephalopathy  History of Depression  * Baseline AO3, lives with niece, ambulates with a walker  * Not on home meds  * Symptoms as above  - Consider starting sertraline low dose  - Consider consulting psychiatry team if symptoms persist despite pain control  - Check TSH, RPR, NH3, folate, and B12  - Perform non urgent CT head  - Infectious workup ordered  - PT/OT and SW consult  - NPO pending speech and swallow. IVF hydration for now      Constipation  * Last BM 2 days ago  * CT Abdomen and Pelvis w/ IV Cont (05.18.22 @ 14:00) No acute abnormality within the abdomen and pelvis. Age-indeterminate moderate compression fracture seen at L1, new since 4/9/2022.  - Monitor BM  - Start senna and PEG 17mg QD  - KUB in AM  - Check TSH      HTN Urgency   History of Poorly controlled HTN  * Recent presentation due to uncontrolled BP leading to cancellation of nerve ablation  * Home meds Losartan/HCTZ 100/25mg QD and atenolol 25mg QD  * Follows with Dr Moses  * BP /104 mmHg  - Outpatient follow up with Dr Moses  - Monitor BP closely  - Resume atenolol 25mg QD  - Hold Losartan/HCTZ 100/25mg QD in setting of JEOVANY  - Start hydralazine 50mg Q8h for now      Acute Kidney Injury  Likely Pre-Renal in Setting of BUN: Cr >20:1 and Reduced PO intake  Metabolic Alkalosis  * Recent labs Cr 1.1  * ED labs BUN 48, Cr 2.1    - Trend BUN and Cr  - IV fluid hydration   - Monitor BP and avoid hypotension  - Check urine studies and urine eosinophils  - Avoid nephrotoxic agents  - Hold HCTZ and Losartan  - Check ultrasound kidney bladder for now      History of UGIB Due to Bleeding Duodenal Ulcer  * Recent admission for a bleeding duodenal ulcer at [facility] (melena)  * Home med Protonix 20mg QD  * Hb ED 11.6  - Trend CBC  - Monitor BM  - Protonix 40mg QD  - T/S  - Check Fe studies  - OP follow up with GI      SEEMA   ILD  * Follows with Dr Stephens  * Not on CPAP or home O2  * On Prednisone 20mg QD  - Outpatient follow up with Dr Stephens  - Resume Prednisone 20mg QD      Hypothyroidism   * No TSH in chart  * Home meds Levothyroxine 100mcg QD  - Check TSH  - Resume Levothyroxine 100mcg QD      Leukocytosis in Setting of Steroid use and History of Essential Thrombocytosis  * Follows with Dr Denise  * Home med Anagrilide 0.5mg QD  - Outpatient follow up with Dr Denise  - Resume Anagrilide 0.5mg QD      Others  - DVT Prophylaxis: Heparin Subcutaneously   - GI Prophylaxis: Pantoprazole 40mg PO QD  - Diet: NPO pending speech and swallow  - Code Status: Full   - Med rec confirmed with niece at bedside      Barriers to learning: NO  Discharge Planning: Patient will be discharged once stable   Plan was communicated with patient and medical team       Rui Whittington MD  PGY - 2 Internal Medicine   [facility]   Assessment and Plan  Case of a 75 yo female patient with multiple comorbidities listed above who was brought to ED by her niece on 05/18 for evaluation of recent worsening of lower back pain and confusion x2 days, found to be hemodynamically stable and to have age indeterminate L1 fracture, to be admitted for further investigations, management, and monitoring. Currently hemodynamically stable.      Worsening Lower Back Pain Likely Secondary to New L1 Compression Fracture  History of Chronic Back Pain for years secondary to Spinal Stenosis   * Chronic Back Pain for years secondary to Spinal Stenosis per niece. s/p Spinal Epidural Injection. Was on PT 3x/ week but has been non compliant for 2-3 months.   * Dexa scan 2021: normal  * Follows with Dr Jaimes. Was supposed to go for a nerve ablation last Monday (cancelled due to poorly controlled HTN s/p admission)  * CT Abdomen and Pelvis w/ IV Cont (05.18.22 @ 14:00) No acute abnormality within the abdomen and pelvis. Age-indeterminate moderate compression fracture seen at L1, new since 4/9/2022.    - Neurosurgery team consulted for new L1 compression fracture  - Check XR TLS  - Monitor pain and keep score 01/10: tylenol PRN and morphine PRN  - Start Cyclobenzaprine 10mg TID PRN  - Apply lidocaine patch  - Resume home prednisone 20mg QD (for ILD per Dr Stephens)  - In case of no pain control with above regimen, consider adding GP or consulting pain management team. Patient was supposed to go for a nerve ablation last Monday (cancelled due to poorly controlled HTN s/p admission)  - PT/OT evaluation if cleared for OOBTC by neurosurgery  - TLSO  - Check Vitamin D levels   - NPO pending speech and swallow. IVF hydration for now      Possible Adjustment Disorder in Setting of Worsening Back Pain  Metabolic Encephalopathy  History of Depression  * Baseline AO3, lives with niece, ambulates with a walker  * Not on home meds  * Symptoms as above  - Consider starting sertraline low dose  - Consider consulting psychiatry team if symptoms persist despite pain control  - Check TSH, RPR, NH3, folate, and B12  - Perform non urgent CT head  - Infectious workup ordered  - PT/OT and SW consult  - NPO pending speech and swallow. IVF hydration for now      Constipation  * Last BM 2 days ago  * CT Abdomen and Pelvis w/ IV Cont (05.18.22 @ 14:00) No acute abnormality within the abdomen and pelvis. Age-indeterminate moderate compression fracture seen at L1, new since 4/9/2022.  - Monitor BM  - Start senna and PEG 17mg QD  - KUB in AM  - Check TSH      HTN Urgency   History of Poorly controlled HTN  * Recent presentation due to uncontrolled BP leading to cancellation of nerve ablation  * Home meds Losartan/HCTZ 100/25mg QD and atenolol 25mg QD  * Follows with Dr Moses  * BP /104 mmHg  - Outpatient follow up with Dr Moses  - Monitor BP closely  - Resume atenolol 25mg QD  - Hold Losartan/HCTZ 100/25mg QD in setting of JEOVANY  - Start hydralazine 50mg Q8h for now      Acute Kidney Injury  Likely Pre-Renal in Setting of BUN: Cr >20:1 and Reduced PO intake  Metabolic Alkalosis  * Recent labs Cr 1.1  * ED labs BUN 48, Cr 2.1    - Trend BUN and Cr  - IV fluid hydration   - Monitor BP and avoid hypotension  - Check urine studies and urine eosinophils  - Avoid nephrotoxic agents  - Hold HCTZ and Losartan  - Check ultrasound kidney bladder for now      History of UGIB Due to Bleeding Duodenal Ulcer  * Recent admission for a bleeding duodenal ulcer at [facility] (melena)  * Home med Protonix 20mg QD  * Hb ED 11.6  - Trend CBC  - Monitor BM  - Protonix 40mg QD  - T/S  - Check Fe studies  - OP follow up with GI      SEEMA   ILD  * Follows with Dr Stephens  * Not on CPAP or home O2  * On Prednisone 20mg QD  - Outpatient follow up with Dr Stephens  - Resume Prednisone 20mg QD      Hypothyroidism   * No TSH in chart  * Home meds Levothyroxine 100mcg QD  - Check TSH  - Resume Levothyroxine 100mcg QD      Leukocytosis in Setting of Steroid use and History of Essential Thrombocytosis  * Follows with Dr Denise  * Home med Anagrilide 0.5mg QD  - Outpatient follow up with Dr Denise  - Resume Anagrilide 0.5mg QD      Others  - DVT Prophylaxis: Heparin Subcutaneously   - GI Prophylaxis: Pantoprazole 40mg PO QD  - Diet: NPO pending speech and swallow  - Code Status: Full   - Med rec confirmed with niece at bedside      Barriers to learning: NO  Discharge Planning: Patient will be discharged once stable   Plan was communicated with patient and medical team       Rui Whittington MD  PGY - 2 Internal Medicine   [facility]

## 2022-05-18 NOTE — ED PROVIDER NOTE - PHYSICAL EXAMINATION
Physical Exam    Vital Signs: I have reviewed the initial vital signs.  Constitutional: well-nourished, appears stated age, no acute distress  Eyes: Conjunctiva pink, Sclera clear, PERRLA, EOMI without pain.   Cardiovascular: S1 and S2, regular rate, regular rhythm, well-perfused extremities, radial pulses equal and 2+ b/l.   Respiratory: unlabored respiratory effort, clear to auscultation bilaterally no wheezing, rales and rhonchi. pt is speaking full sentences. no accessory muscle use.   Gastrointestinal: soft, (+)  lower abdominal tenderness, nondistended abdomen, no pulsatile mass, normal bowl sounds, no rebound, no guarding  Musculoskeletal: supple neck, no lower extremity edema, no calf tenderness  Integumentary: warm, dry, no rash  Neurologic: pt is sleepy but  responds to her name, will shake her head yes and no, but is moaning and not willing to participate and in physical exam.   Psychiatric: appropriate mood, appropriate affect

## 2022-05-18 NOTE — ED PROVIDER NOTE - NS ED ATTENDING STATEMENT MOD
I have seen and examined this patient and fully participated in the care of this patient as the teaching attending.  The service was shared with the DEBORAH.  I reviewed and verified the documentation and independently performed the documented:

## 2022-05-18 NOTE — ED PROVIDER NOTE - ATTENDING CONTRIBUTION TO CARE
76-year-old female history of hypertension lung disease depression now presents with her niece for intractable back pain, unable to find comfortable position.  Decreased p.o. intake.  Decreased ambulation.  Does not want to move around.  Decreased depressed mood also complains of abdominal pain.  Went examined.  No vomiting, no fever, no chest pain or shortness of breath.    vss, chronically ill-appearing afebrile no respiratory distress, pink conj, anicteric, dry MM, neck supple, decreased breath sounds bilaterally, RRR, equal radial pulses bilat, abd soft/diffuse tenderness without peritoneal signs, no distention.  No cva tend. no calves tend, no edema, no fnd.  Tender to lower lumbar area.  No step-off.

## 2022-05-18 NOTE — H&P ADULT - NSHPPHYSICALEXAM_GEN_ALL_CORE
- Physical Exam in ED  * General Appearance: Alert, cooperative, interactive, oriented to time, place, and person, in no acute distress  * Head: Normocephalic, without obvious abnormality, atraumatic  * Neck: Supple, symmetrical, trachea midline, no adenopathy;   * Back: Symmetric, no curvature, unable to assess ROM, no point/CVA tenderness, negative LR test  * Lungs: Respirations unlabored, Good bilateral air entry, normal breath sounds (Clear to auscultation bilaterally, no audible wheezes, crackles, or rhonchi)  * Heart: Regular Rate and Rhythm, normal S1 and S2, no audible murmur, rub, or gallop  * Abdomen: Symmetric, non-distended, no scar, soft, non-tender, bowel sounds active all four quadrants, no masses, no organomegaly (no hepatosplenomegaly)  * Extremities: Extremities normal, atraumatic, no cyanosis, no lower extremity pitting edema bilaterally, adequate dorsalis pedis pulses, negative LR test  * Pulses: 2+ and symmetric all extremities  * Skin: Skin color, texture, turgor normal, no rashes or lesions  * Lymph nodes: Cervical, supraclavicular, and axillary nodes normal  * Neurologic: CNII-XII intact, normal strength, sensation and reflexes throughout, negative LR test

## 2022-05-18 NOTE — ED ADULT TRIAGE NOTE - CHIEF COMPLAINT QUOTE
as per ems patient did not get out of bed yesterday and did not want to eat. patient crying in ambulance bay abdomen soft - diffusely tender

## 2022-05-19 LAB
ALBUMIN SERPL ELPH-MCNC: 4.2 G/DL — SIGNIFICANT CHANGE UP (ref 3.5–5.2)
ALBUMIN SERPL ELPH-MCNC: 4.5 G/DL — SIGNIFICANT CHANGE UP (ref 3.5–5.2)
ALP SERPL-CCNC: 54 U/L — SIGNIFICANT CHANGE UP (ref 30–115)
ALP SERPL-CCNC: 59 U/L — SIGNIFICANT CHANGE UP (ref 30–115)
ALT FLD-CCNC: 26 U/L — SIGNIFICANT CHANGE UP (ref 0–41)
ALT FLD-CCNC: 28 U/L — SIGNIFICANT CHANGE UP (ref 0–41)
ANION GAP SERPL CALC-SCNC: 18 MMOL/L — HIGH (ref 7–14)
ANION GAP SERPL CALC-SCNC: 22 MMOL/L — HIGH (ref 7–14)
APTT BLD: 24.8 SEC — LOW (ref 27–39.2)
APTT BLD: 25.5 SEC — LOW (ref 27–39.2)
AST SERPL-CCNC: 34 U/L — SIGNIFICANT CHANGE UP (ref 0–41)
AST SERPL-CCNC: 35 U/L — SIGNIFICANT CHANGE UP (ref 0–41)
BASOPHILS # BLD AUTO: 0.1 K/UL — SIGNIFICANT CHANGE UP (ref 0–0.2)
BASOPHILS # BLD AUTO: 0.15 K/UL — SIGNIFICANT CHANGE UP (ref 0–0.2)
BASOPHILS NFR BLD AUTO: 0.5 % — SIGNIFICANT CHANGE UP (ref 0–1)
BASOPHILS NFR BLD AUTO: 0.6 % — SIGNIFICANT CHANGE UP (ref 0–1)
BILIRUB SERPL-MCNC: 0.7 MG/DL — SIGNIFICANT CHANGE UP (ref 0.2–1.2)
BILIRUB SERPL-MCNC: 0.7 MG/DL — SIGNIFICANT CHANGE UP (ref 0.2–1.2)
BUN SERPL-MCNC: 40 MG/DL — HIGH (ref 10–20)
BUN SERPL-MCNC: 44 MG/DL — HIGH (ref 10–20)
CALCIUM SERPL-MCNC: 10 MG/DL — SIGNIFICANT CHANGE UP (ref 8.5–10.1)
CALCIUM SERPL-MCNC: 9.6 MG/DL — SIGNIFICANT CHANGE UP (ref 8.5–10.1)
CHLORIDE SERPL-SCNC: 98 MMOL/L — SIGNIFICANT CHANGE UP (ref 98–110)
CHLORIDE SERPL-SCNC: 99 MMOL/L — SIGNIFICANT CHANGE UP (ref 98–110)
CK MB CFR SERPL CALC: 1.7 NG/ML — SIGNIFICANT CHANGE UP (ref 0.6–6.3)
CK MB CFR SERPL CALC: 1.8 NG/ML — SIGNIFICANT CHANGE UP (ref 0.6–6.3)
CK SERPL-CCNC: 122 U/L — SIGNIFICANT CHANGE UP (ref 0–225)
CK SERPL-CCNC: 125 U/L — SIGNIFICANT CHANGE UP (ref 0–225)
CO2 SERPL-SCNC: 20 MMOL/L — SIGNIFICANT CHANGE UP (ref 17–32)
CO2 SERPL-SCNC: 25 MMOL/L — SIGNIFICANT CHANGE UP (ref 17–32)
CREAT SERPL-MCNC: 1.6 MG/DL — HIGH (ref 0.7–1.5)
CREAT SERPL-MCNC: 1.8 MG/DL — HIGH (ref 0.7–1.5)
CULTURE RESULTS: SIGNIFICANT CHANGE UP
EGFR: 29 ML/MIN/1.73M2 — LOW
EGFR: 33 ML/MIN/1.73M2 — LOW
EOSINOPHIL # BLD AUTO: 0.28 K/UL — SIGNIFICANT CHANGE UP (ref 0–0.7)
EOSINOPHIL # BLD AUTO: 0.44 K/UL — SIGNIFICANT CHANGE UP (ref 0–0.7)
EOSINOPHIL NFR BLD AUTO: 1.5 % — SIGNIFICANT CHANGE UP (ref 0–8)
EOSINOPHIL NFR BLD AUTO: 1.7 % — SIGNIFICANT CHANGE UP (ref 0–8)
GLUCOSE SERPL-MCNC: 72 MG/DL — SIGNIFICANT CHANGE UP (ref 70–99)
GLUCOSE SERPL-MCNC: 97 MG/DL — SIGNIFICANT CHANGE UP (ref 70–99)
HCT VFR BLD CALC: 33.2 % — LOW (ref 37–47)
HCT VFR BLD CALC: 34.7 % — LOW (ref 37–47)
HGB BLD-MCNC: 11.2 G/DL — LOW (ref 12–16)
HGB BLD-MCNC: 11.4 G/DL — LOW (ref 12–16)
IMM GRANULOCYTES NFR BLD AUTO: 3.2 % — HIGH (ref 0.1–0.3)
IMM GRANULOCYTES NFR BLD AUTO: 3.8 % — HIGH (ref 0.1–0.3)
INR BLD: 1.04 RATIO — SIGNIFICANT CHANGE UP (ref 0.65–1.3)
INR BLD: 1.04 RATIO — SIGNIFICANT CHANGE UP (ref 0.65–1.3)
LYMPHOCYTES # BLD AUTO: 1.54 K/UL — SIGNIFICANT CHANGE UP (ref 1.2–3.4)
LYMPHOCYTES # BLD AUTO: 1.95 K/UL — SIGNIFICANT CHANGE UP (ref 1.2–3.4)
LYMPHOCYTES # BLD AUTO: 7.5 % — LOW (ref 20.5–51.1)
LYMPHOCYTES # BLD AUTO: 8.1 % — LOW (ref 20.5–51.1)
MCHC RBC-ENTMCNC: 31.8 PG — HIGH (ref 27–31)
MCHC RBC-ENTMCNC: 32 PG — HIGH (ref 27–31)
MCHC RBC-ENTMCNC: 32.9 G/DL — SIGNIFICANT CHANGE UP (ref 32–37)
MCHC RBC-ENTMCNC: 33.7 G/DL — SIGNIFICANT CHANGE UP (ref 32–37)
MCV RBC AUTO: 94.3 FL — SIGNIFICANT CHANGE UP (ref 81–99)
MCV RBC AUTO: 97.5 FL — SIGNIFICANT CHANGE UP (ref 81–99)
MONOCYTES # BLD AUTO: 1.18 K/UL — HIGH (ref 0.1–0.6)
MONOCYTES # BLD AUTO: 1.51 K/UL — HIGH (ref 0.1–0.6)
MONOCYTES NFR BLD AUTO: 5.8 % — SIGNIFICANT CHANGE UP (ref 1.7–9.3)
MONOCYTES NFR BLD AUTO: 6.2 % — SIGNIFICANT CHANGE UP (ref 1.7–9.3)
NEUTROPHILS # BLD AUTO: 15.22 K/UL — HIGH (ref 1.4–6.5)
NEUTROPHILS # BLD AUTO: 21.04 K/UL — HIGH (ref 1.4–6.5)
NEUTROPHILS NFR BLD AUTO: 79.9 % — HIGH (ref 42.2–75.2)
NEUTROPHILS NFR BLD AUTO: 81.2 % — HIGH (ref 42.2–75.2)
NRBC # BLD: 0 /100 WBCS — SIGNIFICANT CHANGE UP (ref 0–0)
NRBC # BLD: 0 /100 WBCS — SIGNIFICANT CHANGE UP (ref 0–0)
PLATELET # BLD AUTO: 185 K/UL — SIGNIFICANT CHANGE UP (ref 130–400)
PLATELET # BLD AUTO: 206 K/UL — SIGNIFICANT CHANGE UP (ref 130–400)
POTASSIUM SERPL-MCNC: 3.6 MMOL/L — SIGNIFICANT CHANGE UP (ref 3.5–5)
POTASSIUM SERPL-MCNC: 3.8 MMOL/L — SIGNIFICANT CHANGE UP (ref 3.5–5)
POTASSIUM SERPL-SCNC: 3.6 MMOL/L — SIGNIFICANT CHANGE UP (ref 3.5–5)
POTASSIUM SERPL-SCNC: 3.8 MMOL/L — SIGNIFICANT CHANGE UP (ref 3.5–5)
PROT SERPL-MCNC: 6.6 G/DL — SIGNIFICANT CHANGE UP (ref 6–8)
PROT SERPL-MCNC: 6.9 G/DL — SIGNIFICANT CHANGE UP (ref 6–8)
PROTHROM AB SERPL-ACNC: 11.9 SEC — SIGNIFICANT CHANGE UP (ref 9.95–12.87)
PROTHROM AB SERPL-ACNC: 12 SEC — SIGNIFICANT CHANGE UP (ref 9.95–12.87)
RBC # BLD: 3.52 M/UL — LOW (ref 4.2–5.4)
RBC # BLD: 3.56 M/UL — LOW (ref 4.2–5.4)
RBC # FLD: 18.4 % — HIGH (ref 11.5–14.5)
RBC # FLD: 18.6 % — HIGH (ref 11.5–14.5)
SODIUM SERPL-SCNC: 140 MMOL/L — SIGNIFICANT CHANGE UP (ref 135–146)
SODIUM SERPL-SCNC: 142 MMOL/L — SIGNIFICANT CHANGE UP (ref 135–146)
SPECIMEN SOURCE: SIGNIFICANT CHANGE UP
TROPONIN T SERPL-MCNC: 0.01 NG/ML — SIGNIFICANT CHANGE UP
TROPONIN T SERPL-MCNC: 0.01 NG/ML — SIGNIFICANT CHANGE UP
WBC # BLD: 19.04 K/UL — HIGH (ref 4.8–10.8)
WBC # BLD: 25.92 K/UL — HIGH (ref 4.8–10.8)
WBC # FLD AUTO: 19.04 K/UL — HIGH (ref 4.8–10.8)
WBC # FLD AUTO: 25.92 K/UL — HIGH (ref 4.8–10.8)

## 2022-05-19 PROCEDURE — 93970 EXTREMITY STUDY: CPT | Mod: 26

## 2022-05-19 PROCEDURE — 72100 X-RAY EXAM L-S SPINE 2/3 VWS: CPT | Mod: 26

## 2022-05-19 PROCEDURE — 74018 RADEX ABDOMEN 1 VIEW: CPT | Mod: 26

## 2022-05-19 PROCEDURE — 99222 1ST HOSP IP/OBS MODERATE 55: CPT

## 2022-05-19 PROCEDURE — 93010 ELECTROCARDIOGRAM REPORT: CPT

## 2022-05-19 PROCEDURE — 72070 X-RAY EXAM THORAC SPINE 2VWS: CPT | Mod: 26

## 2022-05-19 PROCEDURE — 72220 X-RAY EXAM SACRUM TAILBONE: CPT | Mod: 26

## 2022-05-19 PROCEDURE — 71250 CT THORAX DX C-: CPT | Mod: 26

## 2022-05-19 PROCEDURE — 70450 CT HEAD/BRAIN W/O DYE: CPT | Mod: 26

## 2022-05-19 PROCEDURE — 99233 SBSQ HOSP IP/OBS HIGH 50: CPT

## 2022-05-19 PROCEDURE — 76775 US EXAM ABDO BACK WALL LIM: CPT | Mod: 26

## 2022-05-19 PROCEDURE — 70450 CT HEAD/BRAIN W/O DYE: CPT | Mod: 26,77

## 2022-05-19 PROCEDURE — 99356: CPT

## 2022-05-19 RX ORDER — SODIUM CHLORIDE 9 MG/ML
1000 INJECTION INTRAMUSCULAR; INTRAVENOUS; SUBCUTANEOUS
Refills: 0 | Status: DISCONTINUED | OUTPATIENT
Start: 2022-05-19 | End: 2022-05-20

## 2022-05-19 RX ORDER — LABETALOL HCL 100 MG
10 TABLET ORAL EVERY 4 HOURS
Refills: 0 | Status: DISCONTINUED | OUTPATIENT
Start: 2022-05-19 | End: 2022-05-23

## 2022-05-19 RX ORDER — LEVETIRACETAM 250 MG/1
500 TABLET, FILM COATED ORAL
Refills: 0 | Status: DISCONTINUED | OUTPATIENT
Start: 2022-05-19 | End: 2022-05-19

## 2022-05-19 RX ORDER — LEVETIRACETAM 250 MG/1
500 TABLET, FILM COATED ORAL EVERY 12 HOURS
Refills: 0 | Status: DISCONTINUED | OUTPATIENT
Start: 2022-05-19 | End: 2022-05-24

## 2022-05-19 RX ORDER — HALOPERIDOL DECANOATE 100 MG/ML
2 INJECTION INTRAMUSCULAR EVERY 8 HOURS
Refills: 0 | Status: DISCONTINUED | OUTPATIENT
Start: 2022-05-19 | End: 2022-05-23

## 2022-05-19 RX ORDER — LABETALOL HCL 100 MG
10 TABLET ORAL ONCE
Refills: 0 | Status: COMPLETED | OUTPATIENT
Start: 2022-05-19 | End: 2022-05-19

## 2022-05-19 RX ORDER — LABETALOL HCL 100 MG
10 TABLET ORAL EVERY 4 HOURS
Refills: 0 | Status: DISCONTINUED | OUTPATIENT
Start: 2022-05-19 | End: 2022-05-19

## 2022-05-19 RX ORDER — AMLODIPINE BESYLATE 2.5 MG/1
10 TABLET ORAL EVERY 24 HOURS
Refills: 0 | Status: DISCONTINUED | OUTPATIENT
Start: 2022-05-19 | End: 2022-05-20

## 2022-05-19 RX ADMIN — LEVETIRACETAM 500 MILLIGRAM(S): 250 TABLET, FILM COATED ORAL at 14:02

## 2022-05-19 RX ADMIN — AMLODIPINE BESYLATE 10 MILLIGRAM(S): 2.5 TABLET ORAL at 15:56

## 2022-05-19 RX ADMIN — LIDOCAINE 1 PATCH: 4 CREAM TOPICAL at 11:07

## 2022-05-19 RX ADMIN — SODIUM CHLORIDE 75 MILLILITER(S): 9 INJECTION INTRAMUSCULAR; INTRAVENOUS; SUBCUTANEOUS at 20:46

## 2022-05-19 RX ADMIN — LEVETIRACETAM 400 MILLIGRAM(S): 250 TABLET, FILM COATED ORAL at 23:58

## 2022-05-19 RX ADMIN — Medication 10 MILLIGRAM(S): at 20:45

## 2022-05-19 RX ADMIN — HEPARIN SODIUM 5000 UNIT(S): 5000 INJECTION INTRAVENOUS; SUBCUTANEOUS at 23:00

## 2022-05-19 RX ADMIN — HEPARIN SODIUM 5000 UNIT(S): 5000 INJECTION INTRAVENOUS; SUBCUTANEOUS at 14:07

## 2022-05-19 RX ADMIN — HALOPERIDOL DECANOATE 2 MILLIGRAM(S): 100 INJECTION INTRAMUSCULAR at 09:20

## 2022-05-19 RX ADMIN — Medication 10 MILLIGRAM(S): at 12:34

## 2022-05-19 NOTE — CHART NOTE - NSCHARTNOTEFT_GEN_A_CORE
STEPDOWN UPGRADE NOTE:    76y Female transferred to SDU from floor     Patient is a 76y old Female who presents with a chief complaint of Worsening Back Pain and Metabolic Encephalopathy (19 May 2022 15:33)    The patient is currently admitted for the primary diagnosis of AMS (altered mental status)    Urinary Catheter: no    Disposition: SDU    Code Status: Full     COURSE OF EVENTS:  -------------------------------------------------------------------------------------------  Ms. Shook is a 76 year old female patient known to have a history of hypertension, COPD, depression  Chronic Back Pain for years secondary to Spinal Stenosis. Patient has been feeling down x few days with reduced PO intake, reduced sleep, loss of interest, and confusion. She has an instance where she had fecal incontinence on way to bathroom 2 days ago. In the setting of confusion and increased pain, marquise decided to bring patient to ED for evaluation. On CTH evidence of scattered SAH. Blood pressure uncontrolled with SBP in 190s on right and 140s on left. Patient ordered CT chest to r/o aneurysm.   -------------------------------------------------------------------------------------------    Current workup in progress:    #Subacute SAH likely 2/2 HTN Emergency   #AMS   - CT head: New scattered subarachnoid hemorrhage in the right cerebral sulci which appear mildly effaced compared tothe prior CT head dated 4/25/2021. 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  - SBP difference in arms > 40 -> CT chest to r/o aneurysm  - SBP goal < 160   - s/p IV labetolol x1   - start amlodipine 10mg - holding home meds losartan and hctz in setting of JEOVANY   - started on keppra 500 bid for seizure ppx   - r EEG   - MRA head and neck, MR head ordered      #Worsening Lower Back Pain Likely Secondary to New L1 Compression Fracture  #History of Chronic Back Pain for years secondary to Spinal Stenosis   - Follows with Dr Jaimes. Was supposed to go for a nerve ablation last Monday (cancelled due to poorly controlled HTN 240s/110s   - CT A/P: no acute abnormality within the abdomen and pelvis. Age-indeterminate moderate compression fracture seen at L1, new since 4/9/2022.  - Neurosurgery team consulted for new L1 compression fracture  - Check XR TLS  - Monitor pain and keep score 01/10: tylenol PRN and morphine PRN  - Apply lidocaine patch  - Resume home prednisone 20mg QD (for ILD per Dr Stephens)  - TLSO  - Check Vitamin D levels   - NPO pending speech and swallow. IVF hydration for now    #Constipation - had BM today   - cont senna and PEG 17mg QD    #Acute Kidney Injury  - Likely Pre-Renal in Setting of BUN: Cr >20:1 and Reduced PO intake  - Recent labs Cr 1.1, on admission Cr 2.1  - IV fluid hydration - NS 75 cc/hr  - Check urine studies  - Avoid nephrotoxic agents  - Holding HCTZ and Losartan  - Renal US   - nephro eval     #History of UGIB Due to Bleeding Duodenal Ulcer  - Recent admission for a bleeding duodenal ulcer at [facility] (melena)  - Protonix 40mg QD  - T/S, hgb stable   - Check Fe studies  - OP follow up with GI    #SEEMA/ILD  -Follows with Dr Stephens  - Not on CPAP or home O2  - On Prednisone 20mg QD   - unsure if pt should be on ppx     #Hypothyroidism   - Check TSH  - Resume Levothyroxine 100mcg QD    Leukocytosis in Setting of Steroid use and History of Essential Thrombocytosis  * Follows with Dr Denise  * Home med Anagrilide 0.5mg QD  - Outpatient follow up with Dr Denise  - Resume Anagrilide 0.5mg QD      Others  - DVT Prophylaxis: Heparin Subcutaneously   - GI Prophylaxis: Pantoprazole 40mg PO QD  - Diet: NPO pending speech and swallow  - Code Status: Full     SIGN OUT AT 05-19-22 @ 16:02 GIVEN TO:

## 2022-05-19 NOTE — CONSULT NOTE ADULT - SUBJECTIVE AND OBJECTIVE BOX
Neurology Consult    Patient is a 76y old  Female who presents with a chief complaint of Worsening Back Pain and Metabolic Encephalopathy (19 May 2022 11:19)      HPI:  History of Present Illness  Ms. Shook is a 76 year old female patient known to have:  - Baseline AO3, lives with niece, ambulates with a walker  - SEEMA and ILD. Follows with Dr Stephens. Not on CPAP or home O2. On Prednisone 20mg QD  - Depression  - HTN. Recent Admission for HTN Urgency. Follows with Dr Moses  - Hypothyroidism   - Recent admission for a bleeding duodenal ulcer at [facility] (melena). Home med Protonix 20mg QD  - Essential Thrombocytosis. Follows with Dr Denise  - Chronic Back Pain for years secondary to Spinal Stenosis per marquise. s/p Spinal Epidural Injection. Was on PT 3x/ week but has been non compliant for 2-3 months. Follows with Dr Jaimes. Was supposed to go for a nerve ablation last Monday (cancelled due to poorly controlled HTN s/p admission)       She was brought to the ED by marquise on  for evaluation of worsening back pain and confusion x2 days.  History goes back to 2 days PTP when the patient started complaining of worsening of her chronic dull lower back pain that radiates down to her right medial aspect of thigh.  This has resulted in limited ambulation (patient refusing to leave her bed) and to being non compliant with Physical Therapy in the absence of leg weakness or paresthesias or numbness or urinary incontinence.  Per marquise, patient has been feeling down x few days with reduced PO intake, reduced sleep, loss of interest, thoughts that her family doesn't like/ support her, and confusion (some times would not recognize marquise).  She has an instance where she had fecal incontinence on way to bathroom 2 days ago.  In the setting of confusion and increased pain, marquise decided to bring patient to ED for evaluation.    On review of systems, marquise denies any recent fever, chills, night sweats, URTI symptoms (cough, rhinorrhea, sore throat), urinary symptoms (urinary frequency, urgency, intermittence, dysuria, foul smelling urine, cloudy urine), abdominal pain, headache, nausea, or vomiting.   No sick contacts.   No recent travel or exposure to recent travelers.      Upon presentation to the ED, the patient was hemodynamically stable:  Vital Signs in ED   - /104 mmHg  -   - RR 20  - T97.1  - SaO2 94% on RA      Investigations   Laboratory Workup  - CBC:                        11.6   20.94 )-----------( 188      ( 18 May 2022 10:50 )             34.1     - Chemistry:      140  |  96<L>  |  48<H>  ----------------------------<  109<H>  4.3   |  24  |  2.1<H>    Ca    10.4<H>      18 May 2022 10:50    TPro  7.3  /  Alb  4.8  /  TBili  0.6  /  DBili  x   /  AST  36  /  ALT  27  /  AlkPhos  61      - Coagulation Studies:  PT/INR - ( 18 May 2022 10:50 )   PT: 12.30 sec;   INR: 1.07 ratio    PTT - ( 18 May 2022 10:50 )  PTT:24.8 sec      Microbiological Workup  Urinalysis Basic - ( 18 May 2022 14:07 )    Color: Yellow / Appearance: Clear / S.021 / pH: x  Gluc: x / Ketone: Trace  / Bili: Negative / Urobili: <2 mg/dL   Blood: x / Protein: 30 mg/dL / Nitrite: Negative   Leuk Esterase: Negative / RBC: 6 /HPF / WBC 4 /HPF   Sq Epi: x / Non Sq Epi: 1 /HPF / Bacteria: Negative          Radiological Workup  * CT Abdomen and Pelvis w/ IV Cont (22 @ 14:00) No acute abnormality within the abdomen and pelvis. Age-indeterminate moderate compression fracture seen at L1, new since 2022.  * CXR CM and CHF      - Patient was given IV Cefepime 2g x1 dose in ED  - She was also given 1.4 L LR bolus   - She will be admitted for further investigations, management, and monitoring             (18 May 2022 22:59)      PAST MEDICAL & SURGICAL HISTORY:  HTN (hypertension)      Hypothyroid      Depression      Interstitial lung disease      Essential thrombocytopenia      History of ovarian cyst          FAMILY HISTORY:      Social History: (-) x 3    Allergies    No Known Allergies    Intolerances        MEDICATIONS  (STANDING):  amLODIPine   Tablet 10 milliGRAM(s) Oral every 24 hours  ATENolol  Tablet 25 milliGRAM(s) Oral daily  chlorhexidine 4% Liquid 1 Application(s) Topical <User Schedule>  heparin SubCutaneous Injection - Peds 5000 Unit(s) SubCutaneous every 8 hours  levETIRAcetam 500 milliGRAM(s) Oral two times a day  lidocaine   4% Patch 1 Patch Transdermal daily  pantoprazole    Tablet 40 milliGRAM(s) Oral before breakfast  polyethylene glycol 3350 17 Gram(s) Oral daily  predniSONE   Tablet 20 milliGRAM(s) Oral daily  senna 2 Tablet(s) Oral at bedtime    MEDICATIONS  (PRN):  acetaminophen     Tablet .. 650 milliGRAM(s) Oral every 6 hours PRN Mild Pain (1 - 3)  cyclobenzaprine 10 milliGRAM(s) Oral three times a day PRN Muscle Spasm  haloperidol    Injectable 2 milliGRAM(s) IntraMuscular every 8 hours PRN Agitation  morphine  - Injectable 2 milliGRAM(s) IV Push every 6 hours PRN Severe Pain (7 - 10)    Vital Signs Last 24 Hrs  T(C): 36.4 (19 May 2022 12:03), Max: 37.1 (18 May 2022 13:38)  T(F): 97.6 (19 May 2022 12:03), Max: 98.7 (18 May 2022 13:38)  HR: 93 (19 May 2022 12:03) (87 - 112)  BP: 182/96 (19 May 2022 12:03) (141/63 - 182/96)  BP(mean): --  RR: 17 (19 May 2022 12:03) (17 - 18)  SpO2: 99% (19 May 2022 05:01) (95% - 99%)    Examination:  Patient obtunded, aroused by verbal stimuli  Following commands but is confused.   Mild dysarthria in presence of lethargy/obtundation  Aphasia exam limited by mental status  No gaze  Moves all extremities weakly but grossly non focal.   **No nuchal rigidity  Modified ventura score 1    Labs:   CBC Full  -  ( 19 May 2022 12:20 )  WBC Count : 19.04 K/uL  RBC Count : 3.52 M/uL  Hemoglobin : 11.2 g/dL  Hematocrit : 33.2 %  Platelet Count - Automated : 185 K/uL  Mean Cell Volume : 94.3 fL  Mean Cell Hemoglobin : 31.8 pg  Mean Cell Hemoglobin Concentration : 33.7 g/dL  Auto Neutrophil # : 15.22 K/uL  Auto Lymphocyte # : 1.54 K/uL  Auto Monocyte # : 1.18 K/uL  Auto Eosinophil # : 0.28 K/uL  Auto Basophil # : 0.10 K/uL  Auto Neutrophil % : 79.9 %  Auto Lymphocyte % : 8.1 %  Auto Monocyte % : 6.2 %  Auto Eosinophil % : 1.5 %  Auto Basophil % : 0.5 %        140  |  96<L>  |  48<H>  ----------------------------<  109<H>  4.3   |  24  |  2.1<H>    Ca    10.4<H>      18 May 2022 10:50    TPro  7.3  /  Alb  4.8  /  TBili  0.6  /  DBili  x   /  AST  36  /  ALT  27  /  AlkPhos  61      LIVER FUNCTIONS - ( 18 May 2022 10:50 )  Alb: 4.8 g/dL / Pro: 7.3 g/dL / ALK PHOS: 61 U/L / ALT: 27 U/L / AST: 36 U/L / GGT: x           PT/INR - ( 19 May 2022 12:20 )   PT: 12.00 sec;   INR: 1.04 ratio         PTT - ( 19 May 2022 12:20 )  PTT:24.8 sec  Urinalysis Basic - ( 18 May 2022 14:07 )    Color: Yellow / Appearance: Clear / S.021 / pH: x  Gluc: x / Ketone: Trace  / Bili: Negative / Urobili: <2 mg/dL   Blood: x / Protein: 30 mg/dL / Nitrite: Negative   Leuk Esterase: Negative / RBC: 6 /HPF / WBC 4 /HPF   Sq Epi: x / Non Sq Epi: 1 /HPF / Bacteria: Negative          Neuroimaging:      IMPRESSION:    New scattered subarachnoid hemorrhage in the right cerebral sulci which   appear mildly effaced compared tothe prior CT head dated 2021.    Slightly increased size of the ventricle since the prior exam which may   reflect communicating hydrocephalus.    Communication: The summary of above findings were discussed with readback   confirmation with Dr Houser by radiologist Dr. Nieto on 2022 at   9:40 AM..

## 2022-05-19 NOTE — CONSULT NOTE ADULT - ASSESSMENT
Impression:  Ms. Shook is a 76 year old female patient known to have a history of hypertension, COPD, depression  Chronic Back Pain for years secondary to Spinal Stenosis. Patient has been feeling down x few days with reduced PO intake, reduced sleep, loss of interest, and confusion. On CTH evidence of scattered SAH. Patient obtunded on exam, blood pressure 190 systolic.     Suggestion:  -Patient needs higher level of care.   -Agree with neurosurgery on CTA  -Agree with Keppra  -Keep blood pressure less than 160.   -Medical management as per primary team.

## 2022-05-19 NOTE — PROGRESS NOTE ADULT - ASSESSMENT
76 year old female patient known to have a history of hypertension, COPD, depression  Chronic Back Pain for years secondary to Spinal Stenosis.     Presented with worsening back pain, and also confusion since 2 days PTA    CTH here showed evidence of scattered SAH at right cerebral sulci.   Blood pressure uncontrolled with SBP in 180s on left and 140s on right.     #AMS:  Unclear etiology.   Prior to 2 days PTA, AAOx3. Currently drowsy at times but arousable, Oriented x 1-2  Leukocytosis, JEOVANY noted.   CXR: No opacities, UA neg. f/u Blood cultures (received 5/18).   Empiric Cefepime.   AMS MAy be related to --    #Subacute SAH :   - CT head (5/18 midnight): New scattered subarachnoid hemorrhage in the right cerebral sulci which appear mildly effaced compared to the prior CT head dated 4/25/2021. 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  started on keppra 500 bid for seizure ppx, On no blood thinners. Coags normal.   Neurochecks Q4-6 H **  etiology unclear. May be 2/2   #Hypertensive Emergency  BP poorly controlled as outpatient.  Was recently getting a lot of Toradol as outpatient.   5/19: Her home med Losartan-HCTZ could not be given d/t JEOVANY. c/w Home ATenolol 25 QD, Amlodipine 10 QD.   Labetalol or Hydralazine pushes PRN.   Check BP Q4H. Goal < 160.     Currently SBP in 180s on left and 140s on right  MRA Head, neck , upper chest to r/o dissection (if CT angio cannot be done with her GFR of 29 - f/u RAdiology)     MR brain non con to r/o strokes and also check for PRES ?   - r EEG     #Acute Kidney Injury  - Likely Pre-Renal in Setting of BUN: Cr >20:1 and Reduced PO intake  - Recent labs Cr 1.1, on admission Cr 2.1  - IV fluid hydration - NS 75 cc/hr  - Check urine studies  - Avoid nephrotoxic agents  - Holding HCTZ and Losartan  - Renal US   - nephro eval     #Worsening Lower Back Pain Likely Secondary to New L1 Compression Fracture  #History of Chronic Back Pain for years secondary to Spinal Stenosis   - Follows with Dr Jaimes. Was supposed to go for a nerve ablation last Monday (cancelled due to poorly controlled HTN 240s/110s   - CT A/P: no acute abnormality within the abdomen and pelvis. Age-indeterminate moderate compression fracture seen at L1, new since 4/9/2022.  - Neurosurgery team consulted for new L1 compression fracture  - Check XR TLS  - Monitor pain and keep score 01/10: tylenol PRN and morphine PRN  - Apply lidocaine patch  - Resume home prednisone 20mg QD (for ILD per Dr Stephens)  - TLSO  - Check Vitamin D levels   - NPO pending speech and swallow. IVF hydration for now  was taking opiates as outpatient that were recently tapered off and changed to toradol.  PLEASE GET MORE RECORDS FROM PAIN MANAGEMENT.     #Constipation - had BM today   - cont senna and PEG 17mg QD      #History of UGIB Due to Bleeding Duodenal Ulcer  - [facility] admission [facility] a [facility] (melena)  - Protonix 40mg QD  - T/S, hgb stable   - Check Fe studies  - OP follow up with GI    #SEEMA/ILD  -Follows with Dr Stephens  - Not on CPAP or home O2  - On Prednisone 20mg QD   - may consider PCP ppx    #Hypothyroidism   - Check TSH  - Resume Levothyroxine 100mcg QD    Leukocytosis in Setting of Steroid use and History of Essential Thrombocytosis  * Follows with Dr Denise  * Home med Anagrilide 0.5mg QD  - Outpatient follow up with Dr Denise  - Resume Anagrilide 0.5mg QD      Others  - DVT Prophylaxis: Heparin Subcutaneously   - GI Prophylaxis: Pantoprazole 40mg PO QD  - Diet: NPO pending speech and swallow  - Code Status: Full

## 2022-05-19 NOTE — CONSULT NOTE ADULT - SUBJECTIVE AND OBJECTIVE BOX
ICU Vital Signs Last 24 Hrs  T(C): 36.4 (19 May 2022 12:03), Max: 36.9 (19 May 2022 05:01)  T(F): 97.6 (19 May 2022 12:03), Max: 98.5 (19 May 2022 05:01)  HR: 92 (19 May 2022 14:40) (87 - 112)  BP: 145/81 (19 May 2022 14:40) (141/63 - 182/96)  BP(mean): --  ABP: --  ABP(mean): --  RR: 17 (19 May 2022 12:03) (17 - 18)  SpO2: 99% (19 May 2022 05:01) (95% - 99%)        LABS:  Na: 142 (05-19 @ 12:20), 140 (05-18 @ 10:50)  K: 3.8 (05-19 @ 12:20), 4.3 (05-18 @ 10:50)  Cl: 99 (05-19 @ 12:20), 96 (05-18 @ 10:50)  CO2: 25 (05-19 @ 12:20), 24 (05-18 @ 10:50)  BUN: 44 (05-19 @ 12:20), 48 (05-18 @ 10:50)  Cr: 1.8 (05-19 @ 12:20), 2.1 (05-18 @ 10:50)  Glu: 97(05-19 @ 12:20), 109(05-18 @ 10:50)    Hgb: 11.2 (05-19 @ 12:20), 11.6 (05-18 @ 10:50)  Hct: 33.2 (05-19 @ 12:20), 34.1 (05-18 @ 10:50)  WBC: 19.04 (05-19 @ 12:20), 20.94 (05-18 @ 10:50)  Plt: 185 (05-19 @ 12:20), 188 (05-18 @ 10:50)    INR: 1.04 05-19-22 @ 12:20, 1.07 05-18-22 @ 10:50  PTT: 24.8 05-19-22 @ 12:20, 24.8 05-18-22 @ 10:50      LIVER FUNCTIONS - ( 19 May 2022 12:20 )  Alb: 4.5 g/dL / Pro: 6.9 g/dL / ALK PHOS: 59 U/L / ALT: 28 U/L / AST: 34 U/L / GGT: x               MEDICATIONS  (STANDING):  amLODIPine   Tablet 10 milliGRAM(s) Oral every 24 hours  ATENolol  Tablet 25 milliGRAM(s) Oral daily  chlorhexidine 4% Liquid 1 Application(s) Topical <User Schedule>  heparin SubCutaneous Injection - Peds 5000 Unit(s) SubCutaneous every 8 hours  levETIRAcetam 500 milliGRAM(s) Oral two times a day  lidocaine   4% Patch 1 Patch Transdermal daily  pantoprazole    Tablet 40 milliGRAM(s) Oral before breakfast  polyethylene glycol 3350 17 Gram(s) Oral daily  predniSONE   Tablet 20 milliGRAM(s) Oral daily  senna 2 Tablet(s) Oral at bedtime  sodium chloride 0.9%. 1000 milliLiter(s) (75 mL/Hr) IV Continuous <Continuous>    MEDICATIONS  (PRN):  acetaminophen     Tablet .. 650 milliGRAM(s) Oral every 6 hours PRN Mild Pain (1 - 3)  cyclobenzaprine 10 milliGRAM(s) Oral three times a day PRN Muscle Spasm  haloperidol    Injectable 2 milliGRAM(s) IntraMuscular every 8 hours PRN Agitation  labetalol Injectable 10 milliGRAM(s) IV Push every 4 hours PRN Systolic blood pressure > 150  morphine  - Injectable 2 milliGRAM(s) IV Push every 6 hours PRN Severe Pain (7 - 10)    EXAMINATION:  General: No acute distress  HEENT: Anicteric sclerae  Cardiac: [id number]  Lungs: Clear  Abdomen: Soft, non-tender, +BS  Extremities: No c/c/e  Skin/Incision Site: Clean, dry and intact  Neurologic: Awake, alert, fully oriented, follows commands, PERRL, VFFtc, EOMI, face symmetric, tongue midline, no drift, full strength     HPI:  History of Present Illness  Ms. Shook is a 76 year old female patient known to have:  - Baseline AO3, lives with niece, ambulates with a walker  - SEEMA and ILD. Follows with Dr Stephens. Not on CPAP or home O2. On Prednisone 20mg QD  - Depression  - HTN. Recent Admission for HTN Urgency. Follows with Dr Moses  - Hypothyroidism   - Recent admission for a bleeding duodenal ulcer at [facility] (melena). Home med Protonix 20mg QD  - Essential Thrombocytosis. Follows with Dr Denise  - Chronic Back Pain for years secondary to Spinal Stenosis per marquise. s/p Spinal Epidural Injection. Was on PT 3x/ week but has been non compliant for 2-3 months. Follows with Dr Jaimes. Was supposed to go for a nerve ablation last Monday (cancelled due to poorly controlled HTN s/p admission)       She was brought to the ED by marquise on 05/18 for evaluation of worsening back pain and confusion x2 days.  History goes back to 2 days PTP when the patient started complaining of worsening of her chronic dull lower back pain that radiates down to her right medial aspect of thigh.  This has resulted in limited ambulation (patient refusing to leave her bed) and to being non compliant with Physical Therapy in the absence of leg weakness or paresthesias or numbness or urinary incontinence.  Per marquise, patient has been feeling down x few days with reduced PO intake, reduced sleep, loss of interest, thoughts that her family doesn't like/ support her, and confusion (some times would not recognize niece).  She has an instance where she had fecal incontinence on way to bathroom 2 days ago.  In the setting of confusion and increased pain, marquise decided to bring patient to ED for evaluation.    On review of systems, marquise denies any recent fever, chills, night sweats, URTI symptoms (cough, rhinorrhea, sore throat), urinary symptoms (urinary frequency, urgency, intermittence, dysuria, foul smelling urine, cloudy urine), abdominal pain, headache, nausea, or vomiting.   No sick contacts.   No recent travel or exposure to recent travelers.    Upon presentation to the ED, the patient was hemodynamically stable:  Vital Signs in ED   - /104 mmHg  -   - RR 20  - T97.1  - SaO2 94% on RA      - Patient was given IV Cefepime 2g x1 dose in ED  - She was also given 1.4 L LR bolus   - She will be admitted for further investigations, management, and monitoring      Neurocritical care consulted for suspicion for subarachnoid hemorrhage on CT head as the cause of her altered mental status with uncontrolled hypertension    Interval HPI:  Pt was seen comfortable in bed, AAO x 1 but not following commands.    ICU Vital Signs Last 24 Hrs  T(C): 36.4 (19 May 2022 12:03), Max: 36.9 (19 May 2022 05:01)  T(F): 97.6 (19 May 2022 12:03), Max: 98.5 (19 May 2022 05:01)  HR: 92 (19 May 2022 14:40) (87 - 112)  BP: 145/81 (19 May 2022 14:40) (141/63 - 182/96)  BP(mean): --  ABP: --  ABP(mean): --  RR: 17 (19 May 2022 12:03) (17 - 18)  SpO2: 99% (19 May 2022 05:01) (95% - 99%)      LABS:  Na: 142 (05-19 @ 12:20), 140 (05-18 @ 10:50)  K: 3.8 (05-19 @ 12:20), 4.3 (05-18 @ 10:50)  Cl: 99 (05-19 @ 12:20), 96 (05-18 @ 10:50)  CO2: 25 (05-19 @ 12:20), 24 (05-18 @ 10:50)  BUN: 44 (05-19 @ 12:20), 48 (05-18 @ 10:50)  Cr: 1.8 (05-19 @ 12:20), 2.1 (05-18 @ 10:50)  Glu: 97(05-19 @ 12:20), 109(05-18 @ 10:50)    Hgb: 11.2 (05-19 @ 12:20), 11.6 (05-18 @ 10:50)  Hct: 33.2 (05-19 @ 12:20), 34.1 (05-18 @ 10:50)  WBC: 19.04 (05-19 @ 12:20), 20.94 (05-18 @ 10:50)  Plt: 185 (05-19 @ 12:20), 188 (05-18 @ 10:50)    INR: 1.04 05-19-22 @ 12:20, 1.07 05-18-22 @ 10:50  PTT: 24.8 05-19-22 @ 12:20, 24.8 05-18-22 @ 10:50      LIVER FUNCTIONS - ( 19 May 2022 12:20 )  Alb: 4.5 g/dL / Pro: 6.9 g/dL / ALK PHOS: 59 U/L / ALT: 28 U/L / AST: 34 U/L / GGT: x                   MEDICATIONS  (STANDING):  amLODIPine   Tablet 10 milliGRAM(s) Oral every 24 hours  ATENolol  Tablet 25 milliGRAM(s) Oral daily  chlorhexidine 4% Liquid 1 Application(s) Topical <User Schedule>  heparin SubCutaneous Injection - Peds 5000 Unit(s) SubCutaneous every 8 hours  levETIRAcetam 500 milliGRAM(s) Oral two times a day  lidocaine   4% Patch 1 Patch Transdermal daily  pantoprazole    Tablet 40 milliGRAM(s) Oral before breakfast  polyethylene glycol 3350 17 Gram(s) Oral daily  predniSONE   Tablet 20 milliGRAM(s) Oral daily  senna 2 Tablet(s) Oral at bedtime  sodium chloride 0.9%. 1000 milliLiter(s) (75 mL/Hr) IV Continuous <Continuous>    MEDICATIONS  (PRN):  acetaminophen     Tablet .. 650 milliGRAM(s) Oral every 6 hours PRN Mild Pain (1 - 3)  cyclobenzaprine 10 milliGRAM(s) Oral three times a day PRN Muscle Spasm  haloperidol    Injectable 2 milliGRAM(s) IntraMuscular every 8 hours PRN Agitation  labetalol Injectable 10 milliGRAM(s) IV Push every 4 hours PRN Systolic blood pressure > 150  morphine  - Injectable 2 milliGRAM(s) IV Push every 6 hours PRN Severe Pain (7 - 10)    EXAMINATION:  General: No acute distress  HEENT: Anicteric sclerae  Cardiac: [id number]  Lungs: Clear  Abdomen: Soft, non-tender, +BS  Extremities: No c/c/e  Skin/Incision Site: Clean, dry and intact  Neurologic: AAOx1, did not follow commands, PERRL, face symmetric, tongue midline, no drift, Strength 5/5 in UE and 4/5 in LE. Pt withdrawing to pain on all 4s, speaking in full sentences

## 2022-05-19 NOTE — CONSULT NOTE ADULT - SUBJECTIVE AND OBJECTIVE BOX
HPI:  Patient is a 76 year-old female PMH HTN, Hypothyroidism who presented to the hospital yesterday for evaluation of AMS x2days. Patient was brought in by niece who assisted with Angolan translation. Neurosurgery was consulted this AM after CTH from last night demonstrated new right sided scattered SAH. Patient was seen and examined at bedside on 3A. She is sitting upright A&Ox1 (to person) MAEx4, and follows some commands. Niece at bedside claims that pt has been living with her and at baseline is A&Ox3 and rather independent. Niece claims pt became confused a few days ago and wasn't getting better so she decided to bring her to the hospital for evaluation. Patient admits to headache when asked and denies changes in vision or nausea. Niece also claims that BP has been uncontrolled at home as SBP >200 on several occasions. Denies use of anticoagulation/antiplatelet drugs. Niece denies history of recent head trauma.        PAST MEDICAL & SURGICAL HISTORY:  HTN (hypertension)      Hypothyroid      Depression      Interstitial lung disease      Essential thrombocytopenia      History of ovarian cyst          Home Medications:  anagrelide 0.5 mg oral capsule: 1 cap(s) orally 4 times a day (18 May 2022 23:29)  atenolol 25 mg oral tablet: 1 tab(s) orally once a day (18 May 2022 23:28)  losartan-hydrochlorothiazide 100 mg-25 mg oral tablet: 1 tab(s) orally once a day (18 May 2022 23:28)  predniSONE 20 mg oral tablet: 1 tab(s) orally once a day (18 May 2022 23:28)  Protonix 20 mg oral delayed release tablet: 1 tab(s) orally once a day (18 May 2022 23:28)      Allergies    No Known Allergies    Intolerances        ROS:  [X] A ten-point review of systems is negative except as noted   [  ] Due to altered mental status/intubation, subjective information were not able to be obtained from the patient. History was obtained, to the extent possible, from review of the chart and collateral sources of information    MEDICATIONS  (STANDING):  ATENolol  Tablet 25 milliGRAM(s) Oral daily  chlorhexidine 4% Liquid 1 Application(s) Topical <User Schedule>  heparin SubCutaneous Injection - Peds 5000 Unit(s) SubCutaneous every 8 hours  lactated ringers. 1000 milliLiter(s) (75 mL/Hr) IV Continuous <Continuous>  lidocaine   4% Patch 1 Patch Transdermal daily  pantoprazole    Tablet 40 milliGRAM(s) Oral before breakfast  polyethylene glycol 3350 17 Gram(s) Oral daily  predniSONE   Tablet 20 milliGRAM(s) Oral daily  senna 2 Tablet(s) Oral at bedtime    MEDICATIONS  (PRN):  acetaminophen     Tablet .. 650 milliGRAM(s) Oral every 6 hours PRN Mild Pain (1 - 3)  cyclobenzaprine 10 milliGRAM(s) Oral three times a day PRN Muscle Spasm  haloperidol    Injectable 2 milliGRAM(s) IntraMuscular every 8 hours PRN Agitation  morphine  - Injectable 2 milliGRAM(s) IV Push every 6 hours PRN Severe Pain (7 - 10)      ICU Vital Signs Last 24 Hrs  T(C): 36.9 (19 May 2022 05:01), Max: 37.1 (18 May 2022 13:38)  T(F): 98.5 (19 May 2022 05:01), Max: 98.7 (18 May 2022 13:38)  HR: 112 (19 May 2022 10:20) (87 - 112)  BP: 157/114 (19 May 2022 10:20) (141/63 - 175/83)  BP(mean): --  ABP: --  ABP(mean): --  RR: 18 (19 May 2022 05:01) (18 - 18)  SpO2: 99% (19 May 2022 05:01) (95% - 99%)      I&O's Detail      CBC Full  -  ( 18 May 2022 10:50 )  WBC Count : 20.94 K/uL  RBC Count : 3.64 M/uL  Hemoglobin : 11.6 g/dL  Hematocrit : 34.1 %  Platelet Count - Automated : 188 K/uL  Mean Cell Volume : 93.7 fL  Mean Cell Hemoglobin : 31.9 pg  Mean Cell Hemoglobin Concentration : 34.0 g/dL  Auto Neutrophil # : 17.30 K/uL  Auto Lymphocyte # : 1.28 K/uL  Auto Monocyte # : 1.09 K/uL  Auto Eosinophil # : 0.00 K/uL  Auto Basophil # : 0.38 K/uL  Auto Neutrophil % : 82.6 %  Auto Lymphocyte % : 6.1 %  Auto Monocyte % : 5.2 %  Auto Eosinophil % : 0.0 %  Auto Basophil % : 1.8 %        140  |  96<L>  |  48<H>  ----------------------------<  109<H>  4.3   |  24  |  2.1<H>    Ca    10.4<H>      18 May 2022 10:50    TPro  7.3  /  Alb  4.8  /  TBili  0.6  /  DBili  x   /  AST  36  /  ALT  27  /  AlkPhos  61  05-18        Urinalysis Basic - ( 18 May 2022 14:07 )    Color: Yellow / Appearance: Clear / S.021 / pH: x  Gluc: x / Ketone: Trace  / Bili: Negative / Urobili: <2 mg/dL   Blood: x / Protein: 30 mg/dL / Nitrite: Negative   Leuk Esterase: Negative / RBC: 6 /HPF / WBC 4 /HPF   Sq Epi: x / Non Sq Epi: 1 /HPF / Bacteria: Negative        Physical Exam:  General: Sitting upright, following some commands  AAOX1 (to person and place). Verbal function intact  Facial motions symmetric  PERRL, EOMI  Pronator Drift: Unable to assess  Finger to Nose: Unable to assess  Motor: MAEx4      Imaging:  < from: CT Head No Cont (22 @ 00:05) >  IMPRESSION:    New scattered subarachnoid hemorrhage in the right cerebral sulci which   appear mildly effaced compared tothe prior CT head dated 2021.    Slightly increased size of the ventricle since the prior exam which may   reflect communicating hydrocephalus.      Assessment/Plan:  76 year-old female p/w AMSx2 days. CTH demonstrating new right sided scattered SAH.  -Please obtain repeat CTH non con   -CTA H&N  -Keppra 500mg BID x 1 week  -BP Management, keep normotensive  -HOB elevated  -Hold AC/AP drugs  -d/w attending

## 2022-05-19 NOTE — SWALLOW BEDSIDE ASSESSMENT ADULT - SLP PERTINENT HISTORY OF CURRENT PROBLEM
76 y.o F adm with worsening back pain and metabolic encephalopathy. Pt's niece states that Pt hasn't been eating much.  PMhx: Depression, hypothyroid, interstitial ling disease, HTN, SEEMA, Spinal stenosis s/p spinal epidural. 76 y.o F adm with worsening back pain and metabolic encephalopathy.  CT abdomen/pelvis showed new L1 compression fx, new since 4/9/22.  Neuro Sx consulted for L1 fx.  Pt's niece states that Pt hasn't been eating much.  PMhx: Depression, hypothyroid, interstitial ling disease, HTN, SEEMA, Spinal stenosis s/p spinal epidural. 76 y.o F adm with worsening back pain and metabolic encephalopathy.  CT abdomen/pelvis showed new L1 compression fx, new since 4/9/22.  Neuro Sx consulted for L1 fx.  Pt's niece states that Pt hasn't been eating much.  PMhx: thrombocytopenia, Depression, hypothyroid, interstitial ling disease, HTN, SEEMA, Spinal stenosis s/p spinal epidural.

## 2022-05-19 NOTE — PROGRESS NOTE ADULT - SUBJECTIVE AND OBJECTIVE BOX
S: drowsy at times but arousable.  aaox1-2      All other pertinent ROS negative.      Vital Signs Last 24 Hrs  T(C): 36.4 (19 May 2022 12:03), Max: 36.9 (19 May 2022 05:01)  T(F): 97.6 (19 May 2022 12:03), Max: 98.5 (19 May 2022 05:01)  HR: 92 (19 May 2022 14:40) (87 - 112)  BP: 145/81 (19 May 2022 14:40) (141/63 - 182/96)  BP(mean): --  RR: 17 (19 May 2022 12:03) (17 - 18)  SpO2: 99% (19 May 2022 05:01) (95% - 99%)  PHYSICAL EXAM:    Constitutional: NAD, awake and alert, well-developed  HEENT: PERR, EOMI, Normal Hearing, MMM  Neck: Soft and supple, No LAD, No JVD  Respiratory: Breath sounds are clear bilaterally, No wheezing, rales or rhonchi  Cardiovascular: S1 and S2, regular rate and rhythm, no Murmurs, gallops or rubs  Gastrointestinal: Bowel Sounds present, soft, nontender, nondistended, no guarding, no rebound  Extremities: No peripheral edema  Neuro: drowsy at times but aropusable.  aaox1-2  strength/sensations intact in 4 limbs. CNs intact.     MEDICATIONS:  MEDICATIONS  (STANDING):  amLODIPine   Tablet 10 milliGRAM(s) Oral every 24 hours  ATENolol  Tablet 25 milliGRAM(s) Oral daily  chlorhexidine 4% Liquid 1 Application(s) Topical <User Schedule>  heparin SubCutaneous Injection - Peds 5000 Unit(s) SubCutaneous every 8 hours  levETIRAcetam 500 milliGRAM(s) Oral two times a day  lidocaine   4% Patch 1 Patch Transdermal daily  pantoprazole    Tablet 40 milliGRAM(s) Oral before breakfast  polyethylene glycol 3350 17 Gram(s) Oral daily  predniSONE   Tablet 20 milliGRAM(s) Oral daily  senna 2 Tablet(s) Oral at bedtime  sodium chloride 0.9%. 1000 milliLiter(s) (75 mL/Hr) IV Continuous <Continuous>      LABS: All Labs Reviewed:                        11.2   19.04 )-----------( 185      ( 19 May 2022 12:20 )             33.2     05-19    142  |  99  |  44<H>  ----------------------------<  97  3.8   |  25  |  1.8<H>    Ca    10.0      19 May 2022 12:20    TPro  6.9  /  Alb  4.5  /  TBili  0.7  /  DBili  x   /  AST  34  /  ALT  28  /  AlkPhos  59  05-19    PT/INR - ( 19 May 2022 12:20 )   PT: 12.00 sec;   INR: 1.04 ratio         PTT - ( 19 May 2022 12:20 )  PTT:24.8 sec  CARDIAC MARKERS ( 19 May 2022 12:20 )  x     / 0.01 ng/mL / 122 U/L / x     / 1.8 ng/mL      Blood Culture:     Radiology: reviewed     SICU PT Note: Orders received for PT evaluation. Pt was evaluated this morning and was discharged by OT at an independent level. PT was triaged in as pt does have 15 stairs at home. Pt does not feel that he will have any difficulty with stairs when he returns home and his significant other also agreed that pt did well up walking around earlier today and does not have concerns for when pt will return home. Pt declines the need for PT evaluation, is aware that if he changes his mind or has new needs he can request that PT return. Also encouraged him to be up walking with nursing while he remains hospitalized which he verbalizes understanding to. Will d/c PT per pt request.

## 2022-05-19 NOTE — PATIENT PROFILE ADULT - FALL HARM RISK - HARM RISK INTERVENTIONS
Assistance with ambulation/Assistance OOB with selected safe patient handling equipment/Communicate Risk of Fall with Harm to all staff/Discuss with provider need for PT consult/Monitor for mental status changes/Monitor gait and stability/Move patient closer to nurses' station/Provide patient with walking aids - walker, cane, crutches/Reinforce activity limits and safety measures with patient and family/Reorient to person, place and time as needed/Tailored Fall Risk Interventions/Toileting schedule using arm’s reach rule for commode and bathroom/Use of alarms - bed, chair and/or voice tab/Visual Cue: Yellow wristband and red socks/Bed in lowest position, wheels locked, appropriate side rails in place/Call bell, personal items and telephone in reach/Instruct patient to call for assistance before getting out of bed or chair/Non-slip footwear when patient is out of bed/[place] to call system/Physically safe environment - no spills, clutter or unnecessary equipment/Purposeful Proactive Rounding/Room/bathroom lighting operational, light cord in reach

## 2022-05-19 NOTE — CONSULT NOTE ADULT - ASSESSMENT
Case of a 75 yo female patient with multiple comorbidities listed above who was brought to ED by her niece on 05/18 for evaluation of recent worsening of lower back pain and confusion x2 days, found to be hemodynamically stable and to have age indeterminate L1 fracture, to be admitted for further investigations, management, and monitoring. Currently hemodynamically stable.   Neurocritical care consulted for suspicion for subarachnoid hemorrhage on CT head as the cause of her altered mental status with uncontrolled hypertension.    #Metabolic encephalopathy  #Delirium  - AAOx1, however physical exam unremarkable for any signs of acute neurological deficits  - Repeat CT head revealed Focal gray-white distinction loss involving the frontal cortical region possibly representing an area of age-indeterminate ischemic change. Similar right frontoparietal sulcal effacement which may represent subacute subarachnoid hemorrhagic products though a follow-up brain MRI with contrast is recommended for further evaluation.  - upon review of the images, no significant subarachnoid hemorrhagic, change in mental status likely due to metabolic causes  - per the daughter Pt recently was on oxycontin for pain which was stopped, last dose received night before admission. Pt was also just recently started on xanax.    Recommendations:  - MRI of the brain to r/o PRESS syndrome (Posterior reversible encephalopathy syndrome)  - consider upgrade to step down unit for better BP monitoring, upon BP check during exam BP had different readings on the left and right arm, consider rulling out vascular abnormalities for BP discrepancy. Left sys was in the 180s right was in the 140s  - better BP control  - No indication for upgrade to neurocritical service at this time  - Consider neurology consult if mental status does not improve  - Will sign off, recall prn  - Case discussed with Primary Attending.

## 2022-05-19 NOTE — ED ADULT NURSE REASSESSMENT NOTE - NS ED NURSE REASSESS COMMENT FT1
pt remains stable condition noted sleeping throughout the night w/ no complaints offered at this time. hourly rounding done, safety precautions maintained. all needs attended at this time.

## 2022-05-20 LAB
ALBUMIN SERPL ELPH-MCNC: 4.3 G/DL — SIGNIFICANT CHANGE UP (ref 3.5–5.2)
ALP SERPL-CCNC: 56 U/L — SIGNIFICANT CHANGE UP (ref 30–115)
ALT FLD-CCNC: 27 U/L — SIGNIFICANT CHANGE UP (ref 0–41)
ANION GAP SERPL CALC-SCNC: 15 MMOL/L — HIGH (ref 7–14)
AST SERPL-CCNC: 34 U/L — SIGNIFICANT CHANGE UP (ref 0–41)
BASOPHILS # BLD AUTO: 0.13 K/UL — SIGNIFICANT CHANGE UP (ref 0–0.2)
BASOPHILS NFR BLD AUTO: 0.6 % — SIGNIFICANT CHANGE UP (ref 0–1)
BILIRUB SERPL-MCNC: 0.6 MG/DL — SIGNIFICANT CHANGE UP (ref 0.2–1.2)
BUN SERPL-MCNC: 32 MG/DL — HIGH (ref 10–20)
CALCIUM SERPL-MCNC: 9.5 MG/DL — SIGNIFICANT CHANGE UP (ref 8.5–10.1)
CHLORIDE SERPL-SCNC: 102 MMOL/L — SIGNIFICANT CHANGE UP (ref 98–110)
CO2 SERPL-SCNC: 25 MMOL/L — SIGNIFICANT CHANGE UP (ref 17–32)
CREAT SERPL-MCNC: 1.4 MG/DL — SIGNIFICANT CHANGE UP (ref 0.7–1.5)
EGFR: 39 ML/MIN/1.73M2 — LOW
EOSINOPHIL # BLD AUTO: 0.42 K/UL — SIGNIFICANT CHANGE UP (ref 0–0.7)
EOSINOPHIL NFR BLD AUTO: 1.9 % — SIGNIFICANT CHANGE UP (ref 0–8)
GLUCOSE SERPL-MCNC: 95 MG/DL — SIGNIFICANT CHANGE UP (ref 70–99)
HCT VFR BLD CALC: 33.1 % — LOW (ref 37–47)
HGB BLD-MCNC: 10.8 G/DL — LOW (ref 12–16)
IMM GRANULOCYTES NFR BLD AUTO: 3.9 % — HIGH (ref 0.1–0.3)
LYMPHOCYTES # BLD AUTO: 2.17 K/UL — SIGNIFICANT CHANGE UP (ref 1.2–3.4)
LYMPHOCYTES # BLD AUTO: 9.6 % — LOW (ref 20.5–51.1)
MAGNESIUM SERPL-MCNC: 1.9 MG/DL — SIGNIFICANT CHANGE UP (ref 1.8–2.4)
MCHC RBC-ENTMCNC: 31.5 PG — HIGH (ref 27–31)
MCHC RBC-ENTMCNC: 32.6 G/DL — SIGNIFICANT CHANGE UP (ref 32–37)
MCV RBC AUTO: 96.5 FL — SIGNIFICANT CHANGE UP (ref 81–99)
MONOCYTES # BLD AUTO: 1.37 K/UL — HIGH (ref 0.1–0.6)
MONOCYTES NFR BLD AUTO: 6 % — SIGNIFICANT CHANGE UP (ref 1.7–9.3)
NEUTROPHILS # BLD AUTO: 17.7 K/UL — HIGH (ref 1.4–6.5)
NEUTROPHILS NFR BLD AUTO: 78 % — HIGH (ref 42.2–75.2)
NRBC # BLD: 0 /100 WBCS — SIGNIFICANT CHANGE UP (ref 0–0)
PHOSPHATE SERPL-MCNC: 3.6 MG/DL — SIGNIFICANT CHANGE UP (ref 2.1–4.9)
PLATELET # BLD AUTO: 239 K/UL — SIGNIFICANT CHANGE UP (ref 130–400)
POTASSIUM SERPL-MCNC: 4.3 MMOL/L — SIGNIFICANT CHANGE UP (ref 3.5–5)
POTASSIUM SERPL-SCNC: 4.3 MMOL/L — SIGNIFICANT CHANGE UP (ref 3.5–5)
PROT SERPL-MCNC: 6.5 G/DL — SIGNIFICANT CHANGE UP (ref 6–8)
RBC # BLD: 3.43 M/UL — LOW (ref 4.2–5.4)
RBC # FLD: 18.2 % — HIGH (ref 11.5–14.5)
SODIUM SERPL-SCNC: 142 MMOL/L — SIGNIFICANT CHANGE UP (ref 135–146)
TSH SERPL-MCNC: 70.24 UIU/ML — HIGH (ref 0.27–4.2)
WBC # BLD: 22.68 K/UL — HIGH (ref 4.8–10.8)
WBC # FLD AUTO: 22.68 K/UL — HIGH (ref 4.8–10.8)

## 2022-05-20 PROCEDURE — 95819 EEG AWAKE AND ASLEEP: CPT | Mod: 26

## 2022-05-20 PROCEDURE — 99233 SBSQ HOSP IP/OBS HIGH 50: CPT

## 2022-05-20 RX ORDER — NICARDIPINE HYDROCHLORIDE 30 MG/1
5 CAPSULE, EXTENDED RELEASE ORAL
Qty: 40 | Refills: 0 | Status: DISCONTINUED | OUTPATIENT
Start: 2022-05-20 | End: 2022-05-20

## 2022-05-20 RX ORDER — LABETALOL HCL 100 MG
10 TABLET ORAL ONCE
Refills: 0 | Status: DISCONTINUED | OUTPATIENT
Start: 2022-05-20 | End: 2022-05-20

## 2022-05-20 RX ORDER — LABETALOL HCL 100 MG
10 TABLET ORAL ONCE
Refills: 0 | Status: COMPLETED | OUTPATIENT
Start: 2022-05-20 | End: 2022-05-20

## 2022-05-20 RX ORDER — ACETAMINOPHEN 500 MG
1000 TABLET ORAL ONCE
Refills: 0 | Status: COMPLETED | OUTPATIENT
Start: 2022-05-20 | End: 2022-05-20

## 2022-05-20 RX ORDER — HALOPERIDOL DECANOATE 100 MG/ML
2 INJECTION INTRAMUSCULAR ONCE
Refills: 0 | Status: COMPLETED | OUTPATIENT
Start: 2022-05-20 | End: 2022-05-20

## 2022-05-20 RX ORDER — DIPHENHYDRAMINE HCL 50 MG
25 CAPSULE ORAL ONCE
Refills: 0 | Status: COMPLETED | OUTPATIENT
Start: 2022-05-20 | End: 2022-05-20

## 2022-05-20 RX ORDER — LABETALOL HCL 100 MG
20 TABLET ORAL ONCE
Refills: 0 | Status: COMPLETED | OUTPATIENT
Start: 2022-05-20 | End: 2022-05-20

## 2022-05-20 RX ORDER — ATENOLOL 25 MG/1
50 TABLET ORAL DAILY
Refills: 0 | Status: DISCONTINUED | OUTPATIENT
Start: 2022-05-20 | End: 2022-05-23

## 2022-05-20 RX ORDER — ATENOLOL 25 MG/1
25 TABLET ORAL ONCE
Refills: 0 | Status: COMPLETED | OUTPATIENT
Start: 2022-05-20 | End: 2022-05-20

## 2022-05-20 RX ORDER — HYDRALAZINE HCL 50 MG
10 TABLET ORAL ONCE
Refills: 0 | Status: COMPLETED | OUTPATIENT
Start: 2022-05-20 | End: 2022-05-20

## 2022-05-20 RX ORDER — MORPHINE SULFATE 50 MG/1
1 CAPSULE, EXTENDED RELEASE ORAL ONCE
Refills: 0 | Status: DISCONTINUED | OUTPATIENT
Start: 2022-05-20 | End: 2022-05-23

## 2022-05-20 RX ADMIN — HALOPERIDOL DECANOATE 2 MILLIGRAM(S): 100 INJECTION INTRAMUSCULAR at 02:04

## 2022-05-20 RX ADMIN — Medication 10 MILLIGRAM(S): at 18:57

## 2022-05-20 RX ADMIN — Medication 10 MILLIGRAM(S): at 13:13

## 2022-05-20 RX ADMIN — LIDOCAINE 1 PATCH: 4 CREAM TOPICAL at 11:10

## 2022-05-20 RX ADMIN — MORPHINE SULFATE 2 MILLIGRAM(S): 50 CAPSULE, EXTENDED RELEASE ORAL at 03:07

## 2022-05-20 RX ADMIN — LEVETIRACETAM 400 MILLIGRAM(S): 250 TABLET, FILM COATED ORAL at 17:05

## 2022-05-20 RX ADMIN — Medication 25 MILLIGRAM(S): at 04:15

## 2022-05-20 RX ADMIN — LEVETIRACETAM 400 MILLIGRAM(S): 250 TABLET, FILM COATED ORAL at 05:34

## 2022-05-20 RX ADMIN — HALOPERIDOL DECANOATE 2 MILLIGRAM(S): 100 INJECTION INTRAMUSCULAR at 11:12

## 2022-05-20 RX ADMIN — CHLORHEXIDINE GLUCONATE 1 APPLICATION(S): 213 SOLUTION TOPICAL at 06:00

## 2022-05-20 RX ADMIN — HEPARIN SODIUM 5000 UNIT(S): 5000 INJECTION INTRAVENOUS; SUBCUTANEOUS at 21:55

## 2022-05-20 RX ADMIN — Medication 400 MILLIGRAM(S): at 07:43

## 2022-05-20 RX ADMIN — HALOPERIDOL DECANOATE 2 MILLIGRAM(S): 100 INJECTION INTRAMUSCULAR at 18:57

## 2022-05-20 RX ADMIN — HEPARIN SODIUM 5000 UNIT(S): 5000 INJECTION INTRAVENOUS; SUBCUTANEOUS at 13:39

## 2022-05-20 RX ADMIN — MORPHINE SULFATE 2 MILLIGRAM(S): 50 CAPSULE, EXTENDED RELEASE ORAL at 03:22

## 2022-05-20 RX ADMIN — Medication 10 MILLIGRAM(S): at 10:21

## 2022-05-20 RX ADMIN — Medication 10 MILLIGRAM(S): at 15:03

## 2022-05-20 RX ADMIN — HEPARIN SODIUM 5000 UNIT(S): 5000 INJECTION INTRAVENOUS; SUBCUTANEOUS at 06:00

## 2022-05-20 RX ADMIN — Medication 10 MILLIGRAM(S): at 17:50

## 2022-05-20 RX ADMIN — ATENOLOL 25 MILLIGRAM(S): 25 TABLET ORAL at 13:55

## 2022-05-20 RX ADMIN — MORPHINE SULFATE 2 MILLIGRAM(S): 50 CAPSULE, EXTENDED RELEASE ORAL at 17:09

## 2022-05-20 RX ADMIN — Medication 10 MILLIGRAM(S): at 21:55

## 2022-05-20 NOTE — CONSULT NOTE ADULT - SUBJECTIVE AND OBJECTIVE BOX
NEPHROLOGY CONSULTATION NOTE    Patient is a 76y old  Female who presents with a chief complaint of Worsening Back Pain and Metabolic Encephalopathy (19 May 2022 16:27)      HPI:  History of Present Illness  Ms. Shook is a 76 year old female patient known to have:  - Baseline AO3, lives with niece, ambulates with a walker  - SEEMA and ILD. Follows with Dr Stephens. Not on CPAP or home O2. On Prednisone 20mg QD  - Depression  - HTN. Recent Admission for HTN Urgency. Follows with Dr Moses  - Hypothyroidism   - Recent admission for a bleeding duodenal ulcer at [facility] (melena). Home med Protonix 20mg QD  - Essential Thrombocytosis. Follows with Dr Denise  - Chronic Back Pain for years secondary to Spinal Stenosis per marquise. s/p Spinal Epidural Injection. Was on PT 3x/ week but has been non compliant for 2-3 months. Follows with Dr Jaimes. Was supposed to go for a nerve ablation last Monday (cancelled due to poorly controlled HTN s/p admission)       She was brought to the ED by marquise on  for evaluation of worsening back pain and confusion x2 days.  History goes back to 2 days PTP when the patient started complaining of worsening of her chronic dull lower back pain that radiates down to her right medial aspect of thigh.  This has resulted in limited ambulation (patient refusing to leave her bed) and to being non compliant with Physical Therapy in the absence of leg weakness or paresthesias or numbness or urinary incontinence.  Per marquise, patient has been feeling down x few days with reduced PO intake, reduced sleep, loss of interest, thoughts that her family doesn't like/ support her, and confusion (some times would not recognize marquise).  She has an instance where she had fecal incontinence on way to bathroom 2 days ago.  In the setting of confusion and increased pain, marquise decided to bring patient to ED for evaluation.    On review of systems, marquise denies any recent fever, chills, night sweats, URTI symptoms (cough, rhinorrhea, sore throat), urinary symptoms (urinary frequency, urgency, intermittence, dysuria, foul smelling urine, cloudy urine), abdominal pain, headache, nausea, or vomiting.   No sick contacts.   No recent travel or exposure to recent travelers.    Renal was called for KI creat 1.6 -> 1.4 today.    PAST MEDICAL & SURGICAL HISTORY:  HTN (hypertension)      Hypothyroid      Depression      Interstitial lung disease      Essential thrombocytopenia      History of ovarian cyst        Allergies:  No Known Allergies    Home Medications Reviewed  Hospital Medications:   MEDICATIONS  (STANDING):  amLODIPine   Tablet 10 milliGRAM(s) Oral every 24 hours  ATENolol  Tablet 25 milliGRAM(s) Oral daily  chlorhexidine 4% Liquid 1 Application(s) Topical <User Schedule>  heparin SubCutaneous Injection - Peds 5000 Unit(s) SubCutaneous every 8 hours  levETIRAcetam  IVPB 500 milliGRAM(s) IV Intermittent every 12 hours  lidocaine   4% Patch 1 Patch Transdermal daily  pantoprazole    Tablet 40 milliGRAM(s) Oral before breakfast  polyethylene glycol 3350 17 Gram(s) Oral daily  predniSONE   Tablet 15 milliGRAM(s) Oral daily  senna 2 Tablet(s) Oral at bedtime  sodium chloride 0.9%. 1000 milliLiter(s) (75 mL/Hr) IV Continuous <Continuous>      SOCIAL HISTORY:  Denies ETOH,Smoking,   FAMILY HISTORY:        REVIEW OF SYSTEMS:  as above    VITALS:  T(F): 97.2 (22 @ 07:59), Max: 98.3 (22 @ 20:30)  HR: 97 (22 @ 07:59)  BP: 130/64 (22 @ 07:59)  RR: 18 (22 @ 07:59)  SpO2: 96% (22 @ 07:00)     07:01  -   @ 07:00  --------------------------------------------------------  IN: 725 mL / OUT: 0 mL / NET: 725 mL            I&O's Detail    19 May 2022 07:01  -  20 May 2022 07:00  --------------------------------------------------------  IN:    IV PiggyBack: 200 mL    sodium chloride 0.9%: 525 mL  Total IN: 725 mL    OUT:  Total OUT: 0 mL    Total NET: 725 mL        Creatine Kinase, Serum: 125 U/L (22 @ 17:12)  Creatine Kinase, Serum: 122 U/L (22 @ 12:20)      PHYSICAL EXAM:  Constitutional: NAD  HEENT: anicteric sclera,  Neck: No JVD  Respiratory: CTAB, no wheezes, rales or rhonchi  Cardiovascular: S1, S2, RRR  Gastrointestinal: BS+, soft, NT/ND  Extremities: No peripheral edema  Neurological: Awake  Psychiatric: Normal mood, normal affect  : No CVA tenderness. No bran.   Skin: No rashes  Vascular Access:    LABS:      142  |  102  |  32<H>  ----------------------------<  95  4.3   |  25  |  1.4    Ca    9.5      20 May 2022 05:52  Phos  3.6       Mg     1.9         TPro  6.5  /  Alb  4.3  /  TBili  0.6  /  DBili      /  AST  34  /  ALT  27  /  AlkPhos  56      Creatinine Trend: 1.4 <--, 1.6 <--, 1.8 <--, 2.1 <--                        10.8   22.68 )-----------( 239      ( 20 May 2022 05:52 )             33.1     Urine Studies:  Urinalysis Basic - ( 18 May 2022 14:07 )    Color: Yellow / Appearance: Clear / S.021 / pH:   Gluc:  / Ketone: Trace  / Bili: Negative / Urobili: <2 mg/dL   Blood:  / Protein: 30 mg/dL / Nitrite: Negative   Leuk Esterase: Negative / RBC: 6 /HPF / WBC 4 /HPF   Sq Epi:  / Non Sq Epi: 1 /HPF / Bacteria: Negative                RADIOLOGY & ADDITIONAL STUDIES:      < from:  Renal (22 @ 18:37) >  Right kidney: 9.3 cm. No hydronephrosis or calculi. Nonspecific trace   perinephric fluid.    Left kidney:  9.7 cm. No hydronephrosis or calculi. Nonspecific trace   perinephric fluid.    Urinary bladder: Underdistended limiting evaluation.      IMPRESSION:    No hydronephrosis bilaterally. Nonspecific bilateral trace perinephric   fluid.    < end of copied text >  < from: CT Head No Cont (22 @ 12:56) >    IMPRESSION:  Focal gray-white distinction loss involving the frontal cortical region   possibly representing an area of age-indeterminate ischemic change.    Similar right frontoparietal sulcal effacement which may represent   subacute subarachnoid hemorrhagic products though a follow-up brain MRI   with contrast is recommended for further evaluation.    < end of copied text >

## 2022-05-20 NOTE — CONSULT NOTE ADULT - ASSESSMENT
Pt with HTN, spinal stenosis, thrombocytosis, admitted with change in MS, seen for CAITIE and uncontrolled HTN.     CTH here showed evidence of scattered SAH at right cerebral sulci.   Blood pressure uncontrolled with SBP in 180s on left and 140s on right.     #AMS:Unclear etiology.   Prior to 2 days PTA, AAOx3. Currently drowsy at times but arousable, Oriented x 1-2  Leukocytosis, CAITIE noted.   CXR: No opacities, UA neg. f/u Blood cultures (received 5/18).   Empiric Cefepime.       #Subacute SAH :   - CT head (5/18 midnight): New scattered subarachnoid hemorrhage in the right cerebral sulci which appear mildly effaced compared to the prior CT head dated 4/25/2021. 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  started on keppra 500 bid for seizure ppx, On no blood thinners. Coags normal.     #Hypertensive Emergency  BP poorly controlled as outpatient.  5/19: Her home med Losartan-HCTZ could not be given d/t CAITIE. c/w Home ATenolol 25 QD, Amlodipine 10 QD.   Labetalol or Hydralazine pushes PRN.   Check BP Q4H. Goal < 160.     MR brain non con to r/o strokes and also check for PRES ?   - r EEG     #Acute Kidney Injury - Pre-Renal in Setting of BUN: Cr >20:1 and Reduced PO intake  - Recent labs Cr 1.1, on admission Cr 2.1  - IV fluid hydration - NS 75 cc/hr  - can stop IVF when po intake is good  Kidney sono no hydro    Caitie resolving, BP better controlled  Will sign off  recall as needed

## 2022-05-20 NOTE — CONSULT NOTE ADULT - ASSESSMENT
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  HO Severe SEEMA Not on CPAP   HO ET on Anagrelide, followed by hem   JEOVANY improving     Plan     Prednisone 15 mg daily for now   GI DVT prophylaxis   Wean O2 as tolerated,.  Pain control.  Avoid over depressants   HOB At 45 degrees   FU with NeuroSx and Neurology   Feeding per speech and swallow   BP control   Gentle hydration     Bladder scans   SDU

## 2022-05-20 NOTE — PHYSICAL THERAPY INITIAL EVALUATION ADULT - GENERAL OBSERVATIONS, REHAB EVAL
Per RN AJ, pt is confused, lethargic, tachycardic at rest (-115 bpm), agitated with movement (needing b/l wrist restraints and lap belt) and frequently not following commands. PT on hold, will f/u as appropriate. Per RN AJ, pt is confused, lethargic, tachycardic at rest (-115 bpm), agitated with movement (needing b/l wrist restraints and lap belt) and frequently not following commands. Per chart review, there is an active bedrest order. PT on hold, will f/u as appropriate.

## 2022-05-20 NOTE — CONSULT NOTE ADULT - SUBJECTIVE AND OBJECTIVE BOX
Patient is a 76y old  Female who presents with a chief complaint of Worsening Back Pain and Metabolic Encephalopathy (19 May 2022 16:27)      HPI:  History of Present Illness  Ms. Shook is a 76 year old female patient known to have:  - Baseline AO3, lives with niece, ambulates with a walker  - SEEMA and ILD. Follows with Dr Stephens. Not on CPAP or home O2. On Prednisone 20mg QD  - Depression  - HTN. Recent Admission for HTN Urgency. Follows with Dr Moses  - Hypothyroidism   - Recent admission for a bleeding duodenal ulcer at [facility] (melena). Home med Protonix 20mg QD  - Essential Thrombocytosis. Follows with Dr Denise  - Chronic Back Pain for years secondary to Spinal Stenosis per marquise. s/p Spinal Epidural Injection. Was on PT 3x/ week but has been non compliant for 2-3 months. Follows with Dr Jaimes. Was supposed to go for a nerve ablation last Monday (cancelled due to poorly controlled HTN s/p admission)       She was brought to the ED by marquise on  for evaluation of worsening back pain and confusion x2 days.  History goes back to 2 days PTP when the patient started complaining of worsening of her chronic dull lower back pain that radiates down to her right medial aspect of thigh.  This has resulted in limited ambulation (patient refusing to leave her bed) and to being non compliant with Physical Therapy in the absence of leg weakness or paresthesias or numbness or urinary incontinence.  Per marquise, patient has been feeling down x few days with reduced PO intake, reduced sleep, loss of interest, thoughts that her family doesn't like/ support her, and confusion (some times would not recognize niclaudia).  She has an instance where she had fecal incontinence on way to bathroom 2 days ago.  In the setting of confusion and increased pain, marquise decided to bring patient to ED for evaluation.    On review of systems, marquise denies any recent fever, chills, night sweats, URTI symptoms (cough, rhinorrhea, sore throat), urinary symptoms (urinary frequency, urgency, intermittence, dysuria, foul smelling urine, cloudy urine), abdominal pain, headache, nausea, or vomiting.   No sick contacts.   No recent travel or exposure to recent travelers.      Upon presentation to the ED, the patient was hemodynamically stable:  Vital Signs in ED   - /104 mmHg  -   - RR 20  - T97.1  - SaO2 94% on RA      Investigations   Laboratory Workup  - CBC:                        11.6   20.94 )-----------( 188      ( 18 May 2022 10:50 )             34.1     - Chemistry:      140  |  96<L>  |  48<H>  ----------------------------<  109<H>  4.3   |  24  |  2.1<H>    Ca    10.4<H>      18 May 2022 10:50    TPro  7.3  /  Alb  4.8  /  TBili  0.6  /  DBili  x   /  AST  36  /  ALT  27  /  AlkPhos  61      - Coagulation Studies:  PT/INR - ( 18 May 2022 10:50 )   PT: 12.30 sec;   INR: 1.07 ratio    PTT - ( 18 May 2022 10:50 )  PTT:24.8 sec      Microbiological Workup  Urinalysis Basic - ( 18 May 2022 14:07 )    Color: Yellow / Appearance: Clear / S.021 / pH: x  Gluc: x / Ketone: Trace  / Bili: Negative / Urobili: <2 mg/dL   Blood: x / Protein: 30 mg/dL / Nitrite: Negative   Leuk Esterase: Negative / RBC: 6 /HPF / WBC 4 /HPF   Sq Epi: x / Non Sq Epi: 1 /HPF / Bacteria: Negative          Radiological Workup  * CT Abdomen and Pelvis w/ IV Cont (22 @ 14:00) No acute abnormality within the abdomen and pelvis. Age-indeterminate moderate compression fracture seen at L1, new since 2022.  * CXR CM and CHF      - Patient was given IV Cefepime 2g x1 dose in ED  - She was also given 1.4 L LR bolus   - She will be admitted for further investigations, management, and monitoring             (18 May 2022 22:59)      PAST MEDICAL & SURGICAL HISTORY:  HTN (hypertension)      Hypothyroid      Depression      Interstitial lung disease      Essential thrombocytopenia      History of ovarian cyst          SOCIAL HX:   Smoking      NO                   ETOH                            Other    FAMILY HISTORY:  :  No known cardiovacular family hisotry     Review Of Systems:     All ROS are negative except per HPI       Allergies    No Known Allergies    Intolerances          PHYSICAL EXAM    ICU Vital Signs Last 24 Hrs  T(C): 36.4 (20 May 2022 06:10), Max: 36.8 (19 May 2022 20:15)  T(F): 97.5 (20 May 2022 06:10), Max: 98.3 (19 May 2022 20:30)  HR: 106 (20 May 2022 06:10) (81 - 112)  BP: 154/96 (20 May 2022 06:10) (97/53 - 184/83)  BP(mean): 104 (20 May 2022 06:10) (68 - 119)  ABP: --  ABP(mean): --  RR: 24 (20 May 2022 06:10) (17 - 35)  SpO2: 87% (20 May 2022 06:10) (87% - 99%)      CONSTITUTIONAL:  Well nourished. in NAD    ENT:   Airway patent,   Mouth with normal mucosa.       CARDIAC:   Normal rate,   Regular rhythm.    No edema      RESPIRATORY:   No wheezing  Bilateral BS   Not tachypneic,  No use of accessory muscles    GASTROINTESTINAL:  Abdomen soft,   Non-tender,   No guarding,   + BS      NEUROLOGICAL:   Lethargic But follows commands       SKIN:   Skin normal color for race,   No evidence of rash.            22 @ 07:01  -  22 @ 07:00  --------------------------------------------------------  IN:    IV PiggyBack: 200 mL    sodium chloride 0.9%: 525 mL  Total IN: 725 mL    OUT:  Total OUT: 0 mL    Total NET: 725 mL          LABS:                          10.8   22.68 )-----------( 239      ( 20 May 2022 05:52 )             33.1                                                   142  |  102  |  32<H>  ----------------------------<  95  4.3   |  25  |  1.4    Creatinine Trend  BUN 32, Cr 1.4, (22 @ 05:52)  Creatinine Trend  BUN 40, Cr 1.6, (22 @ 17:12)  Creatinine Trend  BUN 44, Cr 1.8, (22 @ 12:20)  Creatinine Trend  BUN 48, Cr 2.1, (22 @ 10:50)      Ca    9.5      20 May 2022 05:52  Phos  3.6     05-  Mg     1.9     -20    TPro  6.5  /  Alb  4.3  /  TBili  0.6  /  DBili  x   /  AST  34  /  ALT  27  /  AlkPhos  56  05-20      PT/INR - ( 19 May 2022 17:12 )   PT: 11.90 sec;   INR: 1.04 ratio         PTT - ( 19 May 2022 17:12 )  PTT:25.5 sec                                       Urinalysis Basic - ( 18 May 2022 14:07 )    Color: Yellow / Appearance: Clear / S.021 / pH: x  Gluc: x / Ketone: Trace  / Bili: Negative / Urobili: <2 mg/dL   Blood: x / Protein: 30 mg/dL / Nitrite: Negative   Leuk Esterase: Negative / RBC: 6 /HPF / WBC 4 /HPF   Sq Epi: x / Non Sq Epi: 1 /HPF / Bacteria: Negative        CARDIAC MARKERS ( 19 May 2022 17:12 )  x     / 0.01 ng/mL / 125 U/L / x     / 1.7 ng/mL  CARDIAC MARKERS ( 19 May 2022 12:20 )  x     / 0.01 ng/mL / 122 U/L / x     / 1.8 ng/mL                                            LIVER FUNCTIONS - ( 20 May 2022 05:52 )  Alb: 4.3 g/dL / Pro: 6.5 g/dL / ALK PHOS: 56 U/L / ALT: 27 U/L / AST: 34 U/L / GGT: x                                                  Culture - Urine (collected 18 May 2022 14:07)  Source: Clean Catch Clean Catch (Midstream)  Final Report (19 May 2022 21:31):    <10,000 CFU/mL Normal Urogenital Gail    Culture - Blood (collected 18 May 2022 10:40)  Source: .Blood Blood-Peripheral  Preliminary Report (19 May 2022 23:02):    No growth to date.    Culture - Blood (collected 18 May 2022 10:40)  Source: .Blood Blood-Peripheral  Preliminary Report (19 May 2022 23:02):    No growth to date.                                                                                           X-Rays reviewed                                                                                     ECHO        MEDICATIONS  (STANDING):  acetaminophen   IVPB .. 1000 milliGRAM(s) IV Intermittent once  amLODIPine   Tablet 10 milliGRAM(s) Oral every 24 hours  ATENolol  Tablet 25 milliGRAM(s) Oral daily  chlorhexidine 4% Liquid 1 Application(s) Topical <User Schedule>  heparin SubCutaneous Injection - Peds 5000 Unit(s) SubCutaneous every 8 hours  levETIRAcetam  IVPB 500 milliGRAM(s) IV Intermittent every 12 hours  lidocaine   4% Patch 1 Patch Transdermal daily  pantoprazole    Tablet 40 milliGRAM(s) Oral before breakfast  polyethylene glycol 3350 17 Gram(s) Oral daily  predniSONE   Tablet 20 milliGRAM(s) Oral daily  senna 2 Tablet(s) Oral at bedtime  sodium chloride 0.9%. 1000 milliLiter(s) (75 mL/Hr) IV Continuous <Continuous>    MEDICATIONS  (PRN):  acetaminophen     Tablet .. 650 milliGRAM(s) Oral every 6 hours PRN Mild Pain (1 - 3)  cyclobenzaprine 10 milliGRAM(s) Oral three times a day PRN Muscle Spasm  haloperidol    Injectable 2 milliGRAM(s) IntraMuscular every 8 hours PRN Agitation  labetalol Injectable 10 milliGRAM(s) IV Push every 4 hours PRN Systolic blood pressure > 150  morphine  - Injectable 2 milliGRAM(s) IV Push every 6 hours PRN Severe Pain (7 - 10)  morphine  - Injectable 1 milliGRAM(s) IV Push once PRN Severe Pain (7 - 10)

## 2022-05-20 NOTE — PROGRESS NOTE ADULT - ASSESSMENT
#Subacute SAH likely 2/2 HTN Emergency   #AMS   - CT head: New scattered subarachnoid hemorrhage in the right cerebral sulci which appear mildly effaced compared tothe prior CT head dated 4/25/2021. 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  - SBP difference in arms > 40 -> CT chest to r/o aneurysm  - SBP goal < 160   - start amlodipine 10mg - holding home meds losartan and hctz in setting of JEOVANY   - started on keppra 500 bid for seizure ppx   - r EEG: Consistent with diffuse cerebral electrophysiological dysfunction.  Secondary to none specific cause., Consistent with focal electrophysiological dysfunction.  Secondary to structural/metabolic cause., Consistent with potential for partial seizure. neurology Follow up  - MRA head and neck, MR head ordered      #Worsening Lower Back Pain Likely Secondary to New L1 Compression Fracture  #History of Chronic Back Pain for years secondary to Spinal Stenosis   - Follows with Dr Jaimes. Was supposed to go for a nerve ablation last Monday (cancelled due to poorly controlled HTN 240s/110s   - CT A/P: no acute abnormality within the abdomen and pelvis. Age-indeterminate moderate compression fracture seen at L1, new since 4/9/2022.  - Neurosurgery team consulted for new L1 compression fracture  - Check XR TLS  - Monitor pain and keep score 01/10: tylenol PRN and morphine PRN  - Apply lidocaine patch  - Resume home prednisone 20mg QD (for ILD per Dr Stephens)  - TLSO  - Check Vitamin D levels   - NPO pending speech and swallow. IVF hydration for now    #Constipation - had BM today   - cont senna and PEG 17mg QD    #Acute Kidney Injury  - Likely Pre-Renal in Setting of BUN: Cr >20:1 and Reduced PO intake  - Recent labs Cr 1.1, on admission Cr 2.1  - IV fluid hydration - NS 75 cc/hr  - Check urine studies  - Avoid nephrotoxic agents  - Holding HCTZ and Losartan  - Renal US   - nephro eval     #History of UGIB Due to Bleeding Duodenal Ulcer  - Recent admission for a bleeding duodenal ulcer at [facility] (melena)  - Protonix 40mg QD  - T/S, hgb stable   - Check Fe studies  - OP follow up with GI    #SEEMA/ILD  -Follows with Dr Stephens  - Not on CPAP or home O2  - On Prednisone 20mg QD   - unsure if pt should be on ppx     #Hypothyroidism   - Check TSH  - Resume Levothyroxine 100mcg QD    Leukocytosis in Setting of Steroid use and History of Essential Thrombocytosis  * Follows with Dr Denise  * Home med Anagrilide 0.5mg QD  - Outpatient follow up with Dr Denise  - Resume Anagrilide 0.5mg QD      Others  - DVT Prophylaxis: Heparin Subcutaneously   - GI Prophylaxis: Pantoprazole 40mg PO QD  - Diet: NPO pending speech and swallow  - Code Status: Full    #Metabolic encephalopathy 2/2 Subacute SAH likely 2/2 HTN Emergency   - CT head: New scattered subarachnoid hemorrhage in the right cerebral sulci which appear mildly effaced compared tothe prior CT head dated 4/25/2021. 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  - SBP difference in arms > 40 -> CT chest to r/o aneurysm  - SBP goal < 160   - start amlodipine 10mg - holding home meds losartan and hctz in setting of JEOVANY   - started on keppra 500 bid for seizure ppx   - r EEG: Consistent with diffuse cerebral electrophysiological dysfunction.  Secondary to none specific cause., Consistent with focal electrophysiological dysfunction.  Secondary to structural/metabolic cause., Consistent with potential for partial seizure. neurology Follow up  - MRA head and neck, MR head ordered    - CT H/N with intravenous contrast ordered      #Worsening Lower Back Pain Likely Secondary to New L1 Compression Fracture  #History of Chronic Back Pain for years secondary to Spinal Stenosis   - Follows with Dr Jaimes. Was supposed to go for a nerve ablation last Monday (cancelled due to poorly controlled HTN 240s/110s   - CT A/P: no acute abnormality within the abdomen and pelvis. Age-indeterminate moderate compression fracture seen at L1, new since 4/9/2022.  - Neurosurgery team consulted for new L1 compression fracture  - Check XR TLS  - Monitor pain and keep score 01/10: tylenol PRN and morphine PRN  - Apply lidocaine patch  - Resume home prednisone 20mg QD (for ILD per Dr Stephens)  - TLSO  - Check Vitamin D levels   - NPO pending speech and swallow. IVF hydration for now, may need NGT if not cleared     #Constipation - had BM today   - cont senna and PEG 17mg QD    #Acute Kidney Injury improving   - Likely Pre-Renal in Setting of BUN: Cr >20:1 and Reduced PO intake  - Recent labs Cr 1.1, on admission Cr 2.1  - IV fluid hydration - NS 75 cc/hr  - Check urine studies  - Avoid nephrotoxic agents  - Holding HCTZ and Losartan  - Renal US- no hydro  - nephro eval     #History of UGIB Due to Bleeding Duodenal Ulcer  - Recent admission for a bleeding duodenal ulcer at [facility] (melena)  - Protonix 40mg QD  - T/S, hgb stable   - Check Fe studies  - OP follow up with GI    #SEEMA/ILD  -Follows with Dr Stephens  - Not on CPAP or home O2  - On Prednisone 15mg QD   - unsure if pt should be on ppx     #Hypothyroidism   - Check TSH  - Resume Levothyroxine 100mcg QD    Leukocytosis in Setting of Steroid use and History of Essential Thrombocytosis  * Follows with Dr Denise  * Home med Anagrilide 0.5mg QD  - Outpatient follow up with Dr Denise  - Resume Anagrilide 0.5mg QD      Others  - DVT Prophylaxis: Heparin Subcutaneously   - GI Prophylaxis: Pantoprazole 40mg PO QD  - Diet: NPO pending speech and swallow  - Code Status: Full

## 2022-05-20 NOTE — PROGRESS NOTE ADULT - SUBJECTIVE AND OBJECTIVE BOX
SUBJECTIVE:    Patient is a 76y old Female who presents with a chief complaint of Worsening Back Pain and Metabolic Encephalopathy (20 May 2022 07:29)    Currently admitted to medicine with the primary diagnosis of AMS (altered mental status)       Today is hospital day 2d.     INTERVAL EVENTS:   evidence of seizure on EEG. need neurology follow up. d/w neurosx, need cta head and neck and mr with without contrast today. translation provided by PCA bedside who speaks Upper sorbian    PAST MEDICAL & SURGICAL HISTORY  HTN (hypertension)    Hypothyroid    Depression    Interstitial lung disease    Essential thrombocytopenia    History of ovarian cyst        ALLERGIES:  No Known Allergies    MEDICATIONS:  STANDING MEDICATIONS  amLODIPine   Tablet 10 milliGRAM(s) Oral every 24 hours  ATENolol  Tablet 25 milliGRAM(s) Oral daily  chlorhexidine 4% Liquid 1 Application(s) Topical <User Schedule>  heparin SubCutaneous Injection - Peds 5000 Unit(s) SubCutaneous every 8 hours  levETIRAcetam  IVPB 500 milliGRAM(s) IV Intermittent every 12 hours  lidocaine   4% Patch 1 Patch Transdermal daily  pantoprazole    Tablet 40 milliGRAM(s) Oral before breakfast  polyethylene glycol 3350 17 Gram(s) Oral daily  predniSONE   Tablet 15 milliGRAM(s) Oral daily  senna 2 Tablet(s) Oral at bedtime  sodium chloride 0.9%. 1000 milliLiter(s) IV Continuous <Continuous>    PRN MEDICATIONS  acetaminophen     Tablet .. 650 milliGRAM(s) Oral every 6 hours PRN  cyclobenzaprine 10 milliGRAM(s) Oral three times a day PRN  haloperidol    Injectable 2 milliGRAM(s) IntraMuscular every 8 hours PRN  labetalol Injectable 10 milliGRAM(s) IV Push every 4 hours PRN  morphine  - Injectable 2 milliGRAM(s) IV Push every 6 hours PRN  morphine  - Injectable 1 milliGRAM(s) IV Push once PRN    VITALS:   T(F): 97.2  HR: 97  BP: 130/64  RR: 18  SpO2: 96%    LABS:                        10.8   22.68 )-----------( 239      ( 20 May 2022 05:52 )             33.1     05-20    142  |  102  |  32<H>  ----------------------------<  95  4.3   |  25  |  1.4    Ca    9.5      20 May 2022 05:52  Phos  3.6     05-20  Mg     1.9     -    TPro  6.5  /  Alb  4.3  /  TBili  0.6  /  DBili  x   /  AST  34  /  ALT  27  /  AlkPhos  56  -    PT/INR - ( 19 May 2022 17:12 )   PT: 11.90 sec;   INR: 1.04 ratio         PTT - ( 19 May 2022 17:12 )  PTT:25.5 sec  Urinalysis Basic - ( 18 May 2022 14:07 )    Color: Yellow / Appearance: Clear / S.021 / pH: x  Gluc: x / Ketone: Trace  / Bili: Negative / Urobili: <2 mg/dL   Blood: x / Protein: 30 mg/dL / Nitrite: Negative   Leuk Esterase: Negative / RBC: 6 /HPF / WBC 4 /HPF   Sq Epi: x / Non Sq Epi: 1 /HPF / Bacteria: Negative        Creatine Kinase, Serum: 125 U/L (22 @ 17:12)  Troponin T, Serum: 0.01 ng/mL (22 @ 17:12)  Creatine Kinase, Serum: 122 U/L (22 @ 12:20)  Troponin T, Serum: 0.01 ng/mL (22 @ 12:20)      Culture - Urine (collected 18 May 2022 14:07)  Source: Clean Catch Clean Catch (Midstream)  Final Report (19 May 2022 21:31):    <10,000 CFU/mL Normal Urogenital Gail    Culture - Blood (collected 18 May 2022 10:40)  Source: .Blood Blood-Peripheral  Preliminary Report (19 May 2022 23:02):    No growth to date.    Culture - Blood (collected 18 May 2022 10:40)  Source: .Blood Blood-Peripheral  Preliminary Report (19 May 2022 23:02):    No growth to date.      CARDIAC MARKERS ( 19 May 2022 17:12 )  x     / 0.01 ng/mL / 125 U/L / x     / 1.7 ng/mL  CARDIAC MARKERS ( 19 May 2022 12:20 )  x     / 0.01 ng/mL / 122 U/L / x     / 1.8 ng/mL      RADIOLOGY:    PHYSICAL EXAM:  GEN: No acute distress  PULM/CHEST: Clear to auscultation bilaterally, no rales, rhonchi or wheezes   CVS: Regular rate and rhythm, S1-S2, no murmurs  ABD: Soft, non-tender, non-distended, +BS  EXT: No edema  NEURO: AAOx1       SUBJECTIVE:    Patient is a 76y old Female who presents with a chief complaint of Worsening Back Pain and Metabolic Encephalopathy (20 May 2022 07:29)    Currently admitted to medicine with the primary diagnosis of AMS (altered mental status)       Today is hospital day 2d.     INTERVAL EVENTS:   evidence of seizure on EEG. need neurology follow up. d/w neurosx, need cta head and neck and mr with without contrast today. translation provided by PCA bedside who speaks Indonesian    Attending note: Pt seen and examined at bedside. Pt alert at bedside but oriented to name only. PCA translated at bedside.     PAST MEDICAL & SURGICAL HISTORY  HTN (hypertension)    Hypothyroid    Depression    Interstitial lung disease    Essential thrombocytopenia    History of ovarian cyst        ALLERGIES:  No Known Allergies    MEDICATIONS:  STANDING MEDICATIONS  amLODIPine   Tablet 10 milliGRAM(s) Oral every 24 hours  ATENolol  Tablet 25 milliGRAM(s) Oral daily  chlorhexidine 4% Liquid 1 Application(s) Topical <User Schedule>  heparin SubCutaneous Injection - Peds 5000 Unit(s) SubCutaneous every 8 hours  levETIRAcetam  IVPB 500 milliGRAM(s) IV Intermittent every 12 hours  lidocaine   4% Patch 1 Patch Transdermal daily  pantoprazole    Tablet 40 milliGRAM(s) Oral before breakfast  polyethylene glycol 3350 17 Gram(s) Oral daily  predniSONE   Tablet 15 milliGRAM(s) Oral daily  senna 2 Tablet(s) Oral at bedtime  sodium chloride 0.9%. 1000 milliLiter(s) IV Continuous <Continuous>    PRN MEDICATIONS  acetaminophen     Tablet .. 650 milliGRAM(s) Oral every 6 hours PRN  cyclobenzaprine 10 milliGRAM(s) Oral three times a day PRN  haloperidol    Injectable 2 milliGRAM(s) IntraMuscular every 8 hours PRN  labetalol Injectable 10 milliGRAM(s) IV Push every 4 hours PRN  morphine  - Injectable 2 milliGRAM(s) IV Push every 6 hours PRN  morphine  - Injectable 1 milliGRAM(s) IV Push once PRN    VITALS:   T(F): 97.2  HR: 97  BP: 130/64  RR: 18  SpO2: 96%    LABS:                        10.8   22.68 )-----------( 239      ( 20 May 2022 05:52 )             33.1     05-20    142  |  102  |  32<H>  ----------------------------<  95  4.3   |  25  |  1.4    Ca    9.5      20 May 2022 05:52  Phos  3.6     05-20  Mg     1.9     05-20    TPro  6.5  /  Alb  4.3  /  TBili  0.6  /  DBili  x   /  AST  34  /  ALT  27  /  AlkPhos  56  05-20    PT/INR - ( 19 May 2022 17:12 )   PT: 11.90 sec;   INR: 1.04 ratio         PTT - ( 19 May 2022 17:12 )  PTT:25.5 sec  Urinalysis Basic - ( 18 May 2022 14:07 )    Color: Yellow / Appearance: Clear / S.021 / pH: x  Gluc: x / Ketone: Trace  / Bili: Negative / Urobili: <2 mg/dL   Blood: x / Protein: 30 mg/dL / Nitrite: Negative   Leuk Esterase: Negative / RBC: 6 /HPF / WBC 4 /HPF   Sq Epi: x / Non Sq Epi: 1 /HPF / Bacteria: Negative        Creatine Kinase, Serum: 125 U/L (22 @ 17:12)  Troponin T, Serum: 0.01 ng/mL (22 @ 17:12)  Creatine Kinase, Serum: 122 U/L (22 @ 12:20)  Troponin T, Serum: 0.01 ng/mL (22 @ 12:20)      Culture - Urine (collected 18 May 2022 14:07)  Source: Clean Catch Clean Catch (Midstream)  Final Report (19 May 2022 21:31):    <10,000 CFU/mL Normal Urogenital Gail    Culture - Blood (collected 18 May 2022 10:40)  Source: .Blood Blood-Peripheral  Preliminary Report (19 May 2022 23:02):    No growth to date.    Culture - Blood (collected 18 May 2022 10:40)  Source: .Blood Blood-Peripheral  Preliminary Report (19 May 2022 23:02):    No growth to date.      CARDIAC MARKERS ( 19 May 2022 17:12 )  x     / 0.01 ng/mL / 125 U/L / x     / 1.7 ng/mL  CARDIAC MARKERS ( 19 May 2022 12:20 )  x     / 0.01 ng/mL / 122 U/L / x     / 1.8 ng/mL      RADIOLOGY:    PHYSICAL EXAM:  GEN: No acute distress  PULM/CHEST: Clear to auscultation bilaterally, no rales, rhonchi or wheezes   CVS: Regular rate and rhythm, S1-S2, no murmurs  ABD: Soft, non-tender, non-distended, +BS  EXT: No edema  NEURO: AAOx1

## 2022-05-20 NOTE — CHART NOTE - NSCHARTNOTEFT_GEN_A_CORE
STEPDOWN UPGRADE NOTE:    76y Female transferred to SDU from floor     Patient is a 76y old Female who presents with a chief complaint of Worsening Back Pain and Metabolic Encephalopathy (19 May 2022 15:33)    The patient is currently admitted for the primary diagnosis of AMS (altered mental status)    Urinary Catheter: no    Disposition: SDU    Code Status: Full     COURSE OF EVENTS:  -------------------------------------------------------------------------------------------  Ms. Shook is a 76 year old female patient known to have a history of hypertension, COPD, depression  Chronic Back Pain for years secondary to Spinal Stenosis. Patient has been feeling down x few days with reduced PO intake, reduced sleep, loss of interest, and confusion. She has an instance where she had fecal incontinence on way to bathroom 2 days ago. In the setting of confusion and increased pain, niece decided to bring patient to ED for evaluation. On CTH evidence of scattered SAH. Blood pressure uncontrolled with SBP in 190s on right and 140s on left. Patient ordered CT chest to r/o aneurysm which was negative.    Patient was pending CTA head and neck as per neuro however patient aggressive and not cooperative AAO 0-1 at bedside.    ICU consulted for perstient hypertension; found to be in htn emergency (not taking po medications) with no improvement s/p labetalol and hydralazine and started on nicardipine ggt.  -------------------------------------------------------------------------------------------    Current workup in progress:  #Metabolic encephalopathy 2/2 Subacute SAH likely 2/2 HTN Emergency   - CT head: New scattered subarachnoid hemorrhage in the right cerebral sulci which appear mildly effaced compared tothe prior CT head dated 4/25/2021. 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  - SBP difference in arms > 40 -> CT chest to r/o aneurysm  - SBP goal < 160   - start amlodipine 10mg - holding home meds losartan and hctz in setting of JEOVANY   - started on keppra 500 bid for seizure ppx   - r EEG: Consistent with diffuse cerebral electrophysiological dysfunction.  Secondary to none specific cause., Consistent with focal electrophysiological dysfunction.  Secondary to structural/metabolic cause., Consistent with potential for partial seizure. neurology Follow up  - MRA head and neck, MR head(patient not cooperative; might need to be performed under sedation)  - CT H/N with intravenous contrast (patient not cooperative; might need to be performed under sedation)  - wean nicardipine ggt as tolerated  - f/u neuro recommendations     #Worsening Lower Back Pain Likely Secondary to New L1 Compression Fracture  #History of Chronic Back Pain for years secondary to Spinal Stenosis   - Follows with Dr Jaimes. Was supposed to go for a nerve ablation last Monday (cancelled due to poorly controlled HTN 240s/110s   - CT A/P: no acute abnormality within the abdomen and pelvis. Age-indeterminate moderate compression fracture seen at L1, new since 4/9/2022.  - Neurosurgery team consulted for new L1 compression fracture  - Check XR TLS  - Monitor pain and keep score 01/10: tylenol PRN and morphine PRN  - Apply lidocaine patch  - Resume home prednisone 20mg QD (for ILD per Dr Stephens)  - TLSO  - Check Vitamin D levels   - NPO pending speech and swallow. IVF hydration for now, may need NGT if not cleared     #Constipation - had BM today   - cont senna and PEG 17mg QD    #Acute Kidney Injury improving   - Likely Pre-Renal in Setting of BUN: Cr >20:1 and Reduced PO intake  - Recent labs Cr 1.1, on admission Cr 2.1  - IV fluid hydration - NS 75 cc/hr  - Check urine studies  - Avoid nephrotoxic agents  - Holding HCTZ and Losartan  - Renal US- no hydro  - nephro eval     #History of UGIB Due to Bleeding Duodenal Ulcer  - Recent admission for a bleeding duodenal ulcer at [facility] (melena)  - Protonix 40mg QD  - T/S, hgb stable   - Check Fe studies  - OP follow up with GI    #SEEMA/ILD  -Follows with Dr Stephens  - Not on CPAP or home O2  - On Prednisone 15mg QD   - unsure if pt should be on ppx     #Hypothyroidism   - Check TSH  - Resume Levothyroxine 100mcg QD    Leukocytosis in Setting of Steroid use and History of Essential Thrombocytosis  * Follows with Dr Denise  * Home med Anagrilide 0.5mg QD  - Outpatient follow up with Dr Denise  - Resume Anagrilide 0.5mg QD      Others  - DVT Prophylaxis: Heparin Subcutaneously   - GI Prophylaxis: Pantoprazole 40mg PO QD  - Diet: NPO pending speech and swallow  - Code Status: Full ICU UPGRADE NOTE:    76y Female transferred from SDU from ICU    Patient is a 76y old Female who presents with a chief complaint of Worsening Back Pain and Metabolic Encephalopathy (19 May 2022 15:33)    The patient is currently admitted for the primary diagnosis of AMS (altered mental status)    Disposition: ICU    Code Status: Full     COURSE OF EVENTS:  -------------------------------------------------------------------------------------------  Ms. Shook is a 76 year old female patient known to have a history of hypertension, COPD, depression  Chronic Back Pain for years secondary to Spinal Stenosis. Patient has been feeling down x few days with reduced PO intake, reduced sleep, loss of interest, and confusion. She has an instance where she had fecal incontinence on way to bathroom 2 days ago. In the setting of confusion and increased pain, niece decided to bring patient to ED for evaluation. On CTH evidence of scattered SAH. Blood pressure uncontrolled with SBP in 190s on right and 140s on left. Patient ordered CT chest to r/o aneurysm which was negative.    Patient was pending CTA head and neck as per neuro however patient aggressive and not cooperative AAO 0-1 at bedside.    ICU consulted for perstient hypertension; found to be in htn emergency (not taking po medications) with no improvement s/p labetalol and hydralazine and started on nicardipine ggt.  -------------------------------------------------------------------------------------------    Current workup in progress:  #Metabolic encephalopathy 2/2 Subacute SAH likely 2/2 HTN Emergency   - CT head: New scattered subarachnoid hemorrhage in the right cerebral sulci which appear mildly effaced compared tothe prior CT head dated 4/25/2021. 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  - SBP difference in arms > 40 -> CT chest to r/o aneurysm  - SBP goal < 160   - start amlodipine 10mg - holding home meds losartan and hctz in setting of JEOVANY   - started on keppra 500 bid for seizure ppx   - r EEG: Consistent with diffuse cerebral electrophysiological dysfunction.  Secondary to none specific cause., Consistent with focal electrophysiological dysfunction.  Secondary to structural/metabolic cause., Consistent with potential for partial seizure. neurology Follow up  - MRA head and neck, MR head(patient not cooperative; might need to be performed under sedation)  - CT H/N with intravenous contrast (patient not cooperative; might need to be performed under sedation)  - wean nicardipine ggt as tolerated  - f/u neuro recommendations     #Worsening Lower Back Pain Likely Secondary to New L1 Compression Fracture  #History of Chronic Back Pain for years secondary to Spinal Stenosis   - Follows with Dr Jaimes. Was supposed to go for a nerve ablation last Monday (cancelled due to poorly controlled HTN 240s/110s   - CT A/P: no acute abnormality within the abdomen and pelvis. Age-indeterminate moderate compression fracture seen at L1, new since 4/9/2022.  - Neurosurgery team consulted for new L1 compression fracture  - Check XR TLS  - Monitor pain and keep score 01/10: tylenol PRN and morphine PRN  - Apply lidocaine patch  - Resume home prednisone 20mg QD (for ILD per Dr Stephens)  - TLSO  - Check Vitamin D levels   - NPO pending speech and swallow. IVF hydration for now, may need NGT if not cleared     #Constipation - had BM today   - cont senna and PEG 17mg QD    #Acute Kidney Injury improving   - Likely Pre-Renal in Setting of BUN: Cr >20:1 and Reduced PO intake  - Recent labs Cr 1.1, on admission Cr 2.1  - IV fluid hydration - NS 75 cc/hr  - Check urine studies  - Avoid nephrotoxic agents  - Holding HCTZ and Losartan  - Renal US- no hydro  - nephro eval     #History of UGIB Due to Bleeding Duodenal Ulcer  - Recent admission for a bleeding duodenal ulcer at [facility] (melena)  - Protonix 40mg QD  - T/S, hgb stable   - Check Fe studies  - OP follow up with GI    #SEEMA/ILD  -Follows with Dr Stephens  - Not on CPAP or home O2  - On Prednisone 15mg QD   - unsure if pt should be on ppx     #Hypothyroidism   - Check TSH  - Resume Levothyroxine 100mcg QD    Leukocytosis in Setting of Steroid use and History of Essential Thrombocytosis  * Follows with Dr Denise  * Home med Anagrilide 0.5mg QD  - Outpatient follow up with Dr Denise  - Resume Anagrilide 0.5mg QD      Others  - DVT Prophylaxis: Heparin Subcutaneously   - GI Prophylaxis: Pantoprazole 40mg PO QD  - Diet: NPO pending speech and swallow  - Code Status: Full

## 2022-05-20 NOTE — CONSULT NOTE ADULT - SUBJECTIVE AND OBJECTIVE BOX
Neuroendovascular Consult:   Consulted for: SAH    Interval HPI:   The patient is a 76-year-old female with a PMHx of HTN (recent admission for hypertensive urgency), Hypothyroidism, duodenal ulcer (recent tx at [facility]), ?essential thrombocytopenia, and chronic back pain 2/2 spinal stenosis who presented 5/18 with worsening back pain and confusion x 2 days + 1 instance of fecal incontinence. Patient denied leg weakness, paresthesia, numbness and UI. Suspicion of epileptiform activity on EEG--neurology following. Neurosurgery was consulted for new, right-sided, scattered SAh on CTH, for which neuroendovascular is also now following. The patient was seen and examined today in the SICU. Patient reports severe back pain and headache. Relevant imaging and clinical history were reviewed by Dr. West.    PAST MEDICAL & SURGICAL HISTORY:  HTN (hypertension)  Hypothyroid  Depression  Interstitial lung disease  Essential thrombocytopenia  History of ovarian cyst    MEDICATIONS  (STANDING):  amLODIPine   Tablet 10 milliGRAM(s) Oral every 24 hours  ATENolol  Tablet 50 milliGRAM(s) Oral daily  ATENolol  Tablet 25 milliGRAM(s) Oral once  chlorhexidine 4% Liquid 1 Application(s) Topical <User Schedule>  heparin SubCutaneous Injection - Peds 5000 Unit(s) SubCutaneous every 8 hours  labetalol Injectable 10 milliGRAM(s) IV Push once  levETIRAcetam  IVPB 500 milliGRAM(s) IV Intermittent every 12 hours  lidocaine   4% Patch 1 Patch Transdermal daily  pantoprazole    Tablet 40 milliGRAM(s) Oral before breakfast  polyethylene glycol 3350 17 Gram(s) Oral daily  predniSONE   Tablet 15 milliGRAM(s) Oral daily  senna 2 Tablet(s) Oral at bedtime  sodium chloride 0.9%. 1000 milliLiter(s) (75 mL/Hr) IV Continuous <Continuous>    MEDICATIONS  (PRN):  acetaminophen     Tablet .. 650 milliGRAM(s) Oral every 6 hours PRN Mild Pain (1 - 3)  cyclobenzaprine 10 milliGRAM(s) Oral three times a day PRN Muscle Spasm  haloperidol    Injectable 2 milliGRAM(s) IntraMuscular every 8 hours PRN Agitation  labetalol Injectable 10 milliGRAM(s) IV Push every 4 hours PRN Systolic blood pressure > 150  morphine  - Injectable 2 milliGRAM(s) IV Push every 6 hours PRN Severe Pain (7 - 10)  morphine  - Injectable 1 milliGRAM(s) IV Push once PRN Severe Pain (7 - 10)    Allergies:  No Known Allergies    Physical Exam:   Vital Signs Last 24 Hrs  T(C): 36.7 (20 May 2022 12:23), Max: 36.8 (19 May 2022 20:15)  T(F): 98.1 (20 May 2022 12:23), Max: 98.3 (19 May 2022 20:30)  HR: 92 (20 May 2022 13:11) (81 - 106)  BP: 200/91 (20 May 2022 13:11) (97/53 - 214/91)  BP(mean): 117 (20 May 2022 12:23) (68 - 130)  RR: 26 (20 May 2022 13:11) (17 - 35)  SpO2: 100% (20 May 2022 13:11) (87% - 100%)    General: Post-Haldol, lethargic  Neuro: EO to voice, AAOx1 to person, face symmetrical, PERRL, EOMI, moving all extremities.  Abd: Distended, +bowel sounds x 4 quadrants.   Extremities:   NIHSS     Labs:                         10.8   22.68 )-----------( 239      ( 20 May 2022 05:52 )             33.1     05-20    142  |  102  |  32<H>  ----------------------------<  95  4.3   |  25  |  1.4    Ca    9.5      20 May 2022 05:52  Phos  3.6     05-20  Mg     1.9     05-20    TPro  6.5  /  Alb  4.3  /  TBili  0.6  /  DBili  x   /  AST  34  /  ALT  27  /  AlkPhos  56  05-20    PT/INR - ( 19 May 2022 17:12 )   PT: 11.90 sec;   INR: 1.04 ratio         PTT - ( 19 May 2022 17:12 )  PTT:25.5 sec    Pertinent labs:                      10.8   22.68 )-----------( 239      ( 20 May 2022 05:52 )             33.1       05-20    142  |  102  |  32<H>  ----------------------------<  95  4.3   |  25  |  1.4    Ca    9.5      20 May 2022 05:52  Phos  3.6     05-20  Mg     1.9     05-20    TPro  6.5  /  Alb  4.3  /  TBili  0.6  /  DBili  x   /  AST  34  /  ALT  27  /  AlkPhos  56  05-20      PT/INR - ( 19 May 2022 17:12 )   PT: 11.90 sec;   INR: 1.04 ratio         PTT - ( 19 May 2022 17:12 )  PTT:25.5 sec    Radiology & Additional Studies:   Radiology imaging reviewed.     ASSESSMENT/ PLAN:   76-year-old female with a PMHx of HTN, Hypothyroidism, duodenal ulcer (recent tx at [facility]), ?essential thrombocytosis, and chronic back pain 2/2 spinal stenosis who presented 5/18 with worsening back pain and confusion x 2 days + 1 instance of fecal incontinence. Likely metabolic encephalopathy 2/2 hypertensive emergency. SBP difference >40mmHg reported in arms. Suspicion of epileptiform activity on EEG--neurology following. Neurosurgery was consulted for new, right-sided, scattered SAH on CTH, for which neuroendovascular is also now following. The patient was seen and examined today in the SICU. Patient reports severe back pain and headache. Relevant imaging and clinical history were reviewed by Dr. West. Suggestions discussed with primary team [phone] and neurosurgery team.    Suggestions:   - Recommending MRA Brain; MR Brain w/without contrast; CTA head/neck  - Will follow.  - Management per primary team/SICU.  - [phone]     Neuroendovascular Consult:   Consulted for: SAH    Interval HPI:   The patient is a 76-year-old female with a PMHx of HTN (recent admission for hypertensive urgency), Hypothyroidism, duodenal ulcer (recent tx at [facility]), ?essential thrombocytopenia, and chronic back pain 2/2 spinal stenosis who presented 5/18 with worsening back pain and confusion x 2 days + 1 instance of fecal incontinence. Patient denied leg weakness, paresthesia, numbness and UI. Suspicion of epileptiform activity on EEG--neurology following. Neurosurgery was consulted for new, right-sided, scattered SAh on CTH, for which neuroendovascular is also now following. The patient was seen and examined today in the SICU. Patient reports severe back pain and headache. Relevant imaging and clinical history were reviewed by Dr. West.    PAST MEDICAL & SURGICAL HISTORY:  HTN (hypertension)  Hypothyroid  Depression  Interstitial lung disease  Essential thrombocytopenia  History of ovarian cyst    MEDICATIONS  (STANDING):  amLODIPine   Tablet 10 milliGRAM(s) Oral every 24 hours  ATENolol  Tablet 50 milliGRAM(s) Oral daily  ATENolol  Tablet 25 milliGRAM(s) Oral once  chlorhexidine 4% Liquid 1 Application(s) Topical <User Schedule>  heparin SubCutaneous Injection - Peds 5000 Unit(s) SubCutaneous every 8 hours  labetalol Injectable 10 milliGRAM(s) IV Push once  levETIRAcetam  IVPB 500 milliGRAM(s) IV Intermittent every 12 hours  lidocaine   4% Patch 1 Patch Transdermal daily  pantoprazole    Tablet 40 milliGRAM(s) Oral before breakfast  polyethylene glycol 3350 17 Gram(s) Oral daily  predniSONE   Tablet 15 milliGRAM(s) Oral daily  senna 2 Tablet(s) Oral at bedtime  sodium chloride 0.9%. 1000 milliLiter(s) (75 mL/Hr) IV Continuous <Continuous>    MEDICATIONS  (PRN):  acetaminophen     Tablet .. 650 milliGRAM(s) Oral every 6 hours PRN Mild Pain (1 - 3)  cyclobenzaprine 10 milliGRAM(s) Oral three times a day PRN Muscle Spasm  haloperidol    Injectable 2 milliGRAM(s) IntraMuscular every 8 hours PRN Agitation  labetalol Injectable 10 milliGRAM(s) IV Push every 4 hours PRN Systolic blood pressure > 150  morphine  - Injectable 2 milliGRAM(s) IV Push every 6 hours PRN Severe Pain (7 - 10)  morphine  - Injectable 1 milliGRAM(s) IV Push once PRN Severe Pain (7 - 10)    Allergies:  No Known Allergies    Physical Exam:   Vital Signs Last 24 Hrs  T(C): 36.7 (20 May 2022 12:23), Max: 36.8 (19 May 2022 20:15)  T(F): 98.1 (20 May 2022 12:23), Max: 98.3 (19 May 2022 20:30)  HR: 92 (20 May 2022 13:11) (81 - 106)  BP: 200/91 (20 May 2022 13:11) (97/53 - 214/91)  BP(mean): 117 (20 May 2022 12:23) (68 - 130)  RR: 26 (20 May 2022 13:11) (17 - 35)  SpO2: 100% (20 May 2022 13:11) (87% - 100%)    General: Post-Haldol, lethargic  Neuro: EO to voice, AAOx1 to person, face symmetrical, PERRL, EOMI, moving all extremities.  Abd: Distended, +bowel sounds x 4 quadrants.   Extremities:   NIHSS     Labs:                         10.8   22.68 )-----------( 239      ( 20 May 2022 05:52 )             33.1     05-20    142  |  102  |  32<H>  ----------------------------<  95  4.3   |  25  |  1.4    Ca    9.5      20 May 2022 05:52  Phos  3.6     05-20  Mg     1.9     05-20    TPro  6.5  /  Alb  4.3  /  TBili  0.6  /  DBili  x   /  AST  34  /  ALT  27  /  AlkPhos  56  05-20    PT/INR - ( 19 May 2022 17:12 )   PT: 11.90 sec;   INR: 1.04 ratio         PTT - ( 19 May 2022 17:12 )  PTT:25.5 sec    Pertinent labs:                      10.8   22.68 )-----------( 239      ( 20 May 2022 05:52 )             33.1       05-20    142  |  102  |  32<H>  ----------------------------<  95  4.3   |  25  |  1.4    Ca    9.5      20 May 2022 05:52  Phos  3.6     05-20  Mg     1.9     05-20    TPro  6.5  /  Alb  4.3  /  TBili  0.6  /  DBili  x   /  AST  34  /  ALT  27  /  AlkPhos  56  05-20      PT/INR - ( 19 May 2022 17:12 )   PT: 11.90 sec;   INR: 1.04 ratio         PTT - ( 19 May 2022 17:12 )  PTT:25.5 sec    Radiology & Additional Studies:   Radiology imaging reviewed.     ASSESSMENT/ PLAN:   76-year-old female with a PMHx of HTN, Hypothyroidism, duodenal ulcer (recent tx at [facility]), ?essential thrombocytosis, and chronic back pain 2/2 spinal stenosis who presented 5/18 with worsening back pain and confusion x 2 days + 1 instance of fecal incontinence. Likely metabolic encephalopathy 2/2 hypertensive emergency. SBP difference >40mmHg reported in arms. Suspicion of epileptiform activity on EEG--neurology following. Neurosurgery was consulted for new, right-sided, scattered SAH on CTH, for which neuroendovascular is also now following. The patient was seen and examined today in the SICU. Patient reports severe back pain and headache. Relevant imaging and clinical history were reviewed by Dr. West. Suggestions discussed with primary team [phone] and neurosurgery team.    Suggestions:   - Recommending CTA head/neck; CTA for this patient is a priority despite contrast shortage due to concerns about potential aneurysm or vascular malformation.  - Will follow.  - Management per primary team/SICU.  - [phone]     Neuroendovascular Consult:   Consulted for: SAH    Interval HPI:   The patient is a 76-year-old female with a PMHx of HTN (recent admission for hypertensive urgency), Hypothyroidism, duodenal ulcer (recent tx at [facility]), ?essential thrombocytosis, and chronic back pain 2/2 spinal stenosis who presented 5/18 with worsening back pain and confusion x 2 days + 1 instance of fecal incontinence. Patient denied leg weakness, paresthesia, numbness and UI. Suspicion of epileptiform activity on EEG--neurology following. Neurosurgery was consulted for new, right-sided, scattered SAH on CTH, for which neuroendovascular is also now following. The patient was seen and examined today in the SICU. Patient reports severe back pain and headache. Relevant imaging and clinical history were reviewed by Dr. West.    PAST MEDICAL & SURGICAL HISTORY:  HTN (hypertension)  Hypothyroid  Depression  Interstitial lung disease  Essential thrombocytosis  History of ovarian cyst    MEDICATIONS  (STANDING):  amLODIPine   Tablet 10 milliGRAM(s) Oral every 24 hours  ATENolol  Tablet 50 milliGRAM(s) Oral daily  ATENolol  Tablet 25 milliGRAM(s) Oral once  chlorhexidine 4% Liquid 1 Application(s) Topical <User Schedule>  heparin SubCutaneous Injection - Peds 5000 Unit(s) SubCutaneous every 8 hours  labetalol Injectable 10 milliGRAM(s) IV Push once  levETIRAcetam  IVPB 500 milliGRAM(s) IV Intermittent every 12 hours  lidocaine   4% Patch 1 Patch Transdermal daily  pantoprazole    Tablet 40 milliGRAM(s) Oral before breakfast  polyethylene glycol 3350 17 Gram(s) Oral daily  predniSONE   Tablet 15 milliGRAM(s) Oral daily  senna 2 Tablet(s) Oral at bedtime  sodium chloride 0.9%. 1000 milliLiter(s) (75 mL/Hr) IV Continuous <Continuous>    MEDICATIONS  (PRN):  acetaminophen     Tablet .. 650 milliGRAM(s) Oral every 6 hours PRN Mild Pain (1 - 3)  cyclobenzaprine 10 milliGRAM(s) Oral three times a day PRN Muscle Spasm  haloperidol    Injectable 2 milliGRAM(s) IntraMuscular every 8 hours PRN Agitation  labetalol Injectable 10 milliGRAM(s) IV Push every 4 hours PRN Systolic blood pressure > 150  morphine  - Injectable 2 milliGRAM(s) IV Push every 6 hours PRN Severe Pain (7 - 10)  morphine  - Injectable 1 milliGRAM(s) IV Push once PRN Severe Pain (7 - 10)    Allergies:  No Known Allergies    Physical Exam:   Vital Signs Last 24 Hrs  T(C): 36.7 (20 May 2022 12:23), Max: 36.8 (19 May 2022 20:15)  T(F): 98.1 (20 May 2022 12:23), Max: 98.3 (19 May 2022 20:30)  HR: 92 (20 May 2022 13:11) (81 - 106)  BP: 200/91 (20 May 2022 13:11) (97/53 - 214/91)  BP(mean): 117 (20 May 2022 12:23) (68 - 130)  RR: 26 (20 May 2022 13:11) (17 - 35)  SpO2: 100% (20 May 2022 13:11) (87% - 100%)    General: Post-Haldol, lethargic  Neuro: EO to voice, AAOx1 to person, face symmetrical, PERRL, EOMI, moving all extremities.  Abd: Distended, +bowel sounds x 4 quadrants.   Extremities:   NIHSS     Labs:                         10.8   22.68 )-----------( 239      ( 20 May 2022 05:52 )             33.1     05-20    142  |  102  |  32<H>  ----------------------------<  95  4.3   |  25  |  1.4    Ca    9.5      20 May 2022 05:52  Phos  3.6     05-20  Mg     1.9     05-20    TPro  6.5  /  Alb  4.3  /  TBili  0.6  /  DBili  x   /  AST  34  /  ALT  27  /  AlkPhos  56  05-20    PT/INR - ( 19 May 2022 17:12 )   PT: 11.90 sec;   INR: 1.04 ratio         PTT - ( 19 May 2022 17:12 )  PTT:25.5 sec    Pertinent labs:                      10.8   22.68 )-----------( 239      ( 20 May 2022 05:52 )             33.1       05-20    142  |  102  |  32<H>  ----------------------------<  95  4.3   |  25  |  1.4    Ca    9.5      20 May 2022 05:52  Phos  3.6     05-20  Mg     1.9     05-20    TPro  6.5  /  Alb  4.3  /  TBili  0.6  /  DBili  x   /  AST  34  /  ALT  27  /  AlkPhos  56  05-20      PT/INR - ( 19 May 2022 17:12 )   PT: 11.90 sec;   INR: 1.04 ratio         PTT - ( 19 May 2022 17:12 )  PTT:25.5 sec    Radiology & Additional Studies:   Radiology imaging reviewed.     ASSESSMENT/ PLAN:   76-year-old female with a PMHx of HTN, Hypothyroidism, duodenal ulcer (recent tx at [facility]), ?essential thrombocytosis, and chronic back pain 2/2 spinal stenosis who presented 5/18 with worsening back pain and confusion x 2 days + 1 instance of fecal incontinence. Likely metabolic encephalopathy 2/2 hypertensive emergency. SBP difference >40mmHg reported in arms. Suspicion of epileptiform activity on EEG--neurology following. Neurosurgery was consulted for new, right-sided, scattered SAH on CTH, for which neuroendovascular is also now following. The patient was seen and examined today in the SICU. Patient reports severe back pain and headache. Relevant imaging and clinical history were reviewed by Dr. West. Suggestions discussed with primary team [phone] and neurosurgery team.    Suggestions:   - Recommending CTA head/neck to help r/o vascular malformation/aneurysm as source of bleed.  - Will follow.  - Management per primary team/SICU.  - [phone]     Neuroendovascular Consult:   Consulted for: SAH    Interval HPI:   The patient is a 76-year-old female with a PMHx of HTN (recent admission for hypertensive urgency), Hypothyroidism, duodenal ulcer (recent tx at [facility]), ?essential thrombocytosis, and chronic back pain 2/2 spinal stenosis who presented 5/18 with worsening back pain and confusion x 2 days + 1 instance of fecal incontinence. Patient denied leg weakness, paresthesia, numbness and UI. Suspicion of epileptiform activity on EEG--neurology following. Neurosurgery was consulted for new, right-sided, scattered SAH on CTH, for which neuroendovascular is also now following. The patient was seen and examined today in the SICU. Patient reports severe back pain and headache. Relevant imaging and clinical history were reviewed by Dr. West.    PAST MEDICAL & SURGICAL HISTORY:  HTN (hypertension)  Hypothyroid  Depression  Interstitial lung disease  Essential thrombocytosis  History of ovarian cyst    MEDICATIONS  (STANDING):  amLODIPine   Tablet 10 milliGRAM(s) Oral every 24 hours  ATENolol  Tablet 50 milliGRAM(s) Oral daily  ATENolol  Tablet 25 milliGRAM(s) Oral once  chlorhexidine 4% Liquid 1 Application(s) Topical <User Schedule>  heparin SubCutaneous Injection - Peds 5000 Unit(s) SubCutaneous every 8 hours  labetalol Injectable 10 milliGRAM(s) IV Push once  levETIRAcetam  IVPB 500 milliGRAM(s) IV Intermittent every 12 hours  lidocaine   4% Patch 1 Patch Transdermal daily  pantoprazole    Tablet 40 milliGRAM(s) Oral before breakfast  polyethylene glycol 3350 17 Gram(s) Oral daily  predniSONE   Tablet 15 milliGRAM(s) Oral daily  senna 2 Tablet(s) Oral at bedtime  sodium chloride 0.9%. 1000 milliLiter(s) (75 mL/Hr) IV Continuous <Continuous>    MEDICATIONS  (PRN):  acetaminophen     Tablet .. 650 milliGRAM(s) Oral every 6 hours PRN Mild Pain (1 - 3)  cyclobenzaprine 10 milliGRAM(s) Oral three times a day PRN Muscle Spasm  haloperidol    Injectable 2 milliGRAM(s) IntraMuscular every 8 hours PRN Agitation  labetalol Injectable 10 milliGRAM(s) IV Push every 4 hours PRN Systolic blood pressure > 150  morphine  - Injectable 2 milliGRAM(s) IV Push every 6 hours PRN Severe Pain (7 - 10)  morphine  - Injectable 1 milliGRAM(s) IV Push once PRN Severe Pain (7 - 10)    Allergies:  No Known Allergies    Physical Exam:   Vital Signs Last 24 Hrs  T(C): 36.7 (20 May 2022 12:23), Max: 36.8 (19 May 2022 20:15)  T(F): 98.1 (20 May 2022 12:23), Max: 98.3 (19 May 2022 20:30)  HR: 92 (20 May 2022 13:11) (81 - 106)  BP: 200/91 (20 May 2022 13:11) (97/53 - 214/91)  BP(mean): 117 (20 May 2022 12:23) (68 - 130)  RR: 26 (20 May 2022 13:11) (17 - 35)  SpO2: 100% (20 May 2022 13:11) (87% - 100%)    General: Post-Haldol, lethargic  Neuro: EO to voice, AAOx1 to person, face symmetrical, PERRL, EOMI, moving all extremities.  Abd: Distended, +bowel sounds x 4 quadrants.     Labs:                         10.8   22.68 )-----------( 239      ( 20 May 2022 05:52 )             33.1     05-20    142  |  102  |  32<H>  ----------------------------<  95  4.3   |  25  |  1.4    Ca    9.5      20 May 2022 05:52  Phos  3.6     05-20  Mg     1.9     05-20    TPro  6.5  /  Alb  4.3  /  TBili  0.6  /  DBili  x   /  AST  34  /  ALT  27  /  AlkPhos  56  05-20    PT/INR - ( 19 May 2022 17:12 )   PT: 11.90 sec;   INR: 1.04 ratio         PTT - ( 19 May 2022 17:12 )  PTT:25.5 sec    Pertinent labs:                      10.8   22.68 )-----------( 239      ( 20 May 2022 05:52 )             33.1       05-20    142  |  102  |  32<H>  ----------------------------<  95  4.3   |  25  |  1.4    Ca    9.5      20 May 2022 05:52  Phos  3.6     05-20  Mg     1.9     05-20    TPro  6.5  /  Alb  4.3  /  TBili  0.6  /  DBili  x   /  AST  34  /  ALT  27  /  AlkPhos  56  05-20      PT/INR - ( 19 May 2022 17:12 )   PT: 11.90 sec;   INR: 1.04 ratio         PTT - ( 19 May 2022 17:12 )  PTT:25.5 sec    Radiology & Additional Studies:   Radiology imaging reviewed.     ASSESSMENT/ PLAN:   76-year-old female with a PMHx of HTN, Hypothyroidism, duodenal ulcer (recent tx at [facility]), ?essential thrombocytosis, and chronic back pain 2/2 spinal stenosis who presented 5/18 with worsening back pain and confusion x 2 days + 1 instance of fecal incontinence. Likely metabolic encephalopathy 2/2 hypertensive emergency. SBP difference >40mmHg reported in arms. Suspicion of epileptiform activity on EEG--neurology following. Neurosurgery was consulted for new, right-sided, scattered SAH on CTH, for which neuroendovascular is also now following. The patient was seen and examined today in the SICU. Patient reports severe back pain and headache. Relevant imaging and clinical history were reviewed by Dr. West. Suggestions discussed with primary team [phone] and neurosurgery team.    Suggestions:   - Recommending CTA head/neck to help r/o vascular malformation/aneurysm as source of bleed.  - Will follow.  - Management per primary team/SICU.  - [phone]

## 2022-05-21 LAB
ALBUMIN SERPL ELPH-MCNC: 4.1 G/DL — SIGNIFICANT CHANGE UP (ref 3.5–5.2)
ALP SERPL-CCNC: 54 U/L — SIGNIFICANT CHANGE UP (ref 30–115)
ALT FLD-CCNC: 25 U/L — SIGNIFICANT CHANGE UP (ref 0–41)
ANION GAP SERPL CALC-SCNC: 20 MMOL/L — HIGH (ref 7–14)
AST SERPL-CCNC: 45 U/L — HIGH (ref 0–41)
BILIRUB SERPL-MCNC: 0.5 MG/DL — SIGNIFICANT CHANGE UP (ref 0.2–1.2)
BUN SERPL-MCNC: 25 MG/DL — HIGH (ref 10–20)
CALCIUM SERPL-MCNC: 9.2 MG/DL — SIGNIFICANT CHANGE UP (ref 8.5–10.1)
CHLORIDE SERPL-SCNC: 104 MMOL/L — SIGNIFICANT CHANGE UP (ref 98–110)
CO2 SERPL-SCNC: 17 MMOL/L — SIGNIFICANT CHANGE UP (ref 17–32)
CREAT SERPL-MCNC: 1.2 MG/DL — SIGNIFICANT CHANGE UP (ref 0.7–1.5)
EGFR: 47 ML/MIN/1.73M2 — LOW
GLUCOSE SERPL-MCNC: 108 MG/DL — HIGH (ref 70–99)
MAGNESIUM SERPL-MCNC: 1.8 MG/DL — SIGNIFICANT CHANGE UP (ref 1.8–2.4)
PHOSPHATE SERPL-MCNC: 2.8 MG/DL — SIGNIFICANT CHANGE UP (ref 2.1–4.9)
POTASSIUM SERPL-MCNC: 4.2 MMOL/L — SIGNIFICANT CHANGE UP (ref 3.5–5)
POTASSIUM SERPL-SCNC: 4.2 MMOL/L — SIGNIFICANT CHANGE UP (ref 3.5–5)
PROT SERPL-MCNC: 6.1 G/DL — SIGNIFICANT CHANGE UP (ref 6–8)
SODIUM SERPL-SCNC: 141 MMOL/L — SIGNIFICANT CHANGE UP (ref 135–146)

## 2022-05-21 PROCEDURE — 99233 SBSQ HOSP IP/OBS HIGH 50: CPT

## 2022-05-21 PROCEDURE — 99232 SBSQ HOSP IP/OBS MODERATE 35: CPT

## 2022-05-21 PROCEDURE — 70498 CT ANGIOGRAPHY NECK: CPT | Mod: 26

## 2022-05-21 PROCEDURE — 99497 ADVNCD CARE PLAN 30 MIN: CPT | Mod: 25

## 2022-05-21 PROCEDURE — 70496 CT ANGIOGRAPHY HEAD: CPT | Mod: 26

## 2022-05-21 RX ORDER — LEVOTHYROXINE SODIUM 125 MCG
100 TABLET ORAL
Refills: 0 | Status: DISCONTINUED | OUTPATIENT
Start: 2022-05-21 | End: 2022-05-24

## 2022-05-21 RX ORDER — LABETALOL HCL 100 MG
20 TABLET ORAL ONCE
Refills: 0 | Status: COMPLETED | OUTPATIENT
Start: 2022-05-21 | End: 2022-05-21

## 2022-05-21 RX ORDER — LABETALOL HCL 100 MG
0.5 TABLET ORAL
Qty: 400 | Refills: 0 | Status: DISCONTINUED | OUTPATIENT
Start: 2022-05-21 | End: 2022-05-23

## 2022-05-21 RX ORDER — LABETALOL HCL 100 MG
10 TABLET ORAL ONCE
Refills: 0 | Status: DISCONTINUED | OUTPATIENT
Start: 2022-05-21 | End: 2022-05-21

## 2022-05-21 RX ORDER — ALPRAZOLAM 0.25 MG
0.5 TABLET ORAL ONCE
Refills: 0 | Status: DISCONTINUED | OUTPATIENT
Start: 2022-05-21 | End: 2022-05-21

## 2022-05-21 RX ADMIN — SENNA PLUS 2 TABLET(S): 8.6 TABLET ORAL at 22:12

## 2022-05-21 RX ADMIN — HEPARIN SODIUM 5000 UNIT(S): 5000 INJECTION INTRAVENOUS; SUBCUTANEOUS at 22:11

## 2022-05-21 RX ADMIN — HEPARIN SODIUM 5000 UNIT(S): 5000 INJECTION INTRAVENOUS; SUBCUTANEOUS at 06:51

## 2022-05-21 RX ADMIN — MORPHINE SULFATE 2 MILLIGRAM(S): 50 CAPSULE, EXTENDED RELEASE ORAL at 07:02

## 2022-05-21 RX ADMIN — Medication 2 MILLIGRAM(S): at 17:30

## 2022-05-21 RX ADMIN — HALOPERIDOL DECANOATE 2 MILLIGRAM(S): 100 INJECTION INTRAMUSCULAR at 04:51

## 2022-05-21 RX ADMIN — CHLORHEXIDINE GLUCONATE 1 APPLICATION(S): 213 SOLUTION TOPICAL at 07:02

## 2022-05-21 RX ADMIN — Medication 10 MILLIGRAM(S): at 02:26

## 2022-05-21 RX ADMIN — HEPARIN SODIUM 5000 UNIT(S): 5000 INJECTION INTRAVENOUS; SUBCUTANEOUS at 17:23

## 2022-05-21 RX ADMIN — LEVETIRACETAM 400 MILLIGRAM(S): 250 TABLET, FILM COATED ORAL at 06:51

## 2022-05-21 RX ADMIN — Medication 10 MILLIGRAM(S): at 22:10

## 2022-05-21 RX ADMIN — Medication 20 MILLIGRAM(S): at 00:11

## 2022-05-21 RX ADMIN — LEVETIRACETAM 400 MILLIGRAM(S): 250 TABLET, FILM COATED ORAL at 17:22

## 2022-05-21 RX ADMIN — MORPHINE SULFATE 2 MILLIGRAM(S): 50 CAPSULE, EXTENDED RELEASE ORAL at 02:25

## 2022-05-21 NOTE — PHYSICAL THERAPY INITIAL EVALUATION ADULT - GENERAL OBSERVATIONS, REHAB EVAL
Chart reviewed and case discussed with TIMO Giang. per RN pt is agitated, confused and is on B wrist restraints and rock n roll posey,  secondary to pulling on lines. Held off PT IE secondary to pts current status. to f/u when medical status improves.

## 2022-05-21 NOTE — PROGRESS NOTE ADULT - SUBJECTIVE AND OBJECTIVE BOX
Patient is a 76y old  Female who presents with a chief complaint of Worsening Back Pain and Metabolic Encephalopathy (20 May 2022 13:52)        Over Night Events:        ROS:  See HPI    PHYSICAL EXAM    ICU Vital Signs Last 24 Hrs  T(C): 36.4 (21 May 2022 08:00), Max: 36.8 (20 May 2022 17:02)  T(F): 97.6 (21 May 2022 08:00), Max: 98.2 (20 May 2022 17:02)  HR: 82 (21 May 2022 09:00) (74 - 106)  BP: 126/76 (21 May 2022 09:00) (93/46 - 243/111)  BP(mean): 97 (21 May 2022 09:00) (66 - 165)  ABP: --  ABP(mean): --  RR: 21 (21 May 2022 09:00) (18 - 49)  SpO2: 100% (21 May 2022 09:00) (97% - 100%)      05-20-22 @ 07:01  -  05-21-22 @ 07:00  --------------------------------------------------------  IN:    sodium chloride 0.9%: 450 mL  Total IN: 450 mL    OUT:  Total OUT: 0 mL    Total NET: 450 mL            CONSTITUTIONAL:  Well nourished.  NAD    ENT:   Airway patent,   No thrush    EYES:   Clear bilaterally,   pupils equal,   round and reactive to light.    CARDIAC:   Normal rate,   regular rhythm.    no edema      CAROTID:   normal systolic impulse  no bruits    RESPIRATORY:   No wheezing  Normal chest expansion  Not tachypneic,  No use of accessory muscles    GASTROINTESTINAL:  Abdomen soft,   non-tender,   no guarding,   + BS    MUSCULOSKELETAL:   range of motion is not limited,  no clubbing, cyanosis    NEUROLOGICAL:   Alert and oriented   no motor deficits.    SKIN:   Skin normal color for race,   No evidence of rash.    PSYCHIATRIC:   normal mood and affect.   no apparent risk to self or others.        LABS:                            10.8   22.68 )-----------( 239      ( 20 May 2022 05:52 )             33.1                                               05-21    141  |  104  |  25<H>  ----------------------------<  108<H>  4.2   |  17  |  1.2    Ca    9.2      21 May 2022 05:57  Phos  2.8     05-21  Mg     1.8     05-21    TPro  6.1  /  Alb  4.1  /  TBili  0.5  /  DBili  x   /  AST  45<H>  /  ALT  25  /  AlkPhos  54  05-21      PT/INR - ( 19 May 2022 17:12 )   PT: 11.90 sec;   INR: 1.04 ratio         PTT - ( 19 May 2022 17:12 )  PTT:25.5 sec                                           CARDIAC MARKERS ( 19 May 2022 17:12 )  x     / 0.01 ng/mL / 125 U/L / x     / 1.7 ng/mL  CARDIAC MARKERS ( 19 May 2022 12:20 )  x     / 0.01 ng/mL / 122 U/L / x     / 1.8 ng/mL                                            LIVER FUNCTIONS - ( 21 May 2022 05:57 )  Alb: 4.1 g/dL / Pro: 6.1 g/dL / ALK PHOS: 54 U/L / ALT: 25 U/L / AST: 45 U/L / GGT: x                                                          Culture - Urine (collected 18 May 2022 14:07)  Source: Clean Catch Clean Catch (Midstream)  Final Report (19 May 2022 21:31):    <10,000 CFU/mL Normal Urogenital Gail                                                                                           MEDICATIONS  (STANDING):  ATENolol  Tablet 50 milliGRAM(s) Oral daily  chlorhexidine 4% Liquid 1 Application(s) Topical <User Schedule>  heparin SubCutaneous Injection - Peds 5000 Unit(s) SubCutaneous every 8 hours  labetalol Infusion 0.5 mG/Min (30 mL/Hr) IV Continuous <Continuous>  levETIRAcetam  IVPB 500 milliGRAM(s) IV Intermittent every 12 hours  lidocaine   4% Patch 1 Patch Transdermal daily  pantoprazole    Tablet 40 milliGRAM(s) Oral before breakfast  polyethylene glycol 3350 17 Gram(s) Oral daily  predniSONE   Tablet 15 milliGRAM(s) Oral daily  senna 2 Tablet(s) Oral at bedtime    MEDICATIONS  (PRN):  acetaminophen     Tablet .. 650 milliGRAM(s) Oral every 6 hours PRN Mild Pain (1 - 3)  cyclobenzaprine 10 milliGRAM(s) Oral three times a day PRN Muscle Spasm  haloperidol    Injectable 2 milliGRAM(s) IntraMuscular every 8 hours PRN Agitation  labetalol Injectable 10 milliGRAM(s) IV Push every 4 hours PRN Systolic blood pressure > 150  LORazepam   Injectable 2 milliGRAM(s) IV Push once PRN Agitation  morphine  - Injectable 2 milliGRAM(s) IV Push every 6 hours PRN Severe Pain (7 - 10)  morphine  - Injectable 1 milliGRAM(s) IV Push once PRN Severe Pain (7 - 10)      Xrays:                                                                                     ECHO     Patient is a 76y old  Female who presents with a chief complaint of Worsening Back Pain and Metabolic Encephalopathy (20 May 2022 13:52)        Over Night Events: events noted, on labetolol, drip, neuro reviewed    PHYSICAL EXAM    ICU Vital Signs Last 24 Hrs  T(C): 36.4 (21 May 2022 08:00), Max: 36.8 (20 May 2022 17:02)  T(F): 97.6 (21 May 2022 08:00), Max: 98.2 (20 May 2022 17:02)  HR: 82 (21 May 2022 09:00) (74 - 106)  BP: 126/76 (21 May 2022 09:00) (93/46 - 243/111)  BP(mean): 97 (21 May 2022 09:00) (66 - 165)  RR: 21 (21 May 2022 09:00) (18 - 49)  SpO2: 100% (21 May 2022 09:00) (97% - 100%)      05-20-22 @ 07:01  -  05-21-22 @ 07:00  --------------------------------------------------------  IN:    sodium chloride 0.9%: 450 mL  Total IN: 450 mL    OUT:  Total OUT: 0 mL    Total NET: 450 mL            CONSTITUTIONAL:  ill looking    ENT:   Airway patent,   No thrush    EYES:   Clear bilaterally,   pupils equal,   round and reactive to light.    CARDIAC:   DEAN 2.6  CAROTID:   normal systolic impulse  no bruits    RESPIRATORY:   dec bs both bases  GASTROINTESTINAL:  Abdomen soft,   non-tender,   no guarding,   + BS    MUSCULOSKELETAL:   range of motion is not limited,  no clubbing, cyanosis    NEUROLOGICAL:   non focal    LABS:                            10.8   22.68 )-----------( 239      ( 20 May 2022 05:52 )             33.1                                               05-21    141  |  104  |  25<H>  ----------------------------<  108<H>  4.2   |  17  |  1.2    Ca    9.2      21 May 2022 05:57  Phos  2.8     05-21  Mg     1.8     05-21    TPro  6.1  /  Alb  4.1  /  TBili  0.5  /  DBili  x   /  AST  45<H>  /  ALT  25  /  AlkPhos  54  05-21      PT/INR - ( 19 May 2022 17:12 )   PT: 11.90 sec;   INR: 1.04 ratio         PTT - ( 19 May 2022 17:12 )  PTT:25.5 sec                                           CARDIAC MARKERS ( 19 May 2022 17:12 )  x     / 0.01 ng/mL / 125 U/L / x     / 1.7 ng/mL  CARDIAC MARKERS ( 19 May 2022 12:20 )  x     / 0.01 ng/mL / 122 U/L / x     / 1.8 ng/mL                                            LIVER FUNCTIONS - ( 21 May 2022 05:57 )  Alb: 4.1 g/dL / Pro: 6.1 g/dL / ALK PHOS: 54 U/L / ALT: 25 U/L / AST: 45 U/L / GGT: x                                                          Culture - Urine (collected 18 May 2022 14:07)  Source: Clean Catch Clean Catch (Midstream)  Final Report (19 May 2022 21:31):    <10,000 CFU/mL Normal Urogenital Gail                                                                                           MEDICATIONS  (STANDING):  ATENolol  Tablet 50 milliGRAM(s) Oral daily  chlorhexidine 4% Liquid 1 Application(s) Topical <User Schedule>  heparin SubCutaneous Injection - Peds 5000 Unit(s) SubCutaneous every 8 hours  labetalol Infusion 0.5 mG/Min (30 mL/Hr) IV Continuous <Continuous>  levETIRAcetam  IVPB 500 milliGRAM(s) IV Intermittent every 12 hours  lidocaine   4% Patch 1 Patch Transdermal daily  pantoprazole    Tablet 40 milliGRAM(s) Oral before breakfast  polyethylene glycol 3350 17 Gram(s) Oral daily  predniSONE   Tablet 15 milliGRAM(s) Oral daily  senna 2 Tablet(s) Oral at bedtime    MEDICATIONS  (PRN):  acetaminophen     Tablet .. 650 milliGRAM(s) Oral every 6 hours PRN Mild Pain (1 - 3)  cyclobenzaprine 10 milliGRAM(s) Oral three times a day PRN Muscle Spasm  haloperidol    Injectable 2 milliGRAM(s) IntraMuscular every 8 hours PRN Agitation  labetalol Injectable 10 milliGRAM(s) IV Push every 4 hours PRN Systolic blood pressure > 150  LORazepam   Injectable 2 milliGRAM(s) IV Push once PRN Agitation  morphine  - Injectable 2 milliGRAM(s) IV Push every 6 hours PRN Severe Pain (7 - 10)  morphine  - Injectable 1 milliGRAM(s) IV Push once PRN Severe Pain (7 - 10)      Xrays:                                                                                     ECHO

## 2022-05-21 NOTE — PROGRESS NOTE ADULT - SUBJECTIVE AND OBJECTIVE BOX
Subjective: 76yFemale with a pmhx of AMS,JEOVANY    ^FAILURE TO THRIVE    Handoff    MEWS Score    HTN (hypertension)    Hypothyroid    Depression    Interstitial lung disease    Essential thrombocytopenia    AMS (altered mental status)    No significant past surgical history    History of ovarian cyst    FAILURE TO THRIVE    8    JEOVANY (acute kidney injury)    SysAdmin_VisitLink      s/p Right sided scattered SAH.    Pt seen and examined at bedside.  Pt awake and alert.  Follows commands.  Denies headaches, dizziness or vision trouble.        Allergies    No Known Allergies    Intolerances        Vital Signs Last 24 Hrs  T(C): 36.4 (21 May 2022 08:00), Max: 36.8 (20 May 2022 17:02)  T(F): 97.6 (21 May 2022 08:00), Max: 98.2 (20 May 2022 17:02)  HR: 87 (21 May 2022 12:00) (74 - 106)  BP: 119/68 (21 May 2022 11:45) (93/46 - 243/111)  BP(mean): 84 (21 May 2022 11:45) (66 - 165)  RR: 18 (21 May 2022 12:00) (18 - 49)  SpO2: 98% (21 May 2022 12:00) (96% - 100%)      acetaminophen     Tablet .. 650 milliGRAM(s) Oral every 6 hours PRN  ATENolol  Tablet 50 milliGRAM(s) Oral daily  chlorhexidine 4% Liquid 1 Application(s) Topical <User Schedule>  cyclobenzaprine 10 milliGRAM(s) Oral three times a day PRN  haloperidol    Injectable 2 milliGRAM(s) IntraMuscular every 8 hours PRN  heparin SubCutaneous Injection - Peds 5000 Unit(s) SubCutaneous every 8 hours  labetalol Infusion 0.5 mG/Min IV Continuous <Continuous>  labetalol Injectable 10 milliGRAM(s) IV Push every 4 hours PRN  levETIRAcetam  IVPB 500 milliGRAM(s) IV Intermittent every 12 hours  lidocaine   4% Patch 1 Patch Transdermal daily  LORazepam   Injectable 2 milliGRAM(s) IV Push once PRN  morphine  - Injectable 2 milliGRAM(s) IV Push every 6 hours PRN  morphine  - Injectable 1 milliGRAM(s) IV Push once PRN  pantoprazole    Tablet 40 milliGRAM(s) Oral before breakfast  polyethylene glycol 3350 17 Gram(s) Oral daily  predniSONE   Tablet 15 milliGRAM(s) Oral daily  senna 2 Tablet(s) Oral at bedtime        05-20-22 @ 07:01  -  05-21-22 @ 07:00  --------------------------------------------------------  IN: 450 mL / OUT: 0 mL / NET: 450 mL    05-21-22 @ 07:01  -  05-21-22 @ 13:11  --------------------------------------------------------  IN: 270 mL / OUT: 0 mL / NET: 270 mL        REVIEW OF SYSTEMS  REVIEW OF SYSTEMS    [x] A ten-point review of systems was otherwise negative except as noted.  [ ] Due to altered mental status/intubation, subjective information were not able to be obtained from the patient. History was obtained, to the extent possible, from review of the chart and collateral sources of information.      Neuro Exam:  Awake and alert  speech clear  tongue midline  no facial droop  pupils equal and reactive  Motor: MAEx4      CBC Full  -  ( 20 May 2022 05:52 )  WBC Count : 22.68 K/uL  RBC Count : 3.43 M/uL  Hemoglobin : 10.8 g/dL  Hematocrit : 33.1 %  Platelet Count - Automated : 239 K/uL  Mean Cell Volume : 96.5 fL  Mean Cell Hemoglobin : 31.5 pg  Mean Cell Hemoglobin Concentration : 32.6 g/dL  Auto Neutrophil # : 17.70 K/uL  Auto Lymphocyte # : 2.17 K/uL  Auto Monocyte # : 1.37 K/uL  Auto Eosinophil # : 0.42 K/uL  Auto Basophil # : 0.13 K/uL  Auto Neutrophil % : 78.0 %  Auto Lymphocyte % : 9.6 %  Auto Monocyte % : 6.0 %  Auto Eosinophil % : 1.9 %  Auto Basophil % : 0.6 %    05-21    141  |  104  |  25<H>  ----------------------------<  108<H>  4.2   |  17  |  1.2    Ca    9.2      21 May 2022 05:57  Phos  2.8     05-21  Mg     1.8     05-21    TPro  6.1  /  Alb  4.1  /  TBili  0.5  /  DBili  x   /  AST  45<H>  /  ALT  25  /  AlkPhos  54  05-21    PT/INR - ( 19 May 2022 17:12 )   PT: 11.90 sec;   INR: 1.04 ratio         PTT - ( 19 May 2022 17:12 )  PTT:25.5 sec    I&O's Detail    20 May 2022 07:01  -  21 May 2022 07:00  --------------------------------------------------------  IN:    sodium chloride 0.9%: 450 mL  Total IN: 450 mL    OUT:  Total OUT: 0 mL    Total NET: 450 mL      21 May 2022 07:01  -  21 May 2022 13:11  --------------------------------------------------------  IN:    Labetalol: 270 mL  Total IN: 270 mL    OUT:  Total OUT: 0 mL    Total NET: 270 mL        I&O's Summary    20 May 2022 07:01  -  21 May 2022 07:00  --------------------------------------------------------  IN: 450 mL / OUT: 0 mL / NET: 450 mL    21 May 2022 07:01  -  21 May 2022 13:11  --------------------------------------------------------  IN: 270 mL / OUT: 0 mL / NET: 270 mL          Assessment/Plan:   Await CTA head and neck  cont Keppra x 1 week  keep B/P normotensive  Hold AC/AP meds  d/w attg

## 2022-05-21 NOTE — PROGRESS NOTE ADULT - SUBJECTIVE AND OBJECTIVE BOX
Pt seen and examined at bedside.   Patient confused this morning at bedside.  Overnight patient became very hypertensive in the 200s.  Patient was started on nicardipine drip with then DC'd and started on labetalol drip.  MICU team was consulted again and patient was upgraded to MICU level of care in the SICU.  Patient scheduled for CT scan of the head and neck with IV contrast today for further evaluation by neurosurgery and interventional neurology.      PAST MEDICAL & SURGICAL HISTORY:  HTN (hypertension)    Hypothyroid    Depression    Interstitial lung disease    Essential thrombocytopenia    History of ovarian cyst        VITAL SIGNS (Last 24 hrs):  T(C): 36.4 (05-21-22 @ 08:00), Max: 36.8 (05-20-22 @ 17:02)  HR: 79 (05-21-22 @ 13:15) (74 - 106)  BP: 152/65 (05-21-22 @ 13:15) (93/46 - 243/111)  RR: 16 (05-21-22 @ 13:15) (12 - 49)  SpO2: 100% (05-21-22 @ 13:15) (96% - 100%)  Wt(kg): --  Daily     Daily     I&O's Summary    20 May 2022 07:01  -  21 May 2022 07:00  --------------------------------------------------------  IN: 450 mL / OUT: 0 mL / NET: 450 mL    21 May 2022 07:01  -  21 May 2022 13:31  --------------------------------------------------------  IN: 270 mL / OUT: 0 mL / NET: 270 mL        PHYSICAL EXAM:  GENERAL: NAD, well-developed  HEAD:  Atraumatic, Normocephalic  EYES: EOMI, PERRLA, conjunctiva and sclera clear  NECK: Supple, No JVD  CHEST/LUNG: Clear to auscultation bilaterally; No wheeze  HEART: Regular rate and rhythm; No murmurs, rubs, or gallops  ABDOMEN: Soft, Nontender, Nondistended; Bowel sounds present  EXTREMITIES:  2+ Peripheral Pulses, No clubbing, cyanosis, or edema  PSYCH: aaox0   NEUROLOGY: not following commands   SKIN: No rashes or lesions    Labs Reviewed  Spoke to patient in regards to abnormal labs.    CBC Full  -  ( 20 May 2022 05:52 )  WBC Count : 22.68 K/uL  Hemoglobin : 10.8 g/dL  Hematocrit : 33.1 %  Platelet Count - Automated : 239 K/uL  Mean Cell Volume : 96.5 fL  Mean Cell Hemoglobin : 31.5 pg  Mean Cell Hemoglobin Concentration : 32.6 g/dL  Auto Neutrophil # : 17.70 K/uL  Auto Lymphocyte # : 2.17 K/uL  Auto Monocyte # : 1.37 K/uL  Auto Eosinophil # : 0.42 K/uL  Auto Basophil # : 0.13 K/uL  Auto Neutrophil % : 78.0 %  Auto Lymphocyte % : 9.6 %  Auto Monocyte % : 6.0 %  Auto Eosinophil % : 1.9 %  Auto Basophil % : 0.6 %    BMP:    05-21 @ 05:57    Blood Urea Nitrogen - 25  Calcium - 9.2  Carbond Dioxide - 17  Chloride - 104  Creatinine - 1.2  Glucose - 108  Potassium - 4.2  Sodium - 141      Hemoglobin A1c -   PT/INR - ( 19 May 2022 17:12 )   PT: 11.90 sec;   INR: 1.04 ratio         PTT - ( 19 May 2022 17:12 )  PTT:25.5 sec  Urine Culture:  05-18 @ 14:07 Urine culture: --    Culture Results:   <10,000 CFU/mL Normal Urogenital Gail  Method Type: --  Organism: --  Organism Identification: --  Specimen Source: Clean Catch Clean Catch (Midstream)  05-18 @ 10:40 Urine culture: --    Culture Results:   No growth to date.  Method Type: --  Organism: --  Organism Identification: --  Specimen Source: .Blood Blood-Peripheral        COVID Labs  CRP:      D-Dimer:    Imaging reviewed independently and reviewed read  < from: CT Chest No Cont (05.19.22 @ 18:43) >  Since  11/23/2021    Diffuse interstitial lung disease characterized by subpleural reticular   opacities mosaic attenuation. Interval decrease in the amount of   bilateral groundglass opacities from earlier study.    No focal aneurysms.    < end of copied text >        MEDICATIONS  (STANDING):  ATENolol  Tablet 50 milliGRAM(s) Oral daily  chlorhexidine 4% Liquid 1 Application(s) Topical <User Schedule>  heparin SubCutaneous Injection - Peds 5000 Unit(s) SubCutaneous every 8 hours  labetalol Infusion 0.5 mG/Min (30 mL/Hr) IV Continuous <Continuous>  levETIRAcetam  IVPB 500 milliGRAM(s) IV Intermittent every 12 hours  levothyroxine Injectable 100 MICROGram(s) IV Push <User Schedule>  lidocaine   4% Patch 1 Patch Transdermal daily  pantoprazole    Tablet 40 milliGRAM(s) Oral before breakfast  polyethylene glycol 3350 17 Gram(s) Oral daily  predniSONE   Tablet 15 milliGRAM(s) Oral daily  senna 2 Tablet(s) Oral at bedtime    MEDICATIONS  (PRN):  acetaminophen     Tablet .. 650 milliGRAM(s) Oral every 6 hours PRN Mild Pain (1 - 3)  cyclobenzaprine 10 milliGRAM(s) Oral three times a day PRN Muscle Spasm  haloperidol    Injectable 2 milliGRAM(s) IntraMuscular every 8 hours PRN Agitation  labetalol Injectable 10 milliGRAM(s) IV Push every 4 hours PRN Systolic blood pressure > 150  LORazepam   Injectable 2 milliGRAM(s) IV Push once PRN Agitation  morphine  - Injectable 2 milliGRAM(s) IV Push every 6 hours PRN Severe Pain (7 - 10)  morphine  - Injectable 1 milliGRAM(s) IV Push once PRN Severe Pain (7 - 10)

## 2022-05-21 NOTE — CONSULT NOTE ADULT - SUBJECTIVE AND OBJECTIVE BOX
Reason for Endocrinology Consult: Diabetes    HPI: 76y Female    PAST MEDICAL & SURGICAL HISTORY:  HTN (hypertension)      Hypothyroid      Depression      Interstitial lung disease      Essential thrombocytopenia      History of ovarian cyst        FAMILY HISTORY:      SH:  Smoking  Etoh:  Recreational Drugs:    Home Medications:  anagrelide 0.5 mg oral capsule: 1 cap(s) orally 4 times a day (18 May 2022 23:29)  atenolol 25 mg oral tablet: 1 tab(s) orally once a day (18 May 2022 23:28)  losartan-hydrochlorothiazide 100 mg-25 mg oral tablet: 1 tab(s) orally once a day (18 May 2022 23:28)  predniSONE 20 mg oral tablet: 1 tab(s) orally once a day (18 May 2022 23:28)  Protonix 20 mg oral delayed release tablet: 1 tab(s) orally once a day (18 May 2022 23:28)      Current (Non-Endocrine) Meds:  acetaminophen     Tablet .. 650 milliGRAM(s) Oral every 6 hours PRN  ATENolol  Tablet 50 milliGRAM(s) Oral daily  chlorhexidine 4% Liquid 1 Application(s) Topical <User Schedule>  cyclobenzaprine 10 milliGRAM(s) Oral three times a day PRN  haloperidol    Injectable 2 milliGRAM(s) IntraMuscular every 8 hours PRN  heparin SubCutaneous Injection - Peds 5000 Unit(s) SubCutaneous every 8 hours  labetalol Infusion 0.5 mG/Min IV Continuous <Continuous>  labetalol Injectable 10 milliGRAM(s) IV Push every 4 hours PRN  levETIRAcetam  IVPB 500 milliGRAM(s) IV Intermittent every 12 hours  lidocaine   4% Patch 1 Patch Transdermal daily  LORazepam   Injectable 2 milliGRAM(s) IV Push once PRN  morphine  - Injectable 2 milliGRAM(s) IV Push every 6 hours PRN  morphine  - Injectable 1 milliGRAM(s) IV Push once PRN  pantoprazole    Tablet 40 milliGRAM(s) Oral before breakfast  polyethylene glycol 3350 17 Gram(s) Oral daily  senna 2 Tablet(s) Oral at bedtime      Current Endocrine Meds:   levothyroxine Injectable 100 MICROGram(s) IV Push <User Schedule>  predniSONE   Tablet 15 milliGRAM(s) Oral daily      Allergies:  No Known Allergies      ROS:  Denies the following except as indicated.    General: weight loss/weight gain, decreased appetite, fatigue, fever  Eyes: blurry vision, double vision  ENT: neck swelling, dysphagia, voice changes   CV: palpitations, SOB, chest pain, cough  GI: nausea, vomiting, diarrhea, constipation, abdominal pain  : nocturia,  polyuria, dysuria  Endo: decreased libido, heat/cold intolerance, jitteriness  MSK: arthralgias, myalgias  Skin: rash, dryness, diaphoresis  Neuro: pedal numbness,pedal paresthesias, pedal pain    Height (cm): 152.9 (05-19 @ 06:00)  Weight (kg): 71.5 (05-19 @ 06:00)  BMI (kg/m2): 30.6 (05-19 @ 06:00)    Vital Signs Last 24 Hrs  T(C): 36.4 (21 May 2022 08:00), Max: 36.4 (21 May 2022 08:00)  T(F): 97.6 (21 May 2022 08:00), Max: 97.6 (21 May 2022 08:00)  HR: 79 (21 May 2022 13:45) (74 - 93)  BP: 156/72 (21 May 2022 13:45) (93/46 - 243/111)  BP(mean): 103 (21 May 2022 13:45) (66 - 165)  RR: 15 (21 May 2022 13:45) (12 - 39)  SpO2: 100% (21 May 2022 13:45) (96% - 100%)  Constitutional: WN/WD in NAD.   Neck: no thyromegaly or palpable thyroid nodules   Respiratory: lungs CTAB.  Cardiovascular: regular rate and rhythm, normal S1 and S2, no audible murmurs  GI: soft, NT/ND, no masses/HSM appreciated.  Ext: no edema, no ulcers, pedal pulses palpable bilaterally  Neurology: no tremor, monofilament sensation intact in feet  Psychiatric: A&O x 3, normal affect/mood.        LABS:                        10.8   22.68 )-----------( 239      ( 20 May 2022 05:52 )             33.1     05-21    141  |  104  |  25<H>  ----------------------------<  108<H>  4.2   |  17  |  1.2    Ca    9.2      21 May 2022 05:57  Phos  2.8     05-21  Mg     1.8     05-21    TPro  6.1  /  Alb  4.1  /  TBili  0.5  /  DBili  x   /  AST  45<H>  /  ALT  25  /  AlkPhos  54  05-21                               Thyroid Stimulating Hormone, Serum: 70.24 (05-20 @ 05:52)          RADIOLOGY & ADDITIONAL STUDIES:    A/P:76yFemale

## 2022-05-21 NOTE — CONSULT NOTE ADULT - ASSESSMENT
primary hypothyroidism, start l-thyroxine 100 mcg. daily intravenously, can switch to oral 150 mcg daily, when patient is more stable, i don't think the hypothyroidism is the main cause for the encephalopathy.

## 2022-05-21 NOTE — PROGRESS NOTE ADULT - ASSESSMENT
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  HO Severe SEEMA Not on CPAP   HO ET on Anagrelide, followed by hem   JEOVANY improving     Plan     Prednisone 15 mg daily for now   GI DVT prophylaxis   Wean O2 as tolerated,.  Pain control.  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  HO Severe SEEMA Not on CPAP   HO ET on Anagrelide, followed by hem   JEOVANY improving     Plan     Prednisone 15 mg daily   GI DVT prophylaxis   Wean O2 as tolerated,.  Pain control.  Avoid over depressants   HOB At 45 degrees   FU with NeuroSx and Neurology   Feeding per speech and swallow   BP control,  Gentle hydration     Bladder scans   will follow

## 2022-05-21 NOTE — PROGRESS NOTE ADULT - CONVERSATION DETAILS
Goals of care conversation conducted with the patient's family on the phone today.  Spoke to the patient's brother was the next of kin.  Patient does not have any  or mother or father that is alive.  Spoke to also the patient's niece on the phone.  Discussed patient's case and updated family on prognosis.  Patient remains to be metabolic encephalopathic likely secondary to subarachnoid hemorrhages.  Patient is pending imaging and neurology follow-up.  Discussed full code versus DNR status for the patient.  Family states that the patient would have preferred to be DNR/DNI.  Explained the difference between full code and DNR/DNI.  Patient's brother was in understanding of the differences and made the patient DNR/DNI.  Family would like to continue the work-up and if in the event interventions are needed family would like to be notified and would like to make a decision based off of the information from the specialists

## 2022-05-21 NOTE — PROGRESS NOTE ADULT - ASSESSMENT
#Metabolic encephalopathy 2/2 Subacute SAH likely 2/2 HTN Emergency   - CT head: New scattered subarachnoid hemorrhage in the right cerebral sulci which appear mildly effaced compared tothe prior CT head dated 4/25/2021. 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  - SBP difference in arms > 40 -> CT chest to r/o aneurysm  - SBP goal < 160   - start amlodipine 10mg - holding home meds losartan and hctz in setting of JEOVANY   - started on keppra 500 bid for seizure ppx   - r EEG: Consistent with diffuse cerebral electrophysiological dysfunction.  Secondary to none specific cause., Consistent with focal electrophysiological dysfunction.  Secondary to structural/metabolic cause., Consistent with potential for partial seizure. neurology Follow up  - MRA head and neck, MR head(patient not cooperative; might need to be performed under sedation)  - CT H/N with intravenous contrast (patient not cooperative; might need to be performed under sedation)  - f/u neuro recommendations   -Patient appears to be more cooperative today may tolerate going to CT head and neck  – Nicardipine drip was weaned off and started on labetalol drip.  Blood pressures are stable.  - EEG showing epileptiform- continue keppra, neuro follow up       #Worsening Lower Back Pain Likely Secondary to New L1 Compression Fracture  #History of Chronic Back Pain for years secondary to Spinal Stenosis   - Follows with Dr Jaimes. Was supposed to go for a nerve ablation last Monday (cancelled due to poorly controlled HTN 240s/110s   - CT A/P: no acute abnormality within the abdomen and pelvis. Age-indeterminate moderate compression fracture seen at L1, new since 4/9/2022.  - Neurosurgery team consulted for new L1 compression fracture  - Check XR TLS  - Monitor pain and keep score 01/10: tylenol PRN and morphine PRN  - Apply lidocaine patch  - Resume home prednisone 20mg QD (for ILD per Dr Stephens)  - TLSO  - Check Vitamin D levels   - NPO pending speech and swallow. IVF hydration for now,   -NG tube placement today.    #Constipation - NGT placement to give meds.     #Acute Kidney Injury improving   - Likely Pre-Renal in Setting of BUN: Cr >20:1 and Reduced PO intake  - Recent labs Cr 1.1, on admission Cr 2.1  - IV fluid hydration - NS 75 cc/hr  - Check urine studies  - Avoid nephrotoxic agents  - Holding HCTZ and Losartan  - Renal US- no hydro  - nephro eval   -Creatinine stable 1.2 today.    #History of UGIB Due to Bleeding Duodenal Ulcer  - Recent admission for a bleeding duodenal ulcer at [facility] (melena)  - Protonix 40mg QD  - T/S, hgb stable   - Check Fe studies  - OP follow up with GI    #SEEMA/ILD  -Follows with Dr Stephens  - Not on CPAP or home O2  - On Prednisone 15mg QD   - unsure if pt should be on ppx     #Hypothyroidism   -TSH 70  -Started on Synthroid 100 mcg IV daily as per endocrine curbside  – Endocrine consult    Leukocytosis in Setting of Steroid use and History of Essential Thrombocytosis  * Follows with Dr Denise  * Home med Anagrilide 0.5mg QD  - Outpatient follow up with Dr Denise  - Resume Anagrilide 0.5mg QD  -Blood cultures have been negative, patient has been afebrile no signs of infection.      #Progress Note Handoff  Pending (specify): Follow-up neurology, neurosurgery, interventional neurology, critical care, endocrine, neuroimaging pending.  Family discussion: Spoke to  family on the phone today  Disposition: SICU under ICU level of care   11/5/2019 TRIAGE:  The patient states that he has been having intermittent left chest discomfort for the past 4 weeks.  He rates the discomfort \"1-2/10\".  It occurs at rest and does not increase with exertion. Last week, it seemed to improve but then it returned again this week.  Occasionally, it is worse when he takes a deep breath. He denies increased pain with chest wall palpation. 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    In addition, he has noticed episodes of indigestion. His wife recommended Pepcid which he started yesterday.  He denies accompanying symptoms of diaphoresis, lightheadedness, rapid heartbeat, palpitations, signs of fluid retention.  His vital signs per epic have been stable.    Last OV 9/21/18. He can only schedule on Friday afternoons. (next OV availability 12/20/19.)  He states that his wife mentioned that Dr. Stout might have more availability. He reports that he has not seen his PCP in years.    PLAN Per Dr. Luis: Needs CT angiogram coronary arteries.    Contacted the patient and advised regarding Dr. Luis's recommendation for CTA coronary arteries.  Advised that he will need to have lab work checked and to take metoprolol 50 mg the night before and the morning of his CT.  The patient states that he only has 2 days off a year.    The patient requested that I call his wife and update her regarding CTA and have her schedule his CTA.  Left voicemail message for his wife advising of the plan and that she will need to call scheduling [phone] to schedule CTA.   He will need a follow-up OV either with Dr. Luis or if she thinks Dr. Stout has more availability, she can contact him to schedule office visit.    * The patient's wife called back requesting CXR & US aorta. Advised that I would discuss with Dr. Luis's team. Discussed with Adrianna Ariaz NP who advised to proceed with CTA, no further  testing needed at this time.    11/23/18 CAC There are multiple small plaques in the LAD with a total coronary calcium score of 104  10/28/15 Stress ECHO  Normal treadmill stress echocardiogram at > 15 METS.  No evidence of myocardial ischemia with stress.  Normal stress ECG.     9/21/18 ASSESSMENT/PLAN   Hypertension:  Controlled.  Continue with lisinopril 10mg daily.    Family h/o thoracic aneurysm and CAD:  No evidence of aneurysm 10/2015.  Normal stress echo 10/2015.  B/o family h/o and last stress testing 3 years ago, will check CT calcium score and this study will also evaluate his aorta.     Hyperlipidemia:  Better control with diet and exercise but LD up a little this year.  He will work on improving diet as he has not been as good at that lately.  Instructed to continue with diet and exercise.  Intolerant to statins.  Follow up in one year.  Encouraged him again to get and maintain a relationship with PCP.

## 2022-05-22 LAB
ALBUMIN SERPL ELPH-MCNC: 4 G/DL — SIGNIFICANT CHANGE UP (ref 3.5–5.2)
ALP SERPL-CCNC: 45 U/L — SIGNIFICANT CHANGE UP (ref 30–115)
ALT FLD-CCNC: 21 U/L — SIGNIFICANT CHANGE UP (ref 0–41)
ANION GAP SERPL CALC-SCNC: 14 MMOL/L — SIGNIFICANT CHANGE UP (ref 7–14)
AST SERPL-CCNC: 24 U/L — SIGNIFICANT CHANGE UP (ref 0–41)
BASOPHILS # BLD AUTO: 0.09 K/UL — SIGNIFICANT CHANGE UP (ref 0–0.2)
BASOPHILS NFR BLD AUTO: 0.5 % — SIGNIFICANT CHANGE UP (ref 0–1)
BILIRUB SERPL-MCNC: 0.5 MG/DL — SIGNIFICANT CHANGE UP (ref 0.2–1.2)
BUN SERPL-MCNC: 25 MG/DL — HIGH (ref 10–20)
CALCIUM SERPL-MCNC: 8.9 MG/DL — SIGNIFICANT CHANGE UP (ref 8.5–10.1)
CHLORIDE SERPL-SCNC: 103 MMOL/L — SIGNIFICANT CHANGE UP (ref 98–110)
CO2 SERPL-SCNC: 20 MMOL/L — SIGNIFICANT CHANGE UP (ref 17–32)
CREAT SERPL-MCNC: 1.3 MG/DL — SIGNIFICANT CHANGE UP (ref 0.7–1.5)
EGFR: 42 ML/MIN/1.73M2 — LOW
EOSINOPHIL # BLD AUTO: 0.38 K/UL — SIGNIFICANT CHANGE UP (ref 0–0.7)
EOSINOPHIL NFR BLD AUTO: 2.2 % — SIGNIFICANT CHANGE UP (ref 0–8)
GLUCOSE BLDC GLUCOMTR-MCNC: 104 MG/DL — HIGH (ref 70–99)
GLUCOSE SERPL-MCNC: 100 MG/DL — HIGH (ref 70–99)
HCT VFR BLD CALC: 30 % — LOW (ref 37–47)
HGB BLD-MCNC: 9.6 G/DL — LOW (ref 12–16)
IMM GRANULOCYTES NFR BLD AUTO: 5 % — HIGH (ref 0.1–0.3)
LYMPHOCYTES # BLD AUTO: 1.26 K/UL — SIGNIFICANT CHANGE UP (ref 1.2–3.4)
LYMPHOCYTES # BLD AUTO: 7.4 % — LOW (ref 20.5–51.1)
MAGNESIUM SERPL-MCNC: 1.6 MG/DL — LOW (ref 1.8–2.4)
MCHC RBC-ENTMCNC: 31.7 PG — HIGH (ref 27–31)
MCHC RBC-ENTMCNC: 32 G/DL — SIGNIFICANT CHANGE UP (ref 32–37)
MCV RBC AUTO: 99 FL — SIGNIFICANT CHANGE UP (ref 81–99)
MONOCYTES # BLD AUTO: 1.39 K/UL — HIGH (ref 0.1–0.6)
MONOCYTES NFR BLD AUTO: 8.1 % — SIGNIFICANT CHANGE UP (ref 1.7–9.3)
NEUTROPHILS # BLD AUTO: 13.1 K/UL — HIGH (ref 1.4–6.5)
NEUTROPHILS NFR BLD AUTO: 76.8 % — HIGH (ref 42.2–75.2)
NRBC # BLD: 0 /100 WBCS — SIGNIFICANT CHANGE UP (ref 0–0)
PHOSPHATE SERPL-MCNC: 2.6 MG/DL — SIGNIFICANT CHANGE UP (ref 2.1–4.9)
PLATELET # BLD AUTO: 356 K/UL — SIGNIFICANT CHANGE UP (ref 130–400)
POTASSIUM SERPL-MCNC: 3.8 MMOL/L — SIGNIFICANT CHANGE UP (ref 3.5–5)
POTASSIUM SERPL-SCNC: 3.8 MMOL/L — SIGNIFICANT CHANGE UP (ref 3.5–5)
PROT SERPL-MCNC: 6 G/DL — SIGNIFICANT CHANGE UP (ref 6–8)
RBC # BLD: 3.03 M/UL — LOW (ref 4.2–5.4)
RBC # FLD: 18.5 % — HIGH (ref 11.5–14.5)
SODIUM SERPL-SCNC: 137 MMOL/L — SIGNIFICANT CHANGE UP (ref 135–146)
WBC # BLD: 17.07 K/UL — HIGH (ref 4.8–10.8)
WBC # FLD AUTO: 17.07 K/UL — HIGH (ref 4.8–10.8)

## 2022-05-22 PROCEDURE — 99233 SBSQ HOSP IP/OBS HIGH 50: CPT

## 2022-05-22 PROCEDURE — 99221 1ST HOSP IP/OBS SF/LOW 40: CPT

## 2022-05-22 PROCEDURE — 71045 X-RAY EXAM CHEST 1 VIEW: CPT | Mod: 26

## 2022-05-22 PROCEDURE — 93880 EXTRACRANIAL BILAT STUDY: CPT | Mod: 26

## 2022-05-22 RX ORDER — HALOPERIDOL DECANOATE 100 MG/ML
2 INJECTION INTRAMUSCULAR ONCE
Refills: 0 | Status: COMPLETED | OUTPATIENT
Start: 2022-05-22 | End: 2022-05-22

## 2022-05-22 RX ADMIN — LIDOCAINE 1 PATCH: 4 CREAM TOPICAL at 21:00

## 2022-05-22 RX ADMIN — POLYETHYLENE GLYCOL 3350 17 GRAM(S): 17 POWDER, FOR SOLUTION ORAL at 13:11

## 2022-05-22 RX ADMIN — LEVETIRACETAM 400 MILLIGRAM(S): 250 TABLET, FILM COATED ORAL at 18:28

## 2022-05-22 RX ADMIN — Medication 1 MILLIGRAM(S): at 21:27

## 2022-05-22 RX ADMIN — HEPARIN SODIUM 5000 UNIT(S): 5000 INJECTION INTRAVENOUS; SUBCUTANEOUS at 23:49

## 2022-05-22 RX ADMIN — HALOPERIDOL DECANOATE 2 MILLIGRAM(S): 100 INJECTION INTRAMUSCULAR at 20:33

## 2022-05-22 RX ADMIN — LIDOCAINE 1 PATCH: 4 CREAM TOPICAL at 23:52

## 2022-05-22 RX ADMIN — LIDOCAINE 1 PATCH: 4 CREAM TOPICAL at 13:11

## 2022-05-22 RX ADMIN — HALOPERIDOL DECANOATE 2 MILLIGRAM(S): 100 INJECTION INTRAMUSCULAR at 21:26

## 2022-05-22 RX ADMIN — ATENOLOL 50 MILLIGRAM(S): 25 TABLET ORAL at 06:04

## 2022-05-22 RX ADMIN — LEVETIRACETAM 400 MILLIGRAM(S): 250 TABLET, FILM COATED ORAL at 06:02

## 2022-05-22 RX ADMIN — HEPARIN SODIUM 5000 UNIT(S): 5000 INJECTION INTRAVENOUS; SUBCUTANEOUS at 13:09

## 2022-05-22 RX ADMIN — HEPARIN SODIUM 5000 UNIT(S): 5000 INJECTION INTRAVENOUS; SUBCUTANEOUS at 06:03

## 2022-05-22 RX ADMIN — CHLORHEXIDINE GLUCONATE 1 APPLICATION(S): 213 SOLUTION TOPICAL at 06:03

## 2022-05-22 RX ADMIN — Medication 100 MICROGRAM(S): at 13:29

## 2022-05-22 RX ADMIN — PANTOPRAZOLE SODIUM 40 MILLIGRAM(S): 20 TABLET, DELAYED RELEASE ORAL at 06:06

## 2022-05-22 RX ADMIN — Medication 15 MILLIGRAM(S): at 06:04

## 2022-05-22 NOTE — SWALLOW BEDSIDE ASSESSMENT ADULT - SLP PERTINENT HISTORY OF CURRENT PROBLEM
new ICA aneurysm. 76 y.o F adm with worsening back pain and metabolic encephalopathy.  CT abdomen/pelvis showed new L1 compression fx, new since 4/9/22.  Neuro Sx consulted for L1 fx.  Pt's niece states that Pt hasn't been eating much.  PMhx: thrombocytopenia, Depression, hypothyroid, interstitial ling disease, HTN, SEEMA, Spinal stenosis s/p spinal epidural.

## 2022-05-22 NOTE — SWALLOW BEDSIDE ASSESSMENT ADULT - COMMENTS
pt received lethargic. NGT in situ. pt known to acute service w/recs for regular, thins (5/19). CTA conducted 5/22 revealed r ICA aneurysm.

## 2022-05-22 NOTE — PROGRESS NOTE ADULT - ASSESSMENT
Plan:    Neurological:  - Neuro Checks Q1H  - DCI Prophylaxis Nimodipine 60 mg Q4H, daily TCDs (please order "VA transcranial dopplers, complete), normovolemia  - Keppra 500 BID  - PT/OT  - SBP goal: 120 - 140  - MRI Brain  -  - 180  - Mg > 2  - Nomonatremia  - Strict Is/Os  - Normothermia  - SCDs  - NPO midnight for DSA with Dr. West      Dispo: Pending DSA findings    Discussed with Attending Physician: Schwab

## 2022-05-22 NOTE — PROGRESS NOTE ADULT - SUBJECTIVE AND OBJECTIVE BOX
SUMMARY: HPI:  History of Present Illness  Ms. Shook is a 76 year old female patient known to have:  - Baseline AO3, lives with niece, ambulates with a walker  - SEEMA and ILD. Follows with Dr Stephens. Not on CPAP or home O2. On Prednisone 20mg QD  - Depression  - HTN. Recent Admission for HTN Urgency. Follows with Dr Moses  - Hypothyroidism   - Recent admission for a bleeding duodenal ulcer at [facility] [place] (melena). Home med Protonix 20mg QD  - Essential Thrombocytosis. Follows with Dr Denise  - Chronic Back Pain for years secondary to Spinal Stenosis per marquise. s/p Spinal Epidural Injection. Was on PT 3x/ week but has been non compliant for 2-3 months. Follows with Dr Jaimes. Was supposed to go for a nerve ablation last Monday (cancelled due to poorly controlled HTN s/p admission)       She was brought to the ED by marquise on 05/18 for evaluation of worsening back pain and confusion x2 days.  History goes back to 2 days PTP when the patient started complaining of worsening of her chronic dull lower back pain that radiates down to her right medial aspect of thigh.  This has resulted in limited ambulation (patient refusing to leave her bed) and to being non compliant with Physical Therapy in the absence of leg weakness or paresthesias or numbness or urinary incontinence.  Per marquise, patient has been feeling down x few days with reduced PO intake, reduced sleep, loss of interest, thoughts that her family doesn't like/ support her, and confusion (some times would not recognize niece).  She has an instance where she had fecal incontinence on way to bathroom 2 days ago.  In the setting of confusion and increased pain, marquise decided to bring patient to ED for evaluation.    On review of systems, marquise denies any recent fever, chills, night sweats, URTI symptoms (cough, rhinorrhea, sore throat), urinary symptoms (urinary frequency, urgency, intermittence, dysuria, foul smelling urine, cloudy urine), abdominal pain, headache, nausea, or vomiting.   No sick contacts.   No recent travel or exposure to recent travelers.      ALLERGIES: Allergies    No Known Allergies    Intolerances        ADMISSION SCORES:   GCS: 15  HH: 1 MF: 1    REVIEW OF SYSTEMS: Negative except for that of HPI    VITALS: [x] Reviewed  ICU Vital Signs Last 24 Hrs  T(C): 36.6 (22 May 2022 04:00), Max: 36.9 (22 May 2022 00:00)  T(F): 97.8 (22 May 2022 04:00), Max: 98.4 (22 May 2022 00:00)  HR: 80 (22 May 2022 14:00) (72 - 84)  BP: 120/61 (22 May 2022 14:00) (87/59 - 167/71)  BP(mean): 86 (22 May 2022 14:00) (68 - 107)  ABP: --  ABP(mean): --  RR: 18 (22 May 2022 14:00) (15 - 38)  SpO2: 96% (22 May 2022 14:00) (94% - 100%)      05-21-22 @ 07:01  -  05-22-22 @ 07:00  --------------------------------------------------------  IN: 1030 mL / OUT: 0 mL / NET: 1030 mL    05-22-22 @ 07:01  -  05-22-22 @ 14:44  --------------------------------------------------------  IN: 210 mL / OUT: 0 mL / NET: 210 mL      LABS:  Na: 137 (05-22 @ 05:31), 141 (05-21 @ 05:57), 142 (05-20 @ 05:52), 140 (05-19 @ 17:12)  K: 3.8 (05-22 @ 05:31), 4.2 (05-21 @ 05:57), 4.3 (05-20 @ 05:52), 3.6 (05-19 @ 17:12)  Cl: 103 (05-22 @ 05:31), 104 (05-21 @ 05:57), 102 (05-20 @ 05:52), 98 (05-19 @ 17:12)  CO2: 20 (05-22 @ 05:31), 17 (05-21 @ 05:57), 25 (05-20 @ 05:52), 20 (05-19 @ 17:12)  BUN: 25 (05-22 @ 05:31), 25 (05-21 @ 05:57), 32 (05-20 @ 05:52), 40 (05-19 @ 17:12)  Cr: 1.3 (05-22 @ 05:31), 1.2 (05-21 @ 05:57), 1.4 (05-20 @ 05:52), 1.6 (05-19 @ 17:12)  Glu: 100(05-22 @ 05:31), 108(05-21 @ 05:57), 95(05-20 @ 05:52), 72(05-19 @ 17:12)    Hgb: 9.6 (05-22 @ 05:31), 10.8 (05-20 @ 05:52), 11.4 (05-19 @ 17:12)  Hct: 30.0 (05-22 @ 05:31), 33.1 (05-20 @ 05:52), 34.7 (05-19 @ 17:12)  WBC: 17.07 (05-22 @ 05:31), 22.68 (05-20 @ 05:52), 25.92 (05-19 @ 17:12)  Plt: 356 (05-22 @ 05:31), 239 (05-20 @ 05:52), 206 (05-19 @ 17:12)    INR: 1.04 05-19-22 @ 17:12  PTT: 25.5 05-19-22 @ 17:12    LIVER FUNCTIONS - ( 22 May 2022 05:31 )  Alb: 4.0 g/dL / Pro: 6.0 g/dL / ALK PHOS: 45 U/L / ALT: 21 U/L / AST: 24 U/L / GGT: x             MEDICATIONS  (STANDING):  ATENolol  Tablet 50 milliGRAM(s) Oral daily  chlorhexidine 4% Liquid 1 Application(s) Topical <User Schedule>  heparin SubCutaneous Injection - Peds 5000 Unit(s) SubCutaneous every 8 hours  labetalol Infusion 0.5 mG/Min (30 mL/Hr) IV Continuous <Continuous>  levETIRAcetam  IVPB 500 milliGRAM(s) IV Intermittent every 12 hours  levothyroxine Injectable 100 MICROGram(s) IV Push <User Schedule>  lidocaine   4% Patch 1 Patch Transdermal daily  pantoprazole    Tablet 40 milliGRAM(s) Oral before breakfast  polyethylene glycol 3350 17 Gram(s) Oral daily  predniSONE   Tablet 15 milliGRAM(s) Oral daily  senna 2 Tablet(s) Oral at bedtime    MEDICATIONS  (PRN):  acetaminophen     Tablet .. 650 milliGRAM(s) Oral every 6 hours PRN Mild Pain (1 - 3)  cyclobenzaprine 10 milliGRAM(s) Oral three times a day PRN Muscle Spasm  haloperidol    Injectable 2 milliGRAM(s) IntraMuscular every 8 hours PRN Agitation  labetalol Injectable 10 milliGRAM(s) IV Push every 4 hours PRN Systolic blood pressure > 150  morphine  - Injectable 2 milliGRAM(s) IV Push every 6 hours PRN Severe Pain (7 - 10)  morphine  - Injectable 1 milliGRAM(s) IV Push once PRN Severe Pain (7 - 10)      IMAGING/DATA: [x] Reviewed    < from: CT Angio Head w/ IV Cont (05.21.22 @ 17:46) >    ACC: [id number] EXAM:  CT ANGIO BRAIN (W) IC                        ACC: [id number] EXAM:  CT ANGIO NECK (W)AW IC                          PROCEDURE DATE:  05/21/2022      < end of copied text >  < from: CT Angio Head w/ IV Cont (05.21.22 @ 17:46) >    CTA neck:  Motion and dense calcific atherosclerosis at the carotid bulbs   bilaterally limit evaluation; presumed at least moderate to severe   stenosis bilaterally.  Short segment beading of the mid left internal carotid artery may   represent fibromuscular dysplasia.  Small 1 mm medially and posteriorly oriented outpouching in the distal   cervical left internal carotid artery.    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  Multifocal stenoses involving the anterior circulation as detailed above.    < end of copied text >    < from: CT Head No Cont (05.19.22 @ 12:56) >    ACC: [id number] EXAM:  CT BRAIN                          PROCEDURE DATE:  05/19/2022      < end of copied text >  < from: CT Head No Cont (05.19.22 @ 12:56) >  IMPRESSION:  Focal gray-white distinction loss involving the frontal cortical region   possibly representing an area of age-indeterminate ischemic change.    Similar right frontoparietal sulcal effacement which may represent   subacute subarachnoid hemorrhagic products though a follow-up brain MRI   with contrast is recommended for further evaluation.    < end of copied text >      EXAMINATION:  General: No acute distress  HEENT: Anicteric sclerae  Cardiac: [id number]  Lungs: Clear  Abdomen: Soft, non-tender, +BS  Extremities: No c/c/e  Skin/Incision Site: Clean, dry and intact  Neurologic: Awake, alert to person, follows commands, PERRL, VFFtc, EOMI, face symmetric, tongue midline, no drift, B/L UE 4/5, B/L LE 3/5

## 2022-05-22 NOTE — PHYSICAL THERAPY INITIAL EVALUATION ADULT - GENERAL OBSERVATIONS, REHAB EVAL
Chart reviewed. pt appeared to be extremely somnolent. as per RN Marisela pt was give Ativan for a test and she is sleeping. To f/u when appropriate.

## 2022-05-22 NOTE — PROGRESS NOTE ADULT - SUBJECTIVE AND OBJECTIVE BOX
Over Night Events: Overnight events noted. s/p repeat CTH        ROS:  See HPI    PHYSICAL EXAM    ICU Vital Signs Last 24 Hrs  T(C): 36.6 (22 May 2022 04:00), Max: 36.9 (22 May 2022 00:00)  T(F): 97.8 (22 May 2022 04:00), Max: 98.4 (22 May 2022 00:00)  HR: 75 (22 May 2022 08:00) (73 - 87)  BP: 135/70 (22 May 2022 08:00) (87/59 - 167/71)  BP(mean): 91 (22 May 2022 08:00) (68 - 109)  ABP: --  ABP(mean): --  RR: 17 (22 May 2022 08:00) (12 - 38)  SpO2: 97% (22 May 2022 08:00) (94% - 100%)      General:  NAD  HEENT: MARYANA             Lungs: Bilateral BS  Cardiovascular: Regular   Abdomen: Soft, Positive BS  Extremities: No clubbing   Skin: Warm  Neurological: Non focal       05-21-22 @ 07:01  -  05-22-22 @ 07:00  --------------------------------------------------------  IN:    IV PiggyBack: 100 mL    Labetalol: 930 mL  Total IN: 1030 mL    OUT:  Total OUT: 0 mL    Total NET: 1030 mL          LABS:                          9.6    17.07 )-----------( 356      ( 22 May 2022 05:31 )             30.0                                               05-22             9.6    17.07 )-----------( 356      ( 05-22 @ 05:31 )             30.0                10.8   22.68 )-----------( 239      ( 05-20 @ 05:52 )             33.1                11.4   25.92 )-----------( 206      ( 05-19 @ 17:12 )             34.7                11.2   19.04 )-----------( 185      ( 05-19 @ 12:20 )             33.2       137  |  103  |  25<H>  ----------------------------<  100<H>  3.8   |  20  |  1.3    Ca    8.9      22 May 2022 05:31  Phos  2.6     05-22  Mg     1.6     05-22    TPro  6.0  /  Alb  4.0  /  TBili  0.5  /  DBili  x   /  AST  24  /  ALT  21  /  AlkPhos  45  05-22                                                                                           LIVER FUNCTIONS - ( 22 May 2022 05:31 )  Alb: 4.0 g/dL / Pro: 6.0 g/dL / ALK PHOS: 45 U/L / ALT: 21 U/L / AST: 24 U/L / GGT: x                                                                                                                                       MEDICATIONS  (STANDING):  ATENolol  Tablet 50 milliGRAM(s) Oral daily  chlorhexidine 4% Liquid 1 Application(s) Topical <User Schedule>  heparin SubCutaneous Injection - Peds 5000 Unit(s) SubCutaneous every 8 hours  labetalol Infusion 0.5 mG/Min (30 mL/Hr) IV Continuous <Continuous>  levETIRAcetam  IVPB 500 milliGRAM(s) IV Intermittent every 12 hours  levothyroxine Injectable 100 MICROGram(s) IV Push <User Schedule>  lidocaine   4% Patch 1 Patch Transdermal daily  pantoprazole    Tablet 40 milliGRAM(s) Oral before breakfast  polyethylene glycol 3350 17 Gram(s) Oral daily  predniSONE   Tablet 15 milliGRAM(s) Oral daily  senna 2 Tablet(s) Oral at bedtime    MEDICATIONS  (PRN):  acetaminophen     Tablet .. 650 milliGRAM(s) Oral every 6 hours PRN Mild Pain (1 - 3)  cyclobenzaprine 10 milliGRAM(s) Oral three times a day PRN Muscle Spasm  haloperidol    Injectable 2 milliGRAM(s) IntraMuscular every 8 hours PRN Agitation  labetalol Injectable 10 milliGRAM(s) IV Push every 4 hours PRN Systolic blood pressure > 150  morphine  - Injectable 2 milliGRAM(s) IV Push every 6 hours PRN Severe Pain (7 - 10)  morphine  - Injectable 1 milliGRAM(s) IV Push once PRN Severe Pain (7 - 10)    < from: CT Angio Head w/ IV Cont (05.21.22 @ 17:46) >  IMPRESSION:    CTA neck:  Motion and dense calcific atherosclerosis at the carotid bulbs   bilaterally limit evaluation; presumed at least moderate to severe   stenosis bilaterally.  Short segment beading of the mid left internal carotid artery may   represent fibromuscular dysplasia.  Small 1 mm medially and posteriorly oriented outpouching in the distal   cervical left internal carotid artery.    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  Multifocal stenoses involving the anterior circulation as detailed above.    Callback requested on 5/22/2022 12:01 AM. Discussed findings with Dr. Davila ([id number]) on 12:31 AM.    COMMENT:  Reference per NASCET criteria for degree of stenosis: Mild: less than 50%   stenosis. Moderate: 50-69% stenosis. Severe: 70-94% stenosis. Near   occlusion: 95-99% stenosis.    < end of copied text >   Over Night Events: Overnight events noted. s/p repeat CTH, more awake, follows commands      PHYSICAL EXAM    ICU Vital Signs Last 24 Hrs  T(C): 36.6 (22 May 2022 04:00), Max: 36.9 (22 May 2022 00:00)  T(F): 97.8 (22 May 2022 04:00), Max: 98.4 (22 May 2022 00:00)  HR: 75 (22 May 2022 08:00) (73 - 87)  BP: 135/70 (22 May 2022 08:00) (87/59 - 167/71)  BP(mean): 91 (22 May 2022 08:00) (68 - 109)  RR: 17 (22 May 2022 08:00) (12 - 38)  SpO2: 97% (22 May 2022 08:00) (94% - 100%)      General:  ill looking  HEENT: NGT            Lungs: Dec bs both bases  Cardiovascular: Regular   Abdomen: Soft, Positive BS  Extremities: No clubbing   Skin: Warm  Neurological: Non focal       05-21-22 @ 07:01  -  05-22-22 @ 07:00  --------------------------------------------------------  IN:    IV PiggyBack: 100 mL    Labetalol: 930 mL  Total IN: 1030 mL    OUT:  Total OUT: 0 mL    Total NET: 1030 mL          LABS:                          9.6    17.07 )-----------( 356      ( 22 May 2022 05:31 )             30.0                                               05-22             9.6    17.07 )-----------( 356      ( 05-22 @ 05:31 )             30.0                10.8   22.68 )-----------( 239      ( 05-20 @ 05:52 )             33.1                11.4   25.92 )-----------( 206      ( 05-19 @ 17:12 )             34.7                11.2   19.04 )-----------( 185      ( 05-19 @ 12:20 )             33.2       137  |  103  |  25<H>  ----------------------------<  100<H>  3.8   |  20  |  1.3    Ca    8.9      22 May 2022 05:31  Phos  2.6     05-22  Mg     1.6     05-22    TPro  6.0  /  Alb  4.0  /  TBili  0.5  /  DBili  x   /  AST  24  /  ALT  21  /  AlkPhos  45  05-22                                                                                           LIVER FUNCTIONS - ( 22 May 2022 05:31 )  Alb: 4.0 g/dL / Pro: 6.0 g/dL / ALK PHOS: 45 U/L / ALT: 21 U/L / AST: 24 U/L / GGT: x                                                                                                                                       MEDICATIONS  (STANDING):  ATENolol  Tablet 50 milliGRAM(s) Oral daily  chlorhexidine 4% Liquid 1 Application(s) Topical <User Schedule>  heparin SubCutaneous Injection - Peds 5000 Unit(s) SubCutaneous every 8 hours  labetalol Infusion 0.5 mG/Min (30 mL/Hr) IV Continuous <Continuous>  levETIRAcetam  IVPB 500 milliGRAM(s) IV Intermittent every 12 hours  levothyroxine Injectable 100 MICROGram(s) IV Push <User Schedule>  lidocaine   4% Patch 1 Patch Transdermal daily  pantoprazole    Tablet 40 milliGRAM(s) Oral before breakfast  polyethylene glycol 3350 17 Gram(s) Oral daily  predniSONE   Tablet 15 milliGRAM(s) Oral daily  senna 2 Tablet(s) Oral at bedtime    MEDICATIONS  (PRN):  acetaminophen     Tablet .. 650 milliGRAM(s) Oral every 6 hours PRN Mild Pain (1 - 3)  cyclobenzaprine 10 milliGRAM(s) Oral three times a day PRN Muscle Spasm  haloperidol    Injectable 2 milliGRAM(s) IntraMuscular every 8 hours PRN Agitation  labetalol Injectable 10 milliGRAM(s) IV Push every 4 hours PRN Systolic blood pressure > 150  morphine  - Injectable 2 milliGRAM(s) IV Push every 6 hours PRN Severe Pain (7 - 10)  morphine  - Injectable 1 milliGRAM(s) IV Push once PRN Severe Pain (7 - 10)    < from: CT Angio Head w/ IV Cont (05.21.22 @ 17:46) >  IMPRESSION:    CTA neck:  Motion and dense calcific atherosclerosis at the carotid bulbs   bilaterally limit evaluation; presumed at least moderate to severe   stenosis bilaterally.  Short segment beading of the mid left internal carotid artery may   represent fibromuscular dysplasia.  Small 1 mm medially and posteriorly oriented outpouching in the distal   cervical left internal carotid artery.    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  Multifocal stenoses involving the anterior circulation as detailed above.    Callback requested on 5/22/2022 12:01 AM. Discussed findings with Dr. Davila ([id number]) on 12:31 AM.    COMMENT:  Reference per NASCET criteria for degree of stenosis: Mild: less than 50%   stenosis. Moderate: 50-69% stenosis. Severe: 70-94% stenosis. Near   occlusion: 95-99% stenosis.    < end of copied text >

## 2022-05-22 NOTE — PROGRESS NOTE ADULT - ASSESSMENT
Impression:    SAH   Saccular aneurysm of R ICA  L1 compression FX followed by NeuroSX  ILD likely NSIP on Prednisone improved on repeat Ct.  HO Severe SEEMA Not on CPAP   HO ET on Anagrelide, followed by hem   JEOVANY improving     Plan     Neuro IR eval  BP control  Endocrinology eval appreciated  FU with NeuroSx and Neurology   C/W Prednisone 15 mg daily   GI DVT prophylaxis   Wean O2 as tolerated,.  Pain control.    HOB At 45 degrees   Feeding per speech and swallow   BP control,  Gentle hydration     Bladder scans     DNR/DNI

## 2022-05-22 NOTE — CHART NOTE - NSCHARTNOTEFT_GEN_A_CORE
Pt has been lethargic with no acute changes in status.  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 Fusiform dilation of the more distal segment of the ICA up to 4 mm. Focal stenosis of mid insular segment right MCA (601-75, 3-190). Additional multifocal moderate stenoses involving the M2 and M3 branches bilaterally. Dense calcifications and motion limit evaluation of patency at the level of the carotid bulbs bilaterally, however, presumed at least moderate stenosis (60%). The cervical ICA is otherwise patent bilaterally. Recommend repeat imaging or follow-up with ultrasound carotid for evaluation. 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 Mild tortuosity of bilateral cervical ICA, may be seen with vasculitis, fibromuscular dysplasia or vasospasm.    - Neurosurgery contacted at 2:18AM 5/22/2022 and they recommended neuroendovascular c/s. consult placed. follow up recommendations Pt has been lethargic with no acute changes in status.  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 Fusiform dilation of the more distal segment of the ICA up to 4 mm. Focal stenosis of mid insular segment right MCA (601-75, 3-190). Additional multifocal moderate stenoses involving the M2 and M3 branches bilaterally. Dense calcifications and motion limit evaluation of patency at the level of the carotid bulbs bilaterally, however, presumed at least moderate stenosis (60%). The cervical ICA is otherwise patent bilaterally. Recommend repeat imaging or follow-up with ultrasound carotid for evaluation. 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 Mild tortuosity of bilateral cervical ICA, may be seen with vasculitis, fibromuscular dysplasia or vasospasm.    - Neurosurgery contacted at 2:18AM 5/22/2022 and they recommended neuroendovascular c/s. Neurovascular called at [phone]->unsuccessful, will try reaching out again. consult placed. follow up recommendations.    pt may benefit from neuro/cc level of care.

## 2022-05-22 NOTE — PROGRESS NOTE ADULT - ASSESSMENT
#Metabolic encephalopathy 2/2 Subacute SAH likely 2/2 HTN Emergency   - CT head: New scattered subarachnoid hemorrhage in the right cerebral sulci which appear mildly effaced compared tothe prior CT head dated 4/25/2021. 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  - SBP difference in arms > 40 -> CT chest to r/o aneurysm  - SBP goal < 160   - start amlodipine 10mg - holding home meds losartan and hctz in setting of JEOVANY   - started on keppra 500 bid for seizure ppx   - r EEG: Consistent with diffuse cerebral electrophysiological dysfunction.  Secondary to none specific cause., Consistent with focal electrophysiological dysfunction.  Secondary to structural/metabolic cause., Consistent with potential for partial seizure. neurology Follow up  - MRA head and neck, MR head(patient not cooperative; might need to be performed under sedation)  - CT H/N with intravenous contrast (patient not cooperative; might need to be performed under sedation)  - f/u neuro recommendations   -Patient appears to be more cooperative today may tolerate going to CT head and neck  – Nicardipine drip was weaned off and started on labetalol drip.  Blood pressures are stable.  - EEG showing epileptiform- continue keppra, neuro follow up   -1 mm saccular aneurysm found on CTA brain.  Neuro/critical care planning to do angiogram tomorrow.  After angiogram patient will likely be placed on neuro critical care service.  -Blood pressure has been stable overnight.  Continue labetalol drip and wean.  -NG tube placeed, started feeding. Nutrition consult  - NPO in evening      #Worsening Lower Back Pain Likely Secondary to New L1 Compression Fracture  #History of Chronic Back Pain for years secondary to Spinal Stenosis   - Follows with Dr Jaimes. Was supposed to go for a nerve ablation last Monday (cancelled due to poorly controlled HTN 240s/110s   - CT A/P: no acute abnormality within the abdomen and pelvis. Age-indeterminate moderate compression fracture seen at L1, new since 4/9/2022.  - Neurosurgery team consulted for new L1 compression fracture  - Check XR TLS  - Monitor pain and keep score 01/10: tylenol PRN and morphine PRN  - Apply lidocaine patch  - Resume home prednisone 20mg QD (for ILD per Dr Stephens)  - TLSO  - Check Vitamin D levels   -NG tube placeed, started feeding     #Constipation - miralax     #Acute Kidney Injury improving   - Likely Pre-Renal in Setting of BUN: Cr >20:1 and Reduced PO intake  - Recent labs Cr 1.1, on admission Cr 2.1  - IV fluid hydration - NS 75 cc/hr  - Check urine studies  - Avoid nephrotoxic agents  - Holding HCTZ and Losartan  - Renal US- no hydro  - nephro eval   -Creatinine stable 1.3 today.    #History of UGIB Due to Bleeding Duodenal Ulcer  - Recent admission for a bleeding duodenal ulcer at [facility] (melena)  - Protonix 40mg QD  - T/S, hgb stable   - Check Fe studies  - OP follow up with GI    #SEEMA/ILD  -Follows with Dr Stephens  - Not on CPAP or home O2  - On Prednisone 15mg QD   - unsure if pt should be on ppx     #Hypothyroidism   -TSH 70  -Started on Synthroid 100 mcg IV daily as per endocrine curbside  – Endocrine consult appreciated    Leukocytosis in Setting of Steroid use and History of Essential Thrombocytosis  * Follows with Dr Denise  * Home med Anagrilide 0.5mg QD  - Outpatient follow up with Dr Denise  - Resume Anagrilide 0.5mg QD  -Blood cultures have been negative, patient has been afebrile no signs of infection.      #Progress Note Handoff  Pending (specify): Follow-up neurology, neurosurgery, interventional neurology, critical care, endocrine, angiogram in am  Family discussion: Spoke to  family on the phone today  Disposition: SICU under ICU level of care

## 2022-05-22 NOTE — PROGRESS NOTE ADULT - SUBJECTIVE AND OBJECTIVE BOX
Pt seen and examined at bedside. Unable to assess. Pt solumelent from ativan in am for CT scan       PAST MEDICAL & SURGICAL HISTORY:  HTN (hypertension)    Hypothyroid    Depression    Interstitial lung disease    Essential thrombocytopenia    History of ovarian cyst        VITAL SIGNS (Last 24 hrs):  T(C): 36.6 (05-22-22 @ 04:00), Max: 36.9 (05-22-22 @ 00:00)  HR: 78 (05-22-22 @ 12:30) (72 - 85)  BP: 127/66 (05-22-22 @ 12:30) (87/59 - 167/71)  RR: 16 (05-22-22 @ 12:30) (16 - 38)  SpO2: 98% (05-22-22 @ 12:30) (94% - 100%)  Wt(kg): --  Daily     Daily     I&O's Summary    21 May 2022 07:01  -  22 May 2022 07:00  --------------------------------------------------------  IN: 1030 mL / OUT: 0 mL / NET: 1030 mL        PHYSICAL EXAM:  GENERAL: NAD  HEAD:  Atraumatic, Normocephalic  EYES: EOMI, PERRLA, conjunctiva and sclera clear  NECK: Supple, No JVD  CHEST/LUNG: Clear to auscultation bilaterally; No wheeze  HEART: Regular rate and rhythm; No murmurs, rubs, or gallops  ABDOMEN: Soft, Nontender, Nondistended; Bowel sounds present  EXTREMITIES:  2+ Peripheral Pulses, No clubbing, cyanosis, or edema  PSYCH: stuperous  NEUROLOGY: moves extremities to stimulus   SKIN: No rashes or lesions    Labs Reviewed  Spoke to patient in regards to abnormal labs.    CBC Full  -  ( 22 May 2022 05:31 )  WBC Count : 17.07 K/uL  Hemoglobin : 9.6 g/dL  Hematocrit : 30.0 %  Platelet Count - Automated : 356 K/uL  Mean Cell Volume : 99.0 fL  Mean Cell Hemoglobin : 31.7 pg  Mean Cell Hemoglobin Concentration : 32.0 g/dL  Auto Neutrophil # : 13.10 K/uL  Auto Lymphocyte # : 1.26 K/uL  Auto Monocyte # : 1.39 K/uL  Auto Eosinophil # : 0.38 K/uL  Auto Basophil # : 0.09 K/uL  Auto Neutrophil % : 76.8 %  Auto Lymphocyte % : 7.4 %  Auto Monocyte % : 8.1 %  Auto Eosinophil % : 2.2 %  Auto Basophil % : 0.5 %    BMP:    05-22 @ 05:31    Blood Urea Nitrogen - 25  Calcium - 8.9  Carbond Dioxide - 20  Chloride - 103  Creatinine - 1.3  Glucose - 100  Potassium - 3.8  Sodium - 137      Hemoglobin A1c -   PT/INR - ( 19 May 2022 17:12 )   PT: 11.90 sec;   INR: 1.04 ratio         PTT - ( 19 May 2022 17:12 )  PTT:25.5 sec  Urine Culture:  05-18 @ 14:07 Urine culture: --    Culture Results:   <10,000 CFU/mL Normal Urogenital Gail  Method Type: --  Organism: --  Organism Identification: --  Specimen Source: Clean Catch Clean Catch (Midstream)  05-18 @ 10:40 Urine culture: --    Culture Results:   No growth to date.  Method Type: --  Organism: --  Organism Identification: --  Specimen Source: .Blood Blood-Peripheral        COVID Labs  CRP:      D-Dimer:      Imaging reviewed independently and reviewed read  < from: [facility] Duplex Carotid, Bilat (05.22.22 @ 10:53) >  IMPRESSION: Right internal carotid artery has mild stenosis 20-39%. Left   internal carotid has stenosis 60-79%    Measurement of carotid stenosis is based on velocity parameters that   correlate the residual internal carotid diameter with that of the more   distal vessel in accordance with a method such as the [place] American   Symptomatic Carotid Endarterectomy Trial (NASCET).    < end of copied text >  < from: CT Angio Head w/ IV Cont (05.21.22 @ 17:46) >  IMPRESSION:    CTA neck:  Motion and dense calcific atherosclerosis at the carotid bulbs   bilaterally limit evaluation; presumed at least moderate to severe   stenosis bilaterally.  Short segment beading of the mid left internal carotid artery may   represent fibromuscular dysplasia.  Small 1 mm medially and posteriorly oriented outpouching in the distal   cervical left internal carotid artery.    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  Multifocal stenoses involving the anterior circulation as detailed above.    Callback requested on 5/22/2022 12:01 AM. Discussed findings with Dr. Davila ([id number]) on 12:31 AM.    MEDICATIONS  (STANDING):  ATENolol  Tablet 50 milliGRAM(s) Oral daily  chlorhexidine 4% Liquid 1 Application(s) Topical <User Schedule>  heparin SubCutaneous Injection - Peds 5000 Unit(s) SubCutaneous every 8 hours  labetalol Infusion 0.5 mG/Min (30 mL/Hr) IV Continuous <Continuous>  levETIRAcetam  IVPB 500 milliGRAM(s) IV Intermittent every 12 hours  levothyroxine Injectable 100 MICROGram(s) IV Push <User Schedule>  lidocaine   4% Patch 1 Patch Transdermal daily  pantoprazole    Tablet 40 milliGRAM(s) Oral before breakfast  polyethylene glycol 3350 17 Gram(s) Oral daily  predniSONE   Tablet 15 milliGRAM(s) Oral daily  senna 2 Tablet(s) Oral at bedtime    MEDICATIONS  (PRN):  acetaminophen     Tablet .. 650 milliGRAM(s) Oral every 6 hours PRN Mild Pain (1 - 3)  cyclobenzaprine 10 milliGRAM(s) Oral three times a day PRN Muscle Spasm  haloperidol    Injectable 2 milliGRAM(s) IntraMuscular every 8 hours PRN Agitation  labetalol Injectable 10 milliGRAM(s) IV Push every 4 hours PRN Systolic blood pressure > 150  morphine  - Injectable 2 milliGRAM(s) IV Push every 6 hours PRN Severe Pain (7 - 10)  morphine  - Injectable 1 milliGRAM(s) IV Push once PRN Severe Pain (7 - 10)

## 2022-05-23 ENCOUNTER — TRANSCRIPTION ENCOUNTER (OUTPATIENT)
Age: 77
End: 2022-05-23

## 2022-05-23 LAB
ANION GAP SERPL CALC-SCNC: 12 MMOL/L — SIGNIFICANT CHANGE UP (ref 7–14)
BASOPHILS # BLD AUTO: 0.09 K/UL — SIGNIFICANT CHANGE UP (ref 0–0.2)
BASOPHILS NFR BLD AUTO: 0.6 % — SIGNIFICANT CHANGE UP (ref 0–1)
BUN SERPL-MCNC: 23 MG/DL — HIGH (ref 10–20)
CALCIUM SERPL-MCNC: 8.2 MG/DL — LOW (ref 8.5–10.1)
CHLORIDE SERPL-SCNC: 105 MMOL/L — SIGNIFICANT CHANGE UP (ref 98–110)
CO2 SERPL-SCNC: 17 MMOL/L — SIGNIFICANT CHANGE UP (ref 17–32)
CREAT SERPL-MCNC: 1.3 MG/DL — SIGNIFICANT CHANGE UP (ref 0.7–1.5)
CULTURE RESULTS: SIGNIFICANT CHANGE UP
CULTURE RESULTS: SIGNIFICANT CHANGE UP
EGFR: 42 ML/MIN/1.73M2 — LOW
EOSINOPHIL # BLD AUTO: 0.38 K/UL — SIGNIFICANT CHANGE UP (ref 0–0.7)
EOSINOPHIL NFR BLD AUTO: 2.5 % — SIGNIFICANT CHANGE UP (ref 0–8)
GLUCOSE BLDC GLUCOMTR-MCNC: 110 MG/DL — HIGH (ref 70–99)
GLUCOSE SERPL-MCNC: 105 MG/DL — HIGH (ref 70–99)
HCT VFR BLD CALC: 23.5 % — LOW (ref 37–47)
HGB BLD-MCNC: 7.8 G/DL — LOW (ref 12–16)
IMM GRANULOCYTES NFR BLD AUTO: 9.1 % — HIGH (ref 0.1–0.3)
LYMPHOCYTES # BLD AUTO: 0.96 K/UL — LOW (ref 1.2–3.4)
LYMPHOCYTES # BLD AUTO: 6.2 % — LOW (ref 20.5–51.1)
MCHC RBC-ENTMCNC: 31.7 PG — HIGH (ref 27–31)
MCHC RBC-ENTMCNC: 33.2 G/DL — SIGNIFICANT CHANGE UP (ref 32–37)
MCV RBC AUTO: 95.5 FL — SIGNIFICANT CHANGE UP (ref 81–99)
MONOCYTES # BLD AUTO: 1.13 K/UL — HIGH (ref 0.1–0.6)
MONOCYTES NFR BLD AUTO: 7.3 % — SIGNIFICANT CHANGE UP (ref 1.7–9.3)
NEUTROPHILS # BLD AUTO: 11.45 K/UL — HIGH (ref 1.4–6.5)
NEUTROPHILS NFR BLD AUTO: 74.3 % — SIGNIFICANT CHANGE UP (ref 42.2–75.2)
NRBC # BLD: 0 /100 WBCS — SIGNIFICANT CHANGE UP (ref 0–0)
PLATELET # BLD AUTO: 422 K/UL — HIGH (ref 130–400)
POTASSIUM SERPL-MCNC: 3.7 MMOL/L — SIGNIFICANT CHANGE UP (ref 3.5–5)
POTASSIUM SERPL-SCNC: 3.7 MMOL/L — SIGNIFICANT CHANGE UP (ref 3.5–5)
RBC # BLD: 2.46 M/UL — LOW (ref 4.2–5.4)
RBC # FLD: 18.1 % — HIGH (ref 11.5–14.5)
SODIUM SERPL-SCNC: 134 MMOL/L — LOW (ref 135–146)
SPECIMEN SOURCE: SIGNIFICANT CHANGE UP
SPECIMEN SOURCE: SIGNIFICANT CHANGE UP
WBC # BLD: 15.42 K/UL — HIGH (ref 4.8–10.8)
WBC # FLD AUTO: 15.42 K/UL — HIGH (ref 4.8–10.8)

## 2022-05-23 PROCEDURE — 99232 SBSQ HOSP IP/OBS MODERATE 35: CPT

## 2022-05-23 PROCEDURE — 36224 PLACE CATH CAROTD ART: CPT | Mod: RT

## 2022-05-23 PROCEDURE — 76937 US GUIDE VASCULAR ACCESS: CPT | Mod: 26

## 2022-05-23 PROCEDURE — 61624 TCAT PERM OCCLS/EMBOLJ CNS: CPT

## 2022-05-23 PROCEDURE — 99291 CRITICAL CARE FIRST HOUR: CPT

## 2022-05-23 PROCEDURE — 75898 FOLLOW-UP ANGIOGRAPHY: CPT | Mod: 26

## 2022-05-23 PROCEDURE — 75894 X-RAYS TRANSCATH THERAPY: CPT | Mod: 26

## 2022-05-23 PROCEDURE — 76377 3D RENDER W/INTRP POSTPROCES: CPT | Mod: 26

## 2022-05-23 PROCEDURE — 71045 X-RAY EXAM CHEST 1 VIEW: CPT | Mod: 26

## 2022-05-23 RX ORDER — NIMODIPINE 60 MG/10ML
60 SOLUTION ORAL EVERY 4 HOURS
Refills: 0 | Status: DISCONTINUED | OUTPATIENT
Start: 2022-05-23 | End: 2022-05-24

## 2022-05-23 RX ORDER — PANTOPRAZOLE SODIUM 20 MG/1
40 TABLET, DELAYED RELEASE ORAL EVERY 12 HOURS
Refills: 0 | Status: DISCONTINUED | OUTPATIENT
Start: 2022-05-23 | End: 2022-05-30

## 2022-05-23 RX ORDER — PANTOPRAZOLE SODIUM 20 MG/1
40 TABLET, DELAYED RELEASE ORAL ONCE
Refills: 0 | Status: COMPLETED | OUTPATIENT
Start: 2022-05-23 | End: 2022-05-23

## 2022-05-23 RX ORDER — TICAGRELOR 90 MG/1
90 TABLET ORAL
Refills: 0 | Status: DISCONTINUED | OUTPATIENT
Start: 2022-05-24 | End: 2022-06-07

## 2022-05-23 RX ORDER — QUETIAPINE FUMARATE 200 MG/1
25 TABLET, FILM COATED ORAL EVERY 6 HOURS
Refills: 0 | Status: DISCONTINUED | OUTPATIENT
Start: 2022-05-23 | End: 2022-05-30

## 2022-05-23 RX ORDER — ASPIRIN/CALCIUM CARB/MAGNESIUM 324 MG
81 TABLET ORAL DAILY
Refills: 0 | Status: DISCONTINUED | OUTPATIENT
Start: 2022-05-24 | End: 2022-05-30

## 2022-05-23 RX ORDER — NICARDIPINE HYDROCHLORIDE 30 MG/1
5 CAPSULE, EXTENDED RELEASE ORAL
Qty: 40 | Refills: 0 | Status: DISCONTINUED | OUTPATIENT
Start: 2022-05-23 | End: 2022-05-23

## 2022-05-23 RX ORDER — NOREPINEPHRINE BITARTRATE/D5W 8 MG/250ML
0.05 PLASTIC BAG, INJECTION (ML) INTRAVENOUS
Qty: 8 | Refills: 0 | Status: DISCONTINUED | OUTPATIENT
Start: 2022-05-23 | End: 2022-05-24

## 2022-05-23 RX ORDER — NIMODIPINE 60 MG/10ML
60 SOLUTION ORAL EVERY 4 HOURS
Refills: 0 | Status: DISCONTINUED | OUTPATIENT
Start: 2022-05-23 | End: 2022-05-23

## 2022-05-23 RX ORDER — CANGRELOR 50 MG/1
1.5 INJECTION, POWDER, LYOPHILIZED, FOR SOLUTION INTRAVENOUS
Qty: 50 | Refills: 0 | Status: DISCONTINUED | OUTPATIENT
Start: 2022-05-23 | End: 2022-05-24

## 2022-05-23 RX ADMIN — LEVETIRACETAM 400 MILLIGRAM(S): 250 TABLET, FILM COATED ORAL at 05:22

## 2022-05-23 RX ADMIN — HEPARIN SODIUM 5000 UNIT(S): 5000 INJECTION INTRAVENOUS; SUBCUTANEOUS at 05:25

## 2022-05-23 RX ADMIN — PANTOPRAZOLE SODIUM 40 MILLIGRAM(S): 20 TABLET, DELAYED RELEASE ORAL at 18:46

## 2022-05-23 RX ADMIN — LEVETIRACETAM 400 MILLIGRAM(S): 250 TABLET, FILM COATED ORAL at 18:45

## 2022-05-23 RX ADMIN — NIMODIPINE 60 MILLIGRAM(S): 60 SOLUTION ORAL at 18:46

## 2022-05-23 RX ADMIN — PANTOPRAZOLE SODIUM 40 MILLIGRAM(S): 20 TABLET, DELAYED RELEASE ORAL at 05:24

## 2022-05-23 RX ADMIN — CHLORHEXIDINE GLUCONATE 1 APPLICATION(S): 213 SOLUTION TOPICAL at 05:19

## 2022-05-23 RX ADMIN — SENNA PLUS 2 TABLET(S): 8.6 TABLET ORAL at 21:55

## 2022-05-23 NOTE — CHART NOTE - NSCHARTNOTEFT_GEN_A_CORE
Patient brought to interventional radiology for preop.     Drowsy but EO to voice and follows simple commands, alert and oriented to person, knows age, disoriented to place time and situation, PERRL, face symmetric, moving all extremities, simple responses in English.    Called patient's niece Hannah to obtain official witnessed consent for procedure. Patient's niece also opted to keep DNR status on patient during the procedure understanding that the DNR already in place remains in effect during all clinical situations including those that may occur during procedure / recovery.     Attempted to contact SICU x [phone] and [phone] without response.    Patient will be transferred to neuro ICU bed placement pending following neuroendovascular procedure.   Please call [phone] for neuro IR updates.     Discussed transfer with neuro ICU team. Patient brought to interventional radiology for preop.     Drowsy but EO to voice and follows simple commands, alert and oriented to person, knows age, disoriented to place time and situation, PERRL, face symmetric, moving all extremities, simple responses in English.  NIHSS 3 for drift in LUE, some effort RLE both 2/2 pain and effort - moves all extremities with good strength throughout.      Called patient's niece Hannah to obtain official witnessed consent for procedure. Patient's niece also opted to keep DNR status on patient during the procedure understanding that the DNR already in place remains in effect during all clinical situations including those that may occur during procedure / recovery.     Attempted to contact SICU x [phone] and [phone] without response.    Patient will be transferred to neuro ICU bed placement pending following neuroendovascular procedure.   Please call [phone] for neuro IR updates.     Discussed transfer with neuro ICU team.

## 2022-05-23 NOTE — BRIEF OPERATIVE NOTE - COMMENTS
Patient is s/p successful uncomplicated diagnostic cerebral angiogram + flow diversion stent embolization of right ICA supraclinoid aneurysm, which had likely ruptured and contributed to delayed presentation right SAH. Coiling attempted initially unsuccessful and not deployed due to wide neck of aneurysm. Integrilin bolus given intraop, postop given ASA 81 and Brilinta 90 through NGT. Patient transferred to neuro ICU without incident.   Hannah, patient's niece, called post procedure and updated. Alcira, patient's brother at bedside post procedure.

## 2022-05-23 NOTE — PROGRESS NOTE ADULT - SUBJECTIVE AND OBJECTIVE BOX
SUMMARY:   History of Present Illness  Ms. Shook is a 76 year old female patient known to have:  - Baseline AO3, lives with niece, ambulates with a walker  - SEEMA and ILD. Follows with Dr Stephens. Not on CPAP or home O2. On Prednisone 20mg QD  - Depression  - HTN. Recent Admission for HTN Urgency. Follows with Dr Moses  - Hypothyroidism   - Recent admission for a bleeding duodenal ulcer at [facility] (melena). Home med Protonix 20mg QD  - Essential Thrombocytosis. Follows with Dr Denise  - Chronic Back Pain for years secondary to Spinal Stenosis per marquise. s/p Spinal Epidural Injection. Was on PT 3x/ week but has been non compliant for 2-3 months. Follows with Dr Jaimes. Was supposed to go for a nerve ablation last Monday (cancelled due to poorly controlled HTN s/p admission)    She was brought to the ED by marquise on 05/18 for evaluation of worsening back pain and confusion x2 days.  History goes back to 2 days PTP when the patient started complaining of worsening of her chronic dull lower back pain that radiates down to her right medial aspect of thigh.  This has resulted in limited ambulation (patient refusing to leave her bed) and to being non compliant with Physical Therapy in the absence of leg weakness or paresthesias or numbness or urinary incontinence.  Per marquise, patient has been feeling down x few days with reduced PO intake, reduced sleep, loss of interest, thoughts that her family doesn't like/ support her, and confusion (some times would not recognize niece).  She has an instance where she had fecal incontinence on way to bathroom 2 days ago.  In the setting of confusion and increased pain, marquise decided to bring patient to ED for evaluation.    On review of systems, marquise denies any recent fever, chills, night sweats, URTI symptoms (cough, rhinorrhea, sore throat), urinary symptoms (urinary frequency, urgency, intermittence, dysuria, foul smelling urine, cloudy urine), abdominal pain, headache, nausea, or vomiting.   No sick contacts.   No recent travel or exposure to recent travelers.      ALLERGIES: No Known Allergies    ADMISSION SCORES:   GCS: 15  HH: 1 MF: 1    REVIEW OF SYSTEMS: Negative except for that of HPI    ICU Vital Signs Last 24 Hrs  T(C): 36.7 (23 May 2022 04:00), Max: 36.7 (23 May 2022 04:00)  T(F): 98 (23 May 2022 04:00), Max: 98 (23 May 2022 04:00)  HR: 79 (23 May 2022 09:00) (79 - 88)  BP: 120/58 (23 May 2022 09:00) (120/58 - 194/79)  BP(mean): 84 (23 May 2022 09:00) (84 - 117)  ABP: --  ABP(mean): --  RR: 24 (23 May 2022 09:00) (17 - 24)  SpO2: 95% (23 May 2022 09:00) (63% - 100%)      05-22-22 @ 07:01  -  05-23-22 @ 07:00  --------------------------------------------------------  IN: 1240 mL / OUT: 700 mL / NET: 540 mL    05-23-22 @ 07:01  -  05-23-22 @ 16:27  --------------------------------------------------------  IN: 180 mL / OUT: 0 mL / NET: 180 mL      acetaminophen     Tablet .. 650 milliGRAM(s) Oral every 6 hours PRN  ATENolol  Tablet 50 milliGRAM(s) Oral daily  cangrelor Infusion 1.5 MICROgram(s)/kG/Min (32.2 mL/Hr) IV Continuous <Continuous>  chlorhexidine 4% Liquid 1 Application(s) Topical <User Schedule>  cyclobenzaprine 10 milliGRAM(s) Oral three times a day PRN  levETIRAcetam  IVPB 500 milliGRAM(s) IV Intermittent every 12 hours  levothyroxine Injectable 100 MICROGram(s) IV Push <User Schedule>  lidocaine   4% Patch 1 Patch Transdermal daily  pantoprazole    Tablet 40 milliGRAM(s) Oral before breakfast  polyethylene glycol 3350 17 Gram(s) Oral daily  predniSONE   Tablet 15 milliGRAM(s) Oral daily  senna 2 Tablet(s) Oral at bedtime      LABS:  Na: 137 (05-22 @ 05:31), 141 (05-21 @ 05:57)  K: 3.8 (05-22 @ 05:31), 4.2 (05-21 @ 05:57)  Cl: 103 (05-22 @ 05:31), 104 (05-21 @ 05:57)  CO2: 20 (05-22 @ 05:31), 17 (05-21 @ 05:57)  BUN: 25 (05-22 @ 05:31), 25 (05-21 @ 05:57)  Cr: 1.3 (05-22 @ 05:31), 1.2 (05-21 @ 05:57)  Glu: 100(05-22 @ 05:31), 108(05-21 @ 05:57)    Hgb: 9.6 (05-22 @ 05:31)  Hct: 30.0 (05-22 @ 05:31)  WBC: 17.07 (05-22 @ 05:31)  Plt: 356 (05-22 @ 05:31)    INR:   PTT:       LIVER FUNCTIONS - ( 22 May 2022 05:31 )  Alb: 4.0 g/dL / Pro: 6.0 g/dL / ALK PHOS: 45 U/L / ALT: 21 U/L / AST: 24 U/L / GGT: x             IMAGING/DATA: [x] Reviewed    < from: CT Angio Head w/ IV Cont (05.21.22 @ 17:46) >    ACC: [id number] EXAM:  CT ANGIO BRAIN (W)AW IC                        ACC: [id number] EXAM:  CT ANGIO NECK (W)AW IC                          PROCEDURE DATE:  05/21/2022      < end of copied text >  < from: CT Angio Head w/ IV Cont (05.21.22 @ 17:46) >    CTA neck:  Motion and dense calcific atherosclerosis at the carotid bulbs   bilaterally limit evaluation; presumed at least moderate to severe   stenosis bilaterally.  Short segment beading of the mid left internal carotid artery may   represent fibromuscular dysplasia.  Small 1 mm medially and posteriorly oriented outpouching in the distal   cervical left internal carotid artery.    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  Multifocal stenoses involving the anterior circulation as detailed above.    < end of copied text >    < from: CT Head No Cont (05.19.22 @ 12:56) >    ACC: [id number] EXAM:  CT BRAIN                          PROCEDURE DATE:  05/19/2022      < end of copied text >  < from: CT Head No Cont (05.19.22 @ 12:56) >  IMPRESSION:  Focal gray-white distinction loss involving the frontal cortical region   possibly representing an area of age-indeterminate ischemic change.    Similar right frontoparietal sulcal effacement which may represent   subacute subarachnoid hemorrhagic products though a follow-up brain MRI   with contrast is recommended for further evaluation.    < end of copied text >      EXAMINATION:  General: No acute distress  HEENT: Anicteric sclerae  Cardiac: [id number]  Lungs: Clear  Abdomen: Soft, non-tender, +BS  Extremities: No c/c/e  Skin/Incision Site: Clean, dry and intact  Neurologic: Awake, alert to person, follows commands, PERRL, VFFtc, EOMI, face symmetric, tongue midline, no drift, B/L UE 4/5, B/L LE 3/5       SUMMARY:   History of Present Illness  76 year old female patient known to have:  - Baseline AO3, lives with niece, ambulates with a walker  - SEEMA and ILD. Follows with Dr Stephens. Not on CPAP or home O2. On Prednisone 20mg QD  - Depression  - HTN. Recent Admission for HTN Urgency. Follows with Dr Moses  - Hypothyroidism   - Recent admission for a bleeding duodenal ulcer at [facility] (melena). Home med Protonix 20mg QD  - Essential Thrombocytosis. Follows with Dr Denise  - Chronic Back Pain for years secondary to Spinal Stenosis per marquise. s/p Spinal Epidural Injection. Was on PT 3x/ week but has been non compliant for 2-3 months. Follows with Dr Jaimes. Was supposed to go for a nerve ablation last Monday (cancelled due to poorly controlled HTN s/p admission)    She was brought to the ED by marquise on 05/18 for evaluation of worsening back pain and confusion x2 days.  History goes back to 2 days PTP when the patient started complaining of worsening of her chronic dull lower back pain that radiates down to her right medial aspect of thigh.  This has resulted in limited ambulation (patient refusing to leave her bed) and to being non compliant with Physical Therapy in the absence of leg weakness or paresthesias or numbness or urinary incontinence.  Per marquise, patient has been feeling down x few days with reduced PO intake, reduced sleep, loss of interest, thoughts that her family doesn't like/ support her, and confusion (some times would not recognize niece).  She has an instance where she had fecal incontinence on way to bathroom 2 days ago.  In the setting of confusion and increased pain, marquise decided to bring patient to ED for evaluation.    On review of systems, marquise denies any recent fever, chills, night sweats, URTI symptoms (cough, rhinorrhea, sore throat), urinary symptoms (urinary frequency, urgency, intermittence, dysuria, foul smelling urine, cloudy urine), abdominal pain, headache, nausea, or vomiting.   No sick contacts.   No recent travel or exposure to recent travelers.    ALLERGIES: No Known Allergies    ADMISSION SCORES:   GCS: 15  HH: 1 MF: 1    REVIEW OF SYSTEMS: Negative except for that of HPI    ICU Vital Signs Last 24 Hrs  T(C): 36.7 (23 May 2022 04:00), Max: 36.7 (23 May 2022 04:00)  T(F): 98 (23 May 2022 04:00), Max: 98 (23 May 2022 04:00)  HR: 79 (23 May 2022 09:00) (79 - 88)  BP: 120/58 (23 May 2022 09:00) (120/58 - 194/79)  BP(mean): 84 (23 May 2022 09:00) (84 - 117)  ABP: --  ABP(mean): --  RR: 24 (23 May 2022 09:00) (17 - 24)  SpO2: 95% (23 May 2022 09:00) (63% - 100%)    05-22-22 @ 07:01  -  05-23-22 @ 07:00  --------------------------------------------------------  IN: 1240 mL / OUT: 700 mL / NET: 540 mL    05-23-22 @ 07:01  -  05-23-22 @ 16:27  --------------------------------------------------------  IN: 180 mL / OUT: 0 mL / NET: 180 mL    acetaminophen     Tablet .. 650 milliGRAM(s) Oral every 6 hours PRN  ATENolol  Tablet 50 milliGRAM(s) Oral daily  cangrelor Infusion 1.5 MICROgram(s)/kG/Min (32.2 mL/Hr) IV Continuous <Continuous>  chlorhexidine 4% Liquid 1 Application(s) Topical <User Schedule>  cyclobenzaprine 10 milliGRAM(s) Oral three times a day PRN  levETIRAcetam  IVPB 500 milliGRAM(s) IV Intermittent every 12 hours  levothyroxine Injectable 100 MICROGram(s) IV Push <User Schedule>  lidocaine   4% Patch 1 Patch Transdermal daily  pantoprazole    Tablet 40 milliGRAM(s) Oral before breakfast  polyethylene glycol 3350 17 Gram(s) Oral daily  predniSONE   Tablet 15 milliGRAM(s) Oral daily  senna 2 Tablet(s) Oral at bedtime    LABS:  Na: 137 (05-22 @ 05:31), 141 (05-21 @ 05:57)  K: 3.8 (05-22 @ 05:31), 4.2 (05-21 @ 05:57)  Cl: 103 (05-22 @ 05:31), 104 (05-21 @ 05:57)  CO2: 20 (05-22 @ 05:31), 17 (05-21 @ 05:57)  BUN: 25 (05-22 @ 05:31), 25 (05-21 @ 05:57)  Cr: 1.3 (05-22 @ 05:31), 1.2 (05-21 @ 05:57)  Glu: 100(05-22 @ 05:31), 108(05-21 @ 05:57)    Hgb: 9.6 (05-22 @ 05:31)  Hct: 30.0 (05-22 @ 05:31)  WBC: 17.07 (05-22 @ 05:31)  Plt: 356 (05-22 @ 05:31)    INR:   PTT:     LIVER FUNCTIONS - ( 22 May 2022 05:31 )  Alb: 4.0 g/dL / Pro: 6.0 g/dL / ALK PHOS: 45 U/L / ALT: 21 U/L / AST: 24 U/L / GGT: x           IMAGING/DATA:   [x] Reviewed    < from: CT Angio Head w/ IV Cont (05.21.22 @ 17:46) >    ACC: [id number] EXAM:  CT ANGIO BRAIN (W)AW IC                        ACC: [id number] EXAM:  CT ANGIO NECK (W)AW IC                          PROCEDURE DATE:  05/21/2022      < end of copied text >  < from: CT Angio Head w/ IV Cont (05.21.22 @ 17:46) >    CTA neck:  Motion and dense calcific atherosclerosis at the carotid bulbs   bilaterally limit evaluation; presumed at least moderate to severe   stenosis bilaterally.  Short segment beading of the mid left internal carotid artery may   represent fibromuscular dysplasia.  Small 1 mm medially and posteriorly oriented outpouching in the distal   cervical left internal carotid artery.    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  Multifocal stenoses involving the anterior circulation as detailed above.    < end of copied text >    < from: CT Head No Cont (05.19.22 @ 12:56) >    ACC: [id number] EXAM:  CT BRAIN                          PROCEDURE DATE:  05/19/2022      < end of copied text >  < from: CT Head No Cont (05.19.22 @ 12:56) >  IMPRESSION:  Focal gray-white distinction loss involving the frontal cortical region   possibly representing an area of age-indeterminate ischemic change.    Similar right frontoparietal sulcal effacement which may represent   subacute subarachnoid hemorrhagic products though a follow-up brain MRI   with contrast is recommended for further evaluation.    < end of copied text >    EXAMINATION:  General: No acute distress  HEENT: Anicteric sclerae  Cardiac: [id number]  Lungs: Clear  Abdomen: Soft, non-tender, +BS  Extremities: No c/c/e  Skin/Incision Site: Clean, dry and intact  Neurologic: Awake, alert to person, follows commands, PERRL, VFFtc, EOMI, face symmetric, tongue midline, no drift, B/L UE 4/5, B/L LE 3/5   History of Present Illness  75 y/o F PMH SEEMA and ILD, Depression, HTN, Hypothyroidism, recent UGIB 2/2 bleeding duodenal ulcer, Essential Thrombocytosis, chronic Back Pain 2/2 Spinal Stenosis s/p Spinal Epidural Injection, at Baseline AO3, lives with niece, ambulates with a walker, brought to the ED by marquise  for evaluation of worsening back pain and confusion x2 days. Two days PTP, pt c/o worsening chronic dull lower back pain that radiated down to her right medial aspect of thigh, resulting in limited ambulation (patient refusing to leave her bed) and to being non compliant with Physical Therapy in the absence of leg weakness or paresthesias or numbness or urinary incontinence. Per niece, patient has been feeling down x few days with reduced PO intake, reduced sleep, loss of interest, thoughts that her family doesn't like/ support her, and confusion (some times would not recognize niece). She has an instance where she had fecal incontinence on way to bathroom 2 days ago. In the setting of confusion and increased pain, marquise decided to bring patient to ED for evaluation.     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 Pt s/p DSA & pipeline stent embo .     ROS: niece denies any recent fever, chills, night sweats, URTI symptoms (cough, rhinorrhea, sore throat), urinary symptoms (urinary frequency, urgency, intermittence, dysuria, foul smelling urine, cloudy urine), abdominal pain, headache, nausea, or vomiting. No sick contacts. No recent travel or exposure to recent travelers.    ALLERGIES: No Known Allergies    ADMISSION SCORES:   GCS: 15  HH: 1 MF: 1    ICU Vital Signs Last 24 Hrs  T(C): 36.7 (23 May 2022 04:00), Max: 36.7 (23 May 2022 04:00)  T(F): 98 (23 May 2022 04:00), Max: 98 (23 May 2022 04:00)  HR: 79 (23 May 2022 09:00) (79 - 88)  BP: 120/58 (23 May 2022 09:00) (120/58 - 194/79)  BP(mean): 84 (23 May 2022 09:00) (84 - 117)  ABP: --  ABP(mean): --  RR: 24 (23 May 2022 09:00) (17 - 24)  SpO2: 95% (23 May 2022 09:00) (63% - 100%)    22 @ 07:01  -  22 @ 07:00  --------------------------------------------------------  IN: 1240 mL / OUT: 700 mL / NET: 540 mL    22 @ 07:01  -  22 @ 16:27  --------------------------------------------------------  IN: 180 mL / OUT: 0 mL / NET: 180 mL    acetaminophen     Tablet .. 650 milliGRAM(s) Oral every 6 hours PRN  ATENolol  Tablet 50 milliGRAM(s) Oral daily  cangrelor Infusion 1.5 MICROgram(s)/kG/Min (32.2 mL/Hr) IV Continuous <Continuous>  chlorhexidine 4% Liquid 1 Application(s) Topical <User Schedule>  cyclobenzaprine 10 milliGRAM(s) Oral three times a day PRN  levETIRAcetam  IVPB 500 milliGRAM(s) IV Intermittent every 12 hours  levothyroxine Injectable 100 MICROGram(s) IV Push <User Schedule>  lidocaine   4% Patch 1 Patch Transdermal daily  pantoprazole    Tablet 40 milliGRAM(s) Oral before breakfast  polyethylene glycol 3350 17 Gram(s) Oral daily  predniSONE   Tablet 15 milliGRAM(s) Oral daily  senna 2 Tablet(s) Oral at bedtime    LABS:  Na: 137 ( @ 05:31), 141 ( @ 05:57)  K: 3.8 ( 05:31), 4.2 ( 05:57)  Cl: 103 ( 05:31), 104 (05-21 @ 05:57)  CO2: 20 ( @ 05:31), 17 ( @ 05:57)  BUN: 25 ( 05:31), 25 ( 05:57)  Cr: 1.3 ( 05:31), 1.2 ( 05:57)  Glu: 100( 05:31), 108( 05:57)    Hgb: 9.6 ( 05:31)  Hct: 30.0 ( 05:31)  WBC: 17.07 ( 05:31)  Plt: 356 ( 05:31)    LIVER FUNCTIONS - ( 22 May 2022 05:31 )  Alb: 4.0 g/dL / Pro: 6.0 g/dL / ALK PHOS: 45 U/L / ALT: 21 U/L / AST: 24 U/L / GGT: x           IMAGING/DATA:     < from: CT Angio Head w/ IV Cont (22 @ 17:46) >    ACC: [id number] EXAM:  CT ANGIO BRAIN (W)AW IC                        ACC: [id number] EXAM:  CT ANGIO NECK (W)AW IC                          PROCEDURE DATE:  2022      < end of copied text >    < from: CT Angio Head w/ IV Cont (22 @ 17:46) >    CTA neck:  Motion and dense calcific atherosclerosis at the carotid bulbs   bilaterally limit evaluation; presumed at least moderate to severe   stenosis bilaterally.  Short segment beading of the mid left internal carotid artery may   represent fibromuscular dysplasia.  Small 1 mm medially and posteriorly oriented outpouching in the distal   cervical left internal carotid artery.    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  Multifocal stenoses involving the anterior circulation as detailed above.      < from: CT Head No Cont (22 @ 12:56) >    ACC: [id number] EXAM:  CT BRAIN                          PROCEDURE DATE:  2022      < end of copied text >  < from: CT Head No Cont (22 @ 12:56) >  IMPRESSION:  Focal gray-white distinction loss involving the frontal cortical region   possibly representing an area of age-indeterminate ischemic change.    Similar right frontoparietal sulcal effacement which may represent   subacute subarachnoid hemorrhagic products though a follow-up brain MRI   with contrast is recommended for further evaluation.    < end of copied text >    EXAMINATION:      Neurologic: Awake, alert to person, follows commands, PERRL, VFFtc, EOMI, face symmetric, tongue midline, no drift, B/L UE 4/5, B/L LE 3/5    PHYSICAL EXAM:   General: well-appearing, no acute distress  HEENT: no posterior pharyngeal erythema or exudates, no cervical lymphadenopathy, no injected conjunctiva, mucous membranes moist  HEART: RRR, S1, S2, cap refill <2 seconds  LUNG: CTAB, no wheezing, ronchi, or crackles  ABDOMEN: soft, NT, nondistended, no palpable masses, no suprapubic tenderness  NEURO: drowsy but awakens, oriented x1-2 (name/), speech clear, pupils ** b/l, VF intact, EOMI, follows commands, no facial droop, RUE/LUE **, RLE/LLE ***  Groin Site: R groin Clean, dry, soft, no hematoma or oozing History of Present Illness  75 y/o F PMH SEEMA and ILD, Depression, HTN, Hypothyroidism, recent UGIB 2/2 bleeding duodenal ulcer, Essential Thrombocytosis, chronic Back Pain 2/2 Spinal Stenosis s/p Spinal Epidural Injection, at Baseline AO3, lives with niece, ambulates with a walker, brought to the ED by marquise  for evaluation of worsening back pain and confusion x2 days. Two days PTP, pt c/o worsening chronic dull lower back pain that radiated down to her right medial aspect of thigh, resulting in limited ambulation (patient refusing to leave her bed) and to being non compliant with Physical Therapy in the absence of leg weakness or paresthesias or numbness or urinary incontinence. Per niece, patient has been feeling down x few days with reduced PO intake, reduced sleep, loss of interest, thoughts that her family doesn't like/ support her, and confusion (some times would not recognize niece). She has an instance where she had fecal incontinence on way to bathroom 2 days ago. In the setting of confusion and increased pain, marquise decided to bring patient to ED for evaluation.     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 Pt s/p DSA & pipeline stent embo .   Procedure done under GA, 4000U Heparin & Integrelin bolus given + ASA/Brilinta 90mg given via NGT @ 5PM, extubated without complications, R groin site. Admitted to [facility] post procedure.    ROS: niece denies any recent fever, chills, night sweats, URTI symptoms (cough, rhinorrhea, sore throat), urinary symptoms (urinary frequency, urgency, intermittence, dysuria, foul smelling urine, cloudy urine), abdominal pain, headache, nausea, or vomiting. No sick contacts. No recent travel or exposure to recent travelers.    ALLERGIES: No Known Allergies    ADMISSION SCORES:   GCS: 15  HH: 1 MF: 1    ICU Vital Signs Last 24 Hrs  T(C): 36.7 (23 May 2022 04:00), Max: 36.7 (23 May 2022 04:00)  T(F): 98 (23 May 2022 04:00), Max: 98 (23 May 2022 04:00)  HR: 79 (23 May 2022 09:00) (79 - 88)  BP: 120/58 (23 May 2022 09:00) (120/58 - 194/79)  BP(mean): 84 (23 May 2022 09:00) (84 - 117)  ABP: --  ABP(mean): --  RR: 24 (23 May 2022 09:00) (17 - 24)  SpO2: 95% (23 May 2022 09:00) (63% - 100%)    22 @ 07:01  -  22 @ 07:00  --------------------------------------------------------  IN: 1240 mL / OUT: 700 mL / NET: 540 mL    22 @ 07:01  -  22 @ 16:27  --------------------------------------------------------  IN: 180 mL / OUT: 0 mL / NET: 180 mL    acetaminophen     Tablet .. 650 milliGRAM(s) Oral every 6 hours PRN  ATENolol  Tablet 50 milliGRAM(s) Oral daily  cangrelor Infusion 1.5 MICROgram(s)/kG/Min (32.2 mL/Hr) IV Continuous <Continuous>  chlorhexidine 4% Liquid 1 Application(s) Topical <User Schedule>  cyclobenzaprine 10 milliGRAM(s) Oral three times a day PRN  levETIRAcetam  IVPB 500 milliGRAM(s) IV Intermittent every 12 hours  levothyroxine Injectable 100 MICROGram(s) IV Push <User Schedule>  lidocaine   4% Patch 1 Patch Transdermal daily  pantoprazole    Tablet 40 milliGRAM(s) Oral before breakfast  polyethylene glycol 3350 17 Gram(s) Oral daily  predniSONE   Tablet 15 milliGRAM(s) Oral daily  senna 2 Tablet(s) Oral at bedtime    LABS:  Na: 137 ( @ 05:31), 141 ( @ 05:57)  K: 3.8 ( @ 05:31), 4.2 ( @ 05:57)  Cl: 103 ( @ 05:31), 104 ( 05:57)  CO2: 20 ( @ 05:31), 17 ( @ 05:57)  BUN: 25 ( @ 05:31), 25 ( @ 05:57)  Cr: 1.3 ( 05:31), 1.2 ( 05:57)  Glu: 100( 05:31), 108( @ 05:57)    Hgb: 9.6 ( 05:31)  Hct: 30.0 ( 05:31)  WBC: 17.07 ( 05:31)  Plt: 356 ( 05:31)    LIVER FUNCTIONS - ( 22 May 2022 05:31 )  Alb: 4.0 g/dL / Pro: 6.0 g/dL / ALK PHOS: 45 U/L / ALT: 21 U/L / AST: 24 U/L / GGT: x           IMAGING/DATA:     < from: CT Angio Head w/ IV Cont (22 @ 17:46) >    ACC: [id number] EXAM:  CT ANGIO BRAIN (W)AW IC                        ACC: [id number] EXAM:  CT ANGIO NECK (W)AW IC                          PROCEDURE DATE:  2022      < end of copied text >    < from: CT Angio Head w/ IV Cont (22 @ 17:46) >    CTA neck:  Motion and dense calcific atherosclerosis at the carotid bulbs   bilaterally limit evaluation; presumed at least moderate to severe   stenosis bilaterally.  Short segment beading of the mid left internal carotid artery may   represent fibromuscular dysplasia.  Small 1 mm medially and posteriorly oriented outpouching in the distal   cervical left internal carotid artery.    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  Multifocal stenoses involving the anterior circulation as detailed above.      < from: CT Head No Cont (22 @ 12:56) >    ACC: [id number] EXAM:  CT BRAIN                          PROCEDURE DATE:  2022      < end of copied text >  < from: CT Head No Cont (22 @ 12:56) >  IMPRESSION:  Focal gray-white distinction loss involving the frontal cortical region   possibly representing an area of age-indeterminate ischemic change.    Similar right frontoparietal sulcal effacement which may represent   subacute subarachnoid hemorrhagic products though a follow-up brain MRI   with contrast is recommended for further evaluation.    < end of copied text >    PHYSICAL EXAM:   General: well-appearing, no acute distress  HEENT: no posterior pharyngeal erythema or exudates, no cervical lymphadenopathy, no injected conjunctiva, mucous membranes moist  HEART: RRR, S1, S2, cap refill <2 seconds  LUNG: CTAB, no wheezing, ronchi, or crackles  ABDOMEN: soft, NT, nondistended, no palpable masses, no suprapubic tenderness  NEURO: drowsy but awakens, oriented x1-2 (name/), speech clear, pupils ** b/l, VF intact, EOMI, follows commands, no facial droop, RUE/LUE **, RLE/LLE ***  Groin Site: R groin Clean, dry, soft, no hematoma or oozing History of Present Illness  75 y/o F PMH SEEMA and ILD, Depression, HTN, Hypothyroidism, recent UGIB 2/2 bleeding duodenal ulcer, Essential Thrombocytosis, chronic Back Pain 2/2 Spinal Stenosis s/p Spinal Epidural Injection, at Baseline AO3, lives with niece, ambulates with a walker, brought to the ED by marquise  for evaluation of worsening back pain and confusion x2 days. Two days PTP, pt c/o worsening chronic dull lower back pain that radiated down to her right medial aspect of thigh, resulting in limited ambulation (patient refusing to leave her bed) and to being non compliant with Physical Therapy in the absence of leg weakness or paresthesias or numbness or urinary incontinence. Per niece, patient has been feeling down x few days with reduced PO intake, reduced sleep, loss of interest, thoughts that her family doesn't like/ support her, and confusion (some times would not recognize niece). She has an instance where she had fecal incontinence on way to bathroom 2 days ago. In the setting of confusion and increased pain, marquise decided to bring patient to ED for evaluation.     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 Pt s/p DSA & pipeline stent embo .   Procedure done under GA, 4000U Heparin & Integrelin bolus given + ASA/Brilinta 90mg given via NGT @ 5PM, extubated without complications, R groin site. Admitted to [facility] post procedure.    ROS: niece denies any recent fever, chills, night sweats, URTI symptoms (cough, rhinorrhea, sore throat), urinary symptoms (urinary frequency, urgency, intermittence, dysuria, foul smelling urine, cloudy urine), abdominal pain, headache, nausea, or vomiting. No sick contacts. No recent travel or exposure to recent travelers.    ALLERGIES: No Known Allergies    ADMISSION SCORES:   GCS: 15  HH: 1 MF: 1    ICU Vital Signs Last 24 Hrs  T(C): 36.7 (23 May 2022 04:00), Max: 36.7 (23 May 2022 04:00)  T(F): 98 (23 May 2022 04:00), Max: 98 (23 May 2022 04:00)  HR: 79 (23 May 2022 09:00) (79 - 88)  BP: 120/58 (23 May 2022 09:00) (120/58 - 194/79)  BP(mean): 84 (23 May 2022 09:00) (84 - 117)  ABP: --  ABP(mean): --  RR: 24 (23 May 2022 09:00) (17 - 24)  SpO2: 95% (23 May 2022 09:00) (63% - 100%)    22 @ 07:01  -  22 @ 07:00  --------------------------------------------------------  IN: 1240 mL / OUT: 700 mL / NET: 540 mL    22 @ 07:01  -  22 @ 16:27  --------------------------------------------------------  IN: 180 mL / OUT: 0 mL / NET: 180 mL    acetaminophen     Tablet .. 650 milliGRAM(s) Oral every 6 hours PRN  ATENolol  Tablet 50 milliGRAM(s) Oral daily  cangrelor Infusion 1.5 MICROgram(s)/kG/Min (32.2 mL/Hr) IV Continuous <Continuous>  chlorhexidine 4% Liquid 1 Application(s) Topical <User Schedule>  cyclobenzaprine 10 milliGRAM(s) Oral three times a day PRN  levETIRAcetam  IVPB 500 milliGRAM(s) IV Intermittent every 12 hours  levothyroxine Injectable 100 MICROGram(s) IV Push <User Schedule>  lidocaine   4% Patch 1 Patch Transdermal daily  pantoprazole    Tablet 40 milliGRAM(s) Oral before breakfast  polyethylene glycol 3350 17 Gram(s) Oral daily  predniSONE   Tablet 15 milliGRAM(s) Oral daily  senna 2 Tablet(s) Oral at bedtime    LABS:  Na: 137 ( @ 05:31), 141 ( @ 05:57)  K: 3.8 ( @ 05:31), 4.2 ( @ 05:57)  Cl: 103 ( @ 05:31), 104 ( 05:57)  CO2: 20 ( @ 05:31), 17 ( @ 05:57)  BUN: 25 ( @ 05:31), 25 ( @ 05:57)  Cr: 1.3 ( 05:31), 1.2 ( 05:57)  Glu: 100( 05:31), 108( @ 05:57)    Hgb: 9.6 ( 05:31)  Hct: 30.0 ( 05:31)  WBC: 17.07 ( 05:31)  Plt: 356 ( 05:31)    LIVER FUNCTIONS - ( 22 May 2022 05:31 )  Alb: 4.0 g/dL / Pro: 6.0 g/dL / ALK PHOS: 45 U/L / ALT: 21 U/L / AST: 24 U/L / GGT: x           IMAGING/DATA:     < from: CT Angio Head w/ IV Cont (22 @ 17:46) >    ACC: [id number] EXAM:  CT ANGIO BRAIN (W)AW IC                        ACC: [id number] EXAM:  CT ANGIO NECK (W)AW IC                          PROCEDURE DATE:  2022      < end of copied text >    < from: CT Angio Head w/ IV Cont (22 @ 17:46) >    CTA neck:  Motion and dense calcific atherosclerosis at the carotid bulbs   bilaterally limit evaluation; presumed at least moderate to severe   stenosis bilaterally.  Short segment beading of the mid left internal carotid artery may   represent fibromuscular dysplasia.  Small 1 mm medially and posteriorly oriented outpouching in the distal   cervical left internal carotid artery.    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  Multifocal stenoses involving the anterior circulation as detailed above.      < from: CT Head No Cont (22 @ 12:56) >    ACC: [id number] EXAM:  CT BRAIN                          PROCEDURE DATE:  2022      < end of copied text >  < from: CT Head No Cont (22 @ 12:56) >  IMPRESSION:  Focal gray-white distinction loss involving the frontal cortical region   possibly representing an area of age-indeterminate ischemic change.    Similar right frontoparietal sulcal effacement which may represent   subacute subarachnoid hemorrhagic products though a follow-up brain MRI   with contrast is recommended for further evaluation.    < end of copied text >    PHYSICAL EXAM:   (Panamanian and English speaking)  General: well-appearing, no acute distress  HEENT: no posterior pharyngeal erythema or exudates, no cervical lymphadenopathy, no injected conjunctiva, mucous membranes moist  HEART: RRR, S1, S2, cap refill <2 seconds  LUNG: CTAB, no wheezing, ronchi, or crackles  ABDOMEN: soft, NT, nondistended, no palpable masses, no suprapubic tenderness  NEURO: drowsy but opens eyes and attends briefly, oriented x1-2 (name/), speech mumbled, pupils 3mm b/l brisk, VF intact, EOMI, follows simple commands, face symmetric, RUE 4/5, LUE 3/5 w ? drift, RLE in knee immobilizer but wiggles toes, LLE 2/5 bends knee (pre-procedure RUE>LUE, LLE>RLE)  Groin Site: R groin Clean, dry, soft, no hematoma or oozing

## 2022-05-23 NOTE — CHART NOTE - NSCHARTNOTEFT_GEN_A_CORE
NUTRITION SUPPORT CONSULTATION    HPI:  History of Present Illness  Ms. Shook is a 76 year old female patient known to have:  - Baseline AO3, lives with niece, ambulates with a walker  - SEEMA and ILD. Follows with Dr Stephens. Not on CPAP or home O2. On Prednisone 20mg QD  - Depression  - HTN. Recent Admission for HTN Urgency. Follows with Dr Moses  - Hypothyroidism   - Recent admission for a bleeding duodenal ulcer at [facility] (melena). Home med Protonix 20mg QD  - Essential Thrombocytosis. Follows with Dr Denise  - Chronic Back Pain for years secondary to Spinal Stenosis per marquise. s/p Spinal Epidural Injection. Was on PT 3x/ week but has been non compliant for 2-3 months. Follows with Dr Jaimes. Was supposed to go for a nerve ablation last Monday (cancelled due to poorly controlled HTN s/p admission)     She was brought to the ED by marquise on 05/18 for evaluation of worsening back pain and confusion x2 days.  History goes back to 2 days PTP when the patient started complaining of worsening of her chronic dull lower back pain that radiates down to her right medial aspect of thigh.  This has resulted in limited ambulation (patient refusing to leave her bed) and to being non compliant with Physical Therapy in the absence of leg weakness or paresthesias or numbness or urinary incontinence.  Per marquise, patient has been feeling down x few days with reduced PO intake, reduced sleep, loss of interest, thoughts that her family doesn't like/ support her, and confusion (some times would not recognize niece).  She has an instance where she had fecal incontinence on way to bathroom 2 days ago.  In the setting of confusion and increased pain, marquise decided to bring patient to ED for evaluation.    On review of systems, marquise denies any recent fever, chills, night sweats, URTI symptoms (cough, rhinorrhea, sore throat), urinary symptoms (urinary frequency, urgency, intermittence, dysuria, foul smelling urine, cloudy urine), abdominal pain, headache, nausea, or vomiting.   No sick contacts.   No recent travel or exposure to recent travelers     PAST MEDICAL & SURGICAL HISTORY:  HTN (hypertension)  Hypothyroid  Depression  Interstitial lung disease  Essential thrombocytopenia  History of ovarian cyst    Allergies  No Known Allergies    MEDICATIONS  (STANDING):  ATENolol  Tablet 50 milliGRAM(s) Oral daily  chlorhexidine 4% Liquid 1 Application(s) Topical <User Schedule>  heparin SubCutaneous Injection - Peds 5000 Unit(s) SubCutaneous every 8 hours  labetalol Infusion 0.5 mG/Min (30 mL/Hr) IV Continuous <Continuous>  levETIRAcetam  IVPB 500 milliGRAM(s) IV Intermittent every 12 hours  levothyroxine Injectable 100 MICROGram(s) IV Push <User Schedule>  lidocaine   4% Patch 1 Patch Transdermal daily  pantoprazole    Tablet 40 milliGRAM(s) Oral before breakfast  polyethylene glycol 3350 17 Gram(s) Oral daily  predniSONE   Tablet 15 milliGRAM(s) Oral daily  senna 2 Tablet(s) Oral at bedtime    MEDICATIONS  (PRN):  acetaminophen     Tablet .. 650 milliGRAM(s) Oral every 6 hours PRN Mild Pain (1 - 3)  cyclobenzaprine 10 milliGRAM(s) Oral three times a day PRN Muscle Spasm  haloperidol    Injectable 2 milliGRAM(s) IntraMuscular every 8 hours PRN Agitation  labetalol Injectable 10 milliGRAM(s) IV Push every 4 hours PRN Systolic blood pressure > 150  morphine  - Injectable 1 milliGRAM(s) IV Push once PRN Severe Pain (7 - 10)  morphine  - Injectable 2 milliGRAM(s) IV Push every 6 hours PRN Severe Pain (7 - 10)    ICU Vital Signs Last 24 Hrs  T(C): 36.7 (23 May 2022 04:00), Max: 36.7 (23 May 2022 04:00)  T(F): 98 (23 May 2022 04:00), Max: 98 (23 May 2022 04:00)  HR: 81 (23 May 2022 07:00) (61 - 88)  BP: 122/58 (23 May 2022 07:00) (95/65 - 194/79)  BP(mean): 84 (23 May 2022 07:00) (82 - 117)  RR: 23 (23 May 2022 07:00) (15 - 23)  SpO2: 97% (23 May 2022 07:00) (63% - 100%)    Drug Dosing Weight  Height (cm): 152.9 (19 May 2022 06:00)  Weight (kg): 71.5 (19 May 2022 06:00)  BMI (kg/m2): 30.6 (19 May 2022 06:00)  BSA (m2): 1.69 (19 May 2022 06:00)    EXAM     Abd:     LE:   Enteral access:  IV access:    LABS  05-22    137  |  103  |  25<H>  ----------------------------<  100<H>  3.8   |  20  |  1.3    Ca    8.9      22 May 2022 05:31  Phos  2.6     05-22  Mg     1.6     05-22    TPro  6.0  /  Alb  4.0  /  TBili  0.5  /  DBili  x   /  AST  24  /  ALT  21  /  AlkPhos  45  05-22                          9.6    17.07 )-----------( 356      ( 22 May 2022 05:31 )             30.0      CAPILLARY BLOOD GLUCOSE  POCT Blood Glucose.: 110 mg/dL (23 May 2022 05:37)     Diet, NPO after Midnight:      NPO Start Date: 22-May-2022,   NPO Start Time: 23:59 (05-22-22 @ 15:12)  Diet, NPO with Tube Feed:   Tube Feeding Modality: Nasogastric  Jevity 1.2 Cosme  Total Volume for 24 Hours (mL): 2057.1  Intermittent  Starting Tube Feed Rate {mL per Hour}: 50  Increase Tube Feed Rate by (mL): 20    Every 6 hours  Until Goal Tube Feed Rate (mL per Hour): 200  Tube Feeding Hours ON: 3  Tube Feeding OFF (Hours): 4  Tube Feed Start Time: 15:15  Free Water Flush  Bolus   Total Volume per Flush (mL): 150   Frequency: Every 6 Hours (05-22-22 @ 15:11)      ASSESSMENT        PLAN NUTRITION SUPPORT CONSULTATION    HPI:  History of Present Illness  Ms. Shook is a 76 year old female patient known to have:  - Baseline AO3, lives with niece, ambulates with a walker  - SEEMA and ILD. Follows with Dr Stephens. Not on CPAP or home O2. On Prednisone 20mg QD  - Depression  - HTN. Recent Admission for HTN Urgency. Follows with Dr Moses  - Hypothyroidism   - Recent admission for a bleeding duodenal ulcer at [facility] (melena). Home med Protonix 20mg QD  - Essential Thrombocytosis. Follows with Dr Denise  - Chronic Back Pain for years secondary to Spinal Stenosis per marquise. s/p Spinal Epidural Injection. Was on PT 3x/ week but has been non compliant for 2-3 months. Follows with Dr Jaimes. Was supposed to go for a nerve ablation last Monday (cancelled due to poorly controlled HTN s/p admission)     She was brought to the ED by marquise on 05/18 for evaluation of worsening back pain and confusion x2 days.  History goes back to 2 days PTP when the patient started complaining of worsening of her chronic dull lower back pain that radiates down to her right medial aspect of thigh.  This has resulted in limited ambulation (patient refusing to leave her bed) and to being non compliant with Physical Therapy in the absence of leg weakness or paresthesias or numbness or urinary incontinence.  Per marquise, patient has been feeling down x few days with reduced PO intake, reduced sleep, loss of interest, thoughts that her family doesn't like/ support her, and confusion (some times would not recognize niece).  She has an instance where she had fecal incontinence on way to bathroom 2 days ago.  In the setting of confusion and increased pain, marquise decided to bring patient to ED for evaluation.    On review of systems, marquise denies any recent fever, chills, night sweats, URTI symptoms (cough, rhinorrhea, sore throat), urinary symptoms (urinary frequency, urgency, intermittence, dysuria, foul smelling urine, cloudy urine), abdominal pain, headache, nausea, or vomiting.   No sick contacts.   No recent travel or exposure to recent travelers     PAST MEDICAL & SURGICAL HISTORY:  HTN (hypertension)  Hypothyroid  Depression  Interstitial lung disease  Essential thrombocytopenia  History of ovarian cyst    Allergies  No Known Allergies    MEDICATIONS  (STANDING):  ATENolol  Tablet 50 milliGRAM(s) Oral daily  chlorhexidine 4% Liquid 1 Application(s) Topical <User Schedule>  heparin SubCutaneous Injection - Peds 5000 Unit(s) SubCutaneous every 8 hours  labetalol Infusion 0.5 mG/Min (30 mL/Hr) IV Continuous <Continuous>  levETIRAcetam  IVPB 500 milliGRAM(s) IV Intermittent every 12 hours  levothyroxine Injectable 100 MICROGram(s) IV Push <User Schedule>  lidocaine   4% Patch 1 Patch Transdermal daily  pantoprazole    Tablet 40 milliGRAM(s) Oral before breakfast  polyethylene glycol 3350 17 Gram(s) Oral daily  predniSONE   Tablet 15 milliGRAM(s) Oral daily  senna 2 Tablet(s) Oral at bedtime    MEDICATIONS  (PRN):  acetaminophen     Tablet .. 650 milliGRAM(s) Oral every 6 hours PRN Mild Pain (1 - 3)  cyclobenzaprine 10 milliGRAM(s) Oral three times a day PRN Muscle Spasm  haloperidol    Injectable 2 milliGRAM(s) IntraMuscular every 8 hours PRN Agitation  labetalol Injectable 10 milliGRAM(s) IV Push every 4 hours PRN Systolic blood pressure > 150  morphine  - Injectable 1 milliGRAM(s) IV Push once PRN Severe Pain (7 - 10)  morphine  - Injectable 2 milliGRAM(s) IV Push every 6 hours PRN Severe Pain (7 - 10)    ICU Vital Signs Last 24 Hrs  T(C): 36.7 (23 May 2022 04:00), Max: 36.7 (23 May 2022 04:00)  T(F): 98 (23 May 2022 04:00), Max: 98 (23 May 2022 04:00)  HR: 81 (23 May 2022 07:00) (61 - 88)  BP: 122/58 (23 May 2022 07:00) (95/65 - 194/79)  BP(mean): 84 (23 May 2022 07:00) (82 - 117)  RR: 23 (23 May 2022 07:00) (15 - 23)  SpO2: 97% (23 May 2022 07:00) (63% - 100%)    Drug Dosing Weight  Height (cm): 152.9 (19 May 2022 06:00)  Weight (kg): 71.5 (19 May 2022 06:00)  BMI (kg/m2): 30.6 (19 May 2022 06:00)  BSA (m2): 1.69 (19 May 2022 06:00)    EXAM  Awake, confused  Abd: soft, ND  Skin: no breakdown   Enteral access: +NGT  IV access: midline cath RUE    LABS  05-22    137  |  103  |  25<H>  ----------------------------<  100<H>  3.8   |  20  |  1.3    Ca    8.9      22 May 2022 05:31  Phos  2.6     05-22  Mg     1.6     05-22    TPro  6.0  /  Alb  4.0  /  TBili  0.5  /  DBili  x   /  AST  24  /  ALT  21  /  AlkPhos  45  05-22                          9.6    17.07 )-----------( 356      ( 22 May 2022 05:31 )             30.0      CAPILLARY BLOOD GLUCOSE  POCT Blood Glucose.: 110 mg/dL (23 May 2022 05:37)     Diet, NPO after Midnight:      NPO Start Date: 22-May-2022,   NPO Start Time: 23:59 (05-22-22 @ 15:12)  Diet, NPO with Tube Feed:   Tube Feeding Modality: Nasogastric  Jevity 1.2 Cosme  Total Volume for 24 Hours (mL): 2057.1  Intermittent  Starting Tube Feed Rate {mL per Hour}: 50  Increase Tube Feed Rate by (mL): 20    Every 6 hours  Until Goal Tube Feed Rate (mL per Hour): 200  Tube Feeding Hours ON: 3  Tube Feeding OFF (Hours): 4  Tube Feed Start Time: 15:15  Free Water Flush  Bolus   Total Volume per Flush (mL): 150   Frequency: Every 6 Hours (05-22-22 @ 15:11)      ASSESSMENT  - metabolic encephalopathy 2/2 Subacute SAH likely 2/2 HTN Emergency   - worsening Lower Back Pain Likely Secondary to New L1 Compression Fracture  - history of Chronic Back Pain for years secondary to Spinal Stenosis   - constipation  - JEOVANY, improved  - SEEMA  - hypothyroidism  - history of UGIB Due to Bleeding Duodenal Ulcer  - at risk for malnutriton    PLAN  - supplement Mg  - change feeds to Jevity 1.2 240ml X 1 feed then increase to 360ml X 4 feeds/day infused via gravity drip over 30-40 min  - bowel regimen (on senna/miralax), document BM's  - will follow NUTRITION SUPPORT CONSULTATION    Ms. Shook is a 76 year old female patient known to have:  - Baseline AO3, lives with niece, ambulates with a walker  - SEEMA and ILD. Follows with Dr Stephens. Not on CPAP or home O2. On Prednisone 20mg QD  - Depression  - HTN. Recent Admission for HTN Urgency. Follows with Dr Moses  - Hypothyroidism   - Recent admission for a bleeding duodenal ulcer at [facility] (melena). Home med Protonix 20mg QD  - Essential Thrombocytosis. Follows with Dr Denise  - Chronic Back Pain for years secondary to Spinal Stenosis per marquise. s/p Spinal Epidural Injection. Was on PT 3x/ week but has been non compliant for 2-3 months. Follows with Dr العلي. Was supposed to go for a nerve ablation last Monday (cancelled due to poorly controlled HTN s/p admission)     She was brought to the ED by marquise on 05/18 for evaluation of worsening back pain and confusion x2 days.  History goes back to 2 days PTP when the patient started complaining of worsening of her chronic dull lower back pain that radiates down to her right medial aspect of thigh.  This has resulted in limited ambulation (patient refusing to leave her bed) and to being non compliant with Physical Therapy in the absence of leg weakness or paresthesias or numbness or urinary incontinence.  Per marquise, patient has been feeling down x few days with reduced PO intake, reduced sleep, loss of interest, thoughts that her family doesn't like/ support her, and confusion (some times would not recognize niece).  She has an instance where she had fecal incontinence on way to bathroom 2 days ago.  In the setting of confusion and increased pain, marquise decided to bring patient to ED for evaluation.    On review of systems, marquise denies any recent fever, chills, night sweats, URTI symptoms (cough, rhinorrhea, sore throat), urinary symptoms (urinary frequency, urgency, intermittence, dysuria, foul smelling urine, cloudy urine), abdominal pain, headache, nausea, or vomiting.   No sick contacts.   No recent travel or exposure to recent travelers     PAST MEDICAL & SURGICAL HISTORY:  HTN (hypertension)  Hypothyroid  Depression  Interstitial lung disease  Essential thrombocytopenia  History of ovarian cyst  No Known Allergies    MEDICATIONS  (STANDING):  ATENolol  Tablet 50 milliGRAM(s) Oral daily  chlorhexidine 4% Liquid 1 Application(s) Topical <User Schedule>  heparin SubCutaneous Injection - Peds 5000 Unit(s) SubCutaneous every 8 hours  labetalol Infusion 0.5 mG/Min (30 mL/Hr) IV Continuous <Continuous>  levETIRAcetam  IVPB 500 milliGRAM(s) IV Intermittent every 12 hours  levothyroxine Injectable 100 MICROGram(s) IV Push <User Schedule>  lidocaine   4% Patch 1 Patch Transdermal daily  pantoprazole    Tablet 40 milliGRAM(s) Oral before breakfast  polyethylene glycol 3350 17 Gram(s) Oral daily  predniSONE   Tablet 15 milliGRAM(s) Oral daily  senna 2 Tablet(s) Oral at bedtime    MEDICATIONS  (PRN):  acetaminophen     Tablet .. 650 milliGRAM(s) Oral every 6 hours PRN Mild Pain (1 - 3)  cyclobenzaprine 10 milliGRAM(s) Oral three times a day PRN Muscle Spasm  haloperidol    Injectable 2 milliGRAM(s) IntraMuscular every 8 hours PRN Agitation  labetalol Injectable 10 milliGRAM(s) IV Push every 4 hours PRN Systolic blood pressure > 150  morphine  - Injectable 1 milliGRAM(s) IV Push once PRN Severe Pain (7 - 10)  morphine  - Injectable 2 milliGRAM(s) IV Push every 6 hours PRN Severe Pain (7 - 10)    ICU Vital Signs Last 24 Hrs  T(C): 36.7 (23 May 2022 04:00), Max: 36.7 (23 May 2022 04:00)  T(F): 98 (23 May 2022 04:00), Max: 98 (23 May 2022 04:00)  HR: 81 (23 May 2022 07:00) (61 - 88)  BP: 122/58 (23 May 2022 07:00) (95/65 - 194/79)  BP(mean): 84 (23 May 2022 07:00) (82 - 117)  RR: 23 (23 May 2022 07:00) (15 - 23)  SpO2: 97% (23 May 2022 07:00) (63% - 100%)    Drug Dosing Weight  Height (cm): 152.9 (19 May 2022 06:00)  Weight (kg): 71.5 (19 May 2022 06:00)  BMI (kg/m2): 30.6 (19 May 2022 06:00)  BSA (m2): 1.69 (19 May 2022 06:00)    EXAM  Awake, confused  Abd: soft, ND  Skin: no breakdown   Enteral access: +NGT  IV access: midline cath RUE    LABS  05-22    137  |  103  |  25<H>  ----------------------------<  100<H>  3.8   |  20  |  1.3    Ca    8.9      22 May 2022 05:31  Phos  2.6     05-22  Mg     1.6     05-22    TPro  6.0  /  Alb  4.0  /  TBili  0.5  /  DBili  x   /  AST  24  /  ALT  21  /  AlkPhos  45  05-22                          9.6    17.07 )-----------( 356      ( 22 May 2022 05:31 )             30.0      CAPILLARY BLOOD GLUCOSE  POCT Blood Glucose.: 110 mg/dL (23 May 2022 05:37)     Diet, NPO after Midnight:      NPO Start Date: 22-May-2022,   NPO Start Time: 23:59 (05-22-22 @ 15:12)  Diet, NPO with Tube Feed:   Tube Feeding Modality: Nasogastric  Jevity 1.2 Cosme  Total Volume for 24 Hours (mL): 2057.1  Intermittent  Starting Tube Feed Rate {mL per Hour}: 50  Increase Tube Feed Rate by (mL): 20    Every 6 hours  Until Goal Tube Feed Rate (mL per Hour): 200  Tube Feeding Hours ON: 3  Tube Feeding OFF (Hours): 4  Tube Feed Start Time: 15:15  Free Water Flush  Bolus   Total Volume per Flush (mL): 150   Frequency: Every 6 Hours (05-22-22 @ 15:11)      ASSESSMENT  - metabolic encephalopathy 2/2 Subacute SAH likely 2/2 HTN Emergency   - worsening Lower Back Pain Likely Secondary to New L1 Compression Fracture  - history of Chronic Back Pain for years secondary to Spinal Stenosis   - constipation  - JEOVANY, improved  - SEEMA  - hypothyroidism  - history of UGIB Due to Bleeding Duodenal Ulcer  - at risk for malnutrition    PLAN  - supplement Mg and phos - watch closely for the next few days  - change feeds to Jevity 1.2 240ml X 1 feed then increase to 360ml X 4 feeds/day infused via gravity drip over 30-40 min  - bowel regimen (on senna/miralax), document BM's  - will follow

## 2022-05-23 NOTE — PRE PROCEDURE NOTE - PRE PROCEDURE EVALUATION
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 Patient is today for diagnostic cerebral angoigram +/- embolization of the 5mm right ICA aneurysm as well as possible BEN. Patient has been NPO except meds since midnight. Last COVID 5/18.   DNR / DNI order per family.   Patient's brother (next of kin) contacted regarding planned procedure and requested decisions be made by the patient's niece Hannah, with prior documented discussion regarding risks and benefits of procedure. Plan to upgrade to neuro ICU following procedure.     Allergies: No Known Allergies    PAST MEDICAL & SURGICAL HISTORY:  HTN (hypertension)  Hypothyroid  Depression  Interstitial lung disease  Essential thrombocytopenia  History of ovarian cyst    Current Medications: acetaminophen     Tablet .. 650 milliGRAM(s) Oral every 6 hours PRN  ATENolol  Tablet 50 milliGRAM(s) Oral daily  chlorhexidine 4% Liquid 1 Application(s) Topical <User Schedule>  cyclobenzaprine 10 milliGRAM(s) Oral three times a day PRN  haloperidol    Injectable 2 milliGRAM(s) IntraMuscular every 8 hours PRN  heparin SubCutaneous Injection - Peds 5000 Unit(s) SubCutaneous every 8 hours  labetalol Infusion 0.5 mG/Min IV Continuous <Continuous>  labetalol Injectable 10 milliGRAM(s) IV Push every 4 hours PRN  levETIRAcetam  IVPB 500 milliGRAM(s) IV Intermittent every 12 hours  levothyroxine Injectable 100 MICROGram(s) IV Push <User Schedule>  lidocaine   4% Patch 1 Patch Transdermal daily  morphine  - Injectable 1 milliGRAM(s) IV Push once PRN  morphine  - Injectable 2 milliGRAM(s) IV Push every 6 hours PRN  pantoprazole    Tablet 40 milliGRAM(s) Oral before breakfast  polyethylene glycol 3350 17 Gram(s) Oral daily  predniSONE   Tablet 15 milliGRAM(s) Oral daily  senna 2 Tablet(s) Oral at bedtime      Labs:                      9.6    17.07 )-----------( 356      ( 22 May 2022 05:31 )             30.0   05-22  137  |  103  |  25<H>  ----------------------------<  100<H>  3.8   |  20  |  1.3  Ca    8.9      22 May 2022 05:31  Phos  2.6     05-22  Mg     1.6     05-22  TPro  6.0  /  Alb  4.0  /  TBili  0.5  /  DBili  x   /  AST  24  /  ALT  21  /  AlkPhos  45  05-22    Assessment/Plan:   This is a 76y  year old Female presents with right frontoparietal SAH, found to have saccular aneurysm 5mm in right supraclinoid ICA, as well as multifocal stenosis.   Patient presents to neuro-IR for diagnostic cerebral angiogram +/- aneurysm embolization +/- BEN.   Procedure, goals, risks, benefits and alternatives were discussed with patient's family. All questions were answered to best understanding.   Risks discussed include but are not limited to stroke, vessel injury, hemorrhage, hematoma, kidney injury, infection, even death.  Patient's family demonstrates understanding  of all risks involved with this procedure and wishes to continue.     Consent for procedure and anesthesia to be placed in chart once patient is brought to IR for pre-op.      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 Patient is today for diagnostic cerebral angoigram +/- embolization of the 5mm right ICA aneurysm as well as possible BEN. Patient has been NPO except meds since midnight. Last COVID 5/18.   Carotid duplex reporting L ICA 60-79% stenosis.   DNR / DNI order per family.   Patient's brother (next of kin) contacted regarding planned procedure and requested decisions be made by the patient's niece Hannah, with prior documented discussion regarding risks and benefits of procedure. Plan to upgrade to neuro ICU following procedure.   Primarily Lao speaking.     Allergies: No Known Allergies    PAST MEDICAL & SURGICAL HISTORY:  HTN (hypertension)  Hypothyroid  Depression  Interstitial lung disease  Essential thrombocytopenia  History of ovarian cyst    Current Medications: acetaminophen     Tablet .. 650 milliGRAM(s) Oral every 6 hours PRN  ATENolol  Tablet 50 milliGRAM(s) Oral daily  chlorhexidine 4% Liquid 1 Application(s) Topical <User Schedule>  cyclobenzaprine 10 milliGRAM(s) Oral three times a day PRN  haloperidol    Injectable 2 milliGRAM(s) IntraMuscular every 8 hours PRN  heparin SubCutaneous Injection - Peds 5000 Unit(s) SubCutaneous every 8 hours  labetalol Infusion 0.5 mG/Min IV Continuous <Continuous>  labetalol Injectable 10 milliGRAM(s) IV Push every 4 hours PRN  levETIRAcetam  IVPB 500 milliGRAM(s) IV Intermittent every 12 hours  levothyroxine Injectable 100 MICROGram(s) IV Push <User Schedule>  lidocaine   4% Patch 1 Patch Transdermal daily  morphine  - Injectable 1 milliGRAM(s) IV Push once PRN  morphine  - Injectable 2 milliGRAM(s) IV Push every 6 hours PRN  pantoprazole    Tablet 40 milliGRAM(s) Oral before breakfast  polyethylene glycol 3350 17 Gram(s) Oral daily  predniSONE   Tablet 15 milliGRAM(s) Oral daily  senna 2 Tablet(s) Oral at bedtime    Labs:                      9.6    17.07 )-----------( 356      ( 22 May 2022 05:31 )             30.0   05-22  137  |  103  |  25<H>  ----------------------------<  100<H>  3.8   |  20  |  1.3  Ca    8.9      22 May 2022 05:31  Phos  2.6     05-22  Mg     1.6     05-22  TPro  6.0  /  Alb  4.0  /  TBili  0.5  /  DBili  x   /  AST  24  /  ALT  21  /  AlkPhos  45  05-22    Assessment/Plan:   This is a 76y  year old Female presents with right frontoparietal SAH, found to have saccular aneurysm 5mm in right supraclinoid ICA, as well as multifocal stenosis.   Patient presents to neuro-IR for diagnostic cerebral angiogram +/- aneurysm embolization +/- BEN.   Procedure, goals, risks, benefits and alternatives were discussed with patient's family. All questions were answered to best understanding.   Risks discussed include but are not limited to stroke, vessel injury, hemorrhage, hematoma, kidney injury, infection, even death.  Patient's family demonstrates understanding  of all risks involved with this procedure and wishes to continue.     Consent for procedure and anesthesia to be placed in chart once patient is brought to IR for pre-op.

## 2022-05-23 NOTE — PROGRESS NOTE ADULT - SUBJECTIVE AND OBJECTIVE BOX
AMANDA FELDER  76y  Female      Patient is a 76y old  Female who presents with a chief complaint of Worsening Back Pain and Metabolic Encephalopathy (23 May 2022 11:40)        T(C): 36.7 (05-23-22 @ 04:00), Max: 36.7 (05-23-22 @ 04:00)  HR: 79 (05-23-22 @ 09:00) (61 - 88)  BP: 120/58 (05-23-22 @ 09:00) (95/65 - 194/79)  RR: 24 (05-23-22 @ 09:00) (17 - 24)  SpO2: 95% (05-23-22 @ 09:00) (63% - 100%)  Wt(kg): --Vital Signs Last 24 Hrs  T(C): 36.7 (23 May 2022 04:00), Max: 36.7 (23 May 2022 04:00)  T(F): 98 (23 May 2022 04:00), Max: 98 (23 May 2022 04:00)  HR: 79 (23 May 2022 09:00) (61 - 88)  BP: 120/58 (23 May 2022 09:00) (95/65 - 194/79)  BP(mean): 84 (23 May 2022 09:00) (84 - 117)  RR: 24 (23 May 2022 09:00) (17 - 24)  SpO2: 95% (23 May 2022 09:00) (63% - 100%)  No Known Allergies      PHYSICAL EXAM:  GENERAL: NAD  HEAD:  Atraumatic, Normocephalic  EYES: EOMI, PERRLA, conjunctiva and sclera clear  NECK: Supple, No JVD  CHEST/LUNG: Clear to auscultation bilaterally; No wheeze  HEART: Regular rate and rhythm; No murmurs, rubs, or gallops  ABDOMEN: Soft, Nontender, Nondistended; Bowel sounds present  EXTREMITIES:  2+ Peripheral Pulses, No clubbing, cyanosis, or edema  PSYCH: Confused, AAOx1  NEUROLOGY: moves extremities to stimulus   SKIN: No rashes or lesions    Consultant(s) Notes Reviewed:  [x ] YES  [ ] NO  Care Discussed with Consultants/Other Providers [ x] YES  [ ] NO    LABS:      RADIOLOGY & ADDITIONAL TESTS:    Imaging Personally Reviewed:  [ ] YES  [ ] NO  acetaminophen     Tablet .. 650 milliGRAM(s) Oral every 6 hours PRN  ATENolol  Tablet 50 milliGRAM(s) Oral daily  cangrelor Infusion 1.5 MICROgram(s)/kG/Min IV Continuous <Continuous>  chlorhexidine 4% Liquid 1 Application(s) Topical <User Schedule>  cyclobenzaprine 10 milliGRAM(s) Oral three times a day PRN  haloperidol    Injectable 2 milliGRAM(s) IntraMuscular every 8 hours PRN  heparin SubCutaneous Injection - Peds 5000 Unit(s) SubCutaneous every 8 hours  labetalol Infusion 0.5 mG/Min IV Continuous <Continuous>  labetalol Injectable 10 milliGRAM(s) IV Push every 4 hours PRN  levETIRAcetam  IVPB 500 milliGRAM(s) IV Intermittent every 12 hours  levothyroxine Injectable 100 MICROGram(s) IV Push <User Schedule>  lidocaine   4% Patch 1 Patch Transdermal daily  morphine  - Injectable 1 milliGRAM(s) IV Push once PRN  morphine  - Injectable 2 milliGRAM(s) IV Push every 6 hours PRN  pantoprazole    Tablet 40 milliGRAM(s) Oral before breakfast  polyethylene glycol 3350 17 Gram(s) Oral daily  predniSONE   Tablet 15 milliGRAM(s) Oral daily  senna 2 Tablet(s) Oral at bedtime      HEALTH ISSUES - PROBLEM Dx:           AMANDA FELDER  76y  Female      Patient is a 76y old  Female who presents with a chief complaint of Worsening Back Pain and Metabolic Encephalopathy (23 May 2022 11:40)    Attending note: Pt seen and examined at bedside. Pt more awake today/ AAOX1. Still confused.       T(C): 36.7 (05-23-22 @ 04:00), Max: 36.7 (05-23-22 @ 04:00)  HR: 79 (05-23-22 @ 09:00) (61 - 88)  BP: 120/58 (05-23-22 @ 09:00) (95/65 - 194/79)  RR: 24 (05-23-22 @ 09:00) (17 - 24)  SpO2: 95% (05-23-22 @ 09:00) (63% - 100%)  Wt(kg): --Vital Signs Last 24 Hrs  T(C): 36.7 (23 May 2022 04:00), Max: 36.7 (23 May 2022 04:00)  T(F): 98 (23 May 2022 04:00), Max: 98 (23 May 2022 04:00)  HR: 79 (23 May 2022 09:00) (61 - 88)  BP: 120/58 (23 May 2022 09:00) (95/65 - 194/79)  BP(mean): 84 (23 May 2022 09:00) (84 - 117)  RR: 24 (23 May 2022 09:00) (17 - 24)  SpO2: 95% (23 May 2022 09:00) (63% - 100%)  No Known Allergies      PHYSICAL EXAM:  GENERAL: NAD  HEAD:  Atraumatic, Normocephalic  EYES: EOMI, PERRLA, conjunctiva and sclera clear  NECK: Supple, No JVD  CHEST/LUNG: Clear to auscultation bilaterally; No wheeze  HEART: Regular rate and rhythm; No murmurs, rubs, or gallops  ABDOMEN: Soft, Nontender, Nondistended; Bowel sounds present  EXTREMITIES:  2+ Peripheral Pulses, No clubbing, cyanosis, or edema  PSYCH: Confused, AAOx1  NEUROLOGY: moves extremities to stimulus   SKIN: No rashes or lesions    Consultant(s) Notes Reviewed:  [x ] YES  [ ] NO  Care Discussed with Consultants/Other Providers [ x] YES  [ ] NO    LABS:      RADIOLOGY & ADDITIONAL TESTS:    Imaging Personally Reviewed:  [ ] YES  [ ] NO  acetaminophen     Tablet .. 650 milliGRAM(s) Oral every 6 hours PRN  ATENolol  Tablet 50 milliGRAM(s) Oral daily  cangrelor Infusion 1.5 MICROgram(s)/kG/Min IV Continuous <Continuous>  chlorhexidine 4% Liquid 1 Application(s) Topical <User Schedule>  cyclobenzaprine 10 milliGRAM(s) Oral three times a day PRN  haloperidol    Injectable 2 milliGRAM(s) IntraMuscular every 8 hours PRN  heparin SubCutaneous Injection - Peds 5000 Unit(s) SubCutaneous every 8 hours  labetalol Infusion 0.5 mG/Min IV Continuous <Continuous>  labetalol Injectable 10 milliGRAM(s) IV Push every 4 hours PRN  levETIRAcetam  IVPB 500 milliGRAM(s) IV Intermittent every 12 hours  levothyroxine Injectable 100 MICROGram(s) IV Push <User Schedule>  lidocaine   4% Patch 1 Patch Transdermal daily  morphine  - Injectable 1 milliGRAM(s) IV Push once PRN  morphine  - Injectable 2 milliGRAM(s) IV Push every 6 hours PRN  pantoprazole    Tablet 40 milliGRAM(s) Oral before breakfast  polyethylene glycol 3350 17 Gram(s) Oral daily  predniSONE   Tablet 15 milliGRAM(s) Oral daily  senna 2 Tablet(s) Oral at bedtime      HEALTH ISSUES - PROBLEM Dx:

## 2022-05-23 NOTE — PROGRESS NOTE ADULT - SUBJECTIVE AND OBJECTIVE BOX
Hospital Course:   Patient admitted with scattered SAH, fpound to have aright ICA aneurysm  on CTA.      Vital Signs Last 24 Hrs  T(C): 36.7 (23 May 2022 04:00), Max: 36.7 (23 May 2022 04:00)  T(F): 98 (23 May 2022 04:00), Max: 98 (23 May 2022 04:00)  HR: 79 (23 May 2022 09:00) (61 - 88)  BP: 120/58 (23 May 2022 09:00) (95/65 - 194/79)  BP(mean): 84 (23 May 2022 09:00) (82 - 117)  RR: 24 (23 May 2022 09:00) (15 - 24)  SpO2: 95% (23 May 2022 09:00) (63% - 100%)    I&O's Detail    22 May 2022 07:01  -  23 May 2022 07:00  --------------------------------------------------------  IN:    IV PiggyBack: 100 mL    Labetalol: 1140 mL  Total IN: 1240 mL    OUT:    Voided (mL): 700 mL  Total OUT: 700 mL    Total NET: 540 mL      23 May 2022 07:01  -  23 May 2022 11:41  --------------------------------------------------------  IN:    Labetalol: 180 mL  Total IN: 180 mL    OUT:  Total OUT: 0 mL    Total NET: 180 mL    I&O's Summary    22 May 2022 07:01  -  23 May 2022 07:00  --------------------------------------------------------  IN: 1240 mL / OUT: 700 mL / NET: 540 mL    23 May 2022 07:01  -  23 May 2022 11:41  --------------------------------------------------------  IN: 180 mL / OUT: 0 mL / NET: 180 mL      PHYSICAL EXAM:  Neurological: awake, alert, verbalizes well, follows commands, SANCHEZ well with good coordination and strength, cooperative, PERRL, face symmetrical    LABS:                        9.6    17.07 )-----------( 356      ( 22 May 2022 05:31 )             30.0     05-22    137  |  103  |  25<H>  ----------------------------<  100<H>  3.8   |  20  |  1.3    Ca    8.9      22 May 2022 05:31  Phos  2.6     05-22  Mg     1.6     05-22    TPro  6.0  /  Alb  4.0  /  TBili  0.5  /  DBili  x   /  AST  24  /  ALT  21  /  AlkPhos  45  05-22    CAPILLARY BLOOD GLUCOSE    POCT Blood Glucose.: 110 mg/dL (23 May 2022 05:37)      Drug Levels: [] N/A    CSF Analysis: [] N/A      Allergies    No Known Allergies    Intolerances      MEDICATIONS:  Antibiotics:    Neuro:  acetaminophen     Tablet .. 650 milliGRAM(s) Oral every 6 hours PRN  cyclobenzaprine 10 milliGRAM(s) Oral three times a day PRN  haloperidol    Injectable 2 milliGRAM(s) IntraMuscular every 8 hours PRN  levETIRAcetam  IVPB 500 milliGRAM(s) IV Intermittent every 12 hours  morphine  - Injectable 1 milliGRAM(s) IV Push once PRN  morphine  - Injectable 2 milliGRAM(s) IV Push every 6 hours PRN    Anticoagulation:  heparin SubCutaneous Injection - Peds 5000 Unit(s) SubCutaneous every 8 hours    OTHER:  ATENolol  Tablet 50 milliGRAM(s) Oral daily  chlorhexidine 4% Liquid 1 Application(s) Topical <User Schedule>  labetalol Infusion 0.5 mG/Min IV Continuous <Continuous>  labetalol Injectable 10 milliGRAM(s) IV Push every 4 hours PRN  levothyroxine Injectable 100 MICROGram(s) IV Push <User Schedule>  lidocaine   4% Patch 1 Patch Transdermal daily  pantoprazole    Tablet 40 milliGRAM(s) Oral before breakfast  polyethylene glycol 3350 17 Gram(s) Oral daily  predniSONE   Tablet 15 milliGRAM(s) Oral daily  senna 2 Tablet(s) Oral at bedtime    IVF:    CULTURES:  Culture Results:   <10,000 CFU/mL Normal Urogenital Gail (05-18 @ 14:07)  Culture Results:   No growth to date. (05-18 @ 10:40)    RADIOLOGY & ADDITIONAL TESTS: < from: CT Angio Head w/ IV Cont (05.21.22 @ 17:46) >  IMPRESSION:    CTA neck:  Motion and dense calcific atherosclerosis at the carotid bulbs   bilaterally limit evaluation; presumed at least moderate to severe   stenosis bilaterally.  Short segment beading of the mid left internal carotid artery may   represent fibromuscular dysplasia.  Small 1 mm medially and posteriorly oriented outpouching in the distal   cervical left internal carotid artery.    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  Multifocal stenoses involving the anterior circulation as detailed above.    Callback requested on 5/22/2022 12:01 AM. Discussed findings with Dr. Davila ([id number]) on 12:31 AM.    COMMENT:  Reference per NASCET criteria for degree of stenosis: Mild: less than 50%   stenosis. Moderate: 50-69% stenosis. Severe: 70-94% stenosis. Near   occlusion: 95-99% stenosis.    < end of copied text >        ASSESSMENT:  Stable exam    AMS,JEOVANY    ^FAILURE TO THRIVE    Yes    Handoff    MEWS Score    HTN (hypertension)    Hypothyroid    Depression    Interstitial lung disease    Essential thrombocytopenia    AMS (altered mental status)    No significant past surgical history    History of ovarian cyst    FAILURE TO THRIVE    8    JEOVANY (acute kidney injury)    [email or web address]        PLAN: For DSA possible coiling with DR West today

## 2022-05-23 NOTE — PROGRESS NOTE ADULT - CRITICAL CARE ATTENDING COMMENT
AMS, hh1 mf1 ?aSAH s/p pipeline stent-coil with flow diverter of R ICA aneurysm, admitted to NCCU post procedure    neuro q1  veeg, keppra ppx  DCI ppx  TCDs  dapt  sbp liberalized  TTE  euvolemia, normonatremia  ppi bid, serial cbc    dispo- ncc

## 2022-05-23 NOTE — BRIEF OPERATIVE NOTE - OPERATION/FINDINGS
Procedure : Diagnostic cerebral angiogram + flow diversion embolization of right ICA aneurysm     Amount and type of contrast : Visipaque 320   Medications given during procedure: Heparin 4000 IU, Integrilin 4.7925 Bolus, ASA 81, Brilinta 90   Implants placed: 1 stent deployed in right ICA   Complications :     Post-procedure exam:   Patient remains drowsy from anesthesia, following simple commands, alert and oriented to self states name , not oriented to place time situation. moves all extremities within plane of bed and AG, right knee immobile, sensation intact, no gaze preference, mild dysarthria.   Extremity Right groin CDI without evidence of bleeding ecchymosis infection swelling warmth or oozing at the time of closure. Soft and nontender to palpation. Distal pulses + doppler pre and post. Bilateral lower extremities temperature and color consistent.   Abd NTND      Suggestions :   Patient is to be upgraded to neuro ICU following procedure for further neurologic monitoring.   Please follow post NI orders for neuro checks, distal pulses, vitals, and groin checks placed for total of 24- hour recovery period following procedure. Please keep reverse trendelenberg, knee immobilizer, bed rest x 6 hr. Keep IVF as ordered.   -160.    Please notify provider with any signs of bleeding or hematoma at R groin site, change in mental status, vitals outside parameters, or absent distal pulses.   Management per neuro ICU   [phone]

## 2022-05-23 NOTE — PHYSICAL THERAPY INITIAL EVALUATION ADULT - DID THE PATIENT HAVE SURGERY?
Pt. assisted back to bed safely with RN. Pt. is not appropriately able to follow commands or participate in PT IE. Will cont to f/u/n/a
n/a

## 2022-05-23 NOTE — CHART NOTE - NSCHARTNOTEFT_GEN_A_CORE
Patient seen at bedside at 0910 had pulled her NGT out herself, bilateral wrist restraints loose but in place - adjustment at bedside.   Notified PCA at bedside who was able to translate in Romanian- patient remains confused AAO0-1, some english understanding.  Attempted to call [phone] to notify and update regarding procedure plan, contact unsuccessful. Patient seen at bedside at 0910 had self d/c NGT by the time of evaluation, bilateral wrist restraints loose but in place - adjustment at bedside.   Notified PCA at bedside who was able to translate in Trinidadian- patient remains confused AAO0-1, some english understanding. Nurse on break per staff.   Attempted to call [phone] to notify and update regarding event / procedure plan, contact unsuccessful, team not present on floor.

## 2022-05-23 NOTE — PROGRESS NOTE ADULT - ASSESSMENT
#Metabolic encephalopathy 2/2 Subacute SAH likely 2/2 HTN Emergency   - CT head: New scattered subarachnoid hemorrhage in the right cerebral sulci which appear mildly effaced compared tothe prior CT head dated 4/25/2021. 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  - SBP goal < 160   - start amlodipine 10mg - holding home meds losartan and hctz in setting of JEOVANY   - started on keppra 500 bid for seizure ppx   - r EEG: Consistent with diffuse cerebral electrophysiological dysfunction.  Secondary to none specific cause., Consistent with focal electrophysiological dysfunction.  Secondary to structural/metabolic cause., Consistent with potential for partial seizure. neurology Follow up  - MRA head and neck, MR head(patient not cooperative; might need to be performed under sedation)  - CT H/N with intravenous contrast (patient not cooperative; might need to be performed under sedation)  - EEG showing epileptiform- continue keppra, neuro follow up   -1 mm saccular aneurysm found on CTA brain. Plan for angiogram w/ neurosx today  -NG tube placed, started feeding. Nutrition consult      #Worsening Lower Back Pain Likely Secondary to New L1 Compression Fracture  #History of Chronic Back Pain for years secondary to Spinal Stenosis   - Follows with Dr Jaimes. Was supposed to go for a nerve ablation last Monday (cancelled due to poorly controlled HTN 240s/110s   - CT A/P: no acute abnormality within the abdomen and pelvis. Age-indeterminate moderate compression fracture seen at L1, new since 4/9/2022.  - Neurosurgery team consulted for new L1 compression fracture  - Check XR TLS  - Monitor pain and keep score 01/10: tylenol PRN and morphine PRN  - lidocaine patch  - Resume home prednisone 20mg QD (for ILD per Dr Stephens)  - TLSO  -NG tube placed, started feeding     #Constipation - miralax     #Acute Kidney Injury improving   - Likely Pre-Renal in Setting of BUN: Cr >20:1 and Reduced PO intake  - Recent labs Cr 1.3, on admission Cr 2.1  - IV fluid hydration - NS 75 cc/hr  - urine studies  - Avoid nephrotoxic agents  - Holding HCTZ and Losartan  - Renal US- no hydro  - nephro eval   -Creatinine stable 1.3 today.    #History of UGIB Due to Bleeding Duodenal Ulcer  - Recent admission for a bleeding duodenal ulcer at [facility] (melena)  - Protonix 40mg QD  - T/S, hgb stable   - Check Fe studies  - OP follow up with GI    #SEEMA/ILD  -Follows with Dr Stephens  - Not on CPAP or home O2  - On Prednisone 15mg QD   - unsure if pt should be on ppx     #Hypothyroidism   -TSH 70  -Synthroid 100 mcg IV daily as per endocrine curbside  – Endocrine consult appreciated    Leukocytosis in Setting of Steroid use and History of Essential Thrombocytosis  * Follows with Dr Denise  * Home med Anagrilide 0.5mg QD  - Outpatient follow up with Dr Denise  - Anagrilide 0.5mg QD  -Blood cultures have been negative, patient has been afebrile no signs of infection.      #Progress Note Handoff  Pending (specify): Follow-up neurology, neurosurgery, interventional neurology, critical care, endocrine, angiogram in am  Family discussion: Spoke to  family on the phone today  Disposition: SICU under ICU level of care   #Metabolic encephalopathy 2/2 Subacute SAH likely 2/2 HTN Emergency   - CT head: New scattered subarachnoid hemorrhage in the right cerebral sulci which appear mildly effaced compared tothe prior CT head dated 4/25/2021. 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  - SBP goal < 160   - start amlodipine 10mg - holding home meds losartan and hctz in setting of JEOVANY   - started on keppra 500 bid for seizure ppx   - r EEG: Consistent with diffuse cerebral electrophysiological dysfunction.  Secondary to none specific cause., Consistent with focal electrophysiological dysfunction.  Secondary to structural/metabolic cause., Consistent with potential for partial seizure. neurology Follow up  - MRA head and neck, MR head(patient not cooperative; might need to be performed under sedation)  - CT H/N with intravenous contrast (patient not cooperative; might need to be performed under sedation)  - EEG showing epileptiform- continue keppra, neuro follow up   -1 mm saccular aneurysm found on CTA brain. Plan for angiogram w/ neurosx today  -NG tube placed, started feeding. Nutrition consult      #Worsening Lower Back Pain Likely Secondary to New L1 Compression Fracture  #History of Chronic Back Pain for years secondary to Spinal Stenosis   - Follows with Dr Jaimes. Was supposed to go for a nerve ablation last Monday (cancelled due to poorly controlled HTN 240s/110s   - CT A/P: no acute abnormality within the abdomen and pelvis. Age-indeterminate moderate compression fracture seen at L1, new since 4/9/2022.  - Neurosurgery team consulted for new L1 compression fracture  - Check XR TLS  - Monitor pain and keep score 01/10: tylenol PRN and morphine PRN  - lidocaine patch  - Resume home prednisone 20mg QD (for ILD per Dr Stephens)  - TLSO  -NG tube placed, started feeding     #Constipation - miralax     #Acute Kidney Injury improving   - Likely Pre-Renal in Setting of BUN: Cr >20:1 and Reduced PO intake  - Recent labs Cr 1.3, on admission Cr 2.1  - IV fluid hydration - NS 75 cc/hr  - urine studies  - Avoid nephrotoxic agents  - Holding HCTZ and Losartan  - Renal US- no hydro  - nephro eval   -Creatinine stable 1.3 today.    #History of UGIB Due to Bleeding Duodenal Ulcer  - Recent admission for a bleeding duodenal ulcer at [facility] (melena)  - Protonix 40mg QD  - T/S, hgb stable   - Check Fe studies  - OP follow up with GI    #SEEMA/ILD  -Follows with Dr Stephens  - Not on CPAP or home O2  - On Prednisone 15mg QD   - unsure if pt should be on ppx     #Hypothyroidism   -TSH 70  -Synthroid 100 mcg IV daily as per endocrine curbside  – Endocrine consult appreciated    Leukocytosis in Setting of Steroid use and History of Essential Thrombocytosis  * Follows with Dr Denise  * Home med Anagrilide 0.5mg QD  - Outpatient follow up with Dr Denise  - Anagrilide 0.5mg QD  -Blood cultures have been negative, patient has been afebrile no signs of infection.

## 2022-05-23 NOTE — PROGRESS NOTE ADULT - ASSESSMENT
Impression:    SAH   Saccular aneurysm of R ICA  L1 compression FX followed by NeuroSX  ILD likely NSIP on Prednisone improved on repeat Ct.  HO Severe SEEMA Not on CPAP   HO ET on Anagrelide, followed by hem   JEOVANY improving     Plan     Neurosx Fup   BP control   C/W Prednisone 15 mg daily   GI DVT prophylaxis   Wean O2 as tolerated,.  Pain control.    HOB At 45 degrees   Feeding per speech and swallow   BP control,  Gentle hydration     Bladder scans     Neuro check q 1 h per neuro ( will rec upgrade to NCC)

## 2022-05-23 NOTE — PHYSICAL THERAPY INITIAL EVALUATION ADULT - SPECIFY REASON(S)
Pt. encountered alert and confused, trying to roll her body out of bed onto the floor. Hungarian  #[id number] used to communicate with patient, pt. not able to understand or follow commands.
Pt is not appropriate for PT at this time secondary confused and agitated with +b/l wrist restraint, RN Shana aware. PT will f/u when appropriate.

## 2022-05-23 NOTE — PROGRESS NOTE ADULT - SUBJECTIVE AND OBJECTIVE BOX
Over Night Events: events noted, Neurosx reviewed, on labetolol drip, afebrile    PHYSICAL EXAM    ICU Vital Signs Last 24 Hrs  T(C): 35.8 (22 May 2022 16:00), Max: 35.8 (22 May 2022 16:00)  T(F): 96.4 (22 May 2022 16:00), Max: 96.4 (22 May 2022 16:00)  HR: 81 (23 May 2022 06:00) (61 - 88)  BP: 126/59 (23 May 2022 06:00) (95/65 - 194/79)  BP(mean): 85 (23 May 2022 06:00) (79 - 117)  RR: 19 (23 May 2022 06:00) (15 - 21)  SpO2: 98% (23 May 2022 06:00) (63% - 100%)      General: ill looking  HEENT: NGT  Lungs: Bilateral BS  Cardiovascular: Regular   Abdomen: Soft, Positive BS  Extremities: No clubbing   Confused, non focal      05-21-22 @ 07:01  -  05-22-22 @ 07:00  --------------------------------------------------------  IN:    IV PiggyBack: 100 mL    Labetalol: 930 mL  Total IN: 1030 mL    OUT:  Total OUT: 0 mL    Total NET: 1030 mL      05-22-22 @ 07:01  -  05-23-22 @ 06:45  --------------------------------------------------------  IN:    IV PiggyBack: 100 mL    Labetalol: 1080 mL  Total IN: 1180 mL    OUT:    Voided (mL): 700 mL  Total OUT: 700 mL    Total NET: 480 mL          LABS:                          9.6    17.07 )-----------( 356      ( 22 May 2022 05:31 )             30.0                                               05-22    137  |  103  |  25<H>  ----------------------------<  100<H>  3.8   |  20  |  1.3    Ca    8.9      22 May 2022 05:31  Phos  2.6     05-22  Mg     1.6     05-22    TPro  6.0  /  Alb  4.0  /  TBili  0.5  /  DBili  x   /  AST  24  /  ALT  21  /  AlkPhos  45  05-22                                                                                           LIVER FUNCTIONS - ( 22 May 2022 05:31 )  Alb: 4.0 g/dL / Pro: 6.0 g/dL / ALK PHOS: 45 U/L / ALT: 21 U/L / AST: 24 U/L / GGT: x                                                                                                                                       MEDICATIONS  (STANDING):  ATENolol  Tablet 50 milliGRAM(s) Oral daily  chlorhexidine 4% Liquid 1 Application(s) Topical <User Schedule>  heparin SubCutaneous Injection - Peds 5000 Unit(s) SubCutaneous every 8 hours  labetalol Infusion 0.5 mG/Min (30 mL/Hr) IV Continuous <Continuous>  levETIRAcetam  IVPB 500 milliGRAM(s) IV Intermittent every 12 hours  levothyroxine Injectable 100 MICROGram(s) IV Push <User Schedule>  lidocaine   4% Patch 1 Patch Transdermal daily  pantoprazole    Tablet 40 milliGRAM(s) Oral before breakfast  polyethylene glycol 3350 17 Gram(s) Oral daily  predniSONE   Tablet 15 milliGRAM(s) Oral daily  senna 2 Tablet(s) Oral at bedtime    MEDICATIONS  (PRN):  acetaminophen     Tablet .. 650 milliGRAM(s) Oral every 6 hours PRN Mild Pain (1 - 3)  cyclobenzaprine 10 milliGRAM(s) Oral three times a day PRN Muscle Spasm  haloperidol    Injectable 2 milliGRAM(s) IntraMuscular every 8 hours PRN Agitation  labetalol Injectable 10 milliGRAM(s) IV Push every 4 hours PRN Systolic blood pressure > 150  morphine  - Injectable 1 milliGRAM(s) IV Push once PRN Severe Pain (7 - 10)  morphine  - Injectable 2 milliGRAM(s) IV Push every 6 hours PRN Severe Pain (7 - 10)

## 2022-05-23 NOTE — PROGRESS NOTE ADULT - ASSESSMENT
Patient admitted with scattered SAH, found to have aright ICA aneurysm on CTA. Assessment/Plan:   This is a 76y  year old Female presents with right frontoparietal SAH, found to have saccular aneurysm 5mm in right supraclinoid ICA, as well as multifocal stenosis.   Patient presents to neuro-IR for diagnostic cerebral angiogram +/- aneurysm embolization +/- BEN.     Plan:    Neurological:  - Neuro Checks Q1H  - Brilinta BID and Aspirin daily   - DCI Prophylaxis Nimodipine 60 mg Q4H, daily TCDs (please order "VA transcranial dopplers, complete), normovolemia  - Keppra 500 BID  - PT/OT    CV:   - sbp 100-200  - TTE    Pulm:  - Aspiration precautions     GI:  - Protonix daily     :  - Mg > 2  - Nomonatremia  - Strict Is/Os  - Normothermia    Heme:  - Lovenox tomorrow   - SCDs       Dispo: NCC    Discussed with Attending Physician: Schwab         Patient admitted with scattered SAH, found to have aright ICA aneurysm on CTA. Assessment/Plan:   This is a 76y  year old Female presents with right frontoparietal SAH, found to have saccular aneurysm 5mm in right supraclinoid ICA, as well as multifocal stenosis.   Patient presents to neuro-IR for diagnostic cerebral angiogram +/- aneurysm embolization +/- BEN.     Plan:    Neurological:  - Neuro Checks Q1H  - Brilinta BID and Aspirin daily   - DCI Prophylaxis Nimodipine 60 mg Q4H, daily TCDs (please order "VA transcranial dopplers, complete), normovolemia  - Keppra 500 BID  - PT/OT    CV:   - sbp 100-200  - TTE    Pulm:  - Aspiration precautions     GI:  - Protonix daily     :  - Mg > 2  - Nomonatremia  - Strict Is/Os  - Normothermia    Heme:  - Lovenox tomorrow   - SCDs       Dispo: NCC    CODE STATUS:  [x] Full Code [] DNR [] DNI [] Palliative/Comfort Care           Assessment/Plan:   77 y/o F PMH as above found to have R frontoparietal HH1/mF1 SAH, possibly 2/2 saccular aneurysm 5mm in right supraclinoid ICA, as well as multifocal stenosis. Pt s/p DSA & pipeline stent embo 5/23.     Plan:  Neurological:  #SAH, 2/2 5mm ruptured R clinoid aneurysm s/p angio & pipeline stent embo 5/23   #new L1 compression fx, CPB 2/2 spinal stenosis (follows w Dr. العلي)  #Encephalopathy of unclear etiology  - Neuro Checks Q1H  - Brilinta BID and Aspirin daily   - DCI Prophylaxis Nimodipine 60 mg Q4H  - daily TCDs, normovolemia  - Cont vEEG  - MRA H/N + MRI brain pending  - Pain control: Tylenol, Lido patch, flexeril (new L1 compression fx, HA)  - Keppra 500mg BID  - PT/OT/OOB    CV:   #HTN  - -200  - TTE  - Hold PO Amlodipine 10mg, Atenolol 50mg qd & home Losartan/HCTZ    Pulm:  #SEEMA not on CPAP, ILD  - Aspiration precautions   - 3L NC  - Prednisone 15mg qd for ILD    GI:  #hx UGIB 2/2 bleeding duodenal ulcer (recent [facility] admission for melena)  - Protonix BID due to Prednisone use and hx UGIB  - Senna/Miralax, LBM 5/22 (dark/tarry)  - NGT, TF Jevity    :  - Nomonatremia  - Strict Is/Os    Heme:  #Anemia, essential thrombocytosis  - Start Lovenox 40 qd 5/24   - SCDs  - Check Fe studies  - Hold home Anagrilide 0.5mg qd (essential thrombocytosis)     ID:  - Monitor fever curve and WBCs    Endo:  #hypothyroidism  - Synthroid IV 100mcg    Dispo: NCC   Assessment/Plan:   77 y/o F PMH as above found to have R frontoparietal HH1/mF1 SAH, possibly 2/2 saccular aneurysm 5mm in right supraclinoid ICA, as well as multifocal stenosis. Pt s/p DSA & pipeline stent embo 5/23.     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 العلي)  #Encephalopathy of unclear etiology  - Neuro Checks Q1H  - Brilinta BID and Aspirin daily   - DCI Prophylaxis Nimodipine 60 mg Q4H  - daily TCDs, normovolemia  - Cont vEEG  - MRA H/N + MRI brain pending  - Pain control: Tylenol, Lido patch, flexeril (new L1 compression fx, HA)  - Keppra 500mg BID  - PT/OT/OOB    CV:   #HTN  - -200  - TTE  - Hold PO Amlodipine 10mg, Atenolol 50mg qd & home Losartan/HCTZ    Pulm:  #SEEMA not on CPAP, ILD  - Aspiration precautions   - 3L NC  - Prednisone 15mg qd for ILD    GI:  #hx UGIB 2/2 bleeding duodenal ulcer (recent [facility] admission for melena)  - Protonix BID due to Prednisone use and hx UGIB  - Senna/Miralax, LBM 5/22 (dark/tarry)  - NGT, TF Jevity    :  - Nomonatremia  - Strict Is/Os    Heme:  #Anemia, essential thrombocytosis  - Start Lovenox 40 qd 5/24   - SCDs  - Check Fe studies  - Hold home Anagrilide 0.5mg qd (essential thrombocytosis)     ID:  - Monitor fever curve and WBCs    Endo:  #hypothyroidism  - Synthroid IV 100mcg    Dispo: NCC   Assessment/Plan:   75 y/o F PMH as above found to have R frontoparietal HH1/mF1 SAH, possibly 2/2 saccular aneurysm 5mm in R supraclinoid ICA, as well as multifocal stenosis. Pt s/p DSA & pipeline stent embo 5/23.     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 العلي)  #Encephalopathy of unclear etiology  - Neuro Checks Q1H  - Neurovascular checks, flat x6hrs  - P2Y12 AM  - Brilinta BID and Aspirin daily   - DCI Prophylaxis Nimodipine 60 mg Q4H  - daily TCDs, normovolemia  - Cont vEEG  - MRA H/N + MRI brain pending  - Pain control: Tylenol, Lido patch, flexeril (new L1 compression fx, HA)  - Keppra 500mg BID  - PT/OT/OOB    CV:   #HTN  - -200  - TTE  - Hold PO Amlodipine 10mg, Atenolol 50mg qd & home Losartan/HCTZ    Pulm:  #SEEMA not on CPAP, ILD  - Aspiration precautions   - 3L NC  - Prednisone 15mg qd for ILD    GI:  #hx UGIB 2/2 bleeding duodenal ulcer (recent [facility] admission for melena)  - Protonix BID due to Prednisone use and hx UGIB  - Senna/Miralax, LBM 5/22 (dark/tarry)  - NGT, TF Jevity    :  - Nomonatremia  - Strict Is/Os    Heme:  #Anemia, essential thrombocytosis  - Start Lovenox 40 qd 5/24   - SCDs  - Check Fe studies  - Hold home Anagrilide 0.5mg qd (essential thrombocytosis)     ID:  - Monitor fever curve and WBCs    Endo:  #hypothyroidism  - Synthroid IV 100mcg    Dispo: NCC   Assessment/Plan:   75 y/o F PMH as above found to have R frontoparietal HH1/mF1 SAH, possibly 2/2 saccular aneurysm 5mm in R supraclinoid ICA, as well as multifocal stenosis. Pt s/p DSA & pipeline stent embo 5/23.     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 العلي)  #Encephalopathy of unclear etiology  - Neuro Checks Q1H  - Neurovascular checks, flat x6hrs  - P2Y12 AM  - Brilinta BID and Aspirin daily   - DCI Prophylaxis Nimodipine 60 mg Q4H  - daily TCDs, normovolemia  - Cont vEEG  - MRA H/N + MRI brain pending  - Pain control: Tylenol, Lido patch, flexeril (new L1 compression fx, HA)  - Keppra 500mg BID  - PT/OT/OOB    CV:   #HTN  - -200  - TTE  - Hold PO Amlodipine 10mg, Atenolol 50mg qd & home Losartan/HCTZ    Pulm:  #SEEMA not on CPAP, ILD  - Aspiration precautions   - 3L NC  - Prednisone 15mg qd for ILD    GI:  #hx UGIB 2/2 bleeding duodenal ulcer (recent [facility] admission for melena)  - Protonix BID due to Prednisone, DAPT use and hx UGIB  - Senna/Miralax, LBM 5/22 (dark/tarry)  - NGT, TF Jevity    :  - Nomonatremia  - Strict Is/Os    Heme:  #Anemia, essential thrombocytosis  - Start Lovenox 40 qd 5/24   - SCDs  - Check Fe studies  - Hold home Anagrilide 0.5mg qd (essential thrombocytosis)  serial CBCs     ID:  - Monitor fever curve and WBCs    Endo:  #hypothyroidism  - Synthroid IV 100mcg    Dispo: NCC

## 2022-05-23 NOTE — CHART NOTE - NSCHARTNOTEFT_GEN_A_CORE
Patient is for diagnostic cerebral angiogram +/- intracranial aneurysm embolization +/- intracranial stenting with the neuroendovascular team / Dr. West.     Please keep patient NPO except meds. Recommending IVF hydration as tolerable 50-75cc/hr.   COVID negative 5/18 (within 5 days).     Will contact patient's family for consent.     Recommending upgrade to neuro ICU following procedure, team notified in coordination with bed placement.   Will update primary team once finalized.     Please call [phone] with any questions regarding neuroendovascular management. Patient is for diagnostic cerebral angiogram +/- intracranial aneurysm embolization +/- intracranial stenting with the neuroendovascular team / Dr. West.     Please keep patient NPO except meds. Recommending IVF hydration as tolerable 50-75cc/hr.   COVID negative 5/18 (within 5 days).     Will contact patient's family for consent.     Recommending upgrade to neuro ICU following procedure, team notified in coordination with bed placement.   Will update primary team once finalized.     Please call [phone] with any questions regarding neuroendovascular management.    UPDATE 08:15  Called Patient's next of kin Alcira Mohr (Brother) who requested that I call his daughter Hannah Carlos [phone] (Patient's niece) for further decision making. Risk / benefit discussion took place between [facility] ACP and patient's niece regarding procedure planned for today. Explained the plan to perform diagnostic cerebral angiogram initially to characterize extra/intracranial vessels, and then possibly proceed with aneurysm coiling followed by intracranial stenting of intracranial stenosis. Explained that if intracranial stent was placed patient would need to be on DAPT which could increase risk of bleeding. Explained risks of procedure including but not limited to: bleeding from groin / puncture site, limb injury or ischemia, intracranial bleeding, ischemic stroke, kidney injury, infection, coma, death. Patient's niece verbalized understanding of all risks and benefits of procedure and states that she and her family wish to continue with procedure planning at this time. Patient's niece verbalized understanding that patient will need to be intubated for procedure purposes and understands the risk of remaining on MV post procedure.   Patient's niece understood that she will be called again [phone] for formal consent for procedure and anesthesia purposes. Patient is for diagnostic cerebral angiogram +/- intracranial aneurysm embolization +/- intracranial stenting with the neuroendovascular team / Dr. West.     Please keep patient NPO except meds. Recommending IVF hydration as tolerable 50-75cc/hr.   COVID negative 5/18 (within 5 days).     Will contact patient's family for consent.     Recommending upgrade to neuro ICU following procedure, team notified in coordination with bed placement.   Will update primary team once finalized.     Please call [phone] with any questions regarding neuroendovascular management.    UPDATE 08:15  Called Patient's next of kin Alcira Mohr (Brother) who requested that I call his daughter Hannah Carlos [phone] (Patient's niece) for further decision making. Risk / benefit discussion took place between [facility] ACP and patient's niece regarding procedure planned for today. Explained the plan to perform diagnostic cerebral angiogram initially to characterize extra/intracranial vessels, and then possibly proceed with aneurysm coiling followed by intracranial stenting of intracranial stenosis. Explained that if intracranial stent was placed patient would need to be on DAPT which could increase risk of bleeding. Explained risks of procedure including but not limited to: bleeding from groin / puncture site, limb injury or ischemia, intracranial bleeding, ischemic stroke, kidney injury, infection, coma, death. Patient's niece verbalized understanding of all risks and benefits of procedure and states that she and her family wish to continue with procedure planning at this time. Patient's niece verbalized understanding that patient will need to be intubated for procedure purposes and understands the risk of remaining on MV post procedure.   Patient's niece understood that she will be called again [phone] for formal consent for procedure and anesthesia purposes.    Called SICU ACP [phone] who stated this patient is under medicine and instructed to call [phone] for primary, line called without success. Also attempted [phone] without success. Will continue try to contact. Patient is for diagnostic cerebral angiogram +/- intracranial aneurysm embolization +/- carotid stenting with the neuroendovascular team / Dr. West.     Please keep patient NPO except meds. Recommending IVF hydration as tolerable 50-75cc/hr.   COVID negative 5/18 (within 5 days).     Will contact patient's family for consent.     Recommending upgrade to neuro ICU following procedure, team notified in coordination with bed placement.   Will update primary team once finalized.     Please call [phone] with any questions regarding neuroendovascular management.    UPDATE 08:15  Called Patient's next of kin Alcira Mohr (Brother) who requested that I call his daughter Hannah Carlos [phone] (Patient's niece) for further decision making. Risk / benefit discussion took place between [facility] ACP and patient's niece regarding procedure planned for today. Explained the plan to perform diagnostic cerebral angiogram initially to characterize extra/intracranial vessels, and then possibly proceed with aneurysm coiling followed by carotid stenting. Explained that if carotid stent was placed patient would need to be on DAPT which could increase risk of bleeding. Explained risks of procedure including but not limited to: bleeding from groin / puncture site, limb injury or ischemia, intracranial bleeding, ischemic stroke, kidney injury, infection, coma, death. Patient's niece verbalized understanding of all risks and benefits of procedure and states that she and her family wish to continue with procedure planning at this time. Patient's niece verbalized understanding that patient will need to be intubated for procedure purposes and understands the risk of remaining on MV post procedure.   Patient's niece understood that she will be called again [phone] for formal consent for procedure and anesthesia purposes.    Called SICU ACP [phone] who stated this patient is under medicine and instructed to call [phone] for primary, line called without success. Also attempted [phone] without success. Will continue try to contact.

## 2022-05-24 LAB
ALBUMIN SERPL ELPH-MCNC: 3.8 G/DL — SIGNIFICANT CHANGE UP (ref 3.5–5.2)
ALP SERPL-CCNC: 50 U/L — SIGNIFICANT CHANGE UP (ref 30–115)
ALT FLD-CCNC: 33 U/L — SIGNIFICANT CHANGE UP (ref 0–41)
ANION GAP SERPL CALC-SCNC: 14 MMOL/L — SIGNIFICANT CHANGE UP (ref 7–14)
ANION GAP SERPL CALC-SCNC: 18 MMOL/L — HIGH (ref 7–14)
APPEARANCE UR: CLEAR — SIGNIFICANT CHANGE UP
AST SERPL-CCNC: 48 U/L — HIGH (ref 0–41)
BASOPHILS # BLD AUTO: 0.13 K/UL — SIGNIFICANT CHANGE UP (ref 0–0.2)
BASOPHILS NFR BLD AUTO: 0.6 % — SIGNIFICANT CHANGE UP (ref 0–1)
BILIRUB SERPL-MCNC: 0.5 MG/DL — SIGNIFICANT CHANGE UP (ref 0.2–1.2)
BILIRUB UR-MCNC: NEGATIVE — SIGNIFICANT CHANGE UP
BUN SERPL-MCNC: 15 MG/DL — SIGNIFICANT CHANGE UP (ref 10–20)
BUN SERPL-MCNC: 21 MG/DL — HIGH (ref 10–20)
CALCIUM SERPL-MCNC: 8.3 MG/DL — LOW (ref 8.5–10.1)
CALCIUM SERPL-MCNC: 8.7 MG/DL — SIGNIFICANT CHANGE UP (ref 8.5–10.1)
CHLORIDE SERPL-SCNC: 105 MMOL/L — SIGNIFICANT CHANGE UP (ref 98–110)
CHLORIDE SERPL-SCNC: 107 MMOL/L — SIGNIFICANT CHANGE UP (ref 98–110)
CO2 SERPL-SCNC: 16 MMOL/L — LOW (ref 17–32)
CO2 SERPL-SCNC: 17 MMOL/L — SIGNIFICANT CHANGE UP (ref 17–32)
COLOR SPEC: SIGNIFICANT CHANGE UP
CREAT SERPL-MCNC: 1.1 MG/DL — SIGNIFICANT CHANGE UP (ref 0.7–1.5)
CREAT SERPL-MCNC: 1.3 MG/DL — SIGNIFICANT CHANGE UP (ref 0.7–1.5)
DIFF PNL FLD: NEGATIVE — SIGNIFICANT CHANGE UP
EGFR: 42 ML/MIN/1.73M2 — LOW
EGFR: 52 ML/MIN/1.73M2 — LOW
EOSINOPHIL # BLD AUTO: 0.47 K/UL — SIGNIFICANT CHANGE UP (ref 0–0.7)
EOSINOPHIL NFR BLD AUTO: 2.1 % — SIGNIFICANT CHANGE UP (ref 0–8)
GLUCOSE SERPL-MCNC: 84 MG/DL — SIGNIFICANT CHANGE UP (ref 70–99)
GLUCOSE SERPL-MCNC: 94 MG/DL — SIGNIFICANT CHANGE UP (ref 70–99)
GLUCOSE UR QL: SIGNIFICANT CHANGE UP
HCT VFR BLD CALC: 23.7 % — LOW (ref 37–47)
HGB BLD-MCNC: 8.1 G/DL — LOW (ref 12–16)
IMM GRANULOCYTES NFR BLD AUTO: 6.9 % — HIGH (ref 0.1–0.3)
KETONES UR-MCNC: ABNORMAL
LEUKOCYTE ESTERASE UR-ACNC: NEGATIVE — SIGNIFICANT CHANGE UP
LYMPHOCYTES # BLD AUTO: 0.92 K/UL — LOW (ref 1.2–3.4)
LYMPHOCYTES # BLD AUTO: 4.2 % — LOW (ref 20.5–51.1)
MAGNESIUM SERPL-MCNC: 1.5 MG/DL — LOW (ref 1.8–2.4)
MCHC RBC-ENTMCNC: 32.4 PG — HIGH (ref 27–31)
MCHC RBC-ENTMCNC: 34.2 G/DL — SIGNIFICANT CHANGE UP (ref 32–37)
MCV RBC AUTO: 94.8 FL — SIGNIFICANT CHANGE UP (ref 81–99)
MONOCYTES # BLD AUTO: 1.71 K/UL — HIGH (ref 0.1–0.6)
MONOCYTES NFR BLD AUTO: 7.7 % — SIGNIFICANT CHANGE UP (ref 1.7–9.3)
MRSA PCR RESULT.: POSITIVE
NEUTROPHILS # BLD AUTO: 17.32 K/UL — HIGH (ref 1.4–6.5)
NEUTROPHILS NFR BLD AUTO: 78.5 % — HIGH (ref 42.2–75.2)
NITRITE UR-MCNC: NEGATIVE — SIGNIFICANT CHANGE UP
NRBC # BLD: 0 /100 WBCS — SIGNIFICANT CHANGE UP (ref 0–0)
NT-PROBNP SERPL-SCNC: 988 PG/ML — HIGH (ref 0–300)
PH UR: 5.5 — SIGNIFICANT CHANGE UP (ref 5–8)
PHOSPHATE SERPL-MCNC: 2.1 MG/DL — SIGNIFICANT CHANGE UP (ref 2.1–4.9)
PLATELET # BLD AUTO: 489 K/UL — HIGH (ref 130–400)
POTASSIUM SERPL-MCNC: 3.6 MMOL/L — SIGNIFICANT CHANGE UP (ref 3.5–5)
POTASSIUM SERPL-MCNC: 3.7 MMOL/L — SIGNIFICANT CHANGE UP (ref 3.5–5)
POTASSIUM SERPL-SCNC: 3.6 MMOL/L — SIGNIFICANT CHANGE UP (ref 3.5–5)
POTASSIUM SERPL-SCNC: 3.7 MMOL/L — SIGNIFICANT CHANGE UP (ref 3.5–5)
PROT SERPL-MCNC: 5.9 G/DL — LOW (ref 6–8)
PROT UR-MCNC: SIGNIFICANT CHANGE UP
RBC # BLD: 2.5 M/UL — LOW (ref 4.2–5.4)
RBC # FLD: 18.5 % — HIGH (ref 11.5–14.5)
SODIUM SERPL-SCNC: 138 MMOL/L — SIGNIFICANT CHANGE UP (ref 135–146)
SODIUM SERPL-SCNC: 139 MMOL/L — SIGNIFICANT CHANGE UP (ref 135–146)
SP GR SPEC: 1.02 — SIGNIFICANT CHANGE UP (ref 1.01–1.03)
UROBILINOGEN FLD QL: SIGNIFICANT CHANGE UP
WBC # BLD: 22.07 K/UL — HIGH (ref 4.8–10.8)
WBC # FLD AUTO: 22.07 K/UL — HIGH (ref 4.8–10.8)

## 2022-05-24 PROCEDURE — 36620 INSERTION CATHETER ARTERY: CPT | Mod: GC

## 2022-05-24 PROCEDURE — 71045 X-RAY EXAM CHEST 1 VIEW: CPT | Mod: 26

## 2022-05-24 PROCEDURE — 93306 TTE W/DOPPLER COMPLETE: CPT | Mod: 26

## 2022-05-24 PROCEDURE — 71045 X-RAY EXAM CHEST 1 VIEW: CPT | Mod: 26,77

## 2022-05-24 PROCEDURE — 99291 CRITICAL CARE FIRST HOUR: CPT

## 2022-05-24 PROCEDURE — 95720 EEG PHY/QHP EA INCR W/VEEG: CPT

## 2022-05-24 RX ORDER — SODIUM CHLORIDE 9 MG/ML
500 INJECTION INTRAMUSCULAR; INTRAVENOUS; SUBCUTANEOUS ONCE
Refills: 0 | Status: COMPLETED | OUTPATIENT
Start: 2022-05-24 | End: 2022-05-24

## 2022-05-24 RX ORDER — NOREPINEPHRINE BITARTRATE/D5W 8 MG/250ML
0.05 PLASTIC BAG, INJECTION (ML) INTRAVENOUS
Qty: 8 | Refills: 0 | Status: DISCONTINUED | OUTPATIENT
Start: 2022-05-24 | End: 2022-05-25

## 2022-05-24 RX ORDER — POTASSIUM CHLORIDE 20 MEQ
40 PACKET (EA) ORAL ONCE
Refills: 0 | Status: COMPLETED | OUTPATIENT
Start: 2022-05-24 | End: 2022-05-24

## 2022-05-24 RX ORDER — MAGNESIUM SULFATE 500 MG/ML
1 VIAL (ML) INJECTION
Refills: 0 | Status: COMPLETED | OUTPATIENT
Start: 2022-05-24 | End: 2022-05-24

## 2022-05-24 RX ORDER — ENOXAPARIN SODIUM 100 MG/ML
40 INJECTION SUBCUTANEOUS EVERY 24 HOURS
Refills: 0 | Status: DISCONTINUED | OUTPATIENT
Start: 2022-05-24 | End: 2022-06-07

## 2022-05-24 RX ORDER — POTASSIUM CHLORIDE 20 MEQ
40 PACKET (EA) ORAL ONCE
Refills: 0 | Status: DISCONTINUED | OUTPATIENT
Start: 2022-05-24 | End: 2022-05-24

## 2022-05-24 RX ORDER — MAGNESIUM SULFATE 500 MG/ML
2 VIAL (ML) INJECTION
Refills: 0 | Status: COMPLETED | OUTPATIENT
Start: 2022-05-24 | End: 2022-05-24

## 2022-05-24 RX ORDER — NIMODIPINE 60 MG/10ML
30 SOLUTION ORAL
Refills: 0 | Status: DISCONTINUED | OUTPATIENT
Start: 2022-05-24 | End: 2022-05-31

## 2022-05-24 RX ORDER — MUPIROCIN 20 MG/G
1 OINTMENT TOPICAL EVERY 12 HOURS
Refills: 0 | Status: COMPLETED | OUTPATIENT
Start: 2022-05-24 | End: 2022-05-29

## 2022-05-24 RX ORDER — LEVETIRACETAM 250 MG/1
500 TABLET, FILM COATED ORAL
Refills: 0 | Status: COMPLETED | OUTPATIENT
Start: 2022-05-24 | End: 2022-05-26

## 2022-05-24 RX ORDER — LEVOTHYROXINE SODIUM 125 MCG
100 TABLET ORAL DAILY
Refills: 0 | Status: DISCONTINUED | OUTPATIENT
Start: 2022-05-24 | End: 2022-05-25

## 2022-05-24 RX ADMIN — Medication 100 GRAM(S): at 08:46

## 2022-05-24 RX ADMIN — LIDOCAINE 1 PATCH: 4 CREAM TOPICAL at 12:35

## 2022-05-24 RX ADMIN — PANTOPRAZOLE SODIUM 40 MILLIGRAM(S): 20 TABLET, DELAYED RELEASE ORAL at 17:30

## 2022-05-24 RX ADMIN — NIMODIPINE 30 MILLIGRAM(S): 60 SOLUTION ORAL at 16:29

## 2022-05-24 RX ADMIN — LIDOCAINE 1 PATCH: 4 CREAM TOPICAL at 23:38

## 2022-05-24 RX ADMIN — TICAGRELOR 90 MILLIGRAM(S): 90 TABLET ORAL at 17:31

## 2022-05-24 RX ADMIN — LEVETIRACETAM 400 MILLIGRAM(S): 250 TABLET, FILM COATED ORAL at 06:07

## 2022-05-24 RX ADMIN — Medication 81 MILLIGRAM(S): at 12:35

## 2022-05-24 RX ADMIN — NIMODIPINE 60 MILLIGRAM(S): 60 SOLUTION ORAL at 11:04

## 2022-05-24 RX ADMIN — Medication 650 MILLIGRAM(S): at 22:10

## 2022-05-24 RX ADMIN — Medication 25 GRAM(S): at 10:17

## 2022-05-24 RX ADMIN — Medication 40 MILLIEQUIVALENT(S): at 20:08

## 2022-05-24 RX ADMIN — Medication 40 MILLIEQUIVALENT(S): at 10:21

## 2022-05-24 RX ADMIN — PANTOPRAZOLE SODIUM 40 MILLIGRAM(S): 20 TABLET, DELAYED RELEASE ORAL at 06:01

## 2022-05-24 RX ADMIN — Medication 25 GRAM(S): at 12:33

## 2022-05-24 RX ADMIN — Medication 650 MILLIGRAM(S): at 22:40

## 2022-05-24 RX ADMIN — ENOXAPARIN SODIUM 40 MILLIGRAM(S): 100 INJECTION SUBCUTANEOUS at 12:34

## 2022-05-24 RX ADMIN — CHLORHEXIDINE GLUCONATE 1 APPLICATION(S): 213 SOLUTION TOPICAL at 06:01

## 2022-05-24 RX ADMIN — LEVETIRACETAM 500 MILLIGRAM(S): 250 TABLET, FILM COATED ORAL at 17:30

## 2022-05-24 RX ADMIN — SODIUM CHLORIDE 1000 MILLILITER(S): 9 INJECTION INTRAMUSCULAR; INTRAVENOUS; SUBCUTANEOUS at 11:45

## 2022-05-24 RX ADMIN — Medication 100 GRAM(S): at 06:07

## 2022-05-24 RX ADMIN — Medication 15 MILLIGRAM(S): at 06:08

## 2022-05-24 RX ADMIN — SODIUM CHLORIDE 1000 MILLILITER(S): 9 INJECTION INTRAMUSCULAR; INTRAVENOUS; SUBCUTANEOUS at 13:23

## 2022-05-24 RX ADMIN — NIMODIPINE 30 MILLIGRAM(S): 60 SOLUTION ORAL at 18:34

## 2022-05-24 RX ADMIN — TICAGRELOR 90 MILLIGRAM(S): 90 TABLET ORAL at 06:00

## 2022-05-24 RX ADMIN — NIMODIPINE 60 MILLIGRAM(S): 60 SOLUTION ORAL at 10:58

## 2022-05-24 RX ADMIN — NIMODIPINE 30 MILLIGRAM(S): 60 SOLUTION ORAL at 22:06

## 2022-05-24 NOTE — PHYSICAL THERAPY INITIAL EVALUATION ADULT - GENERAL OBSERVATIONS, REHAB EVAL
pt. encountered in bed in NAD + tele, +bran, +sequentials, +IV, + VEEG. Pt agreeable to PT. pt speaks english and was able to answer questions appropriately and express her needs  [phone]

## 2022-05-24 NOTE — PROGRESS NOTE ADULT - CRITICAL CARE ATTENDING COMMENT
AMS, hh1 mf1 ?aSAH s/p pipeline stent-coil with flow diverter of R ICA aneurysm, admitted to NCCU post procedure    neuro q1  veeg, keppra ppx  DCI ppx  TCDs  dapt  sbp liberalized  TTE  euvolemia, normonatremia  ppi bid, serial cbc    dispo- ncc AMS, hh1 mf1 ?aSAH s/p pipeline stent-coil with flow diverter of R ICA aneurysm, admitted to NCCU post procedure    neuro q1  veeg, keppra ppx  DCI ppx  TCDs  dapt  sbp liberalized 120-200  TTE  euvolemia, normonatremia  ppi bid, serial cbc    dispo- ncc

## 2022-05-24 NOTE — PROVIDER CONTACT NOTE (OTHER) - ACTION/TREATMENT ORDERED:
NELSON green will discuss with attending and no further interventions at this time.
Levo started as per NELSON Olvera through wrist peripheral IV. MD Mann at bedside currently placing new forearm access for Levo. TIMO Bowman at bedside monitoring Levo infusion through IV site closely.

## 2022-05-24 NOTE — SWALLOW BEDSIDE ASSESSMENT ADULT - SLP PERTINENT HISTORY OF CURRENT PROBLEM
new ICA aneurysm. 76 y.o F adm with worsening back pain and metabolic encephalopathy.  CT abdomen/pelvis showed new L1 compression fx, new since 4/9/22.  Neuro Sx consulted for L1 fx.  Pt's niece states that Pt hasn't been eating much.  PMhx: thrombocytopenia, Depression, hypothyroid, interstitial ling disease, HTN, SEEMA, Spinal stenosis s/p spinal epidural. Hospital course complicated by R ICA aneurysm, s/p DSA w/ flow diversion

## 2022-05-24 NOTE — CHART NOTE - NSCHARTNOTEFT_GEN_A_CORE
Transcranial doppler results 5/23/22  Velocity R MCA 40, L MCA 81  Lind[place]ard ratio R 1.9, L 3.7    5/24/22  Velocity R MCA 75, L   Lindegaard ratio R 2.2, L 4.2

## 2022-05-24 NOTE — PHYSICAL THERAPY INITIAL EVALUATION ADULT - GAIT DEVIATIONS NOTED, PT EVAL
decreased kendy/increased time in double stance/decreased velocity of limb motion/decreased step length/decreased stride length/decreased weight-shifting ability

## 2022-05-24 NOTE — PHYSICAL THERAPY INITIAL EVALUATION ADULT - GAIT TRAINING, PT EVAL
Goal: pt will ambulate w/ rolling walker 20 feet with min assist by discharge to facilitate return to PLOF.

## 2022-05-24 NOTE — EEG REPORT - NS EEG TEXT BOX
Epilepsy Attending Note:     BEBEAMANDA ZULUAGA    76y Female  MRN [id number]    Vital Signs Last 24 Hrs  T(C): 37.1 (24 May 2022 08:00), Max: 37.1 (24 May 2022 08:00)  T(F): 98.7 (24 May 2022 08:00), Max: 98.7 (24 May 2022 08:00)  HR: 94 (24 May 2022 10:00) (74 - 110)  BP: --  BP(mean): --  RR: 25 (24 May 2022 10:00) (16 - 46)  SpO2: 99% (24 May 2022 10:00) (85% - 100%)                          8.1    22.07 )-----------( 489      ( 24 May 2022 05:00 )             23.7       05-24    139  |  105  |  21<H>  ----------------------------<  94  3.7   |  16<L>  |  1.3    Ca    8.7      24 May 2022 05:00  Phos  2.1     05-24  Mg     1.5     05-24    TPro  5.9<L>  /  Alb  3.8  /  TBili  0.5  /  DBili  x   /  AST  48<H>  /  ALT  33  /  AlkPhos  50  05-24      MEDICATIONS  (STANDING):  aspirin  chewable 81 milliGRAM(s) Oral daily  chlorhexidine 4% Liquid 1 Application(s) Topical <User Schedule>  enoxaparin Injectable 40 milliGRAM(s) SubCutaneous every 24 hours  levETIRAcetam 500 milliGRAM(s) Oral two times a day  levothyroxine Injectable 100 MICROGram(s) IV Push <User Schedule>  lidocaine   4% Patch 1 Patch Transdermal daily  magnesium sulfate  IVPB 2 Gram(s) IV Intermittent every 2 hours  niMODipine Oral Solution 60 milliGRAM(s) Enteral Tube every 4 hours  norepinephrine Infusion 0.05 MICROgram(s)/kG/Min (6.74 mL/Hr) IV Continuous <Continuous>  pantoprazole  Injectable 40 milliGRAM(s) IV Push every 12 hours  polyethylene glycol 3350 17 Gram(s) Oral daily  predniSONE   Tablet 15 milliGRAM(s) Oral daily  senna 2 Tablet(s) Oral at bedtime  ticagrelor 90 milliGRAM(s) Oral two times a day    MEDICATIONS  (PRN):  acetaminophen     Tablet .. 650 milliGRAM(s) Oral every 6 hours PRN Mild Pain (1 - 3)  cyclobenzaprine 10 milliGRAM(s) Oral three times a day PRN Muscle Spasm  QUEtiapine 25 milliGRAM(s) Oral every 6 hours PRN agitation            VEEG in the last 24 hours:    Background---------------- continues, symmetrical and less than optimally organized reaching 7-8 hz    Focal and generalized slowing-----1- mild generalized slowing. 2- mild left hemispheric focal slowing 3- lower amplitude on the right side    Interictal activity-----------none    Events-----none    Seizures---none    Impression:  abnormal as above    Plan - as/NCC team

## 2022-05-24 NOTE — PROGRESS NOTE ADULT - SUBJECTIVE AND OBJECTIVE BOX
Neuroendovascular progress note:     Interval HPI:   Patient is a 76 year old female, primarily Khmer speaking, who presented with AMS and worsened lower back pain, found to have right frontoparietal SAH HH1/mFS 1 possibly 2/2 5mm right supraclinoid ICA saccular aneurysm found on CTA, which also reported multifocal stenosis. Patient is now POD 1 s/p diagnostic cerebral angiogram + pipeline stent embolization of the right ICA aneurysm. Patient was started on DAPT ASA 81 and Brilinta 90 starting yesterday. H/H 8.1/23.7. Patient this AM had episode of ABP 85-90s asymptomatic, levo started per neuro ICU. + BM this morning nonmelanous.     HTN (hypertension)  Hypothyroid  Depression  Interstitial lung disease  Essential thrombocytopenia    Overnight Events: None    Medications   aspirin  chewable 81 milliGRAM(s) Oral daily  chlorhexidine 4% Liquid 1 Application(s) Topical <User Schedule>  enoxaparin Injectable 40 milliGRAM(s) SubCutaneous every 24 hours  levETIRAcetam 500 milliGRAM(s) Oral two times a day  levothyroxine Injectable 100 MICROGram(s) IV Push <User Schedule>  lidocaine   4% Patch 1 Patch Transdermal daily  magnesium sulfate  IVPB 2 Gram(s) IV Intermittent every 2 hours  niMODipine Oral Solution 60 milliGRAM(s) Enteral Tube every 4 hours  norepinephrine Infusion 0.05 MICROgram(s)/kG/Min IV Continuous <Continuous>  pantoprazole  Injectable 40 milliGRAM(s) IV Push every 12 hours  polyethylene glycol 3350 17 Gram(s) Oral daily  predniSONE   Tablet 15 milliGRAM(s) Oral daily  senna 2 Tablet(s) Oral at bedtime  ticagrelor 90 milliGRAM(s) Oral two times a day    Vitals  T(F): 98.7 (22 @ 08:00), Max: 98.7 (22 @ 08:00)  HR: 94 (22 @ 10:00) (74 - 110)  BP: --  RR: 25 (22 @ 10:00) (16 - 46)  SpO2: 99% (22 @ 10:00) (85% - 100%)    Labs                     8.1    22.07 )-----------( 489      ( 24 May 2022 05:00 )             23.7   05-  139  |  105  |  21<H>  ----------------------------<  94  3.7   |  16<L>  |  1.3  Ca    8.7      24 May 2022 05:00  Phos  2.1     05-24  Mg     1.5     05-24  TPro  5.9<L>  /  Alb  3.8  /  TBili  0.5  /  DBili  x   /  AST  48<H>  /  ALT  33  /  AlkPhos  50  05-24  LIVER FUNCTIONS - ( 24 May 2022 05:00 )  Alb: 3.8 g/dL / Pro: 5.9 g/dL / ALK PHOS: 50 U/L / ALT: 33 U/L / AST: 48 U/L / GGT: x           General - NAD, laying upright in bed, Turkish video translation at bedside.   Neuro - aaox1 (knows name  and age), not oriented to place, time- sometimes uncooperative with exam and commands, PERRL, EOMI, moving all extremities to antigravity and within plane of bed - LUE/ bilateral LE limited 2/2 weakness.   Abd - NTND  Extremity - Right groin CDI without evidence of bleeding hematoma oozing infection swelling or warmth. Minimal ecchymosis surrounding the arteriotomy site. Distal pusles +. Temperature and color consistent bilateral lower extremity.     Radiology:   Images reviewed.     Assessment:   Patient is a 76 year old female, primarily Khmer speaking, who presented with AMS and worsened lower back pain, found to have right frontoparietal SAH HH1/mFS 1 possibly 2/2 5mm right supraclinoid ICA saccular aneurysm found on CTA, which also reported multifocal stenosis. Patient is now POD 1 s/p diagnostic cerebral angiogram + pipeline stent embolization of the right ICA aneurysm. Patient was started on DAPT ASA 81 and Brilinta 90 starting yesterday. H/H 8.1/23.7.     Suggestions:   Patient appears stable from a post neuroendovascular procedure perspective based on labs exam and vitals.   Please continue to monitor right groin / arteriotomy site / intact distal pulses.   Please continue DAPT and monitor for signs of bleeding, melena, hematochezia.   DCI prophylaxis  TCDs    Neurologic and medical management per neuro ICU.   [phone] Neuroendovascular progress note:     Interval HPI:   Patient is a 76 year old female, primarily Botswanan speaking, who presented with AMS and worsened lower back pain, found to have right frontoparietal SAH HH1/mFS 1 possibly 2/2 5mm right supraclinoid ICA saccular aneurysm found on CTA, which also reported multifocal stenosis. Patient is now POD 1 s/p diagnostic cerebral angiogram + pipeline stent embolization of the right ICA aneurysm. Patient was started on DAPT ASA 81 and Brilinta 90 starting yesterday. H/H 8.1/23.7. Patient this AM had episode of ABP 85-90s asymptomatic, levo started per neuro ICU. + BM this morning nonmelanous.     HTN (hypertension)  Hypothyroid  Depression  Interstitial lung disease  Essential thrombocytopenia    Overnight Events: None    Medications   aspirin  chewable 81 milliGRAM(s) Oral daily  chlorhexidine 4% Liquid 1 Application(s) Topical <User Schedule>  enoxaparin Injectable 40 milliGRAM(s) SubCutaneous every 24 hours  levETIRAcetam 500 milliGRAM(s) Oral two times a day  levothyroxine Injectable 100 MICROGram(s) IV Push <User Schedule>  lidocaine   4% Patch 1 Patch Transdermal daily  magnesium sulfate  IVPB 2 Gram(s) IV Intermittent every 2 hours  niMODipine Oral Solution 60 milliGRAM(s) Enteral Tube every 4 hours  norepinephrine Infusion 0.05 MICROgram(s)/kG/Min IV Continuous <Continuous>  pantoprazole  Injectable 40 milliGRAM(s) IV Push every 12 hours  polyethylene glycol 3350 17 Gram(s) Oral daily  predniSONE   Tablet 15 milliGRAM(s) Oral daily  senna 2 Tablet(s) Oral at bedtime  ticagrelor 90 milliGRAM(s) Oral two times a day    Vitals  T(F): 98.7 (22 @ 08:00), Max: 98.7 (22 @ 08:00)  HR: 94 (22 @ 10:00) (74 - 110)  BP: --  RR: 25 (22 @ 10:00) (16 - 46)  SpO2: 99% (22 @ 10:00) (85% - 100%)    Labs                     8.1    22.07 )-----------( 489      ( 24 May 2022 05:00 )             23.7   05-  139  |  105  |  21<H>  ----------------------------<  94  3.7   |  16<L>  |  1.3  Ca    8.7      24 May 2022 05:00  Phos  2.1     05-24  Mg     1.5     05-24  TPro  5.9<L>  /  Alb  3.8  /  TBili  0.5  /  DBili  x   /  AST  48<H>  /  ALT  33  /  AlkPhos  50  05-24  LIVER FUNCTIONS - ( 24 May 2022 05:00 )  Alb: 3.8 g/dL / Pro: 5.9 g/dL / ALK PHOS: 50 U/L / ALT: 33 U/L / AST: 48 U/L / GGT: x           General - NAD, laying upright in bed, Nepali video translation at bedside.   Neuro - aaox1 (knows name  and age), not oriented to place, time- sometimes uncooperative with exam and commands, PERRL, EOMI, moving all extremities to antigravity and within plane of bed - LUE/ bilateral LE limited 2/2 pain.   Abd - NTND  Extremity - Right groin CDI without evidence of bleeding hematoma oozing infection swelling or warmth. Minimal ecchymosis surrounding the arteriotomy site. Distal pusles +. Temperature and color consistent bilateral lower extremity.     Radiology:   Images reviewed.     Assessment:   Patient is a 76 year old female, primarily Botswanan speaking, who presented with AMS and worsened lower back pain, found to have right frontoparietal SAH HH1/mFS 1 possibly 2/2 5mm right supraclinoid ICA saccular aneurysm found on CTA, which also reported multifocal stenosis. Patient is now POD 1 s/p diagnostic cerebral angiogram + pipeline stent embolization of the right ICA aneurysm. Patient was started on DAPT ASA 81 and Brilinta 90 starting yesterday. H/H 8.1/23.7.     Suggestions:   Patient appears stable from a post neuroendovascular procedure perspective based on labs exam and vitals.   Please continue to monitor right groin / arteriotomy site / intact distal pulses.   Please continue DAPT and monitor for signs of bleeding, melena, hematochezia.   DCI prophylaxis  TCDs    Neurologic and medical management per neuro ICU.   [phone]

## 2022-05-24 NOTE — PROVIDER CONTACT NOTE (OTHER) - REASON
Pt. /54 falling below the ordered systolic parameters for the patient
Pt. systolic blood pressure 85-99

## 2022-05-24 NOTE — PROVIDER CONTACT NOTE (OTHER) - SITUATION
NELSON Olvera notified that pt. /54 falling below the ordered systolic blood pressure parameters.
Pt. systolic blood pressure between 85-99. Falling below the ordered systolic blood pressure parameters

## 2022-05-24 NOTE — PROCEDURE NOTE - NSINDICATIONS_GEN_A_CORE
arterial puncture to obtain ABG's/blood sampling/cannulation purposes/critical patient/monitoring purposes

## 2022-05-24 NOTE — PROGRESS NOTE ADULT - ASSESSMENT
Assessment/Plan:   75 y/o F PMH as above found to have R frontoparietal HH1/mF1 SAH, possibly 2/2 saccular aneurysm 5mm in R supraclinoid ICA, as well as multifocal stenosis. Pt s/p DSA & pipeline stent embo 5/23.     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 العلي)  #Encephalopathy of unclear etiology  - Neuro Checks Q1H  - Neurovascular checks, flat x6hrs  - P2Y12 AM  - Brilinta BID and Aspirin daily   - DCI Prophylaxis Nimodipine 60 mg Q4H  - daily TCDs, normovolemia  - Cont vEEG  - MRA H/N + MRI brain pending  - Pain control: Tylenol, Lido patch, flexeril (new L1 compression fx, HA)  - Keppra 500mg BID  - PT/OT/OOB    CV:   #HTN  - -200  - TTE  - Hold PO Amlodipine 10mg, Atenolol 50mg qd & home Losartan/HCTZ    Pulm:  #SEEMA not on CPAP, ILD  - Aspiration precautions   - 3L NC  - Prednisone 15mg qd for ILD    GI:  #hx UGIB 2/2 bleeding duodenal ulcer (recent [facility] admission for melena)  - Protonix BID due to Prednisone, DAPT use and hx UGIB  - Senna/Miralax, LBM 5/22 (dark/tarry)  - NGT, TF Jevity    :  - Nomonatremia  - Strict Is/Os    Heme:  #Anemia, essential thrombocytosis  - Start Lovenox 40 qd 5/24   - SCDs  - Check Fe studies  - Hold home Anagrilide 0.5mg qd (essential thrombocytosis)  serial CBCs     ID:  - Monitor fever curve and WBCs    Endo:  #hypothyroidism  - Synthroid IV 100mcg    Dispo: NCC   Assessment/Plan:   75 y/o F PMH as above found to have R frontoparietal HH1/mF1 SAH, 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 Pt s/p DSA & pipeline stent embo 5/23.   ?ictus day 5/16  DSA pipline stenting R ICA aneurysm on 5/23    Plan:  Neurological:  #SAH, 2/2 5mm ruptured R supraclinoid aneurysm s/p angio & pipeline stent embo 5/23   #new L1 compression fx, CPB 2/2 spinal stenosis (follows w Dr. العلي)  #Encephalopathy of unclear etiology  - Neuro Checks Q1H  - Brilinta BID and Aspirin daily   - DCI Prophylaxis Nimodipine 60 mg Q4H  - daily TCDs, normovolemia  keppra for ppx; Cont vEEG  - MRA H/N + MRI brain pending  - Pain control: Tylenol, Lido patch, flexeril (new L1 compression fx, HA); MR L-spine  - PT/OT/OOB    CV:   #HTN  - -200  - TTE pending   -nimodipine  - Hold PO Amlodipine 10mg, Atenolol 50mg qd & home Losartan/HCTZ    Pulm:  #SEEMA not on CPAP, ILD  - Aspiration precautions   - 3L NC  - Prednisone 15mg qd for ILD; pulm following    GI:  #hx UGIB 2/2 bleeding duodenal ulcer (recent [facility] [place] admission for melena)  - Protonix BID due to Prednisone, DAPT use and hx UGIB  - Senna/Miralax, LBM 5/24  - NGT, TF Jevity  repeat swallow evaluation    :  - Nomonatremia, euvolemia  - Strict Is/Os; bran in place    Heme:  #Anemia, essential thrombocytosis  - Start Lovenox 40 qd 5/24   - SCDs  - Hold home Anagrilide 0.5mg qd (essential thrombocytosis)  negative LE dopplers from 5/19    ID:  - Monitor fever curve and WBCs  repeat UA    Endo:  #hypothyroidism  - Synthroid IV 100mcg  TSH 70; repeat in 3 weeks    a-line 5/23  bran 5/24    Dispo: NCC

## 2022-05-24 NOTE — PROGRESS NOTE ADULT - SUBJECTIVE AND OBJECTIVE BOX
History of Present Illness  77 y/o F PMH SEEMA and ILD, Depression, HTN, Hypothyroidism, recent UGIB 2/2 bleeding duodenal ulcer, Essential Thrombocytosis, chronic Back Pain 2/2 Spinal Stenosis s/p Spinal Epidural Injection, at Baseline AO3, lives with niece, ambulates with a walker, brought to the ED by marquise  for evaluation of worsening back pain and confusion x2 days. Two days PTP, pt c/o worsening chronic dull lower back pain that radiated down to her right medial aspect of thigh, resulting in limited ambulation (patient refusing to leave her bed) and to being non compliant with Physical Therapy in the absence of leg weakness or paresthesias or numbness or urinary incontinence. Per niece, patient has been feeling down x few days with reduced PO intake, reduced sleep, loss of interest, thoughts that her family doesn't like/ support her, and confusion (some times would not recognize niece). She has an instance where she had fecal incontinence on way to bathroom 2 days ago. In the setting of confusion and increased pain, marquise decided to bring patient to ED for evaluation.     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 Pt s/p DSA & pipeline stent embo .   Procedure done under GA, 4000U Heparin & Integrelin bolus given + ASA/Brilinta 90mg given via NGT @ 5PM, extubated without complications, R groin site. Admitted to [facility] post procedure.    ROS: niece denies any recent fever, chills, night sweats, URTI symptoms (cough, rhinorrhea, sore throat), urinary symptoms (urinary frequency, urgency, intermittence, dysuria, foul smelling urine, cloudy urine), abdominal pain, headache, nausea, or vomiting. No sick contacts. No recent travel or exposure to recent travelers.    ALLERGIES: No Known Allergies    ADMISSION SCORES:   GCS: 15  HH: 1 MF: 1  IMAGING/DATA:     < from: CT Angio Head w/ IV Cont (22 @ 17:46) >    ACC: [id number] EXAM:  CT ANGIO BRAIN (W)AW IC                        ACC: [id number] EXAM:  CT ANGIO NECK (W)AW IC                          PROCEDURE DATE:  2022      < end of copied text >    < from: CT Angio Head w/ IV Cont (22 @ 17:46) >    CTA neck:  Motion and dense calcific atherosclerosis at the carotid bulbs   bilaterally limit evaluation; presumed at least moderate to severe   stenosis bilaterally.  Short segment beading of the mid left internal carotid artery may   represent fibromuscular dysplasia.  Small 1 mm medially and posteriorly oriented outpouching in the distal   cervical left internal carotid artery.    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  Multifocal stenoses involving the anterior circulation as detailed above.      < from: CT Head No Cont (22 @ 12:56) >    ACC: [id number] EXAM:  CT BRAIN                          PROCEDURE DATE:  2022      < end of copied text >  < from: CT Head No Cont (22 @ 12:56) >  IMPRESSION:  Focal gray-white distinction loss involving the frontal cortical region   possibly representing an area of age-indeterminate ischemic change.    Similar right frontoparietal sulcal effacement which may represent   subacute subarachnoid hemorrhagic products though a follow-up brain MRI   with contrast is recommended for further evaluation.    < end of copied text >    PHYSICAL EXAM:   (Czech and English speaking)  General: well-appearing, no acute distress  HEENT: no posterior pharyngeal erythema or exudates, no cervical lymphadenopathy, no injected conjunctiva, mucous membranes moist  HEART: RRR, S1, S2, cap refill <2 seconds  LUNG: CTAB, no wheezing, ronchi, or crackles  ABDOMEN: soft, NT, nondistended, no palpable masses, no suprapubic tenderness  NEURO: drowsy but opens eyes and attends briefly, oriented x1-2 (name/), speech mumbled, pupils 3mm b/l brisk, VF intact, EOMI, follows simple commands, face symmetric, RUE 4/5, LUE 3/5 w ? drift, RLE in knee immobilizer but wiggles toes, LLE 2/5 bends knee (pre-procedure RUE>LUE, LLE>RLE)  Groin Site: R groin Clean, dry, soft, no hematoma or oozing      ICU Vital Signs Last 24 Hrs  T(C): 37.1 (24 May 2022 08:00), Max: 37.1 (24 May 2022 08:00)  T(F): 98.7 (24 May 2022 08:00), Max: 98.7 (24 May 2022 08:00)  HR: 96 (24 May 2022 09:00) (74 - 110)  BP: --  BP(mean): --  ABP: 114/52 (24 May 2022 09:00) (90/46 - 178/80)  ABP(mean): 78 (24 May 2022 09:00) (64 - 120)  RR: 20 (24 May 2022 09:00) (16 - 46)  SpO2: 98% (24 May 2022 09:00) (85% - 100%)      22 @ 07:01  -  22 @ 07:00  --------------------------------------------------------  IN: 852.8 mL / OUT: 945 mL / NET: -92.2 mL    22 @ 07:01  -  22 @ 09:23  --------------------------------------------------------  IN: 5.4 mL / OUT: 0 mL / NET: 5.4 mL            acetaminophen     Tablet .. 650 milliGRAM(s) Oral every 6 hours PRN  aspirin  chewable 81 milliGRAM(s) Oral daily  chlorhexidine 4% Liquid 1 Application(s) Topical <User Schedule>  cyclobenzaprine 10 milliGRAM(s) Oral three times a day PRN  enoxaparin Injectable 40 milliGRAM(s) SubCutaneous every 24 hours  levETIRAcetam  IVPB 500 milliGRAM(s) IV Intermittent every 12 hours  levothyroxine Injectable 100 MICROGram(s) IV Push <User Schedule>  lidocaine   4% Patch 1 Patch Transdermal daily  magnesium sulfate  IVPB 2 Gram(s) IV Intermittent every 2 hours  niMODipine Oral Solution 60 milliGRAM(s) Enteral Tube every 4 hours  norepinephrine Infusion 0.05 MICROgram(s)/kG/Min (6.74 mL/Hr) IV Continuous <Continuous>  pantoprazole  Injectable 40 milliGRAM(s) IV Push every 12 hours  polyethylene glycol 3350 17 Gram(s) Oral daily  potassium chloride   Powder 40 milliEquivalent(s) Enteral Tube once  predniSONE   Tablet 15 milliGRAM(s) Oral daily  QUEtiapine 25 milliGRAM(s) Oral every 6 hours PRN  senna 2 Tablet(s) Oral at bedtime  ticagrelor 90 milliGRAM(s) Oral two times a day      LABS:  Na: 139 ( @ 05:00), 134 ( 19:09), 137 ()  K: 3.7 ( 05:00), 3.7 ( 19:09), 3.8 ()  Cl: 105 ( 05:00), 105 ( 19:09), 103 ()  CO2: 16 ( 05:00), 17 ( 19:09), 20 ()  BUN: 21 ( 05:00), 23 ( 19:09), 25 ()  Cr: 1.3 ( @ 05:00), 1.3 ( 19:09), 1.3 ()  Glu: 94( 05:00), 105( 19:09), 100()    Hgb: 8.1 ( 05:00), 7.8 ( 19:09), 9.6 ()  Hct: 23.7 ( 05:00), 23.5 ( 19:09), 30.0 ()  WBC: 22.07 ( @ 05:00), 15.42 ( @ 19:09), 17.07 (:31)  Plt: 489 (05-24 @ 05:00), 422 ( @ 19:09), 356 ( @ 05:31)    INR:   PTT:           LIVER FUNCTIONS - ( 24 May 2022 05:00 )  Alb: 3.8 g/dL / Pro: 5.9 g/dL / ALK PHOS: 50 U/L / ALT: 33 U/L / AST: 48 U/L / GGT: x                History of Present Illness  77 y/o F PMH SEEMA and ILD, Depression, HTN, Hypothyroidism, recent UGIB 2/2 bleeding duodenal ulcer, Essential Thrombocytosis, chronic Back Pain 2/2 Spinal Stenosis s/p Spinal Epidural Injection, at Baseline AO3, lives with niece, ambulates with a walker, brought to the ED by marquise  for evaluation of worsening back pain and confusion x2 days. Two days PTP, pt c/o worsening chronic dull lower back pain that radiated down to her right medial aspect of thigh, resulting in limited ambulation (patient refusing to leave her bed) and to being non compliant with Physical Therapy in the absence of leg weakness or paresthesias or numbness or urinary incontinence. Per niece, patient has been feeling down x few days with reduced PO intake, reduced sleep, loss of interest, thoughts that her family doesn't like/ support her, and confusion (some times would not recognize niece). She has an instance where she had fecal incontinence on way to bathroom 2 days ago. In the setting of confusion and increased pain, marquise decided to bring patient to ED for evaluation.     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 Pt s/p DSA & pipeline stent embo .   Procedure done under GA, 4000U Heparin & Integrelin bolus given + ASA/Brilinta 90mg given via NGT @ 5PM, extubated without complications, R groin site. Admitted to [facility] post procedure.    ROS: niece denies any recent fever, chills, night sweats, URTI symptoms (cough, rhinorrhea, sore throat), urinary symptoms (urinary frequency, urgency, intermittence, dysuria, foul smelling urine, cloudy urine), abdominal pain, headache, nausea, or vomiting. No sick contacts. No recent travel or exposure to recent travelers.    ALLERGIES: No Known Allergies    ADMISSION SCORES:   GCS: 15  HH: 1 MF: 1  IMAGING/DATA:     < from: CT Angio Head w/ IV Cont (22 @ 17:46) >    ACC: [id number] EXAM:  CT ANGIO BRAIN (W)AW IC                        ACC: [id number] EXAM:  CT ANGIO NECK (W)AW IC                          PROCEDURE DATE:  2022      < end of copied text >    < from: CT Angio Head w/ IV Cont (22 @ 17:46) >    CTA neck:  Motion and dense calcific atherosclerosis at the carotid bulbs   bilaterally limit evaluation; presumed at least moderate to severe   stenosis bilaterally.  Short segment beading of the mid left internal carotid artery may   represent fibromuscular dysplasia.  Small 1 mm medially and posteriorly oriented outpouching in the distal   cervical left internal carotid artery.    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  Multifocal stenoses involving the anterior circulation as detailed above.      < from: CT Head No Cont (22 @ 12:56) >    ACC: [id number] EXAM:  CT BRAIN                          PROCEDURE DATE:  2022      < end of copied text >  < from: CT Head No Cont (22 @ 12:56) >  IMPRESSION:  Focal gray-white distinction loss involving the frontal cortical region   possibly representing an area of age-indeterminate ischemic change.    Similar right frontoparietal sulcal effacement which may represent   subacute subarachnoid hemorrhagic products though a follow-up brain MRI   with contrast is recommended for further evaluation.    < end of copied text >    PHYSICAL EXAM:   (Croatian and English speaking)  General: well-appearing, no acute distress  HEENT: no posterior pharyngeal erythema or exudates, no cervical lymphadenopathy, no injected conjunctiva, mucous membranes moist  HEART: RRR, S1, S2, cap refill <2 seconds  LUNG: CTAB, no wheezing, ronchi, or crackles  ABDOMEN: soft, NT, nondistended, no palpable masses, no suprapubic tenderness  NEURO: awake alert oriented x1-2 (name/), fluent speech, not fully participatory, tracks and attends b/l pupils 3mm b/l brisk, EOMI, follows simple commands on all four, face symmetric, RUE grossly 4+, LUE 4-/5,  b/l LEs limited by pain 2/5   Groin Site: R groin Clean, dry, soft, no hematoma or oozing      ICU Vital Signs Last 24 Hrs  T(C): 37.1 (24 May 2022 08:00), Max: 37.1 (24 May 2022 08:00)  T(F): 98.7 (24 May 2022 08:00), Max: 98.7 (24 May 2022 08:00)  HR: 96 (24 May 2022 09:00) (74 - 110)  BP: --  BP(mean): --  ABP: 114/52 (24 May 2022 09:00) (90/46 - 178/80)  ABP(mean): 78 (24 May 2022 09:00) (64 - 120)  RR: 20 (24 May 2022 09:00) (16 - 46)  SpO2: 98% (24 May 2022 09:00) (85% - 100%)      22 @ 07:01  -  22 @ 07:00  --------------------------------------------------------  IN: 852.8 mL / OUT: 945 mL / NET: -92.2 mL    22 @ 07:01  -  22 @ 09:23  --------------------------------------------------------  IN: 5.4 mL / OUT: 0 mL / NET: 5.4 mL            acetaminophen     Tablet .. 650 milliGRAM(s) Oral every 6 hours PRN  aspirin  chewable 81 milliGRAM(s) Oral daily  chlorhexidine 4% Liquid 1 Application(s) Topical <User Schedule>  cyclobenzaprine 10 milliGRAM(s) Oral three times a day PRN  enoxaparin Injectable 40 milliGRAM(s) SubCutaneous every 24 hours  levETIRAcetam  IVPB 500 milliGRAM(s) IV Intermittent every 12 hours  levothyroxine Injectable 100 MICROGram(s) IV Push <User Schedule>  lidocaine   4% Patch 1 Patch Transdermal daily  magnesium sulfate  IVPB 2 Gram(s) IV Intermittent every 2 hours  niMODipine Oral Solution 60 milliGRAM(s) Enteral Tube every 4 hours  norepinephrine Infusion 0.05 MICROgram(s)/kG/Min (6.74 mL/Hr) IV Continuous <Continuous>  pantoprazole  Injectable 40 milliGRAM(s) IV Push every 12 hours  polyethylene glycol 3350 17 Gram(s) Oral daily  potassium chloride   Powder 40 milliEquivalent(s) Enteral Tube once  predniSONE   Tablet 15 milliGRAM(s) Oral daily  QUEtiapine 25 milliGRAM(s) Oral every 6 hours PRN  senna 2 Tablet(s) Oral at bedtime  ticagrelor 90 milliGRAM(s) Oral two times a day      LABS:  Na: 139 ( @ 05:00), 134 ( 19:09), 137 ()  K: 3.7 ( 05:00), 3.7 ( 19:09), 3.8 ()  Cl: 105 ( 05:00), 105 ( 19:09), 103 ()  CO2: 16 ( 05:00), 17 ( 19:09), 20 ()  BUN: 21 ( 05:00), 23 ( 19:09), 25 ()  Cr: 1.3 ( 05:00), 1.3 ( 19:09), 1.3 ()  Glu: 94( 05:00), 105( 19:09), 100(:)    Hgb: 8.1 ( 05:00), 7.8 ( 19:09), 9.6 ()  Hct: 23.7 ( 05:00), 23.5 ( 19:09), 30.0 ()  WBC: 22.07 ( @ 05:00), 15.42 ( @ 19:09), 17.07 (:31)  Plt: 489 ( 05:00), 422 ( 19:09), 356 (:)    INR:   PTT:           LIVER FUNCTIONS - ( 24 May 2022 05:00 )  Alb: 3.8 g/dL / Pro: 5.9 g/dL / ALK PHOS: 50 U/L / ALT: 33 U/L / AST: 48 U/L / GGT: x

## 2022-05-24 NOTE — OCCUPATIONAL THERAPY INITIAL EVALUATION ADULT - SPECIFY REASON(S)
Case discussed in Neurocrit IDT rounds, per team, pt on vEEG, confused, possible seizures. Will hold and initiate OT when pt medically cleared, possibly this afternoon.
Hold OT at this time secondary pt is agitated/uncooperative with new onset neuro event and increased BP. OT will folllow up when appropriate.
Pt currently on bedrest. Updated activity orders are required to cont with OT evaluation. OT to cont when appropriate.
Case discussed with neuro-intervention team, pt pending procedures possible coiling and stent today. WIll hold until post-procedure

## 2022-05-24 NOTE — PHYSICAL THERAPY INITIAL EVALUATION ADULT - PERTINENT HX OF CURRENT PROBLEM, REHAB EVAL
Patient is a 76 year old female, primarily Samoan speaking, who presented with AMS and worsened lower back pain, found to have right frontoparietal SAH HH1/mFS 1 possibly 2/2 5mm right supraclinoid ICA saccular aneurysm found on CTA, which also reported multifocal stenosis. Patient is now s/p diagnostic cerebral angiogram + pipeline stent embolization of the right ICA aneurysm.

## 2022-05-25 LAB
ALBUMIN SERPL ELPH-MCNC: 3.8 G/DL — SIGNIFICANT CHANGE UP (ref 3.5–5.2)
ALP SERPL-CCNC: 52 U/L — SIGNIFICANT CHANGE UP (ref 30–115)
ALT FLD-CCNC: 30 U/L — SIGNIFICANT CHANGE UP (ref 0–41)
ANION GAP SERPL CALC-SCNC: 15 MMOL/L — HIGH (ref 7–14)
AST SERPL-CCNC: 35 U/L — SIGNIFICANT CHANGE UP (ref 0–41)
BASOPHILS # BLD AUTO: 0.14 K/UL — SIGNIFICANT CHANGE UP (ref 0–0.2)
BASOPHILS NFR BLD AUTO: 0.6 % — SIGNIFICANT CHANGE UP (ref 0–1)
BILIRUB SERPL-MCNC: 0.6 MG/DL — SIGNIFICANT CHANGE UP (ref 0.2–1.2)
BUN SERPL-MCNC: 11 MG/DL — SIGNIFICANT CHANGE UP (ref 10–20)
CALCIUM SERPL-MCNC: 8.7 MG/DL — SIGNIFICANT CHANGE UP (ref 8.5–10.1)
CHLORIDE SERPL-SCNC: 112 MMOL/L — HIGH (ref 98–110)
CO2 SERPL-SCNC: 15 MMOL/L — LOW (ref 17–32)
CREAT SERPL-MCNC: 1.2 MG/DL — SIGNIFICANT CHANGE UP (ref 0.7–1.5)
EGFR: 47 ML/MIN/1.73M2 — LOW
EOSINOPHIL # BLD AUTO: 0.3 K/UL — SIGNIFICANT CHANGE UP (ref 0–0.7)
EOSINOPHIL NFR BLD AUTO: 1.2 % — SIGNIFICANT CHANGE UP (ref 0–8)
GLUCOSE SERPL-MCNC: 110 MG/DL — HIGH (ref 70–99)
HCT VFR BLD CALC: 22.5 % — LOW (ref 37–47)
HGB BLD-MCNC: 7.7 G/DL — LOW (ref 12–16)
IMM GRANULOCYTES NFR BLD AUTO: 8 % — HIGH (ref 0.1–0.3)
LYMPHOCYTES # BLD AUTO: 1.55 K/UL — SIGNIFICANT CHANGE UP (ref 1.2–3.4)
LYMPHOCYTES # BLD AUTO: 6.4 % — LOW (ref 20.5–51.1)
MAGNESIUM SERPL-MCNC: 2.4 MG/DL — SIGNIFICANT CHANGE UP (ref 1.8–2.4)
MCHC RBC-ENTMCNC: 32.6 PG — HIGH (ref 27–31)
MCHC RBC-ENTMCNC: 34.2 G/DL — SIGNIFICANT CHANGE UP (ref 32–37)
MCV RBC AUTO: 95.3 FL — SIGNIFICANT CHANGE UP (ref 81–99)
MONOCYTES # BLD AUTO: 1.72 K/UL — HIGH (ref 0.1–0.6)
MONOCYTES NFR BLD AUTO: 7.1 % — SIGNIFICANT CHANGE UP (ref 1.7–9.3)
NEUTROPHILS # BLD AUTO: 18.61 K/UL — HIGH (ref 1.4–6.5)
NEUTROPHILS NFR BLD AUTO: 76.7 % — HIGH (ref 42.2–75.2)
NRBC # BLD: 0 /100 WBCS — SIGNIFICANT CHANGE UP (ref 0–0)
PHOSPHATE SERPL-MCNC: 1.6 MG/DL — LOW (ref 2.1–4.9)
PLATELET # BLD AUTO: 530 K/UL — HIGH (ref 130–400)
POTASSIUM SERPL-MCNC: 4.2 MMOL/L — SIGNIFICANT CHANGE UP (ref 3.5–5)
POTASSIUM SERPL-SCNC: 4.2 MMOL/L — SIGNIFICANT CHANGE UP (ref 3.5–5)
PROCALCITONIN SERPL-MCNC: 0.18 NG/ML — HIGH (ref 0.02–0.1)
PROT SERPL-MCNC: 5.8 G/DL — LOW (ref 6–8)
RBC # BLD: 2.36 M/UL — LOW (ref 4.2–5.4)
RBC # FLD: 19.2 % — HIGH (ref 11.5–14.5)
SODIUM SERPL-SCNC: 142 MMOL/L — SIGNIFICANT CHANGE UP (ref 135–146)
T4 FREE SERPL-MCNC: 1 NG/DL — SIGNIFICANT CHANGE UP (ref 0.9–1.8)
WBC # BLD: 24.26 K/UL — HIGH (ref 4.8–10.8)
WBC # FLD AUTO: 24.26 K/UL — HIGH (ref 4.8–10.8)

## 2022-05-25 PROCEDURE — 95720 EEG PHY/QHP EA INCR W/VEEG: CPT

## 2022-05-25 PROCEDURE — 71045 X-RAY EXAM CHEST 1 VIEW: CPT | Mod: 26

## 2022-05-25 PROCEDURE — 99291 CRITICAL CARE FIRST HOUR: CPT

## 2022-05-25 RX ORDER — SODIUM CHLORIDE 9 MG/ML
250 INJECTION INTRAMUSCULAR; INTRAVENOUS; SUBCUTANEOUS ONCE
Refills: 0 | Status: DISCONTINUED | OUTPATIENT
Start: 2022-05-25 | End: 2022-05-30

## 2022-05-25 RX ADMIN — NIMODIPINE 30 MILLIGRAM(S): 60 SOLUTION ORAL at 20:21

## 2022-05-25 RX ADMIN — NIMODIPINE 30 MILLIGRAM(S): 60 SOLUTION ORAL at 17:15

## 2022-05-25 RX ADMIN — PANTOPRAZOLE SODIUM 40 MILLIGRAM(S): 20 TABLET, DELAYED RELEASE ORAL at 17:17

## 2022-05-25 RX ADMIN — TICAGRELOR 90 MILLIGRAM(S): 90 TABLET ORAL at 06:24

## 2022-05-25 RX ADMIN — NIMODIPINE 30 MILLIGRAM(S): 60 SOLUTION ORAL at 10:12

## 2022-05-25 RX ADMIN — NIMODIPINE 30 MILLIGRAM(S): 60 SOLUTION ORAL at 08:24

## 2022-05-25 RX ADMIN — POLYETHYLENE GLYCOL 3350 17 GRAM(S): 17 POWDER, FOR SOLUTION ORAL at 11:23

## 2022-05-25 RX ADMIN — SENNA PLUS 2 TABLET(S): 8.6 TABLET ORAL at 22:49

## 2022-05-25 RX ADMIN — NIMODIPINE 30 MILLIGRAM(S): 60 SOLUTION ORAL at 04:15

## 2022-05-25 RX ADMIN — LIDOCAINE 1 PATCH: 4 CREAM TOPICAL at 23:00

## 2022-05-25 RX ADMIN — PANTOPRAZOLE SODIUM 40 MILLIGRAM(S): 20 TABLET, DELAYED RELEASE ORAL at 06:00

## 2022-05-25 RX ADMIN — MUPIROCIN 1 APPLICATION(S): 20 OINTMENT TOPICAL at 17:18

## 2022-05-25 RX ADMIN — NIMODIPINE 30 MILLIGRAM(S): 60 SOLUTION ORAL at 15:14

## 2022-05-25 RX ADMIN — LEVETIRACETAM 500 MILLIGRAM(S): 250 TABLET, FILM COATED ORAL at 05:57

## 2022-05-25 RX ADMIN — LEVETIRACETAM 500 MILLIGRAM(S): 250 TABLET, FILM COATED ORAL at 17:17

## 2022-05-25 RX ADMIN — CHLORHEXIDINE GLUCONATE 1 APPLICATION(S): 213 SOLUTION TOPICAL at 05:57

## 2022-05-25 RX ADMIN — Medication 100 MICROGRAM(S): at 06:01

## 2022-05-25 RX ADMIN — ENOXAPARIN SODIUM 40 MILLIGRAM(S): 100 INJECTION SUBCUTANEOUS at 12:25

## 2022-05-25 RX ADMIN — TICAGRELOR 90 MILLIGRAM(S): 90 TABLET ORAL at 17:17

## 2022-05-25 RX ADMIN — Medication 15 MILLIGRAM(S): at 05:56

## 2022-05-25 RX ADMIN — LIDOCAINE 1 PATCH: 4 CREAM TOPICAL at 11:22

## 2022-05-25 RX ADMIN — NIMODIPINE 30 MILLIGRAM(S): 60 SOLUTION ORAL at 22:12

## 2022-05-25 RX ADMIN — MUPIROCIN 1 APPLICATION(S): 20 OINTMENT TOPICAL at 05:57

## 2022-05-25 RX ADMIN — Medication 81 MILLIGRAM(S): at 11:23

## 2022-05-25 RX ADMIN — NIMODIPINE 30 MILLIGRAM(S): 60 SOLUTION ORAL at 06:24

## 2022-05-25 RX ADMIN — LIDOCAINE 1 PATCH: 4 CREAM TOPICAL at 19:00

## 2022-05-25 NOTE — OCCUPATIONAL THERAPY INITIAL EVALUATION ADULT - TRANSFER TRAINING, PT EVAL
Patient will perform bed <> chair transfer with CGA with use of appropriate assistive device by discharge.

## 2022-05-25 NOTE — SWALLOW BEDSIDE ASSESSMENT ADULT - CONSISTENCIES ADMINISTERED
thin liquid/pureed/soft & bite-sized
thin liquid/regular solid
thin liquid/moderately thick/mildly thick/pureed/soft & bite-sized

## 2022-05-25 NOTE — SWALLOW BEDSIDE ASSESSMENT ADULT - MODE OF PRESENTATION
cup/spoon/straw/self fed/fed by clinician
assisted by SLP/cup/self fed
cup/spoon/straw/self fed/fed by clinician

## 2022-05-25 NOTE — OCCUPATIONAL THERAPY INITIAL EVALUATION ADULT - PERTINENT HX OF CURRENT PROBLEM, REHAB EVAL
75 y/o F PMH as above found to have R frontoparietal HH1/mF1 SAH, 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 Pt s/p DSA & pipeline stent embo 5/23.

## 2022-05-25 NOTE — OCCUPATIONAL THERAPY INITIAL EVALUATION ADULT - ADDITIONAL COMMENTS
pt is ?able historian however appears to have required some assistance with ADLs prior to admit. utilizes RW and SC, has shower chair in home

## 2022-05-25 NOTE — SWALLOW BEDSIDE ASSESSMENT ADULT - SLP GENERAL OBSERVATIONS
Pt awake more alert following commands. Pt oriented to self not place or time. Speech clear.
pt received lethargic. NGT in situ. pt known to acute service w/recs for regular, thins (5/19). CTA conducted 5/22 revealed r ICA aneurysm.
Pt received in bed awake, hallucinating, believed she is home, asking for her brother, feels like she is falling.
Pt awake in bed, o2 via RA

## 2022-05-25 NOTE — PROGRESS NOTE ADULT - CRITICAL CARE ATTENDING COMMENT
AMS, hh1 mf1 ?aSAH s/p pipeline stent-coil with flow diverter of R ICA aneurysm, admitted to [facility] post procedure    neuro q1  veeg, keppra ppx  DCI ppx  TCDs  dapt  sbp liberalized 120-200  TTE  euvolemia, normonatremia  ppi bid, serial cbc    dispo- ncc AMS, hh1 mf1 ?aSAH s/p pipeline stent-coil with flow diverter of R ICA aneurysm, admitted to [facility] post procedure    bleed day 9    neuro q2  veeg, keppra ppx  DCI ppx  TCDs daily  dapt  sbp liberalized 120-200  TTE- stable Biv Function  euvolemia, normonatremia  c/w ppi bid, stable cbc; hx of GIB and on DAPT on prednisone    dispo- ncc

## 2022-05-25 NOTE — OCCUPATIONAL THERAPY INITIAL EVALUATION ADULT - PATIENT PROFILE REVIEW, REHAB EVAL
Evaluation Attempted: 10:00am; pt chart thoroughly reviewed prior to OT evaluation/yes
Evaluation Attempted: 09:00am; pt chart thoroughly reviewed prior to OT evaluation/yes
yes
Evaluation TIme: 10:00-11:00am; pt chart thoroughly reviewed prior to OT evaluation/yes

## 2022-05-25 NOTE — OCCUPATIONAL THERAPY INITIAL EVALUATION ADULT - GROSSLY INTACT, SENSORY
however will continue to assess as pt with decreased cognition during assessment/Left UE/Right UE/Grossly Intact

## 2022-05-25 NOTE — SWALLOW BEDSIDE ASSESSMENT ADULT - PHARYNGEAL PHASE
Throat clear post oral intake/Within functional limits
Within functional limits
w/ thin, mildly thick and soft and bite sized solids/Throat clear post oral intake/Delayed throat clear post oral intake
details…

## 2022-05-25 NOTE — EEG REPORT - NS EEG TEXT BOX
Epilepsy Attending Note:     RADHAPINGXIANGAMANDA ZULUAGA    76y Female  MRN [id number]    Vital Signs Last 24 Hrs  T(C): 36.6 (25 May 2022 08:00), Max: 37.8 (24 May 2022 20:00)  T(F): 97.9 (25 May 2022 08:00), Max: 100.1 (24 May 2022 20:00)  HR: 94 (25 May 2022 09:00) (78 - 104)  BP: 107/46 (24 May 2022 23:00) (96/58 - 158/83)  BP(mean): 59 (24 May 2022 23:00) (59 - 114)  RR: 23 (25 May 2022 09:00) (14 - 28)  SpO2: 99% (25 May 2022 09:00) (94% - 100%)                          7.7    24.26 )-----------( 530      ( 25 May 2022 05:18 )             22.5       05-25    142  |  112<H>  |  11  ----------------------------<  110<H>  4.2   |  15<L>  |  1.2    Ca    8.7      25 May 2022 05:18  Phos  1.6     05-25  Mg     2.4     05-25    TPro  5.8<L>  /  Alb  3.8  /  TBili  0.6  /  DBili  x   /  AST  35  /  ALT  30  /  AlkPhos  52  05-25      MEDICATIONS  (STANDING):  aspirin  chewable 81 milliGRAM(s) Oral daily  chlorhexidine 4% Liquid 1 Application(s) Topical <User Schedule>  enoxaparin Injectable 40 milliGRAM(s) SubCutaneous every 24 hours  levETIRAcetam 500 milliGRAM(s) Oral two times a day  levothyroxine 100 MICROGram(s) Oral daily  lidocaine   4% Patch 1 Patch Transdermal daily  mupirocin 2% Ointment 1 Application(s) Topical every 12 hours  niMODipine Oral Solution 30 milliGRAM(s) Enteral Tube every 2 hours  norepinephrine Infusion 0.05 MICROgram(s)/kG/Min (6.74 mL/Hr) IV Continuous <Continuous>  pantoprazole  Injectable 40 milliGRAM(s) IV Push every 12 hours  polyethylene glycol 3350 17 Gram(s) Oral daily  predniSONE   Tablet 15 milliGRAM(s) Oral daily  senna 2 Tablet(s) Oral at bedtime  sodium chloride 0.9% Bolus 250 milliLiter(s) IV Bolus once  ticagrelor 90 milliGRAM(s) Oral two times a day    MEDICATIONS  (PRN):  acetaminophen     Tablet .. 650 milliGRAM(s) Oral every 6 hours PRN Mild Pain (1 - 3)  cyclobenzaprine 10 milliGRAM(s) Oral three times a day PRN Muscle Spasm  QUEtiapine 25 milliGRAM(s) Oral every 6 hours PRN agitation          VEEG in the last 24 hours:    Background - continuous, symmetrical, less than optimally organized, reaching frequencies in the range of 7-8 hz    Focal and generalized slowin. mild generalized slowing  2. mild left hemispheric focal slowing   3. lower amplitude on the right side    Interictal activity - none    Events - none    Seizures -none    Impression:  Abnormal VEEG as above    Plan - as per [facility] team

## 2022-05-25 NOTE — CHART NOTE - NSCHARTNOTEFT_GEN_A_CORE
As per vascular tech:    Right MCA: 72  Right [place]: 2.11    Left MCA: 104  Left [place]: 2.73    Discussed with NCC Attending Dr. Schwab Jennifer Holtzbach, [facility]  [phone]

## 2022-05-25 NOTE — SWALLOW BEDSIDE ASSESSMENT ADULT - NS SPL SWALLOW CLINIC TRIAL FT
tolerated
+ toleration observed without overt symptoms of penetration/aspiration. Hard chewables not trialed 2/2 mental status
+overt s/s of penetration/aspiration w/ thin, mildly thick and soft and bite sized solids +toleration of puree and moderately thick liquids w/o overt s/s of penetration/aspiration

## 2022-05-25 NOTE — CHART NOTE - NSCHARTNOTEFT_GEN_A_CORE
T(F): 97.9 (22 @ 08:00), Max: 100.1 (22 @ 20:00)  HR: 100 (22 @ 11:30) (86 - 106)  BP: 101/67 (22 @ 11:30) (94/75 - 158/83)  RR: 25 (22 @ 11:30) (14 - 28)  SpO2: 99% (22 @ 11:30) (86% - 100%)    MEDICATIONS  (STANDING):  aspirin  chewable 81 milliGRAM(s) Oral daily  chlorhexidine 4% Liquid 1 Application(s) Topical <User Schedule>  enoxaparin Injectable 40 milliGRAM(s) SubCutaneous every 24 hours  levETIRAcetam 500 milliGRAM(s) Oral two times a day  lidocaine   4% Patch 1 Patch Transdermal daily  mupirocin 2% Ointment 1 Application(s) Topical every 12 hours  niMODipine Oral Solution 30 milliGRAM(s) Enteral Tube every 2 hours  pantoprazole  Injectable 40 milliGRAM(s) IV Push every 12 hours  polyethylene glycol 3350 17 Gram(s) Oral daily  predniSONE   Tablet 15 milliGRAM(s) Oral daily  senna 2 Tablet(s) Oral at bedtime  sodium chloride 0.9% Bolus 250 milliLiter(s) IV Bolus once  ticagrelor 90 milliGRAM(s) Oral two times a day    MEDICATIONS  (PRN):  acetaminophen     Tablet .. 650 milliGRAM(s) Oral every 6 hours PRN Mild Pain (1 - 3)  cyclobenzaprine 10 milliGRAM(s) Oral three times a day PRN Muscle Spasm  QUEtiapine 25 milliGRAM(s) Oral every 6 hours PRN agitation    I&O's Detail    24 May 2022 07:01  -  25 May 2022 07:00  --------------------------------------------------------  IN:    Enteral Tube Flush: 250 mL    IV PiggyBack: 250 mL    Norepinephrine: 5.4 mL    Oral Fluid: 720 mL    Sodium Chloride 0.9% Bolus: 1000 mL  Total IN: 2225.4 mL    OUT:    Indwelling Catheter - Urethral (mL): 1890 mL    Norepinephrine: 0 mL  Total OUT: 1890 mL    Total NET: 335.4 mL    25 May 2022 07:01  -  25 May 2022 12:14  --------------------------------------------------------  IN:  Total IN: 0 mL    OUT:    Indwelling Catheter - Urethral (mL): 700 mL  Total OUT: 700 mL    Total NET: -700 mL        142  |  112<H>  |  11  ----------------------------<  110<H>  4.2   |  15<L>  |  1.2    Ca    8.7      25 May 2022 05:18  Phos  1.6       Mg     2.4         TPro  5.8<L>  /  Alb  3.8  /  TBili  0.6  /  DBili  x   /  AST  35  /  ALT  30  /  AlkPhos  52                          7.7    24.26 )-----------( 530      ( 25 May 2022 05:18 )             22.5     CAPILLARY BLOOD GLUCOSE  POCT Blood Glucose.: 76 mg/dL (25 May 2022 05:00)      Daily Weight in k.4 (25 May 2022 01:00)    Diet, Soft and Bite Sized (22 @ 10:58) [Active]    ASSESSMENT  - metabolic encephalopathy 2/2 Subacute SAH likely 2/2 HTN Emergency   - worsening Lower Back Pain Likely Secondary to New L1 Compression Fracture  - history of Chronic Back Pain for years secondary to Spinal Stenosis   - constipation  - JEOVANY, improved  - SEEMA  - hypothyroidism  - history of UGIB Due to Bleeding Duodenal Ulcer  - at risk for malnutrition    PLAN  - supplement phos IV  - PO diet per speech  - bowel regimen (on senna/miralax), document BM's  - will follow pt alert, verbal, OOB in chair  small NG feeding tube still in place  po diet ordered - awaiting Speech f/u  abd obese, soft, NT, "bloated" but not tender per pt, feels full, + large BM yesterday per RN  T(F): 97.9 (22 @ 08:00), Max: 100.1 (22 @ 20:00)  HR: 100 (22 @ 11:30) (86 - 106)  BP: 101/67 (22 @ 11:30) (94/75 - 158/83)  RR: 25 (22 @ 11:30) (14 - 28)  SpO2: 99% (22 @ 11:30) (86% - 100%)    MEDICATIONS  (STANDING):  aspirin  chewable 81 milliGRAM(s) Oral daily  chlorhexidine 4% Liquid 1 Application(s) Topical <User Schedule>  enoxaparin Injectable 40 milliGRAM(s) SubCutaneous every 24 hours  levETIRAcetam 500 milliGRAM(s) Oral two times a day  lidocaine   4% Patch 1 Patch Transdermal daily  mupirocin 2% Ointment 1 Application(s) Topical every 12 hours  niMODipine Oral Solution 30 milliGRAM(s) Enteral Tube every 2 hours  pantoprazole  Injectable 40 milliGRAM(s) IV Push every 12 hours  polyethylene glycol 3350 17 Gram(s) Oral daily  predniSONE   Tablet 15 milliGRAM(s) Oral daily  senna 2 Tablet(s) Oral at bedtime  sodium chloride 0.9% Bolus 250 milliLiter(s) IV Bolus once  ticagrelor 90 milliGRAM(s) Oral two times a day    MEDICATIONS  (PRN):  acetaminophen     Tablet .. 650 milliGRAM(s) Oral every 6 hours PRN Mild Pain (1 - 3)  cyclobenzaprine 10 milliGRAM(s) Oral three times a day PRN Muscle Spasm  QUEtiapine 25 milliGRAM(s) Oral every 6 hours PRN agitation    I&O's Detail    24 May 2022 07:01  -  25 May 2022 07:00  --------------------------------------------------------  IN:    Enteral Tube Flush: 250 mL    IV PiggyBack: 250 mL    Norepinephrine: 5.4 mL    Oral Fluid: 720 mL    Sodium Chloride 0.9% Bolus: 1000 mL  Total IN: 2225.4 mL    OUT:    Indwelling Catheter - Urethral (mL): 1890 mL    Norepinephrine: 0 mL  Total OUT: 1890 mL    Total NET: 335.4 mL    25 May 2022 07:01  -  25 May 2022 12:14  --------------------------------------------------------  IN:  Total IN: 0 mL    OUT:    Indwelling Catheter - Urethral (mL): 700 mL  Total OUT: 700 mL    Total NET: -700 mL        142  |  112<H>  |  11  ----------------------------<  110<H>  4.2   |  15<L>  |  1.2    Ca    8.7      25 May 2022 05:18  Phos  1.6       Mg     2.4         TPro  5.8<L>  /  Alb  3.8  /  TBili  0.6  /  DBili  x   /  AST  35  /  ALT  30  /  AlkPhos  52                          7.7    24.26 )-----------( 530      ( 25 May 2022 05:18 )             22.5     CAPILLARY BLOOD GLUCOSE  POCT Blood Glucose.: 76 mg/dL (25 May 2022 05:00)      Daily Weight in k.4 (25 May 2022 01:00)    Diet, Soft and Bite Sized (22 @ 10:58) [Active]    ASSESSMENT  - metabolic encephalopathy 2/2 Subacute SAH likely 2/2 HTN Emergency   - worsening Lower Back Pain Likely Secondary to New L1 Compression Fracture  - history of Chronic Back Pain for years secondary to Spinal Stenosis   - constipation  - JEOVANY, improved  - SEEMA  - hypothyroidism  - history of UGIB Due to Bleeding Duodenal Ulcer  - at risk for malnutrition  - ACUTE - HYPOPHOSPHATEMIA    PLAN  - SUPPLEMENT PHOS IV - aim for 3.5  - PO diet per speech  - bowel regimen (on senna/miralax), document BM's  - d/w RN and N-C:  if meal intake < 50%, give 240ml Replete over 30 min via NG after the attempted po meal      if po intake > 75% x next 2 meals, can d/c NG and add oral between-meal supplements to reach protein goals (e.g. Prosource Gelatein, lite yogurt)  - will follow

## 2022-05-25 NOTE — OCCUPATIONAL THERAPY INITIAL EVALUATION ADULT - GENERAL OBSERVATIONS, REHAB EVAL
Pt received semi cantu in bed in NAD, +vEEG, +tele, +BP cuff, +pulse oxi, +NG tube, +R wrist A line, +bran, +b/l LE sequentials removed prior to session, left seated in b/s chair in [facility], all liens intact, vitals stable, TIMO Bowman present b/l wrist restraints

## 2022-05-25 NOTE — SWALLOW BEDSIDE ASSESSMENT ADULT - SWALLOW EVAL: DIAGNOSIS
+overt s/s of penetration/aspiration w/ thin, mildly thick and soft and bite sized solids +toleration of puree and moderately thick liquids w/o overt s/s of penetration/aspiration
not a candidate for po trials at this time 2'high risks of aspiration/penetration
+ toleration observed without overt symptoms of penetration/aspiration for puree, soft and thin liquids
+ toleration of cup sips of thin liquids and regular textures.

## 2022-05-25 NOTE — OCCUPATIONAL THERAPY INITIAL EVALUATION ADULT - NS ASR FOLLOW COMMAND OT EVAL
benefits from visual cues and redirection, decreased attention/50% of the time/able to follow single-step instructions

## 2022-05-25 NOTE — PROGRESS NOTE ADULT - SUBJECTIVE AND OBJECTIVE BOX
History of Present Illness  75 y/o F PMH SEEMA and ILD, Depression, HTN, Hypothyroidism, recent UGIB 2/2 bleeding duodenal ulcer, Essential Thrombocytosis, chronic Back Pain 2/2 Spinal Stenosis s/p Spinal Epidural Injection, at Baseline AO3, lives with niece, ambulates with a walker, brought to the ED by marquise  for evaluation of worsening back pain and confusion x2 days. Two days PTP, pt c/o worsening chronic dull lower back pain that radiated down to her right medial aspect of thigh, resulting in limited ambulation (patient refusing to leave her bed) and to being non compliant with Physical Therapy in the absence of leg weakness or paresthesias or numbness or urinary incontinence. Per niece, patient has been feeling down x few days with reduced PO intake, reduced sleep, loss of interest, thoughts that her family doesn't like/ support her, and confusion (some times would not recognize niece). She has an instance where she had fecal incontinence on way to bathroom 2 days ago. In the setting of confusion and increased pain, marquise decided to bring patient to ED for evaluation.     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 Pt s/p DSA & pipeline stent embo .   Procedure done under GA, 4000U Heparin & Integrelin bolus given + ASA/Brilinta 90mg given via NGT @ 5PM, extubated without complications, R groin site. Admitted to [facility] post procedure.    ROS: niece denies any recent fever, chills, night sweats, URTI symptoms (cough, rhinorrhea, sore throat), urinary symptoms (urinary frequency, urgency, intermittence, dysuria, foul smelling urine, cloudy urine), abdominal pain, headache, nausea, or vomiting. No sick contacts. No recent travel or exposure to recent travelers.    ALLERGIES: No Known Allergies    ADMISSION SCORES:   GCS: 15  HH: 1 MF: 1  IMAGING/DATA:     < from: CT Angio Head w/ IV Cont (22 @ 17:46) >    ACC: [id number] EXAM:  CT ANGIO BRAIN (W)AW IC                        ACC: [id number] EXAM:  CT ANGIO NECK (W)AW IC                          PROCEDURE DATE:  2022      < end of copied text >    < from: CT Angio Head w/ IV Cont (22 @ 17:46) >    CTA neck:  Motion and dense calcific atherosclerosis at the carotid bulbs   bilaterally limit evaluation; presumed at least moderate to severe   stenosis bilaterally.  Short segment beading of the mid left internal carotid artery may   represent fibromuscular dysplasia.  Small 1 mm medially and posteriorly oriented outpouching in the distal   cervical left internal carotid artery.    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  Multifocal stenoses involving the anterior circulation as detailed above.      < from: CT Head No Cont (22 @ 12:56) >    ACC: [id number] EXAM:  CT BRAIN                          PROCEDURE DATE:  2022      < end of copied text >  < from: CT Head No Cont (22 @ 12:56) >  IMPRESSION:  Focal gray-white distinction loss involving the frontal cortical region   possibly representing an area of age-indeterminate ischemic change.    Similar right frontoparietal sulcal effacement which may represent   subacute subarachnoid hemorrhagic products though a follow-up brain MRI   with contrast is recommended for further evaluation.      PHYSICAL EXAM:   (Pakistani and English speaking)  General: well-appearing, no acute distress  HEENT: no posterior pharyngeal erythema or exudates, no cervical lymphadenopathy, no injected conjunctiva, mucous membranes moist  HEART: RRR, S1, S2, cap refill <2 seconds  LUNG: CTAB, no wheezing, ronchi, or crackles  ABDOMEN: soft, NT, nondistended, no palpable masses, no suprapubic tenderness  NEURO: awake alert oriented x1-2 (name/), fluent speech, not fully participatory, tracks and attends b/l pupils 3mm b/l brisk, EOMI, follows simple commands on all four, face symmetric, RUE grossly 4+, LUE 4-/5,  b/l LEs limited by pain 2/5   Groin Site: R groin Clean, dry, soft, no hematoma or oozing      ICU Vital Signs Last 24 Hrs  T(C): 37.8 (24 May 2022 20:00), Max: 37.8 (24 May 2022 20:00)  T(F): 100.1 (24 May 2022 20:00), Max: 100.1 (24 May 2022 20:00)  HR: 98 (24 May 2022 22:00) (78 - 110)  BP: 103/57 (24 May 2022 19:00) (96/58 - 158/83)  BP(mean): 66 (24 May 2022 19:00) (65 - 114)  ABP: 128/60 (24 May 2022 22:00) (90/46 - 176/80)  ABP(mean): 88 (24 May 2022 22:00) (64 - 120)  RR: 24 (24 May 2022 22:00) (18 - 46)  SpO2: 99% (24 May 2022 22:00) (85% - 100%)      22 @ 07:01  -  22 @ 07:00  --------------------------------------------------------  IN: 852.8 mL / OUT: 945 mL / NET: -92.2 mL    22 @ 07:01  -  22 @ 00:52  --------------------------------------------------------  IN: 1005.4 mL / OUT: 1300 mL / NET: -294.6 mL          acetaminophen     Tablet .. 650 milliGRAM(s) Oral every 6 hours PRN  aspirin  chewable 81 milliGRAM(s) Oral daily  chlorhexidine 4% Liquid 1 Application(s) Topical <User Schedule>  cyclobenzaprine 10 milliGRAM(s) Oral three times a day PRN  enoxaparin Injectable 40 milliGRAM(s) SubCutaneous every 24 hours  levETIRAcetam 500 milliGRAM(s) Oral two times a day  levothyroxine 100 MICROGram(s) Oral daily  lidocaine   4% Patch 1 Patch Transdermal daily  mupirocin 2% Ointment 1 Application(s) Topical every 12 hours  niMODipine Oral Solution 30 milliGRAM(s) Enteral Tube every 2 hours  norepinephrine Infusion 0.05 MICROgram(s)/kG/Min (6.74 mL/Hr) IV Continuous <Continuous>  pantoprazole  Injectable 40 milliGRAM(s) IV Push every 12 hours  polyethylene glycol 3350 17 Gram(s) Oral daily  predniSONE   Tablet 15 milliGRAM(s) Oral daily  QUEtiapine 25 milliGRAM(s) Oral every 6 hours PRN  senna 2 Tablet(s) Oral at bedtime  sodium chloride 0.9% Bolus 250 milliLiter(s) IV Bolus once  ticagrelor 90 milliGRAM(s) Oral two times a day      LABS:  Na: 138 ( @ 16:40), 139 ( 05:00), 134 ( 19:09), 137 (:)  K: 3.6 ( @ 16:40), 3.7 ( 05:00), 3.7 ( @ 19:09), 3.8 (:)  Cl: 107 ( @ 16:40), 105 ( 05:00), 105 ( @ 19:09), 103 (:)  CO2: 17 ( 16:40), 16 ( 05:00), 17 ( @ 19:09), 20 (:)  BUN: 15 ( 16:40), 21 ( 05:00), 23 ( @ 19:09), 25 (:)  Cr: 1.1 ( @ 16:40), 1.3 ( 05:00), 1.3 ( @ 19:09), 1.3 (:31)  Glu: 84( @ 16:40), 94( 05:00), 105( @ 19:09), 100()    Hgb: 8.1 ( @ 05:00), 7.8 ( @ 19:09), 9.6 (:31)  Hct: 23.7 ( @ 05:00), 23.5 ( @ 19:09), 30.0 ( @ :31)  WBC: 22.07 ( @ 05:00), 15.42 ( @ 19:09), 17.07 ( @ :31)  Plt: 489 ( @ 05:00), 422 ( @ 19:09), 356 ( @ :31)    INR:   PTT:         LIVER FUNCTIONS - ( 24 May 2022 05:00 )  Alb: 3.8 g/dL / Pro: 5.9 g/dL / ALK PHOS: 50 U/L / ALT: 33 U/L / AST: 48 U/L / GGT: x                      History of Present Illness  75 y/o F PMH SEEMA and ILD, Depression, HTN, Hypothyroidism, recent UGIB 2/2 bleeding duodenal ulcer, Essential Thrombocytosis, chronic Back Pain 2/2 Spinal Stenosis s/p Spinal Epidural Injection, at Baseline AO3, lives with niece, ambulates with a walker, brought to the ED by marquise  for evaluation of worsening back pain and confusion x2 days. Two days PTP, pt c/o worsening chronic dull lower back pain that radiated down to her right medial aspect of thigh, resulting in limited ambulation (patient refusing to leave her bed) and to being non compliant with Physical Therapy in the absence of leg weakness or paresthesias or numbness or urinary incontinence. Per niece, patient has been feeling down x few days with reduced PO intake, reduced sleep, loss of interest, thoughts that her family doesn't like/ support her, and confusion (some times would not recognize niece). She has an instance where she had fecal incontinence on way to bathroom 2 days ago. In the setting of confusion and increased pain, marquise decided to bring patient to ED for evaluation.     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 Pt s/p DSA & pipeline stent embo .   Procedure done under GA, 4000U Heparin & Integrelin bolus given + ASA/Brilinta 90mg given via NGT @ 5PM, extubated without complications, R groin site. Admitted to [facility] post procedure.    ROS: niece denies any recent fever, chills, night sweats, URTI symptoms (cough, rhinorrhea, sore throat), urinary symptoms (urinary frequency, urgency, intermittence, dysuria, foul smelling urine, cloudy urine), abdominal pain, headache, nausea, or vomiting. No sick contacts. No recent travel or exposure to recent travelers.    ALLERGIES: No Known Allergies    ADMISSION SCORES:   GCS: 15  HH: 1 MF: 1  IMAGING/DATA:     < from: CT Angio Head w/ IV Cont (22 @ 17:46) >    ACC: [id number] EXAM:  CT ANGIO BRAIN (W)AW IC                        ACC: [id number] EXAM:  CT ANGIO NECK (W)AW IC                          PROCEDURE DATE:  2022      < end of copied text >    < from: CT Angio Head w/ IV Cont (22 @ 17:46) >    CTA neck:  Motion and dense calcific atherosclerosis at the carotid bulbs   bilaterally limit evaluation; presumed at least moderate to severe   stenosis bilaterally.  Short segment beading of the mid left internal carotid artery may   represent fibromuscular dysplasia.  Small 1 mm medially and posteriorly oriented outpouching in the distal   cervical left internal carotid artery.    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  Multifocal stenoses involving the anterior circulation as detailed above.      < from: CT Head No Cont (22 @ 12:56) >    ACC: [id number] EXAM:  CT BRAIN                          PROCEDURE DATE:  2022      < end of copied text >  < from: CT Head No Cont (22 @ 12:56) >  IMPRESSION:  Focal gray-white distinction loss involving the frontal cortical region   possibly representing an area of age-indeterminate ischemic change.    Similar right frontoparietal sulcal effacement which may represent   subacute subarachnoid hemorrhagic products though a follow-up brain MRI   with contrast is recommended for further evaluation.      PHYSICAL EXAM:   (Italian and English speaking)  General: well-appearing, no acute distress  HEENT: no posterior pharyngeal erythema or exudates, no cervical lymphadenopathy, no injected conjunctiva, mucous membranes moist  HEART: RRR, S1, S2, cap refill <2 seconds  LUNG: CTAB, no wheezing, ronchi, or crackles  ABDOMEN: soft, NT, nondistended, no palpable masses, no suprapubic tenderness  NEURO: awake alert oriented x1-2 (name/), fluent speech, not fully participatory, tracks and attends b/l pupils 3mm b/l brisk, EOMI, follows simple commands on all four, face symmetric, RUE grossly 4+, LUE 4-/5,  b/l LEs limited by pain 2/5   Groin Site: R groin Clean, dry, soft, no hematoma or oozing    ICU Vital Signs Last 24 Hrs  T(C): 36.6 (25 May 2022 08:00), Max: 37.8 (24 May 2022 20:00)  T(F): 97.9 (25 May 2022 08:00), Max: 100.1 (24 May 2022 20:00)  HR: 106 (25 May 2022 10:00) (78 - 106)  BP: 107/46 (24 May 2022 23:00) (96/58 - 158/83)  BP(mean): 59 (24 May 2022 23:00) (59 - 114)  ABP: 142/64 (25 May 2022 10:00) (94/44 - 164/86)  ABP(mean): 94 (25 May 2022 10:00) (64 - 120)  RR: 23 (25 May 2022 10:00) (14 - 28)  SpO2: 99% (25 May 2022 10:00) (94% - 100%)      22 @ 07:01  -  22 @ 07:00  --------------------------------------------------------  IN: 2225.4 mL / OUT: 1890 mL / NET: 335.4 mL    22 @ 07:01  -  22 @ 10:41  --------------------------------------------------------  IN: 0 mL / OUT: 275 mL / NET: -275 mL            acetaminophen     Tablet .. 650 milliGRAM(s) Oral every 6 hours PRN  aspirin  chewable 81 milliGRAM(s) Oral daily  chlorhexidine 4% Liquid 1 Application(s) Topical <User Schedule>  cyclobenzaprine 10 milliGRAM(s) Oral three times a day PRN  enoxaparin Injectable 40 milliGRAM(s) SubCutaneous every 24 hours  levETIRAcetam 500 milliGRAM(s) Oral two times a day  levothyroxine 100 MICROGram(s) Oral daily  lidocaine   4% Patch 1 Patch Transdermal daily  mupirocin 2% Ointment 1 Application(s) Topical every 12 hours  niMODipine Oral Solution 30 milliGRAM(s) Enteral Tube every 2 hours  norepinephrine Infusion 0.05 MICROgram(s)/kG/Min (6.74 mL/Hr) IV Continuous <Continuous>  pantoprazole  Injectable 40 milliGRAM(s) IV Push every 12 hours  polyethylene glycol 3350 17 Gram(s) Oral daily  predniSONE   Tablet 15 milliGRAM(s) Oral daily  QUEtiapine 25 milliGRAM(s) Oral every 6 hours PRN  senna 2 Tablet(s) Oral at bedtime  sodium chloride 0.9% Bolus 250 milliLiter(s) IV Bolus once  ticagrelor 90 milliGRAM(s) Oral two times a day      LABS:  Na: 142 ( @ 05:18), 138 ( @ 16:40), 139 ( @ 05:00), 134 ( @ 19:09)  K: 4.2 ( 05:18), 3.6 ( @ 16:40), 3.7 ( @ 05:00), 3.7 ( @ 19:09)  Cl: 112 ( 05:18), 107 ( @ 16:40), 105 ( @ 05:00), 105 ( @ 19:09)  CO2: 15 ( @ 05:18), 17 ( @ 16:40), 16 ( @ 05:00), 17 ( @ 19:09)  BUN: 11 ( @ 05:18), 15 ( @ 16:40), 21 ( @ 05:00), 23 ( @ 19:09)  Cr: 1.2 ( 05:18), 1.1 ( @ 16:40), 1.3 ( @ 05:00), 1.3 ( @ 19:09)  Glu: 110( @ 05:18), 84( @ 16:40), 94( @ 05:00), 105( @ 19:09)    Hgb: 7.7 ( @ 05:18), 8.1 ( @ 05:00), 7.8 ( @ 19:09)  Hct: 22.5 ( @ 05:18), 23.7 (05-24 @ 05:00), 23.5 ( @ 19:09)  WBC: 24.26 ( @ 05:18), 22.07 ( @ 05:00), 15.42 ( @ 19:09)  Plt: 530 ( @ 05:18), 489 ( @ 05:00), 422 ( @ 19:09)    INR:   PTT:           LIVER FUNCTIONS - ( 25 May 2022 05:18 )  Alb: 3.8 g/dL / Pro: 5.8 g/dL / ALK PHOS: 52 U/L / ALT: 30 U/L / AST: 35 U/L / GGT: x

## 2022-05-25 NOTE — OCCUPATIONAL THERAPY INITIAL EVALUATION ADULT - RANGE OF MOTION EXAMINATION, UPPER EXTREMITY
RUE shoulder <1/4 AROM, ~3/4 PROM, pt reports she has a prior shoulder injury, elbow WFL AROM, wrist/digits WFL, LUE shoulder ~1/4 AROM, elbow/wrist/digits WFL

## 2022-05-25 NOTE — PROGRESS NOTE ADULT - ASSESSMENT
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 Pt s/p DSA & pipeline stent embo 5/23.   ?ictus day 5/16  DSA pipline stenting R ICA aneurysm on 5/23    Plan:  Neurological:  #SAH, 2/2 5mm ruptured R supraclinoid aneurysm s/p angio & pipeline stent embo 5/23   #new L1 compression fx, CPB 2/2 spinal stenosis (follows w Dr. العلي)  #Encephalopathy of unclear etiology  - Neuro Checks Q1H  - Brilinta BID and Aspirin daily   - DCI Prophylaxis Nimodipine 60 mg Q4H  - daily TCDs, normovolemia  keppra for ppx; Cont vEEG  - MRA H/N + MRI brain pending  - Pain control: Tylenol, Lido patch, flexeril (new L1 compression fx, HA); MR L-spine  - PT/OT/OOB    CV:   #HTN  - -200  - TTE pending   -nimodipine  - Hold PO Amlodipine 10mg, Atenolol 50mg qd & home Losartan/HCTZ    Pulm:  #SEEMA not on CPAP, ILD  - Aspiration precautions   - 3L NC  - Prednisone 15mg qd for ILD; pulm following    GI:  #hx UGIB 2/2 bleeding duodenal ulcer (recent [facility] admission for melena)  - Protonix BID due to Prednisone, DAPT use and hx UGIB  - Senna/Miralax, LBM 5/24  - NGT, TF Jevity  repeat swallow evaluation    :  - Nomonatremia, euvolemia  - Strict Is/Os; bran in place    Heme:  #Anemia, essential thrombocytosis  - Start Lovenox 40 qd 5/24   - SCDs  - Hold home Anagrilide 0.5mg qd (essential thrombocytosis)  negative LE dopplers from 5/19    ID:  - Monitor fever curve and WBCs  repeat UA    Endo:  #hypothyroidism  - Synthroid IV 100mcg  TSH 70; repeat in 3 weeks    a-line 5/23  bran 5/24    Dispo: NCC   Assessment/Plan:   77 y/o F PMH as above found to have R frontoparietal HH1/mF1 SAH, 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 Pt s/p DSA & pipeline stent embo 5/23.   ?ictus day 5/16  DSA pipline stenting R ICA aneurysm on 5/23    Plan:  Neurological:  #SAH, 2/2 5mm ruptured R supraclinoid aneurysm s/p angio & pipeline stent embo 5/23   #new L1 compression fx, CPB 2/2 spinal stenosis (follows w Dr. العلي)  #Encephalopathy of unclear etiology  - Neuro Checks Q2H  - Brilinta BID and Aspirin daily   - DCI Prophylaxis Nimodipine 60 mg Q4H  - daily TCDs, normovolemia  keppra for ppx  Video EEG negative, D/C  - MRA H/N + MRI brain pending  - Pain control: Tylenol, Lido patch, flexeril (new L1 compression fx, HA); MR L-spine  - PT/OT/OOB    CV:   #HTN  - -200  - TTE pending   -nimodipine  - Hold PO Amlodipine 10mg, Atenolol 50mg qd & home Losartan/HCTZ    Pulm:  #SEEMA not on CPAP, ILD  - Aspiration precautions   - 3L NC  - Prednisone 15mg qd for ILD; pulm following  - Incentive spirometry    GI:  #hx UGIB 2/2 bleeding duodenal ulcer (recent [facility] admission for melena)  - Protonix BID due to Prednisone, DAPT use and hx UGIB  - Senna/Miralax, LBM 5/24  - NGT, TF Jevity  - Passed s/s, advanced soft and bite size; can D/C NGT      :  - Nomonatremia, euvolemia  - Strict Is/Os  - D/C bran; bladder scan q6hrs    Heme:  #Anemia, essential thrombocytosis  - Start Lovenox 40 qd 5/24   - SCDs  - Hold home Anagrilide 0.5mg qd (essential thrombocytosis)  negative LE dopplers from 5/19    ID:  - Monitor fever curve and WBCs  repeat UA, negative    Endo:  #hypothyroidism  - Synthroid IV 100mcg  TSH 70; repeat in 3 weeks    a-line 5/23  bran 5/24, D/C 5/25    Dispo: NCC

## 2022-05-26 LAB
ALBUMIN SERPL ELPH-MCNC: 4 G/DL — SIGNIFICANT CHANGE UP (ref 3.5–5.2)
ALP SERPL-CCNC: 57 U/L — SIGNIFICANT CHANGE UP (ref 30–115)
ALT FLD-CCNC: 27 U/L — SIGNIFICANT CHANGE UP (ref 0–41)
ANION GAP SERPL CALC-SCNC: 15 MMOL/L — HIGH (ref 7–14)
ANISOCYTOSIS BLD QL: SLIGHT — SIGNIFICANT CHANGE UP
AST SERPL-CCNC: 31 U/L — SIGNIFICANT CHANGE UP (ref 0–41)
BASOPHILS # BLD AUTO: 0.49 K/UL — HIGH (ref 0–0.2)
BASOPHILS NFR BLD AUTO: 1.7 % — HIGH (ref 0–1)
BILIRUB SERPL-MCNC: 0.7 MG/DL — SIGNIFICANT CHANGE UP (ref 0.2–1.2)
BUN SERPL-MCNC: 10 MG/DL — SIGNIFICANT CHANGE UP (ref 10–20)
CALCIUM SERPL-MCNC: 9.2 MG/DL — SIGNIFICANT CHANGE UP (ref 8.5–10.1)
CHLORIDE SERPL-SCNC: 106 MMOL/L — SIGNIFICANT CHANGE UP (ref 98–110)
CO2 SERPL-SCNC: 16 MMOL/L — LOW (ref 17–32)
CREAT SERPL-MCNC: 1.2 MG/DL — SIGNIFICANT CHANGE UP (ref 0.7–1.5)
EGFR: 47 ML/MIN/1.73M2 — LOW
EOSINOPHIL # BLD AUTO: 0.75 K/UL — HIGH (ref 0–0.7)
EOSINOPHIL NFR BLD AUTO: 2.6 % — SIGNIFICANT CHANGE UP (ref 0–8)
GIANT PLATELETS BLD QL SMEAR: PRESENT — SIGNIFICANT CHANGE UP
GLUCOSE SERPL-MCNC: 94 MG/DL — SIGNIFICANT CHANGE UP (ref 70–99)
HCT VFR BLD CALC: 24.2 % — LOW (ref 37–47)
HGB BLD-MCNC: 7.9 G/DL — LOW (ref 12–16)
LYMPHOCYTES # BLD AUTO: 0.52 K/UL — LOW (ref 1.2–3.4)
LYMPHOCYTES # BLD AUTO: 1.8 % — LOW (ref 20.5–51.1)
MAGNESIUM SERPL-MCNC: 1.9 MG/DL — SIGNIFICANT CHANGE UP (ref 1.8–2.4)
MANUAL SMEAR VERIFICATION: SIGNIFICANT CHANGE UP
MCHC RBC-ENTMCNC: 31.6 PG — HIGH (ref 27–31)
MCHC RBC-ENTMCNC: 32.6 G/DL — SIGNIFICANT CHANGE UP (ref 32–37)
MCV RBC AUTO: 96.8 FL — SIGNIFICANT CHANGE UP (ref 81–99)
METAMYELOCYTES # FLD: 1.8 % — HIGH (ref 0–0)
MICROCYTES BLD QL: SLIGHT — SIGNIFICANT CHANGE UP
MONOCYTES # BLD AUTO: 3.27 K/UL — HIGH (ref 0.1–0.6)
MONOCYTES NFR BLD AUTO: 11.4 % — HIGH (ref 1.7–9.3)
MYELOCYTES NFR BLD: 3.5 % — HIGH (ref 0–0)
NEUTROPHILS # BLD AUTO: 21.91 K/UL — HIGH (ref 1.4–6.5)
NEUTROPHILS NFR BLD AUTO: 76.3 % — HIGH (ref 42.2–75.2)
NRBC # BLD: 3 /100 — HIGH (ref 0–0)
NRBC # BLD: SIGNIFICANT CHANGE UP /100 WBCS (ref 0–0)
OVALOCYTES BLD QL SMEAR: SLIGHT — SIGNIFICANT CHANGE UP
PHOSPHATE SERPL-MCNC: 2.3 MG/DL — SIGNIFICANT CHANGE UP (ref 2.1–4.9)
PLAT MORPH BLD: ABNORMAL
PLATELET # BLD AUTO: 610 K/UL — HIGH (ref 130–400)
POIKILOCYTOSIS BLD QL AUTO: SLIGHT — SIGNIFICANT CHANGE UP
POLYCHROMASIA BLD QL SMEAR: SLIGHT — SIGNIFICANT CHANGE UP
POTASSIUM SERPL-MCNC: 4.1 MMOL/L — SIGNIFICANT CHANGE UP (ref 3.5–5)
POTASSIUM SERPL-SCNC: 4.1 MMOL/L — SIGNIFICANT CHANGE UP (ref 3.5–5)
PROMYELOCYTES # FLD: 0.9 % — HIGH (ref 0–0)
PROT SERPL-MCNC: 6 G/DL — SIGNIFICANT CHANGE UP (ref 6–8)
RBC # BLD: 2.5 M/UL — LOW (ref 4.2–5.4)
RBC # FLD: 19.3 % — HIGH (ref 11.5–14.5)
RBC BLD AUTO: ABNORMAL
SODIUM SERPL-SCNC: 137 MMOL/L — SIGNIFICANT CHANGE UP (ref 135–146)
WBC # BLD: 28.72 K/UL — HIGH (ref 4.8–10.8)
WBC # FLD AUTO: 28.72 K/UL — HIGH (ref 4.8–10.8)

## 2022-05-26 PROCEDURE — 72158 MRI LUMBAR SPINE W/O & W/DYE: CPT | Mod: 26

## 2022-05-26 PROCEDURE — 70548 MR ANGIOGRAPHY NECK W/DYE: CPT | Mod: 26

## 2022-05-26 PROCEDURE — 70553 MRI BRAIN STEM W/O & W/DYE: CPT | Mod: 26

## 2022-05-26 PROCEDURE — 99291 CRITICAL CARE FIRST HOUR: CPT

## 2022-05-26 PROCEDURE — 70544 MR ANGIOGRAPHY HEAD W/O DYE: CPT | Mod: 26,59

## 2022-05-26 RX ORDER — ANAGRELIDE HCL 0.5 MG
0.5 CAPSULE ORAL EVERY 6 HOURS
Refills: 0 | Status: DISCONTINUED | OUTPATIENT
Start: 2022-05-26 | End: 2022-05-30

## 2022-05-26 RX ORDER — LEVOTHYROXINE SODIUM 125 MCG
100 TABLET ORAL DAILY
Refills: 0 | Status: DISCONTINUED | OUTPATIENT
Start: 2022-05-26 | End: 2022-06-07

## 2022-05-26 RX ORDER — ANAGRELIDE HCL 0.5 MG
0.5 CAPSULE ORAL EVERY 6 HOURS
Refills: 0 | Status: DISCONTINUED | OUTPATIENT
Start: 2022-05-26 | End: 2022-05-26

## 2022-05-26 RX ORDER — ANAGRELIDE HCL 0.5 MG
0.5 CAPSULE ORAL DAILY
Refills: 0 | Status: DISCONTINUED | OUTPATIENT
Start: 2022-05-26 | End: 2022-05-26

## 2022-05-26 RX ORDER — MAGNESIUM SULFATE 500 MG/ML
2 VIAL (ML) INJECTION ONCE
Refills: 0 | Status: COMPLETED | OUTPATIENT
Start: 2022-05-26 | End: 2022-05-26

## 2022-05-26 RX ADMIN — QUETIAPINE FUMARATE 25 MILLIGRAM(S): 200 TABLET, FILM COATED ORAL at 23:50

## 2022-05-26 RX ADMIN — QUETIAPINE FUMARATE 25 MILLIGRAM(S): 200 TABLET, FILM COATED ORAL at 00:06

## 2022-05-26 RX ADMIN — POLYETHYLENE GLYCOL 3350 17 GRAM(S): 17 POWDER, FOR SOLUTION ORAL at 12:20

## 2022-05-26 RX ADMIN — TICAGRELOR 90 MILLIGRAM(S): 90 TABLET ORAL at 05:43

## 2022-05-26 RX ADMIN — LEVETIRACETAM 500 MILLIGRAM(S): 250 TABLET, FILM COATED ORAL at 05:42

## 2022-05-26 RX ADMIN — MUPIROCIN 1 APPLICATION(S): 20 OINTMENT TOPICAL at 19:36

## 2022-05-26 RX ADMIN — NIMODIPINE 30 MILLIGRAM(S): 60 SOLUTION ORAL at 20:12

## 2022-05-26 RX ADMIN — SENNA PLUS 2 TABLET(S): 8.6 TABLET ORAL at 22:08

## 2022-05-26 RX ADMIN — NIMODIPINE 30 MILLIGRAM(S): 60 SOLUTION ORAL at 00:06

## 2022-05-26 RX ADMIN — NIMODIPINE 30 MILLIGRAM(S): 60 SOLUTION ORAL at 04:20

## 2022-05-26 RX ADMIN — CHLORHEXIDINE GLUCONATE 1 APPLICATION(S): 213 SOLUTION TOPICAL at 05:43

## 2022-05-26 RX ADMIN — LIDOCAINE 1 PATCH: 4 CREAM TOPICAL at 16:48

## 2022-05-26 RX ADMIN — NIMODIPINE 30 MILLIGRAM(S): 60 SOLUTION ORAL at 10:00

## 2022-05-26 RX ADMIN — LIDOCAINE 1 PATCH: 4 CREAM TOPICAL at 12:20

## 2022-05-26 RX ADMIN — NIMODIPINE 30 MILLIGRAM(S): 60 SOLUTION ORAL at 12:21

## 2022-05-26 RX ADMIN — ENOXAPARIN SODIUM 40 MILLIGRAM(S): 100 INJECTION SUBCUTANEOUS at 12:20

## 2022-05-26 RX ADMIN — NIMODIPINE 30 MILLIGRAM(S): 60 SOLUTION ORAL at 07:59

## 2022-05-26 RX ADMIN — TICAGRELOR 90 MILLIGRAM(S): 90 TABLET ORAL at 19:34

## 2022-05-26 RX ADMIN — NIMODIPINE 30 MILLIGRAM(S): 60 SOLUTION ORAL at 22:05

## 2022-05-26 RX ADMIN — Medication 81 MILLIGRAM(S): at 12:19

## 2022-05-26 RX ADMIN — NIMODIPINE 30 MILLIGRAM(S): 60 SOLUTION ORAL at 02:27

## 2022-05-26 RX ADMIN — Medication 100 MICROGRAM(S): at 14:26

## 2022-05-26 RX ADMIN — Medication 25 GRAM(S): at 09:25

## 2022-05-26 RX ADMIN — Medication 0.5 MILLIGRAM(S): at 23:54

## 2022-05-26 RX ADMIN — Medication 15 MILLIGRAM(S): at 05:43

## 2022-05-26 RX ADMIN — PANTOPRAZOLE SODIUM 40 MILLIGRAM(S): 20 TABLET, DELAYED RELEASE ORAL at 05:43

## 2022-05-26 RX ADMIN — PANTOPRAZOLE SODIUM 40 MILLIGRAM(S): 20 TABLET, DELAYED RELEASE ORAL at 19:34

## 2022-05-26 RX ADMIN — NIMODIPINE 30 MILLIGRAM(S): 60 SOLUTION ORAL at 05:42

## 2022-05-26 RX ADMIN — Medication 0.5 MILLIGRAM(S): at 19:35

## 2022-05-26 RX ADMIN — NIMODIPINE 30 MILLIGRAM(S): 60 SOLUTION ORAL at 14:26

## 2022-05-26 RX ADMIN — MUPIROCIN 1 APPLICATION(S): 20 OINTMENT TOPICAL at 05:43

## 2022-05-26 RX ADMIN — LIDOCAINE 1 PATCH: 4 CREAM TOPICAL at 16:49

## 2022-05-26 NOTE — PROGRESS NOTE ADULT - CRITICAL CARE ATTENDING COMMENT
AMS, hh1 mf1 ?aSAH s/p pipeline stent-coil with flow diverter of R ICA aneurysm, admitted to [facility]U post procedure    bleed day 9    neuro q2  veeg, keppra ppx  DCI ppx  TCDs daily  dapt  sbp liberalized 120-200  TTE- stable Biv Function  euvolemia, normonatremia  c/w ppi bid, stable cbc; hx of GIB and on DAPT on prednisone    dispo- ncc AMS, hh1 mf1 ?aSAH s/p pipeline stent-coil with flow diverter of R ICA aneurysm, admitted to [facility]CCU post procedure    bleed day 9    neuro q2  veeg, keppra ppx  DCI ppx  TCDs daily  dapt  sbp liberalized 120-200  TTE- stable Biv Function  euvolemia, normonatremia  c/w ppi bid, stable cbc; hx of GIB and on DAPT on prednisone

## 2022-05-26 NOTE — PROGRESS NOTE ADULT - SUBJECTIVE AND OBJECTIVE BOX
History of Present Illness  75 y/o F PMH SEEMA and ILD, Depression, HTN, Hypothyroidism, recent UGIB 2/2 bleeding duodenal ulcer, Essential Thrombocytosis, chronic Back Pain 2/2 Spinal Stenosis s/p Spinal Epidural Injection, at Baseline AO3, lives with niece, ambulates with a walker, brought to the ED by marquise  for evaluation of worsening back pain and confusion x2 days. Two days PTP, pt c/o worsening chronic dull lower back pain that radiated down to her right medial aspect of thigh, resulting in limited ambulation (patient refusing to leave her bed) and to being non compliant with Physical Therapy in the absence of leg weakness or paresthesias or numbness or urinary incontinence. Per niece, patient has been feeling down x few days with reduced PO intake, reduced sleep, loss of interest, thoughts that her family doesn't like/ support her, and confusion (some times would not recognize niece). She has an instance where she had fecal incontinence on way to bathroom 2 days ago. In the setting of confusion and increased pain, marquise decided to bring patient to ED for evaluation.     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 Pt s/p DSA & pipeline stent embo .   Procedure done under GA, 4000U Heparin & Integrelin bolus given + ASA/Brilinta 90mg given via NGT @ 5PM, extubated without complications, R groin site. Admitted to [facility] post procedure.    ROS: niece denies any recent fever, chills, night sweats, URTI symptoms (cough, rhinorrhea, sore throat), urinary symptoms (urinary frequency, urgency, intermittence, dysuria, foul smelling urine, cloudy urine), abdominal pain, headache, nausea, or vomiting. No sick contacts. No recent travel or exposure to recent travelers.    OVERNIGHT EVENTS: No overnight events, retained urine, bran inserted    ALLERGIES: No Known Allergies    ADMISSION SCORES:   GCS: 15  HH: 1 MF: 1  IMAGING/DATA:     < from: CT Angio Head w/ IV Cont (22 @ 17:46) >    ACC: [id number] EXAM:  CT ANGIO BRAIN (W)AW IC                        ACC: [id number] EXAM:  CT ANGIO NECK (W)AW IC                          PROCEDURE DATE:  2022      < end of copied text >    < from: CT Angio Head w/ IV Cont (22 @ 17:46) >    CTA neck:  Motion and dense calcific atherosclerosis at the carotid bulbs   bilaterally limit evaluation; presumed at least moderate to severe   stenosis bilaterally.  Short segment beading of the mid left internal carotid artery may   represent fibromuscular dysplasia.  Small 1 mm medially and posteriorly oriented outpouching in the distal   cervical left internal carotid artery.    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  Multifocal stenoses involving the anterior circulation as detailed above.      < from: CT Head No Cont (22 @ 12:56) >    ACC: [id number] EXAM:  CT BRAIN                          PROCEDURE DATE:  2022      < end of copied text >  < from: CT Head No Cont (22 @ 12:56) >  IMPRESSION:  Focal gray-white distinction loss involving the frontal cortical region   possibly representing an area of age-indeterminate ischemic change.    Similar right frontoparietal sulcal effacement which may represent   subacute subarachnoid hemorrhagic products though a follow-up brain MRI   with contrast is recommended for further evaluation.      PHYSICAL EXAM:   (Wallisian and English speaking)  General: well-appearing, no acute distress  HEENT: no posterior pharyngeal erythema or exudates, no cervical lymphadenopathy, no injected conjunctiva, mucous membranes moist  HEART: RRR, S1, S2, cap refill <2 seconds  LUNG: CTAB, no wheezing, ronchi, or crackles  ABDOMEN: soft, NT, nondistended, no palpable masses, no suprapubic tenderness  NEURO: awake alert oriented x1-2 (name/), fluent speech, not fully participatory, tracks and attends b/l pupils 3mm b/l brisk, EOMI, follows simple commands on all four, face symmetric, RUE grossly 4+, LUE 4-/5,  b/l LEs limited by pain 2/5   Groin Site: R groin Clean, dry, soft, no hematoma or oozing    ICU Vital Signs Last 24 Hrs  T(C): 37.2 (26 May 2022 05:00), Max: 37.2 (26 May 2022 05:00)  T(F): 98.9 (26 May 2022 05:00), Max: 98.9 (26 May 2022 05:00)  HR: 100 (26 May 2022 06:00) (94 - 126)  BP: 140/51 (26 May 2022 06:00) (94/75 - 172/81)  BP(mean): 81 (26 May 2022 06:00) (79 - 114)  ABP: 130/70 (26 May 2022 00:00) (94/64 - 176/84)  ABP(mean): 98 (26 May 2022 00:00) (74 - 120)  RR: 30 (26 May 2022 06:00) (19 - 32)  SpO2: 99% (26 May 2022 06:00) (86% - 100%)      22 @ 07:01  -  22 @ 07:00  --------------------------------------------------------  IN: 150 mL / OUT: 1875 mL / NET: -1725 mL      LABS:  Na: 137 ( @ 05:01), 142 ( @ 05:18), 138 ( @ 16:40), 139 ( @ 05:00), 134 ( @ 19:09)  K: 4.1 ( @ 05:01), 4.2 ( @ 05:18), 3.6 ( @ 16:40), 3.7 ( @ 05:00), 3.7 ( @ 19:09)  Cl: 106 ( @ 05:01), 112 ( @ 05:18), 107 ( @ 16:40), 105 ( @ 05:00), 105 ( @ 19:09)  CO2: 16 ( @ 05:01), 15 ( @ 05:18), 17 ( @ 16:40), 16 ( @ 05:00), 17 ( @ 19:09)  BUN: 10 ( @ 05:01), 11 ( @ 05:18), 15 ( @ 16:40), 21 ( @ 05:00), 23 ( 19:09)  Cr: 1.2 ( 05:01), 1.2 ( 05:18), 1.1 ( 16:40), 1.3 ( 05:00), 1.3 ( 19:09)  Glu: 94( 05:01), 110( 05:18), 84( @ 16:40), 94( @ 05:00), 105( 19:09)    Hgb: 7.9 ( 05:01), 7.7 ( 05:18), 8.1 ( 05:00), 7.8 ( 19:09)  Hct: 24.2 ( 05:01), 22.5 ( 05:18), 23.7 ( @ 05:00), 23.5 ( 19:09)  WBC: 28.72 ( 05:01), 24.26 ( 05:18), 22.07 ( @ 05:00), 15.42 ( 19:09)  Plt: 610 (:), 530 ( 05:18), 489 ( 05:00), 422 ( 19:09)    INR:   PTT:       LIVER FUNCTIONS - ( 26 May 2022 05:01 )  Alb: 4.0 g/dL / Pro: 6.0 g/dL / ALK PHOS: 57 U/L / ALT: 27 U/L / AST: 31 U/L / GGT: x             MEDICATIONS  (STANDING):  aspirin  chewable 81 milliGRAM(s) Oral daily  chlorhexidine 4% Liquid 1 Application(s) Topical <User Schedule>  enoxaparin Injectable 40 milliGRAM(s) SubCutaneous every 24 hours  lidocaine   4% Patch 1 Patch Transdermal daily  mupirocin 2% Ointment 1 Application(s) Topical every 12 hours  niMODipine Oral Solution 30 milliGRAM(s) Enteral Tube every 2 hours  pantoprazole  Injectable 40 milliGRAM(s) IV Push every 12 hours  polyethylene glycol 3350 17 Gram(s) Oral daily  predniSONE   Tablet 15 milliGRAM(s) Oral daily  senna 2 Tablet(s) Oral at bedtime  sodium chloride 0.9% Bolus 250 milliLiter(s) IV Bolus once  ticagrelor 90 milliGRAM(s) Oral two times a day    MEDICATIONS  (PRN):  acetaminophen     Tablet .. 650 milliGRAM(s) Oral every 6 hours PRN Mild Pain (1 - 3)  cyclobenzaprine 10 milliGRAM(s) Oral three times a day PRN Muscle Spasm  QUEtiapine 25 milliGRAM(s) Oral every 6 hours PRN agitation              History of Present Illness  75 y/o F PMH SEEMA and ILD, Depression, HTN, Hypothyroidism, recent UGIB 2/2 bleeding duodenal ulcer, Essential Thrombocytosis, chronic Back Pain 2/2 Spinal Stenosis s/p Spinal Epidural Injection, at Baseline AO3, lives with niece, ambulates with a walker, brought to the ED by marquise  for evaluation of worsening back pain and confusion x2 days. Two days PTP, pt c/o worsening chronic dull lower back pain that radiated down to her right medial aspect of thigh, resulting in limited ambulation (patient refusing to leave her bed) and to being non compliant with Physical Therapy in the absence of leg weakness or paresthesias or numbness or urinary incontinence. Per niece, patient has been feeling down x few days with reduced PO intake, reduced sleep, loss of interest, thoughts that her family doesn't like/ support her, and confusion (some times would not recognize niece). She has an instance where she had fecal incontinence on way to bathroom 2 days ago. In the setting of confusion and increased pain, marquise decided to bring patient to ED for evaluation.     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 Pt s/p DSA & pipeline stent embo .   Procedure done under GA, 4000U Heparin & Integrelin bolus given + ASA/Brilinta 90mg given via NGT @ 5PM, extubated without complications, R groin site. Admitted to [facility] post procedure.    ROS: niece denies any recent fever, chills, night sweats, URTI symptoms (cough, rhinorrhea, sore throat), urinary symptoms (urinary frequency, urgency, intermittence, dysuria, foul smelling urine, cloudy urine), abdominal pain, headache, nausea, or vomiting. No sick contacts. No recent travel or exposure to recent travelers.    OVERNIGHT EVENTS: No overnight events, retained urine, bran inserted    ALLERGIES: No Known Allergies    ADMISSION SCORES:   GCS: 15  HH: 1 MF: 1  IMAGING/DATA:     < from: CT Angio Head w/ IV Cont (22 @ 17:46) >    ACC: [id number] EXAM:  CT ANGIO BRAIN (W)AW IC                        ACC: [id number] EXAM:  CT ANGIO NECK (W)AW IC                          PROCEDURE DATE:  2022      < end of copied text >    < from: CT Angio Head w/ IV Cont (22 @ 17:46) >    CTA neck:  Motion and dense calcific atherosclerosis at the carotid bulbs   bilaterally limit evaluation; presumed at least moderate to severe   stenosis bilaterally.  Short segment beading of the mid left internal carotid artery may   represent fibromuscular dysplasia.  Small 1 mm medially and posteriorly oriented outpouching in the distal   cervical left internal carotid artery.    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  Multifocal stenoses involving the anterior circulation as detailed above.      < from: CT Head No Cont (22 @ 12:56) >    ACC: [id number] EXAM:  CT BRAIN                          PROCEDURE DATE:  2022      < end of copied text >  < from: CT Head No Cont (22 @ 12:56) >  IMPRESSION:  Focal gray-white distinction loss involving the frontal cortical region   possibly representing an area of age-indeterminate ischemic change.    Similar right frontoparietal sulcal effacement which may represent   subacute subarachnoid hemorrhagic products though a follow-up brain MRI   with contrast is recommended for further evaluation.      PHYSICAL EXAM:   (Chilean and English speaking)  General: well-appearing, no acute distress  HEENT: no posterior pharyngeal erythema or exudates, no cervical lymphadenopathy, no injected conjunctiva, mucous membranes moist  HEART: RRR, S1, S2, cap refill <2 seconds  LUNG: CTAB, no wheezing, ronchi, or crackles  ABDOMEN: soft, NT, nondistended, no palpable masses, no suprapubic tenderness  NEURO: awake alert oriented x1-2 (name/), fluent speech, not fully participatory, tracks and attends b/l pupils 3mm b/l brisk, EOMI, follows simple commands on all four, face symmetric, RUE grossly 4+, LUE 4-/5,  b/l LEs limited by pain 2/5   Groin Site: R groin Clean, dry, soft, no hematoma or oozing    ICU Vital Signs Last 24 Hrs  T(C): 37.4 (26 May 2022 08:00), Max: 37.4 (26 May 2022 08:00)  T(F): 99.3 (26 May 2022 08:00), Max: 99.3 (26 May 2022 08:00)  HR: 96 (26 May 2022 08:00) (96 - 126)  BP: 158/67 (26 May 2022 08:00) (94/75 - 172/81)  BP(mean): 103 (26 May 2022 08:00) (79 - 114)  ABP: 130/70 (26 May 2022 00:00) (94/64 - 176/84)  ABP(mean): 84 (26 May 2022 08:00) (74 - 120)  RR: 21 (26 May 2022 08:00) (19 - 32)  SpO2: 99% (26 May 2022 08:00) (86% - 100%)      22 @ 07:01  -  22 @ 07:00  --------------------------------------------------------  IN: 150 mL / OUT: 1875 mL / NET: -1725 mL    22 @ 07:01  -  22 @ 09:13  --------------------------------------------------------  IN: 0 mL / OUT: 125 mL / NET: -125 mL      LABS:  Na: 137 ( @ 05:01), 142 ( @ 05:18), 138 ( @ 16:40), 139 ( @ 05:00), 134 ( @ 19:09)  K: 4.1 ( @ 05:01), 4.2 ( @ 05:18), 3.6 ( @ 16:40), 3.7 ( @ 05:00), 3.7 ( @ 19:09)  Cl: 106 ( @ 05:01), 112 ( @ 05:18), 107 ( @ 16:40), 105 ( @ 05:00), 105 ( @ 19:09)  CO2: 16 ( @ 05:01), 15 ( @ 05:18), 17 ( @ 16:40), 16 ( @ 05:00), 17 ( @ 19:09)  BUN: 10 ( @ 05:01), 11 ( @ 05:18), 15 ( @ 16:40), 21 ( @ 05:00), 23 ( @ 19:09)  Cr: 1.2 ( @ 05:01), 1.2 ( @ 05:18), 1.1 ( @ 16:40), 1.3 ( @ 05:00), 1.3 ( @ 19:09)  Glu: 94( @ 05:01), 110( @ 05:18), 84( @ 16:40), 94( @ 05:00), 105( @ 19:09)    Hgb: 7.9 ( @ 05:01), 7.7 ( @ 05:18), 8.1 ( @ 05:00), 7.8 ( @ 19:09)  Hct: 24.2 ( @ 05:01), 22.5 ( @ 05:18), 23.7 ( @ 05:00), 23.5 ( @ 19:09)  WBC: 28.72 ( @ 05:01), 24.26 ( @ 05:18), 22.07 ( @ 05:00), 15.42 ( @ 19:09)  Plt: 610 ( @ 05:01), 530 ( @ 05:18), 489 ( @ 05:00), 422 ( @ 19:09)    INR:   PTT:     LIVER FUNCTIONS - ( 26 May 2022 05:01 )  Alb: 4.0 g/dL / Pro: 6.0 g/dL / ALK PHOS: 57 U/L / ALT: 27 U/L / AST: 31 U/L / GGT: x           MEDICATIONS  (STANDING):  aspirin  chewable 81 milliGRAM(s) Oral daily  chlorhexidine 4% Liquid 1 Application(s) Topical <User Schedule>  enoxaparin Injectable 40 milliGRAM(s) SubCutaneous every 24 hours  lidocaine   4% Patch 1 Patch Transdermal daily  magnesium sulfate  IVPB 2 Gram(s) IV Intermittent once  mupirocin 2% Ointment 1 Application(s) Topical every 12 hours  niMODipine Oral Solution 30 milliGRAM(s) Enteral Tube every 2 hours  pantoprazole  Injectable 40 milliGRAM(s) IV Push every 12 hours  polyethylene glycol 3350 17 Gram(s) Oral daily  predniSONE   Tablet 15 milliGRAM(s) Oral daily  senna 2 Tablet(s) Oral at bedtime  sodium chloride 0.9% Bolus 250 milliLiter(s) IV Bolus once  ticagrelor 90 milliGRAM(s) Oral two times a day    MEDICATIONS  (PRN):  acetaminophen     Tablet .. 650 milliGRAM(s) Oral every 6 hours PRN Mild Pain (1 - 3)  cyclobenzaprine 10 milliGRAM(s) Oral three times a day PRN Muscle Spasm  QUEtiapine 25 milliGRAM(s) Oral every 6 hours PRN agitation

## 2022-05-26 NOTE — PHARMACOTHERAPY INTERVENTION NOTE - COMMENTS
Order for KCl 40mew tablets.  Pt has an ng tube.  NP to change order to powder
recommended changing levothyroxine to po/ng
-anagrelide 0.5 mg po daily, NF form submitted, home regimen 0.5g q6h, d/w NCC team, will increase to q6h  -levothyroxine 100mcg po daily, d/c 5/25, d/w team to clarify, resumed w/ stat dose today

## 2022-05-26 NOTE — PROGRESS NOTE ADULT - ASSESSMENT
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 Pt s/p DSA & pipeline stent embo 5/23.   ?ictus day 5/16  DSA pipline stenting R ICA aneurysm on 5/23    Plan:  Neurological:  #SAH, 2/2 5mm ruptured R supraclinoid aneurysm s/p angio & pipeline stent embo 5/23   #new L1 compression fx, CPB 2/2 spinal stenosis (follows w Dr. العلي)  #Encephalopathy of unclear etiology  - Neuro Checks Q2H  - Brilinta BID and Aspirin daily   - DCI Prophylaxis Nimodipine 60 mg Q4H  - daily TCDs, normovolemia  keppra for ppx  Video EEG negative, D/C  - MRA H/N + MRI brain pending  - Pain control: Tylenol, Lido patch, flexeril (new L1 compression fx, HA); MR L-spine  - PT/OT/OOB    CV:   #HTN  - -200  - TTE pending   -nimodipine  - Hold PO Amlodipine 10mg, Atenolol 50mg qd & home Losartan/HCTZ    Pulm:  #SEEMA not on CPAP, ILD  - Aspiration precautions   - 3L NC  - Prednisone 15mg qd for ILD; pulm following  - Incentive spirometry    GI:  #hx UGIB 2/2 bleeding duodenal ulcer (recent [facility] admission for melena)  - Protonix BID due to Prednisone, DAPT use and hx UGIB  - Senna/Miralax, LBM 5/24  - NGT, TF Jevity  - Passed s/s, advanced soft and bite size; can D/C NGT      :  - Nomonatremia, euvolemia  - Strict Is/Os  - D/C bran; bladder scan q6hrs    Heme:  #Anemia, essential thrombocytosis  - Start Lovenox 40 qd 5/24   - SCDs  - Hold home Anagrilide 0.5mg qd (essential thrombocytosis)  negative LE dopplers from 5/19    ID:  - Monitor fever curve and WBCs  repeat UA, negative    Endo:  #hypothyroidism  - Synthroid IV 100mcg  TSH 70; repeat in 3 weeks    a-line 5/23  bran 5/24, D/C 5/25    Dispo: NCC   Assessment/Plan:   75 y/o F PMH as above found to have R frontoparietal HH1/mF1 SAH, 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 Pt s/p DSA & pipeline stent embo 5/23.   ?ictus day 5/16  DSA pipline stenting R ICA aneurysm on 5/23    Plan:  Neurological:  #SAH, 2/2 5mm ruptured R supraclinoid aneurysm s/p angio & pipeline stent embo 5/23   #new L1 compression fx, CPB 2/2 spinal stenosis (follows w Dr. العلي)  #Encephalopathy of unclear etiology  - Neuro Checks Q2H  - Brilinta BID and Aspirin daily   - DCI Prophylaxis Nimodipine 30 mg Q2H  - daily TCDs, normovolemia  - keppra for ppx  - Video EEG negative, D/C  - MRA H/N + MRI brain pending  - Pain control: Tylenol, Lido patch, flexeril (new L1 compression fx, HA); MR L-spine  - PT/OT/OOB    CV:   #HTN  - -200  - TTE pending   - nimodipine  - Hold PO Amlodipine 10mg, Atenolol 50mg qd & home Losartan/HCTZ    Pulm:  #SEEMA not on CPAP, ILD  - Aspiration precautions   - Prednisone 15mg qd for ILD; pulm following  - Incentive spirometry    GI:  #hx UGIB 2/2 bleeding duodenal ulcer (recent [facility] admission for melena)  - Protonix BID due to Prednisone, DAPT use and hx UGIB  - Senna/Miralax, LBM 5/25  - NGT, TF Jevity  - Passed s/s, advanced soft and bite size; can D/C NGT if passes PO sven ct      :  - Nomonatremia, euvolemia  - Strict Is/Os  - D/C bran 5/25; re-inserted overnight 2/2 retention    Heme:  #Anemia, essential thrombocytosis  - Start Lovenox 40 qd 5/24   - SCDs  - Resumed home Anagrilide 0.5mg q6h (essential thrombocytosis)  - negative LE dopplers from 5/19    ID:  - Monitor fever curve and WBCs  - procal 0.18  - CXR 5/25 unchanged  - repeat UA, negative  - MRSA positive, on 5-day course mupirocin (end date 5/29)  - f/u repeat Bcx    Endo:  #hypothyroidism  - Synthroid PO 100mcg qD  - TSH 70, free t4 1.0; repeat in 3 weeks    a-line 5/23  bran 5/24, D/C 5/25, reinserted 5/26 for retention    Dispo: NCC

## 2022-05-26 NOTE — PROGRESS NOTE ADULT - SUBJECTIVE AND OBJECTIVE BOX
Neuroendovascular progress note:     Interval HPI:   Patient is a 76 year old female Singaporean speaking presented with AMS worsened lower back pain found with right frontoparietal SAH HH1 mFS 1 possibly 2/2 5mm right supraclinoid ICA saccular aneurysm found on CTA which also reported multifocal stenosis. Patient is now POD 3 diagnostic cerebral angiogram + pipeline stent embolization of the right ICA aneurysm. Patient on DAPT ASA 81 and Brilinta 90. No acute events overnight. Patient without complaints this AM. Albarran inserted this AM.     Past medical history   HTN (hypertension)  Hypothyroid  Depression  Interstitial lung disease  Essential thrombocytopenia    Overnight Events: None    Medications   anagrelide 0.5 milliGRAM(s) Oral every 6 hours  aspirin  chewable 81 milliGRAM(s) Oral daily  chlorhexidine 4% Liquid 1 Application(s) Topical <User Schedule>  enoxaparin Injectable 40 milliGRAM(s) SubCutaneous every 24 hours  levothyroxine 100 MICROGram(s) Oral daily  lidocaine   4% Patch 1 Patch Transdermal daily  mupirocin 2% Ointment 1 Application(s) Topical every 12 hours  niMODipine Oral Solution 30 milliGRAM(s) Enteral Tube every 2 hours  pantoprazole  Injectable 40 milliGRAM(s) IV Push every 12 hours  polyethylene glycol 3350 17 Gram(s) Oral daily  predniSONE   Tablet 15 milliGRAM(s) Oral daily  senna 2 Tablet(s) Oral at bedtime  sodium chloride 0.9% Bolus 250 milliLiter(s) IV Bolus once  ticagrelor 90 milliGRAM(s) Oral two times a day    Vitals   T(F): 98.7 (22 @ 12:00), Max: 99.3 (22 @ 08:00)  HR: 96 (22 @ 14:00) (90 - 126)  BP: 142/71 (22 @ 14:00) (100/89 - 172/81)  RR: 22 (22 @ 14:00) (17 - 32)  SpO2: 98% (22 @ 14:00) (98% - 100%)    Labs                       7.9    28.72 )-----------( 610      ( 26 May 2022 05:01 )             24.2     137  |  106  |  10  ----------------------------<  94  4.1   |  16<L>  |  1.2  Ca    9.2      26 May 2022 05:01  Phos  2.3       Mg     1.9       Tpro  6.0  /  Alb  4.0  /  TBili  0.7  /  DBili  x   /  AST  31  /  ALT  27  /  AlkPhos  57    LIVER FUNCTIONS - ( 26 May 2022 05:01 )  Alb: 4.0 g/dL / Pro: 6.0 g/dL / ALK PHOS: 57 U/L / ALT: 27 U/L / AST: 31 U/L / GGT: x           Exam:   General - NAD laying in bed   Neuro - AAOx1-2  (knows name, , age) not to situation time place, EOMI, PERRL, follows simple commands, moving all extremities within plane and to antigravity, bilateral lower extremity ROM limited 2/2 chronic pain.   Extremity - R groin CDI without evidence of bleeding hematoma oozing ecchymosis swelling warmth. Distal pulses intact bilaterally. Temperature and color consistent bilaterally.   Abd - NTND     Radiology:   < from: CT Angio Head w/ IV Cont (22 @ 17:46) >  IMPRESSION:  CTA neck:  Motion and dense calcific atherosclerosis at the carotid bulbs   bilaterally limit evaluation; presumed at least moderate to severe   stenosis bilaterally.  Short segment beading of the mid left internal carotid artery may   represent fibromuscular dysplasia.  Small 1 mm medially and posteriorly oriented outpouching in the distal   cervical left internal carotid artery.  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  Multifocal stenoses involving the anterior circulation as detailed above.  Callback requested on 2022 12:01 AM. Discussed findings with Dr. Davila ([id number]) on 12:31 AM.  < end of copied text >  < from: CT Head No Cont (22 @ 12:56) >  IMPRESSION:  Focal gray-white distinction loss involving the frontal cortical region   possibly representing an area of age-indeterminate ischemic change.  Similar right frontoparietal sulcal effacement which may represent   subacute subarachnoid hemorrhagic products though a follow-up brain MRI   with contrast is recommended for further evaluation.  < end of copied text >    Suggestions:  Patient is a 76 year old female, primarily Armenian speaking, who presented with AMS and worsened lower back pain, found to have right frontoparietal SAH HH1/mFS 1 possibly 2/2 5mm right supraclinoid ICA saccular aneurysm found on CTA, which also reported multifocal stenosis. Patient is now POD 1 s/p diagnostic cerebral angiogram + pipeline stent embolization of the right ICA aneurysm. Patient was started on DAPT ASA 81 and Brilinta 90 starting yesterday. H/H 7.9/24.2.     Recommending repeat CTA next week.   Pending MRI MRA - f/u report.   Continue DAPT - ASA 81 and Brilinta 90 BID. Continue trending H/H, monitoring for signs of bleeding.   DCI ppx   Continue daily TCD   Discussed w Dr. West

## 2022-05-27 LAB
ALBUMIN SERPL ELPH-MCNC: 3.7 G/DL — SIGNIFICANT CHANGE UP (ref 3.5–5.2)
ALP SERPL-CCNC: 55 U/L — SIGNIFICANT CHANGE UP (ref 30–115)
ALT FLD-CCNC: 22 U/L — SIGNIFICANT CHANGE UP (ref 0–41)
ANION GAP SERPL CALC-SCNC: 16 MMOL/L — HIGH (ref 7–14)
AST SERPL-CCNC: 23 U/L — SIGNIFICANT CHANGE UP (ref 0–41)
BASOPHILS # BLD AUTO: 0.21 K/UL — HIGH (ref 0–0.2)
BASOPHILS NFR BLD AUTO: 0.8 % — SIGNIFICANT CHANGE UP (ref 0–1)
BILIRUB SERPL-MCNC: 0.5 MG/DL — SIGNIFICANT CHANGE UP (ref 0.2–1.2)
BLD GP AB SCN SERPL QL: SIGNIFICANT CHANGE UP
BUN SERPL-MCNC: 12 MG/DL — SIGNIFICANT CHANGE UP (ref 10–20)
CALCIUM SERPL-MCNC: 8.9 MG/DL — SIGNIFICANT CHANGE UP (ref 8.5–10.1)
CHLORIDE SERPL-SCNC: 105 MMOL/L — SIGNIFICANT CHANGE UP (ref 98–110)
CO2 SERPL-SCNC: 16 MMOL/L — LOW (ref 17–32)
CREAT SERPL-MCNC: 1.3 MG/DL — SIGNIFICANT CHANGE UP (ref 0.7–1.5)
EGFR: 42 ML/MIN/1.73M2 — LOW
EOSINOPHIL # BLD AUTO: 0.38 K/UL — SIGNIFICANT CHANGE UP (ref 0–0.7)
EOSINOPHIL NFR BLD AUTO: 1.5 % — SIGNIFICANT CHANGE UP (ref 0–8)
GLUCOSE SERPL-MCNC: 87 MG/DL — SIGNIFICANT CHANGE UP (ref 70–99)
HCT VFR BLD CALC: 21.8 % — LOW (ref 37–47)
HCT VFR BLD CALC: 23.2 % — LOW (ref 37–47)
HGB BLD-MCNC: 7.3 G/DL — LOW (ref 12–16)
HGB BLD-MCNC: 7.7 G/DL — LOW (ref 12–16)
IMM GRANULOCYTES NFR BLD AUTO: 9.2 % — HIGH (ref 0.1–0.3)
LYMPHOCYTES # BLD AUTO: 1.83 K/UL — SIGNIFICANT CHANGE UP (ref 1.2–3.4)
LYMPHOCYTES # BLD AUTO: 7.1 % — LOW (ref 20.5–51.1)
MAGNESIUM SERPL-MCNC: 2 MG/DL — SIGNIFICANT CHANGE UP (ref 1.8–2.4)
MCHC RBC-ENTMCNC: 32.2 PG — HIGH (ref 27–31)
MCHC RBC-ENTMCNC: 32.4 PG — HIGH (ref 27–31)
MCHC RBC-ENTMCNC: 33.2 G/DL — SIGNIFICANT CHANGE UP (ref 32–37)
MCHC RBC-ENTMCNC: 33.5 G/DL — SIGNIFICANT CHANGE UP (ref 32–37)
MCV RBC AUTO: 96 FL — SIGNIFICANT CHANGE UP (ref 81–99)
MCV RBC AUTO: 97.5 FL — SIGNIFICANT CHANGE UP (ref 81–99)
MONOCYTES # BLD AUTO: 1.9 K/UL — HIGH (ref 0.1–0.6)
MONOCYTES NFR BLD AUTO: 7.4 % — SIGNIFICANT CHANGE UP (ref 1.7–9.3)
NEUTROPHILS # BLD AUTO: 18.97 K/UL — HIGH (ref 1.4–6.5)
NEUTROPHILS NFR BLD AUTO: 74 % — SIGNIFICANT CHANGE UP (ref 42.2–75.2)
NRBC # BLD: 0 /100 WBCS — SIGNIFICANT CHANGE UP (ref 0–0)
NRBC # BLD: 2 /100 WBCS — HIGH (ref 0–0)
PHOSPHATE SERPL-MCNC: 3.2 MG/DL — SIGNIFICANT CHANGE UP (ref 2.1–4.9)
PLATELET # BLD AUTO: 542 K/UL — HIGH (ref 130–400)
PLATELET # BLD AUTO: 620 K/UL — HIGH (ref 130–400)
POTASSIUM SERPL-MCNC: 3.4 MMOL/L — LOW (ref 3.5–5)
POTASSIUM SERPL-SCNC: 3.4 MMOL/L — LOW (ref 3.5–5)
PROT SERPL-MCNC: 5.9 G/DL — LOW (ref 6–8)
RBC # BLD: 2.27 M/UL — LOW (ref 4.2–5.4)
RBC # BLD: 2.38 M/UL — LOW (ref 4.2–5.4)
RBC # FLD: 19 % — HIGH (ref 11.5–14.5)
RBC # FLD: 19.2 % — HIGH (ref 11.5–14.5)
SODIUM SERPL-SCNC: 137 MMOL/L — SIGNIFICANT CHANGE UP (ref 135–146)
WBC # BLD: 25.66 K/UL — HIGH (ref 4.8–10.8)
WBC # BLD: 32.27 K/UL — HIGH (ref 4.8–10.8)
WBC # FLD AUTO: 25.66 K/UL — HIGH (ref 4.8–10.8)
WBC # FLD AUTO: 32.27 K/UL — HIGH (ref 4.8–10.8)

## 2022-05-27 PROCEDURE — 93010 ELECTROCARDIOGRAM REPORT: CPT

## 2022-05-27 PROCEDURE — 99291 CRITICAL CARE FIRST HOUR: CPT

## 2022-05-27 RX ORDER — POTASSIUM CHLORIDE 20 MEQ
40 PACKET (EA) ORAL EVERY 4 HOURS
Refills: 0 | Status: COMPLETED | OUTPATIENT
Start: 2022-05-27 | End: 2022-05-27

## 2022-05-27 RX ORDER — ACETAMINOPHEN 500 MG
650 TABLET ORAL ONCE
Refills: 0 | Status: COMPLETED | OUTPATIENT
Start: 2022-05-27 | End: 2022-05-29

## 2022-05-27 RX ADMIN — TICAGRELOR 90 MILLIGRAM(S): 90 TABLET ORAL at 05:20

## 2022-05-27 RX ADMIN — Medication 100 MICROGRAM(S): at 05:20

## 2022-05-27 RX ADMIN — Medication 15 MILLIGRAM(S): at 05:20

## 2022-05-27 RX ADMIN — PANTOPRAZOLE SODIUM 40 MILLIGRAM(S): 20 TABLET, DELAYED RELEASE ORAL at 05:20

## 2022-05-27 RX ADMIN — LIDOCAINE 1 PATCH: 4 CREAM TOPICAL at 12:32

## 2022-05-27 RX ADMIN — Medication 0.5 MILLIGRAM(S): at 23:10

## 2022-05-27 RX ADMIN — PANTOPRAZOLE SODIUM 40 MILLIGRAM(S): 20 TABLET, DELAYED RELEASE ORAL at 17:22

## 2022-05-27 RX ADMIN — ENOXAPARIN SODIUM 40 MILLIGRAM(S): 100 INJECTION SUBCUTANEOUS at 12:33

## 2022-05-27 RX ADMIN — Medication 0.5 MILLIGRAM(S): at 05:20

## 2022-05-27 RX ADMIN — Medication 0.5 MILLIGRAM(S): at 17:23

## 2022-05-27 RX ADMIN — NIMODIPINE 30 MILLIGRAM(S): 60 SOLUTION ORAL at 04:07

## 2022-05-27 RX ADMIN — Medication 40 MILLIEQUIVALENT(S): at 12:35

## 2022-05-27 RX ADMIN — NIMODIPINE 30 MILLIGRAM(S): 60 SOLUTION ORAL at 12:38

## 2022-05-27 RX ADMIN — MUPIROCIN 1 APPLICATION(S): 20 OINTMENT TOPICAL at 05:19

## 2022-05-27 RX ADMIN — SENNA PLUS 2 TABLET(S): 8.6 TABLET ORAL at 21:30

## 2022-05-27 RX ADMIN — MUPIROCIN 1 APPLICATION(S): 20 OINTMENT TOPICAL at 17:21

## 2022-05-27 RX ADMIN — NIMODIPINE 30 MILLIGRAM(S): 60 SOLUTION ORAL at 14:47

## 2022-05-27 RX ADMIN — NIMODIPINE 30 MILLIGRAM(S): 60 SOLUTION ORAL at 23:10

## 2022-05-27 RX ADMIN — NIMODIPINE 30 MILLIGRAM(S): 60 SOLUTION ORAL at 17:21

## 2022-05-27 RX ADMIN — Medication 81 MILLIGRAM(S): at 12:32

## 2022-05-27 RX ADMIN — NIMODIPINE 30 MILLIGRAM(S): 60 SOLUTION ORAL at 16:38

## 2022-05-27 RX ADMIN — NIMODIPINE 30 MILLIGRAM(S): 60 SOLUTION ORAL at 20:33

## 2022-05-27 RX ADMIN — POLYETHYLENE GLYCOL 3350 17 GRAM(S): 17 POWDER, FOR SOLUTION ORAL at 12:38

## 2022-05-27 RX ADMIN — CHLORHEXIDINE GLUCONATE 1 APPLICATION(S): 213 SOLUTION TOPICAL at 05:19

## 2022-05-27 RX ADMIN — TICAGRELOR 90 MILLIGRAM(S): 90 TABLET ORAL at 17:20

## 2022-05-27 RX ADMIN — Medication 0.5 MILLIGRAM(S): at 13:46

## 2022-05-27 RX ADMIN — CYCLOBENZAPRINE HYDROCHLORIDE 10 MILLIGRAM(S): 10 TABLET, FILM COATED ORAL at 21:35

## 2022-05-27 RX ADMIN — Medication 40 MILLIEQUIVALENT(S): at 15:05

## 2022-05-27 NOTE — CHART NOTE - NSCHARTNOTEFT_GEN_A_CORE
MICU Transfer Note    Transfer from: MICU  Transfer to:  (  ) Medicine    (  ) Telemetry    (  ) RCU    (  ) Palliative    ( X ) Stroke Unit    (  ) _______________  Accepting physician: Dr. Connie MULLEN COURSE:  77 y/o F PMH SEEMA and ILD, Depression, HTN, Hypothyroidism, recent UGIB 2/2 bleeding duodenal ulcer, Essential Thrombocytosis, chronic Back Pain 2/2 Spinal Stenosis s/p Spinal Epidural Injection, at Baseline AO3, lives with marquise, ambulates with a walker, brought to the ED by marquise  for evaluation of worsening back pain and confusion x2 days. Two days PTP, pt c/o worsening chronic dull lower back pain that radiated down to her right medial aspect of thigh, resulting in limited ambulation (patient refusing to leave her bed) and to being non compliant with Physical Therapy in the absence of leg weakness or paresthesias or numbness or urinary incontinence. Per marquise, patient has been feeling down x few days with reduced PO intake, reduced sleep, loss of interest, thoughts that her family doesn't like/ support her, and confusion (some times would not recognize niece). She has an instance where she had fecal incontinence on way to bathroom 2 days ago. In the setting of confusion and increased pain, marquise decided to bring patient to ED for evaluation.     Pt initially admitted to MICU.  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 Pt s/p DSA & pipeline stent embo .  Patient was febrile on  but work-up was negative except for (+) MRSA PCR.  Patient on 5-day course of mupirocin.  Patient is now awake, alert, and oriented x2-3 (person, , place) and tolerating PT/OT.   Passed S+S and tolerating PO soft and bite-sized diet.  Failed TOV on  and bran reinserted for retention.  Re-trial once ambulation improves.  Hemodynamically stable for downgrade to stroke unit for further management.        ASSESSMENT & PLAN:   · Assessment	  Assessment/Plan:   77 y/o F PMH as above found to have R frontoparietal HH1/mF1 SAH, 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 Pt s/p DSA & pipeline stent embo .   ?ictus day   DSA pipeline stenting R ICA aneurysm on     Plan:  Neurological:  #SAH, 2/2 5mm ruptured R supraclinoid aneurysm s/p angio & pipeline stent embo    #new L1 compression fx, CPB 2/2 spinal stenosis (follows w Dr. العلي)  #Encephalopathy of unclear etiology  - Neuro Checks Q4H  - Brilinta BID and Aspirin daily   - DCI Prophylaxis Nimodipine 30 mg Q2H  - normovolemia  - keppra for ppx  - Video EEG negative, D/C  - MRA showed decreased aneurysm (5.9 -> 3.5mm), repeat MRI or CTA in 3 weeks per NSG  - Pain control: Tylenol, Lido patch, flexeril (new L1 compression fx, HA); MR L-spine  - PT/OT/OOB    CV:   #HTN  - -200  - TTE pending   - nimodipine  - Hold PO Amlodipine 10mg, Atenolol 50mg qd & home Losartan/HCTZ    Pulm:  #SEEMA not on CPAP, ILD  - Aspiration precautions   - Prednisone 15mg qd for ILD; pulm following  - Incentive spirometry    GI:  #hx UGIB 2/2 bleeding duodenal ulcer (recent [facility] admission for melena)  - Protonix BID due to Prednisone, DAPT use and hx UGIB  - Senna/Miralax, LBM   - Passed s/s, advanced to soft and bite sized    :  - Nomonatremia, euvolemia  - Strict Is/Os  - D/C bran ; re-inserted  overnight 2/2 retention    Heme:  #Anemia, essential thrombocytosis  - Start Lovenox 40 qd    - SCDs  - Resumed home Anagrilide 0.5mg q6h (essential thrombocytosis)  - negative LE dopplers from     ID:  - Monitor fever curve and WBCs  - procal 0.18  - CXR  unchanged  - repeat UA, negative  - MRSA positive, on 5-day course mupirocin (end date )  - repeat Bcx  showed NGTD    Endo:  #hypothyroidism  - Synthroid PO 100mcg qD  - TSH 70, free t4 1.0; repeat in 3 weeks    a-line , removed   bran , D/C , reinserted  for retention    Dispo: downgrade to stroke unit         For Follow-Up:  - pending TTE  - strict I+Os        Vital Signs Last 24 Hrs  T(C): 36.6 (27 May 2022 08:00), Max: 37.4 (26 May 2022 20:00)  T(F): 97.9 (27 May 2022 08:00), Max: 99.4 (26 May 2022 20:00)  HR: 106 (27 May 2022 10:00) (94 - 114)  BP: 102/60 (27 May 2022 10:00) (89/49 - 164/82)  BP(mean): 73 (27 May 2022 10:00) (63 - 115)  RR: 18 (27 May 2022 10:00) (15 - 30)  SpO2: 97% (27 May 2022 10:00) (89% - 100%)  I&O's Summary    26 May 2022 07:01  -  27 May 2022 07:00  --------------------------------------------------------  IN: 500 mL / OUT: 1015 mL / NET: -515 mL    27 May 2022 07:01  -  27 May 2022 13:10  --------------------------------------------------------  IN: 0 mL / OUT: 40 mL / NET: -40 mL          MEDICATIONS  (STANDING):  anagrelide 0.5 milliGRAM(s) Oral every 6 hours  aspirin  chewable 81 milliGRAM(s) Oral daily  chlorhexidine 4% Liquid 1 Application(s) Topical <User Schedule>  enoxaparin Injectable 40 milliGRAM(s) SubCutaneous every 24 hours  levothyroxine 100 MICROGram(s) Oral daily  lidocaine   4% Patch 1 Patch Transdermal daily  mupirocin 2% Ointment 1 Application(s) Topical every 12 hours  niMODipine Oral Solution 30 milliGRAM(s) Enteral Tube every 2 hours  pantoprazole  Injectable 40 milliGRAM(s) IV Push every 12 hours  polyethylene glycol 3350 17 Gram(s) Oral daily  potassium chloride   Powder 40 milliEquivalent(s) Oral every 4 hours  predniSONE   Tablet 15 milliGRAM(s) Oral daily  senna 2 Tablet(s) Oral at bedtime  sodium chloride 0.9% Bolus 250 milliLiter(s) IV Bolus once  ticagrelor 90 milliGRAM(s) Oral two times a day    MEDICATIONS  (PRN):  acetaminophen     Tablet .. 650 milliGRAM(s) Oral every 6 hours PRN Mild Pain (1 - 3)  cyclobenzaprine 10 milliGRAM(s) Oral three times a day PRN Muscle Spasm  QUEtiapine 25 milliGRAM(s) Oral every 6 hours PRN agitation        LABS                                            7.3                   Neurophils% (auto):   74.0   ( @ 05:00):    25.66)-----------(542          Lymphocytes% (auto):  7.1                                           21.8                   Eosinphils% (auto):   1.5      Manual%: Neutrophils x    ; Lymphocytes x    ; Eosinophils x    ; Bands%: x    ; Blasts x                                    137    |  105    |  12                  Calcium: 8.9   / iCa: x      ( @ 05:00)    ----------------------------<  87        Magnesium: 2.0                              3.4     |  16     |  1.3              Phosphorous: 3.2      TPro  5.9    /  Alb  3.7    /  TBili  0.5    /  DBili  x      /  AST  23     /  ALT  22     /  AlkPhos  55     27 May 2022 05:00

## 2022-05-27 NOTE — PROGRESS NOTE ADULT - SUBJECTIVE AND OBJECTIVE BOX
History of Present Illness  75 y/o F PMH SEEMA and ILD, Depression, HTN, Hypothyroidism, recent UGIB 2/2 bleeding duodenal ulcer, Essential Thrombocytosis, chronic Back Pain 2/2 Spinal Stenosis s/p Spinal Epidural Injection, at Baseline AO3, lives with niece, ambulates with a walker, brought to the ED by marquise  for evaluation of worsening back pain and confusion x2 days. Two days PTP, pt c/o worsening chronic dull lower back pain that radiated down to her right medial aspect of thigh, resulting in limited ambulation (patient refusing to leave her bed) and to being non compliant with Physical Therapy in the absence of leg weakness or paresthesias or numbness or urinary incontinence. Per niece, patient has been feeling down x few days with reduced PO intake, reduced sleep, loss of interest, thoughts that her family doesn't like/ support her, and confusion (some times would not recognize niece). She has an instance where she had fecal incontinence on way to bathroom 2 days ago. In the setting of confusion and increased pain, marquise decided to bring patient to ED for evaluation.     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 Pt s/p DSA & pipeline stent embo .   Procedure done under GA, 4000U Heparin & Integrelin bolus given + ASA/Brilinta 90mg given via NGT @ 5PM, extubated without complications, R groin site. Admitted to [facility] post procedure.    OVERNIGHT EVENTS: No overnight events. Protected sleep time from [id number]    ROS: niece denies any recent fever, chills, night sweats, URTI symptoms (cough, rhinorrhea, sore throat), urinary symptoms (urinary frequency, urgency, intermittence, dysuria, foul smelling urine, cloudy urine), abdominal pain, headache, nausea, or vomiting. No sick contacts. No recent travel or exposure to recent travelers.    ALLERGIES: No Known Allergies    ADMISSION SCORES:   GCS: 15  HH: 1 MF: 1    VITALS: [x] Reviewed    IMAGING/DATA: [x] Reviewed    IV FLUIDS/MEDICATIONS: [x] Reviewed    PHYSICAL EXAM:   (French and English speaking)  General: well-appearing, no acute distress  HEENT: no posterior pharyngeal erythema or exudates, no cervical lymphadenopathy, no injected conjunctiva, mucous membranes moist  HEART: RRR, S1, S2, cap refill <2 seconds  LUNG: CTAB, no wheezing, ronchi, or crackles  ABDOMEN: soft, NT, nondistended, no palpable masses, no suprapubic tenderness  NEURO: awake alert oriented x1-2 (name/), fluent speech, not fully participatory, tracks and attends b/l pupils 3mm b/l brisk, EOMI, follows simple commands on all four, face symmetric, RUE grossly 4+, LUE 4-/5,  b/l LEs limited by pain 2/5   Groin Site: R groin Clean, dry, soft, no hematoma or oozing          ICU Vital Signs Last 24 Hrs  T(C): 36.8 (27 May 2022 04:00), Max: 37.4 (26 May 2022 08:00)  T(F): 98.2 (27 May 2022 04:00), Max: 99.4 (26 May 2022 20:00)  HR: 98 (27 May 2022 04:00) (90 - 114)  BP: 131/73 (27 May 2022 04:00) (89/49 - 164/82)  BP(mean): 93 (27 May 2022 04:00) (63 - 115)  ABP: 130/74 (26 May 2022 20:00) (130/74 - 146/62)  ABP(mean): 96 (26 May 2022 20:00) (78 - 96)  RR: 15 (27 May 2022 04:00) (15 - 30)  SpO2: 97% (27 May 2022 04:00) (96% - 100%)      22 @ 07:01  -  22 @ 07:00  --------------------------------------------------------  IN: 150 mL / OUT: 1875 mL / NET: -1725 mL    22 @ 07:01  -  22 @ 06:22  --------------------------------------------------------  IN: 500 mL / OUT: 910 mL / NET: -410 mL            acetaminophen     Tablet .. 650 milliGRAM(s) Oral every 6 hours PRN  anagrelide 0.5 milliGRAM(s) Oral every 6 hours  aspirin  chewable 81 milliGRAM(s) Oral daily  chlorhexidine 4% Liquid 1 Application(s) Topical <User Schedule>  cyclobenzaprine 10 milliGRAM(s) Oral three times a day PRN  enoxaparin Injectable 40 milliGRAM(s) SubCutaneous every 24 hours  levothyroxine 100 MICROGram(s) Oral daily  lidocaine   4% Patch 1 Patch Transdermal daily  mupirocin 2% Ointment 1 Application(s) Topical every 12 hours  niMODipine Oral Solution 30 milliGRAM(s) Enteral Tube every 2 hours  pantoprazole  Injectable 40 milliGRAM(s) IV Push every 12 hours  polyethylene glycol 3350 17 Gram(s) Oral daily  predniSONE   Tablet 15 milliGRAM(s) Oral daily  QUEtiapine 25 milliGRAM(s) Oral every 6 hours PRN  senna 2 Tablet(s) Oral at bedtime  sodium chloride 0.9% Bolus 250 milliLiter(s) IV Bolus once  ticagrelor 90 milliGRAM(s) Oral two times a day      LABS:  Na: 137 ( @ 05:00), 137 ( @ 05:01), 142 ( @ 05:18), 138 ( @ 16:40)  K: 3.4 ( @ 05:00), 4.1 ( @ 05:01), 4.2 ( @ 05:18), 3.6 ( @ 16:40)  Cl: 105 ( @ 05:00), 106 ( @ 05:01), 112 ( @ 05:18), 107 ( @ 16:40)  CO2: 16 ( @ 05:00), 16 ( @ 05:01), 15 ( @ 05:18), 17 ( @ 16:40)  BUN: 12 ( @ 05:00), 10 ( @ 05:01), 11 ( @ 05:18), 15 ( @ 16:40)  Cr: 1.3 ( @ 05:00), 1.2 ( @ 05:01), 1.2 ( @ 05:18), 1.1 ( @ 16:40)  Glu: 87( @ 05:00), 94( @ 05:01), 110( @ 05:18), 84( @ 16:40)    Hgb: 7.3 ( @ 05:00), 7.9 ( @ 05:01), 7.7 ( @ 05:18)  Hct: 21.8 ( @ 05:00), 24.2 ( @ 05:01), 22.5 ( @ 05:18)  WBC: 25.66 ( @ 05:00), 28.72 ( @ 05:01), 24.26 ( @ 05:18)  Plt: 542 ( @ 05:00), 610 ( @ 05:01), 530 ( @ 05:18)    INR:   PTT:           LIVER FUNCTIONS - ( 27 May 2022 05:00 )  Alb: 3.7 g/dL / Pro: 5.9 g/dL / ALK PHOS: 55 U/L / ALT: 22 U/L / AST: 23 U/L / GGT: x

## 2022-05-27 NOTE — SPEECH LANGUAGE PATHOLOGY EVALUATION - SLP PERTINENT HISTORY OF CURRENT PROBLEM
pt is a 77 y/o F w/ PMHx: SEEMA and ILD, Depression, HTN, Hypothyroidism, recent UGIB, essential thrombocytosis, brought to the ED by niece for worsening back pain and confusion x2 days. Pt initially admitted to MICU.  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 Pt s/p DSA & pipeline stent embo 5/23. Pt febrile on 5/25 but work-up was negative except for (+) MRSA PCR. Hemodynamically stable for downgrade to stroke unit for further management. Pt for downgrade to stroke unit.

## 2022-05-27 NOTE — SPEECH LANGUAGE PATHOLOGY EVALUATION - SLP GENERAL OBSERVATIONS
pt received in bed awake +generalized weakness w/o c/o pain. +room air; pt started on calorie count 2' poor po intake

## 2022-05-27 NOTE — PROGRESS NOTE ADULT - ASSESSMENT
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 Pt s/p DSA & pipeline stent embo 5/23.   ?ictus day 5/16  DSA pipline stenting R ICA aneurysm on 5/23    Plan:  Neurological:  #SAH, 2/2 5mm ruptured R supraclinoid aneurysm s/p angio & pipeline stent embo 5/23   #new L1 compression fx, CPB 2/2 spinal stenosis (follows w Dr. العلي)  #Encephalopathy of unclear etiology  - Neuro Checks Q2H  - Brilinta BID and Aspirin daily   - DCI Prophylaxis Nimodipine 30 mg Q2H  - daily TCDs, normovolemia  - keppra for ppx  - Video EEG negative, D/C  - MRA H/N + MRI brain pending  - Pain control: Tylenol, Lido patch, flexeril (new L1 compression fx, HA); MR L-spine  - PT/OT/OOB    CV:   #HTN  - -200  - TTE pending   - nimodipine  - Hold PO Amlodipine 10mg, Atenolol 50mg qd & home Losartan/HCTZ    Pulm:  #SEEMA not on CPAP, ILD  - Aspiration precautions   - Prednisone 15mg qd for ILD; pulm following  - Incentive spirometry    GI:  #hx UGIB 2/2 bleeding duodenal ulcer (recent [facility] admission for melena)  - Protonix BID due to Prednisone, DAPT use and hx UGIB  - Senna/Miralax, LBM 5/25  - NGT, TF Jevity  - Passed s/s, advanced soft and bite size; can D/C NGT if passes PO sven ct      :  - Nomonatremia, euvolemia  - Strict Is/Os  - D/C bran 5/25; re-inserted overnight 2/2 retention    Heme:  #Anemia, essential thrombocytosis  - Start Lovenox 40 qd 5/24   - SCDs  - Resumed home Anagrilide 0.5mg q6h (essential thrombocytosis)  - negative LE dopplers from 5/19    ID:  - Monitor fever curve and WBCs  - procal 0.18  - CXR 5/25 unchanged  - repeat UA, negative  - MRSA positive, on 5-day course mupirocin (end date 5/29)  - f/u repeat Bcx    Endo:  #hypothyroidism  - Synthroid PO 100mcg qD  - TSH 70, free t4 1.0; repeat in 3 weeks    a-line 5/23  bran 5/24, D/C 5/25, reinserted 5/26 for retention    Dispo: NCC   Assessment/Plan:   75 y/o F PMH as above found to have R frontoparietal HH1/mF1 SAH, 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 Pt s/p DSA & pipeline stent embo 5/23.   ?ictus day 5/16  DSA pipeline stenting R ICA aneurysm on 5/23    Plan:  Neurological:  #SAH, 2/2 5mm ruptured R supraclinoid aneurysm s/p angio & pipeline stent embo 5/23   #new L1 compression fx, CPB 2/2 spinal stenosis (follows w Dr. العلي)  #Encephalopathy of unclear etiology  - Neuro Checks Q4H  - Brilinta BID and Aspirin daily   - DCI Prophylaxis Nimodipine 30 mg Q2H  - daily TCDs, normovolemia  - keppra for ppx  - Video EEG negative, D/C  - MRA showed decreased aneurysm (5.9 -> 3.5mm), repeat MRI or CTA in 3 weeks per NSG  - Pain control: Tylenol, Lido patch, flexeril (new L1 compression fx, HA); MR L-spine  - PT/OT/OOB    CV:   #HTN  - -200  - TTE pending   - nimodipine  - Hold PO Amlodipine 10mg, Atenolol 50mg qd & home Losartan/HCTZ    Pulm:  #SEEMA not on CPAP, ILD  - Aspiration precautions   - Prednisone 15mg qd for ILD; pulm following  - Incentive spirometry    GI:  #hx UGIB 2/2 bleeding duodenal ulcer (recent [facility] admission for melena)  - Protonix BID due to Prednisone, DAPT use and hx UGIB  - Senna/Miralax, LBM 5/25  - Passed s/s, advanced soft and bite size    :  - Nomonatremia, euvolemia  - Strict Is/Os  - D/C bran 5/25; re-inserted 5/25 overnight 2/2 retention    Heme:  #Anemia, essential thrombocytosis  - Start Lovenox 40 qd 5/24   - SCDs  - Resumed home Anagrilide 0.5mg q6h (essential thrombocytosis)  - negative LE dopplers from 5/19    ID:  - Monitor fever curve and WBCs  - procal 0.18  - CXR 5/25 unchanged  - repeat UA, negative  - MRSA positive, on 5-day course mupirocin (end date 5/29)  - repeat Bcx 5/25 showed NGTD    Endo:  #hypothyroidism  - Synthroid PO 100mcg qD  - TSH 70, free t4 1.0; repeat in 3 weeks    a-line 5/23  bran 5/24, D/C 5/25, reinserted 5/26 for retention    Dispo: downgrade to stroke unit   Assessment/Plan:   77 y/o F PMH as above found to have R frontoparietal HH1/mF1 SAH, 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 Pt s/p DSA & pipeline stent embo 5/23.   ?ictus day 5/16  DSA pipeline stenting R ICA aneurysm on 5/23    Plan:  Neurological:  #SAH, 2/2 5mm ruptured R supraclinoid aneurysm s/p angio & pipeline stent embo 5/23   #new L1 compression fx, CPB 2/2 spinal stenosis (follows w Dr. العلي)  #Encephalopathy of unclear etiology  - Neuro Checks Q4H  - Brilinta BID and Aspirin daily   - DCI Prophylaxis Nimodipine 30 mg Q2H  - daily TCDs, normovolemia  - keppra for ppx  - Video EEG negative, D/C  - MRA showed decreased aneurysm (5.9 -> 3.5mm), repeat MRI or CTA in 3 weeks per NSG  - Pain control: Tylenol, Lido patch, flexeril (new L1 compression fx, HA); MR L-spine  - PT/OT/OOB    CV:   #HTN  - -200  - TTE pending   - nimodipine  - Hold PO Amlodipine 10mg, Atenolol 50mg qd & home Losartan/HCTZ    Pulm:  #SEEMA not on CPAP, ILD  - Aspiration precautions   - Prednisone 15mg qd for ILD; pulm following  - Incentive spirometry    GI:  #hx UGIB 2/2 bleeding duodenal ulcer (recent [facility] admission for melena)  - Protonix BID due to Prednisone, DAPT use and hx UGIB  - Senna/Miralax, LBM 5/25  - Passed s/s, advanced to soft and bite sized    :  - Nomonatremia, euvolemia  - Strict Is/Os  - D/C bran 5/25; re-inserted 5/25 overnight 2/2 retention    Heme:  #Anemia, essential thrombocytosis  - Start Lovenox 40 qd 5/24   - SCDs  - Resumed home Anagrilide 0.5mg q6h (essential thrombocytosis)  - negative LE dopplers from 5/19    ID:  - Monitor fever curve and WBCs  - procal 0.18  - CXR 5/25 unchanged  - repeat UA, negative  - MRSA positive, on 5-day course mupirocin (end date 5/29)  - repeat Bcx 5/25 showed NGTD    Endo:  #hypothyroidism  - Synthroid PO 100mcg qD  - TSH 70, free t4 1.0; repeat in 3 weeks    a-line 5/23  bran 5/24, D/C 5/25, reinserted 5/26 for retention    Dispo: downgrade to stroke unit    Assessment/Plan:   77 y/o F PMH as above found to have R frontoparietal HH1/mF1 SAH, 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 Pt s/p DSA & pipeline stent embo 5/23.   ?ictus day 5/16  DSA pipeline stenting R ICA aneurysm on 5/23    Plan:  Neurological:  #SAH, 2/2 5mm ruptured R supraclinoid aneurysm s/p angio & pipeline stent embo 5/23   #new L1 compression fx, CPB 2/2 spinal stenosis (follows w Dr. العلي)  #Encephalopathy of unclear etiology  - Neuro Checks Q4H  - Brilinta BID and Aspirin daily   - DCI Prophylaxis Nimodipine 30 mg Q2H  - daily TCDs, normovolemia  - keppra for ppx  - Video EEG negative, D/C  - MRA showed decreased aneurysm (5.9 -> 3.5mm), repeat MRI or CTA in 3 weeks per NSG  - Pain control: Tylenol, Lido patch, flexeril (new L1 compression fx, HA); MR L-spine  - PT/OT/OOB    CV:   #HTN  - -200  - TTE pending   - nimodipine  - Hold PO Amlodipine 10mg, Atenolol 50mg qd & home Losartan/HCTZ    Pulm:  #SEEMA not on CPAP, ILD  - Aspiration precautions   - Prednisone 15mg qd for ILD; pulm following  - Incentive spirometry    GI:  #hx UGIB 2/2 bleeding duodenal ulcer (recent [facility] admission for melena)  - Protonix BID due to Prednisone, DAPT use and hx UGIB  - Senna/Miralax, LBM 5/25  - Passed s/s, advanced to soft and bite sized    :  - Nomonatremia, euvolemia  - Strict Is/Os  - D/C bran 5/25; re-inserted 5/25 overnight 2/2 retention    Heme:  #Anemia, essential thrombocytosis  - Start Lovenox 40 qd 5/24   - SCDs  - Resumed home Anagrilide 0.5mg q6h (essential thrombocytosis)  - negative LE dopplers from 5/19    ID:  - Monitor fever curve and WBCs  - procal 0.18  - CXR 5/25 unchanged  - repeat UA, negative  - MRSA positive, on 5-day course mupirocin (end date 5/29)  - repeat Bcx 5/25 showed NGTD    Endo:  #hypothyroidism  - Synthroid PO 100mcg qD  - TSH 70, free t4 1.0; repeat in 3 weeks    a-line 5/23, remove 5/27  bran 5/24, D/C 5/25, reinserted 5/26 for retention    Dispo: downgrade to stroke unit    Assessment/Plan:   77 y/o F PMH as above found to have R frontoparietal HH1/mF1 SAH, 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 Pt s/p DSA & pipeline stent embo 5/23.   ?ictus day 5/16  DSA pipeline stenting R ICA aneurysm on 5/23    Plan:  Neurological:  #SAH, 2/2 5mm ruptured R supraclinoid aneurysm s/p angio & pipeline stent embo 5/23   #new L1 compression fx, CPB 2/2 spinal stenosis (follows w Dr. العلي)  #Encephalopathy of unclear etiology  - Neuro Checks Q4H  - Brilinta BID and Aspirin daily   - DCI Prophylaxis Nimodipine 30 mg Q2H  - normovolemia  - keppra for ppx  - Video EEG negative, D/C  - MRA showed decreased aneurysm (5.9 -> 3.5mm), repeat MRI or CTA in 3 weeks per NSG  - Pain control: Tylenol, Lido patch, flexeril (new L1 compression fx, HA); MR L-spine  - PT/OT/OOB    CV:   #HTN  - -200  - TTE pending   - nimodipine  - Hold PO Amlodipine 10mg, Atenolol 50mg qd & home Losartan/HCTZ    Pulm:  #SEEMA not on CPAP, ILD  - Aspiration precautions   - Prednisone 15mg qd for ILD; pulm following  - Incentive spirometry    GI:  #hx UGIB 2/2 bleeding duodenal ulcer (recent [facility] admission for melena)  - Protonix BID due to Prednisone, DAPT use and hx UGIB  - Senna/Miralax, LBM 5/25  - Passed s/s, advanced to soft and bite sized    :  - Nomonatremia, euvolemia  - Strict Is/Os  - D/C bran 5/25; re-inserted 5/25 overnight 2/2 retention    Heme:  #Anemia, essential thrombocytosis  - Start Lovenox 40 qd 5/24   - SCDs  - Resumed home Anagrilide 0.5mg q6h (essential thrombocytosis)  - negative LE dopplers from 5/19    ID:  - Monitor fever curve and WBCs  - procal 0.18  - CXR 5/25 unchanged  - repeat UA, negative  - MRSA positive, on 5-day course mupirocin (end date 5/29)  - repeat Bcx 5/25 showed NGTD    Endo:  #hypothyroidism  - Synthroid PO 100mcg qD  - TSH 70, free t4 1.0; repeat in 3 weeks    a-line 5/23, remove 5/27  bran 5/24, D/C 5/25, reinserted 5/26 for retention    Dispo: downgrade to stroke unit

## 2022-05-27 NOTE — CHART NOTE - NSCHARTNOTEFT_GEN_A_CORE
This 76 y old female  with PMH of SEEMA, Depression, HTN, Hypothyroidism, myeloproliferative disorder and ET (followed by Dr. Celso Denise), recent admission for GI bleed that found to have peptic ulcer that was cauterized at [facility] (during that admission she received two units of blood) presented to our hospital for confusion and worsening back pain.   The patient was found ot have SAH and admitted to NeuroICU. Angiogram showed aneurysm and a pipeline stent was done on May 23, 2022.   Today the patient was down graded to stroke unit, and on arrival she was found to be tachycardiac in the 130s on monitor and looked sinus. EKG was done and showed sinus tachycardia. The patient was also found to have leukocytosis, and gradually dropping Hb, in addition to gradual increase in her Platelets (as her home Anagrelide was stopped, and restarted yesterday)    The patient was evaluated for any infectious source with growth to date on blood and urine culture, CXR was clear and she was afebrile for the last few days.   Today she had a bowel movement with no blood in it     Exam:   Awake, alert, and answering questions and she was able to tell she is in the hospital but not oriented to time  No focal neurological exam was noted   Normal visual field  Normal eyes movements, PERRLA  No facial asymmetry   Power 4+/5 all over     NIHSS: 2 for answering questions     The most likely cause of her tachycardia is her anemia (7.3 today)  Talked on the phone to the brother Mr. Alcira Mohr who asked me to call his daughter (the patient niece)  The niece Hannah Carlos was called and updated about the patient current situation and her approval for the blood transfusion was obtained     Plan:   - Transfuse one unit of PRC  - CBC tonight at 8 pm  - CBC tomorrow morning, then daily if stable   - Continue pantoprazole 40 mg IV bid   - Hematology consult   - Continue on Anagrelide, aspirin and Brilinta for now

## 2022-05-27 NOTE — PROGRESS NOTE ADULT - CRITICAL CARE ATTENDING COMMENT
s/p MMA embolization for R SDH  Improved MS, on high dose thiamine  Stable hemodynamically, single fever spike yesterday, w up sent, possible autonomic dysfunction due to alcohol withdrawal. 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 Pt s/p DSA & pipeline stent embo 5/23.   ?ictus day 5/16  DSA pipeline stenting R ICA aneurysm on 5/23

## 2022-05-27 NOTE — CHART NOTE - NSCHARTNOTEFT_GEN_A_CORE
Calorie count started by RN this AM and form observed hung at bedside. RD will follow up with result once calorie count is completed.

## 2022-05-28 LAB
ABO RH CONFIRMATION: SIGNIFICANT CHANGE UP
ALBUMIN SERPL ELPH-MCNC: 3.9 G/DL — SIGNIFICANT CHANGE UP (ref 3.5–5.2)
ALP SERPL-CCNC: 64 U/L — SIGNIFICANT CHANGE UP (ref 30–115)
ALT FLD-CCNC: 19 U/L — SIGNIFICANT CHANGE UP (ref 0–41)
ANION GAP SERPL CALC-SCNC: 21 MMOL/L — HIGH (ref 7–14)
AST SERPL-CCNC: 24 U/L — SIGNIFICANT CHANGE UP (ref 0–41)
BASOPHILS # BLD AUTO: 0.46 K/UL — HIGH (ref 0–0.2)
BASOPHILS NFR BLD AUTO: 1.7 % — HIGH (ref 0–1)
BILIRUB SERPL-MCNC: 0.6 MG/DL — SIGNIFICANT CHANGE UP (ref 0.2–1.2)
BLD GP AB SCN SERPL QL: SIGNIFICANT CHANGE UP
BUN SERPL-MCNC: 16 MG/DL — SIGNIFICANT CHANGE UP (ref 10–20)
CALCIUM SERPL-MCNC: 9 MG/DL — SIGNIFICANT CHANGE UP (ref 8.5–10.1)
CHLORIDE SERPL-SCNC: 105 MMOL/L — SIGNIFICANT CHANGE UP (ref 98–110)
CO2 SERPL-SCNC: 14 MMOL/L — LOW (ref 17–32)
CREAT SERPL-MCNC: 1.3 MG/DL — SIGNIFICANT CHANGE UP (ref 0.7–1.5)
EGFR: 42 ML/MIN/1.73M2 — LOW
EOSINOPHIL # BLD AUTO: 0.41 K/UL — SIGNIFICANT CHANGE UP (ref 0–0.7)
EOSINOPHIL NFR BLD AUTO: 1.5 % — SIGNIFICANT CHANGE UP (ref 0–8)
GLUCOSE SERPL-MCNC: 99 MG/DL — SIGNIFICANT CHANGE UP (ref 70–99)
HAPTOGLOB SERPL-MCNC: 323 MG/DL — HIGH (ref 34–200)
HCT VFR BLD CALC: 25.9 % — LOW (ref 37–47)
HCT VFR BLD CALC: 28.4 % — LOW (ref 37–47)
HGB BLD-MCNC: 8.9 G/DL — LOW (ref 12–16)
HGB BLD-MCNC: 9.6 G/DL — LOW (ref 12–16)
IMM GRANULOCYTES NFR BLD AUTO: 12.5 % — HIGH (ref 0.1–0.3)
LYMPHOCYTES # BLD AUTO: 2.01 K/UL — SIGNIFICANT CHANGE UP (ref 1.2–3.4)
LYMPHOCYTES # BLD AUTO: 7.2 % — LOW (ref 20.5–51.1)
MAGNESIUM SERPL-MCNC: 1.9 MG/DL — SIGNIFICANT CHANGE UP (ref 1.8–2.4)
MCHC RBC-ENTMCNC: 31.9 PG — HIGH (ref 27–31)
MCHC RBC-ENTMCNC: 32.4 PG — HIGH (ref 27–31)
MCHC RBC-ENTMCNC: 33.8 G/DL — SIGNIFICANT CHANGE UP (ref 32–37)
MCHC RBC-ENTMCNC: 34.4 G/DL — SIGNIFICANT CHANGE UP (ref 32–37)
MCV RBC AUTO: 94.2 FL — SIGNIFICANT CHANGE UP (ref 81–99)
MCV RBC AUTO: 94.4 FL — SIGNIFICANT CHANGE UP (ref 81–99)
MONOCYTES # BLD AUTO: 2.16 K/UL — HIGH (ref 0.1–0.6)
MONOCYTES NFR BLD AUTO: 7.8 % — SIGNIFICANT CHANGE UP (ref 1.7–9.3)
NEUTROPHILS # BLD AUTO: 19.31 K/UL — HIGH (ref 1.4–6.5)
NEUTROPHILS NFR BLD AUTO: 69.3 % — SIGNIFICANT CHANGE UP (ref 42.2–75.2)
NRBC # BLD: 1 /100 WBCS — HIGH (ref 0–0)
NRBC # BLD: 1 /100 WBCS — HIGH (ref 0–0)
PLATELET # BLD AUTO: 567 K/UL — HIGH (ref 130–400)
PLATELET # BLD AUTO: 636 K/UL — HIGH (ref 130–400)
POTASSIUM SERPL-MCNC: 4.4 MMOL/L — SIGNIFICANT CHANGE UP (ref 3.5–5)
POTASSIUM SERPL-SCNC: 4.4 MMOL/L — SIGNIFICANT CHANGE UP (ref 3.5–5)
PROT SERPL-MCNC: 6.4 G/DL — SIGNIFICANT CHANGE UP (ref 6–8)
RBC # BLD: 2.75 M/UL — LOW (ref 4.2–5.4)
RBC # BLD: 3.01 M/UL — LOW (ref 4.2–5.4)
RBC # FLD: 18.7 % — HIGH (ref 11.5–14.5)
RBC # FLD: 19.2 % — HIGH (ref 11.5–14.5)
SODIUM SERPL-SCNC: 140 MMOL/L — SIGNIFICANT CHANGE UP (ref 135–146)
WBC # BLD: 27.36 K/UL — HIGH (ref 4.8–10.8)
WBC # BLD: 27.84 K/UL — HIGH (ref 4.8–10.8)
WBC # FLD AUTO: 27.36 K/UL — HIGH (ref 4.8–10.8)
WBC # FLD AUTO: 27.84 K/UL — HIGH (ref 4.8–10.8)

## 2022-05-28 PROCEDURE — 99233 SBSQ HOSP IP/OBS HIGH 50: CPT

## 2022-05-28 PROCEDURE — 93010 ELECTROCARDIOGRAM REPORT: CPT

## 2022-05-28 RX ORDER — SODIUM CHLORIDE 9 MG/ML
1000 INJECTION INTRAMUSCULAR; INTRAVENOUS; SUBCUTANEOUS
Refills: 0 | Status: DISCONTINUED | OUTPATIENT
Start: 2022-05-28 | End: 2022-05-30

## 2022-05-28 RX ORDER — METOPROLOL TARTRATE 50 MG
25 TABLET ORAL EVERY 12 HOURS
Refills: 0 | Status: DISCONTINUED | OUTPATIENT
Start: 2022-05-28 | End: 2022-06-07

## 2022-05-28 RX ADMIN — PANTOPRAZOLE SODIUM 40 MILLIGRAM(S): 20 TABLET, DELAYED RELEASE ORAL at 18:22

## 2022-05-28 RX ADMIN — TICAGRELOR 90 MILLIGRAM(S): 90 TABLET ORAL at 05:51

## 2022-05-28 RX ADMIN — Medication 25 MILLIGRAM(S): at 15:39

## 2022-05-28 RX ADMIN — NIMODIPINE 30 MILLIGRAM(S): 60 SOLUTION ORAL at 18:23

## 2022-05-28 RX ADMIN — Medication 100 MICROGRAM(S): at 05:52

## 2022-05-28 RX ADMIN — NIMODIPINE 30 MILLIGRAM(S): 60 SOLUTION ORAL at 01:24

## 2022-05-28 RX ADMIN — NIMODIPINE 30 MILLIGRAM(S): 60 SOLUTION ORAL at 10:12

## 2022-05-28 RX ADMIN — NIMODIPINE 30 MILLIGRAM(S): 60 SOLUTION ORAL at 04:09

## 2022-05-28 RX ADMIN — Medication 81 MILLIGRAM(S): at 11:59

## 2022-05-28 RX ADMIN — Medication 0.5 MILLIGRAM(S): at 18:25

## 2022-05-28 RX ADMIN — NIMODIPINE 30 MILLIGRAM(S): 60 SOLUTION ORAL at 16:22

## 2022-05-28 RX ADMIN — PANTOPRAZOLE SODIUM 40 MILLIGRAM(S): 20 TABLET, DELAYED RELEASE ORAL at 05:52

## 2022-05-28 RX ADMIN — NIMODIPINE 30 MILLIGRAM(S): 60 SOLUTION ORAL at 08:06

## 2022-05-28 RX ADMIN — MUPIROCIN 1 APPLICATION(S): 20 OINTMENT TOPICAL at 18:22

## 2022-05-28 RX ADMIN — SODIUM CHLORIDE 75 MILLILITER(S): 9 INJECTION INTRAMUSCULAR; INTRAVENOUS; SUBCUTANEOUS at 11:58

## 2022-05-28 RX ADMIN — LIDOCAINE 1 PATCH: 4 CREAM TOPICAL at 19:30

## 2022-05-28 RX ADMIN — NIMODIPINE 30 MILLIGRAM(S): 60 SOLUTION ORAL at 22:02

## 2022-05-28 RX ADMIN — CHLORHEXIDINE GLUCONATE 1 APPLICATION(S): 213 SOLUTION TOPICAL at 05:52

## 2022-05-28 RX ADMIN — Medication 0.5 MILLIGRAM(S): at 12:24

## 2022-05-28 RX ADMIN — TICAGRELOR 90 MILLIGRAM(S): 90 TABLET ORAL at 18:24

## 2022-05-28 RX ADMIN — POLYETHYLENE GLYCOL 3350 17 GRAM(S): 17 POWDER, FOR SOLUTION ORAL at 11:59

## 2022-05-28 RX ADMIN — LIDOCAINE 1 PATCH: 4 CREAM TOPICAL at 12:56

## 2022-05-28 RX ADMIN — Medication 15 MILLIGRAM(S): at 05:51

## 2022-05-28 RX ADMIN — SENNA PLUS 2 TABLET(S): 8.6 TABLET ORAL at 21:17

## 2022-05-28 RX ADMIN — Medication 0.5 MILLIGRAM(S): at 05:53

## 2022-05-28 RX ADMIN — ENOXAPARIN SODIUM 40 MILLIGRAM(S): 100 INJECTION SUBCUTANEOUS at 11:59

## 2022-05-28 RX ADMIN — QUETIAPINE FUMARATE 25 MILLIGRAM(S): 200 TABLET, FILM COATED ORAL at 19:44

## 2022-05-28 RX ADMIN — MUPIROCIN 1 APPLICATION(S): 20 OINTMENT TOPICAL at 05:51

## 2022-05-28 RX ADMIN — NIMODIPINE 30 MILLIGRAM(S): 60 SOLUTION ORAL at 20:07

## 2022-05-28 RX ADMIN — NIMODIPINE 30 MILLIGRAM(S): 60 SOLUTION ORAL at 12:02

## 2022-05-28 NOTE — PROGRESS NOTE ADULT - SUBJECTIVE AND OBJECTIVE BOX
Neurology Follow up note  Patient examined at bedside,1 Unit prbc administered for hb 7.3. No acute overnight events.        HPI:  Ms. Shook is a 76 year old female patient known to have:Baseline AO3, lives with niece, ambulates with a walker, SEEMA and ILD. Follows with Dr Stephens. Not on CPAP or home O2. On Prednisone 20mg QD, Depression, HTN. Recent Admission for HTN Urgency, Hypothyroidism. Recent admission for a bleeding duodenal ulcer at [facility] (melena). Home med Protonix 20mg QD Essential Thrombocytosis. Follows with Dr Denise, Chronic Back Pain for years secondary to Spinal Stenosis per marquise. s/p Spinal Epidural Injection. Was on PT 3x/ week but has been non compliant for 2-3 months. Follows with Dr Jaimes. Was supposed to go for a nerve ablation last Monday (cancelled due to poorly controlled HTN s/p admission). She was brought to the ED by marquise on  for evaluation of worsening back pain and confusion x2 days. History goes back to 2 days PTP when the patient started complaining of worsening of her chronic dull lower back pain that radiates down to her right medial aspect of thigh. This has resulted in limited ambulation (patient refusing to leave her bed) and to being non compliant with Physical Therapy in the absence of leg weakness or paresthesias or numbness or urinary incontinence. Per marquise, patient has been feeling down x few days with reduced PO intake, reduced sleep, loss of interest, thoughts that her family doesn't like/ support her, and confusion (some times would not recognize niece).She has an instance where she had fecal incontinence on way to bathroom 2 days ago.In the setting of confusion and increased pain, marquise decided to bring patient to ED for evaluation.On review of systems, marquise denies any recent fever, chills, night sweats, URTI symptoms (cough, rhinorrhea, sore throat), urinary symptoms (urinary frequency, urgency, intermittence, dysuria, foul smelling urine, cloudy urine), abdominal pain, headache, nausea, or vomiting. No sick contacts. No recent travel or exposure to recent travelers.          Vital Signs Last 24 Hrs  T(C): 36.4 (27 May 2022 17:43), Max: 36.8 (27 May 2022 04:00)  T(F): 97.6 (27 May 2022 17:43), Max: 98.2 (27 May 2022 04:00)  HR: 126 (28 May 2022 01:22) (98 - 133)  BP: 126/59 (28 May 2022 01:22) (80/45 - 138/62)  BP(mean): 79 (28 May 2022 01:22) (53 - 97)  RR: 18 (28 May 2022 01:22) (15 - 24)  SpO2: 98% (28 May 2022 01:22) (89% - 99%)        Neurological Exam:   Mental status: Patient is awake alert oriented to self and place. Anxious, follows 1 step commands.   Cranial nerves: Normal visual field, normal eye movements, PERRLA, No facial asymmetry   Power 4+/5 UE            3/ 5  LE  No abnormal involuntary mvmts  Sensation: Responds to pain in all 4 exts.  FTN/HKS: Grossly intact  Gait: unable.      NIHSS 6  LOC 0   Commands 0   Questions 2  VF 0  Gaze 0  Face 0  RUE 0  RLE 2  LUE  0  LLE 2  Sensory 0  Speech 0  Language 0  Ataxia 0  Extinction 0      mRs  0 No symptoms at all  1 No significant disability despite symptoms; able to carry out all usual duties and activities without assistance  2 Slight disability; unable to carry out all previous activities, but able to look after own affairs  3 Moderate disability; requiring some help, but able to walk without assistance  4 Moderately severe disability; unable to walk without assistance and unable to attend to own bodily needs without assistance  5 Severe disability; bedridden, incontinent and requiring constant nursing care and attention  6 Dead         Medications  Acetaminophen Tablet .. 650 milligram Oral every 6 hours PRN  Acetaminophen Tablet .. 650 milligrams Oral once  Anagrelide 0.5 milligram Oral every 6 hours  Aspirin  chewable 81 milligram Oral daily  Chlorhexidine 4% Liquid 1 Application(s) Topical <User Schedule>  Cyclobenzaprine 10 milligram Oral three times a day PRN  Enoxaparin Injectable 40 milligrams Subcutaneous every 24 hours  Levothyroxine 100 Microgram's Oral daily  Lidocaine   4% Patch 1 Patch Transdermal daily  Mupirocin 2% Ointment 1 Application(s) Topical every 12 hours  Nimodipine Oral Solution 30 milligram Enteral Tube every 2 hours  Pantoprazole  Injectable 40 milligram IV Push every 12 hours  Polyethylene glycol 3350 17 Gram(s) Oral daily  Prednisone   Tablet 15 milligram Oral daily  Quetiapine 25 milligram Oral every 6 hours PRN  Senna 2 Tablet(s) Oral at bedtime  Sodium chloride 0.9% Bolus 250 milliliter IV Bolus once  Ticagrelor 90 milligram Oral two times a day      Lab                        7.7    32.27 )-----------( 620      ( 27 May 2022 21:30 )             23.2         137  |  105  |  12  ----------------------------<  87  3.4<L>   |  16<L>  |  1.3    Ca    8.9      27 May 2022 05:00  Phos  3.2       Mg     2.0         TPro  5.9<L>  /  Alb  3.7  /  TBili  0.5  /  DBili  x   /  AST  23  /  ALT  22  /  AlkPhos  55        Radiology  < from: MR Lumbar Spine w/wo IV Cont (22 @ 19:38) >  IMPRESSION:  Acute compression deformity of the L1 vertebral body with approximately   80% height loss as well as bulging of the posterior cortex resulting in   severe spinal stenosis.    Additional acute compression deformity of the L4 superior endplate.    Severe multilevel degenerative changes contributing to spinal canal as   well as neuroforaminal narrowing as detailed above.    --- End of Report ---      JO BECERRIL MD; Attending Radiologist  This document has been electronically signed. May 27 2022  9:18AM    < end of copied text >        ECHO  < from: TTE Echo Complete w/o Contrast w/ Doppler (22 @ 10:38) >    Summary:   1. Normal global left ventricular systolic function.   2. Normal left atrial size.   3. Normal right atrial size.   4. Trace mitral valve regurgitation.   5. PSAP 35.    < end of copied text >      VEEG  VEEG in the last 24 hours:    Background - continuous, symmetrical, less than optimally organized, reaching frequencies in the range of 7-8 hz    Focal and generalized slowin. mild generalized slowing  2. mild left hemispheric focal slowing   3. lower amplitude on the right side    Interictal activity - none    Events - none    Seizures -none    Impression:  Abnormal VEEG as above    Plan - as per NCC team          Electronic Signatures:  Phong Lopez)  (Signed 25-May-2022 10:10)  	Authored: EEG REPORT      Last Updated: 25-May-2022 10:10 by Phong Lopez) Neurology Follow up note  Patient examined at bedside,1 Unit prbc administered for hb 7.3. No acute overnight events.  Pt currently more awake and alert AOx2 person, "[facility]  Per nursing with decreased PO intake but no agitation or severe confusion.  No signs of depression.      HPI:  Ms. Shook is a 76 year old female patient known to have:Baseline AO3, lives with niece, ambulates with a walker, SEEMA and ILD. Follows with Dr Stephens. Not on CPAP or home O2. On Prednisone 20mg QD, Depression, HTN. Recent Admission for HTN Urgency, Hypothyroidism. Recent admission for a bleeding duodenal ulcer at [facility] (melena). Home med Protonix 20mg QD Essential Thrombocytosis. Follows with Dr Denise, Chronic Back Pain for years secondary to Spinal Stenosis per marquise. s/p Spinal Epidural Injection. Was on PT 3x/ week but has been non compliant for 2-3 months. Follows with Dr Jaimes. Was supposed to go for a nerve ablation last Monday (cancelled due to poorly controlled HTN s/p admission). She was brought to the ED by marquise on  for evaluation of worsening back pain and confusion x2 days. History goes back to 2 days PTP when the patient started complaining of worsening of her chronic dull lower back pain that radiates down to her right medial aspect of thigh. This has resulted in limited ambulation (patient refusing to leave her bed) and to being non compliant with Physical Therapy in the absence of leg weakness or paresthesias or numbness or urinary incontinence. Per marquise, patient has been feeling down x few days with reduced PO intake, reduced sleep, loss of interest, thoughts that her family doesn't like/ support her, and confusion (some times would not recognize niclaudia).She has an instance where she had fecal incontinence on way to bathroom 2 days ago.In the setting of confusion and increased pain, marquise decided to bring patient to ED for evaluation. On review of systems, marquise denies any recent fever, chills, night sweats, URTI symptoms (cough, rhinorrhea, sore throat), urinary symptoms (urinary frequency, urgency, intermittence, dysuria, foul smelling urine, cloudy urine), abdominal pain, headache, nausea, or vomiting. No sick contacts. No recent travel or exposure to recent travelers.    Vital Signs Last 24 Hrs  T(C): 36.4 (27 May 2022 17:43), Max: 36.8 (27 May 2022 04:00)  T(F): 97.6 (27 May 2022 17:43), Max: 98.2 (27 May 2022 04:00)  HR: 126 (28 May 2022 01:22) (98 - 133)  BP: 126/59 (28 May 2022 01:22) (80/45 - 138/62)  BP(mean): 79 (28 May 2022 01:22) (53 - 97)  RR: 18 (28 May 2022 01:22) (15 - 24)  SpO2: 98% (28 May 2022 01:22) (89% - 99%)    Neurological Exam:   Mental status: Patient is awake alert oriented to self and place. Anxious, follows 1 step commands.   Cranial nerves: Normal visual field, normal eye movements, PERRLA, No facial asymmetry   Power 4+/5 UE            3/ 5  LE  No abnormal involuntary mvmts  Sensation: Responds to pain in all 4 exts.  FTN/HKS: Grossly intact  Gait: unable.      NIHSS 6  LOC 0   Commands 0   Questions 2  VF 0  Gaze 0  Face 0  RUE 0  RLE 2  LUE  0  LLE 2  Sensory 0  Speech 0  Language 0  Ataxia 0  Extinction 0      mRs  0 No symptoms at all  1 No significant disability despite symptoms; able to carry out all usual duties and activities without assistance  2 Slight disability; unable to carry out all previous activities, but able to look after own affairs  3 Moderate disability; requiring some help, but able to walk without assistance  4 Moderately severe disability; unable to walk without assistance and unable to attend to own bodily needs without assistance  5 Severe disability; bedridden, incontinent and requiring constant nursing care and attention  6 Dead         Medications  Acetaminophen Tablet .. 650 milligram Oral every 6 hours PRN  Acetaminophen Tablet .. 650 milligrams Oral once  Anagrelide 0.5 milligram Oral every 6 hours  Aspirin  chewable 81 milligram Oral daily  Chlorhexidine 4% Liquid 1 Application(s) Topical <User Schedule>  Cyclobenzaprine 10 milligram Oral three times a day PRN  Enoxaparin Injectable 40 milligrams Subcutaneous every 24 hours  Levothyroxine 100 Microgram's Oral daily  Lidocaine   4% Patch 1 Patch Transdermal daily  Mupirocin 2% Ointment 1 Application(s) Topical every 12 hours  Nimodipine Oral Solution 30 milligram Enteral Tube every 2 hours  Pantoprazole  Injectable 40 milligram IV Push every 12 hours  Polyethylene glycol 3350 17 Gram(s) Oral daily  Prednisone   Tablet 15 milligram Oral daily  Quetiapine 25 milligram Oral every 6 hours PRN  Senna 2 Tablet(s) Oral at bedtime  Sodium chloride 0.9% Bolus 250 milliliter IV Bolus once  Ticagrelor 90 milligram Oral two times a day      Lab                        7.7    32. )-----------( 620      ( 27 May 2022 21:30 )             23.2         137  |  105  |  12  ----------------------------<  87  3.4<L>   |  16<L>  |  1.3    Ca    8.9      27 May 2022 05:00  Phos  3.2       Mg     2.0         TPro  5.9<L>  /  Alb  3.7  /  TBili  0.5  /  DBili  x   /  AST  23  /  ALT  22  /  AlkPhos  55        Radiology  < from: MR Lumbar Spine w/wo IV Cont (22 @ 19:38) >  IMPRESSION:  Acute compression deformity of the L1 vertebral body with approximately   80% height loss as well as bulging of the posterior cortex resulting in   severe spinal stenosis.    Additional acute compression deformity of the L4 superior endplate.    Severe multilevel degenerative changes contributing to spinal canal as   well as neuroforaminal narrowing as detailed above.    --- End of Report ---      JO BECERRIL MD; Attending Radiologist  This document has been electronically signed. May 27 2022  9:18AM    < end of copied text >        ECHO  < from: TTE Echo Complete w/o Contrast w/ Doppler (22 @ 10:38) >    Summary:   1. Normal global left ventricular systolic function.   2. Normal left atrial size.   3. Normal right atrial size.   4. Trace mitral valve regurgitation.   5. PSAP 35.    < end of copied text >      VEEG  VEEG in the last 24 hours:    Background - continuous, symmetrical, less than optimally organized, reaching frequencies in the range of 7-8 hz    Focal and generalized slowin. mild generalized slowing  2. mild left hemispheric focal slowing   3. lower amplitude on the right side    Interictal activity - none    Events - none    Seizures -none    Impression:  Abnormal VEEG as above    Plan - as per NCC team          Electronic Signatures:  Phong Lopez (MD)  (Signed 25-May-2022 10:10)  	Authored: EEG REPORT      Last Updated: 25-May-2022 10:10 by Phong Lopez (MD)

## 2022-05-28 NOTE — PROGRESS NOTE ADULT - ASSESSMENT
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 Pt s/p DSA pipeline stenting R ICA aneurysm on 5/23. S/P 1 UNIT PRBC for hb 7.3. No acute change in neuro status.    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 العلي)  #Encephalopathy of unclear etiology  - Neuro Checks Q4H  - Brilinta BID and Aspirin daily   - Nimodipine 30 mg Q2H  - Normovolemia  - Keppra for seizure ppx  - Video EEG negative  - MRA showed decreased aneurysm (5.9 -> 3.5mm), repeat MRI or CTA in 3 weeks per NSG  - Pain control: Tylenol, Lido patch, flexeril (new L1 compression fx, HA) on MR L-spine  - PT/OT/OOB    CV:   #HTN  - -200  - nimodipine      Pulm:  #SEEMA not on CPAP, ILD  - Aspiration precautions   - Prednisone 15mg qd for ILD; pulm following  - Incentive spirometry    GI:  #hx UGIB 2/2 bleeding duodenal ulcer (recent [facility] admission for melena)  - Protonix BID due to Prednisone  - Senna/Miralax  - Passed s/s, advanced to soft and bite sized diet  - Hematology consult     :  - Normonatremic euvolemia  - Strict Is/Os      Heme:  #Anemia, HB 7.3 s/p 1 unit prbc overnight  - Lovenox 40 qd 5/24   - SCDs  - Continue Anagrelide 0.5mg q6h (essential thrombocytosis)  - Negative LE dopplers from 5/19    ID:  - Monitor fever trend and WBCs  - CXR 5/25 unchanged  - Repeat UA, negative  - MRSA positive, on 5-day course mupirocin (end date 5/29)  - repeat Bcx 5/25 showed NGTD    Endo:  #hypothyroidism  - Synthroid PO 100mcg qD  - TSH 70, free t4 1.0; follow up in 2 weeks      Albarran Reinserted 5/26 for retention    Dispo: Continue stroke unit          77 y/o F W/PMH as above found to have R frontoparietal HH1/mF1 SAH, 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 Pt s/p DSA pipeline stenting R ICA aneurysm on 5/23. S/P 1 UNIT PRBC for hb 7.3. No acute change in neuro status.    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 العلي)  #Encephalopathy of unclear etiology  - Neuro Checks Q4H  - Brilinta BID and Aspirin daily   - Nimodipine 30 mg Q2H  - Normovolemia  - Keppra for seizure ppx  - Video EEG negative  - MRA showed decreased aneurysm (5.9 -> 3.5mm), repeat MRI or CTA in 3 weeks per NSG  - Pain control: Tylenol, Lido patch, flexeril (new L1 compression fx, HA) on MR L-spine  - PT/OT/OOB    CV:   #HTN  - -200  - nimodipine  - start IVF 0.9NS @ 75cc  - encourage PO intake    Pulm:  #SEEMA not on CPAP, ILD  - Aspiration precautions   - Prednisone 15mg qd for ILD; pulm following  - Incentive spirometry    GI:  #hx UGIB 2/2 bleeding duodenal ulcer (recent [facility] admission for melena)  - Protonix BID due to Prednisone  - Senna/Miralax  - Passed s/s, advanced to soft and bite sized diet  - encourage PO intake  - track CBC    :  - Normonatremic euvolemia  - Strict Is/Os    Heme:  #Anemia, HB 7.3 s/p 1 unit prbc overnight  - Lovenox 40 qd 5/24   - SCDs  - Continue Anagrelide 0.5mg q6h (essential thrombocytosis)  - Negative LE dopplers from 5/19  - track CBC    ID:  - Monitor fever trend and WBCs  - CXR 5/25 unchanged  - Repeat UA, negative  - MRSA positive, on 5-day course mupirocin (end date 5/29)  - repeat Bcx 5/25 showed NGTD    Endo:  #hypothyroidism  - Synthroid PO 100mcg qD  - TSH 70, free t4 1.0; follow up in 2 weeks      Albarran Reinserted 5/26 for retention    Dispo: Continue stroke unit w/ Q4 neurochecks

## 2022-05-28 NOTE — CHART NOTE - NSCHARTNOTEFT_GEN_A_CORE
The patient had a few episodes of tachycardia of >220 bpm (strip in the patient chart), and lasted for few minutes with spontaneous resolution.   Cardiology team were consulted (consult placed for Dr. Moses), and Electrophysiology were consulted.   She was started on metoprolol tartrate 25 mg bid, she was at home of atenolol   EKG was done did not capture the tachyarrhythmia, and the HR on it was around 120s, as sinus tachycardia

## 2022-05-28 NOTE — PROGRESS NOTE ADULT - SUBJECTIVE AND OBJECTIVE BOX
POD# 5    S/P Cerebral Angiogram with Pipeline Stent    Pt seen and examined at bedside. Pt without complaints at this time. Denies HA, dizziness    Vital Signs Last 24 Hrs  T(C): 36.7 (28 May 2022 12:00), Max: 36.7 (28 May 2022 12:00)  T(F): 98 (28 May 2022 12:00), Max: 98 (28 May 2022 12:00)  HR: 128 (28 May 2022 15:33) (111 - 133)  BP: 169/81 (28 May 2022 15:33) (99/70 - 169/81)  BP(mean): 101 (28 May 2022 15:33) (76 - 113)  RR: 19 (28 May 2022 14:00) (18 - 20)  SpO2: 98% (28 May 2022 15:33) (97% - 100%)    I&O's Detail    27 May 2022 07:01  -  28 May 2022 07:00  --------------------------------------------------------  IN:  Total IN: 0 mL    OUT:    Indwelling Catheter - Urethral (mL): 85 mL    Voided (mL): 100 mL  Total OUT: 185 mL    Total NET: -185 mL        I&O's Summary    27 May 2022 07:01  -  28 May 2022 07:00  --------------------------------------------------------  IN: 0 mL / OUT: 185 mL / NET: -185 mL        REVIEW OF SYSTEMS    [ ] A ten-point review of systems was otherwise negative except as noted.  [X] Due to altered mental status/intubation, subjective information were not able to be obtained from the patient. History was obtained, to the extent possible, from review of the chart and collateral sources of information.      PHYSICAL EXAM:  Neurological:  Awake and alert  Pupils reactive  No droop  SANCHEZ  Refusing to follow commands    LABS:                        9.6    27.84 )-----------( 636      ( 28 May 2022 06:12 )             28.4     05-28    140  |  105  |  16  ----------------------------<  99  4.4   |  14<L>  |  1.3    Ca    9.0      28 May 2022 06:12  Phos  3.2     05-27  Mg     1.9     05-28    TPro  6.4  /  Alb  3.9  /  TBili  0.6  /  DBili  x   /  AST  24  /  ALT  19  /  AlkPhos  64  05-28    Allergies    No Known Allergies    Intolerances      MEDICATIONS:  Antibiotics:    Neuro:  acetaminophen     Tablet .. 650 milliGRAM(s) Oral every 6 hours PRN  acetaminophen     Tablet .. 650 milliGRAM(s) Oral once  cyclobenzaprine 10 milliGRAM(s) Oral three times a day PRN  QUEtiapine 25 milliGRAM(s) Oral every 6 hours PRN      IVF:  sodium chloride 0.9% Bolus 250 milliLiter(s) IV Bolus once  sodium chloride 0.9%. 1000 milliLiter(s) IV Continuous <Continuous>      CULTURES:  Culture Results:   No growth to date. (05-25 @ 05:45)  Culture Results:   No growth to date. (05-25 @ 05:45)      RADIOLOGY & ADDITIONAL TESTS:      ASSESSMENT:  76y Female s/p    AMS,JEOVANY    ^FAILURE TO THRIVE    Yes    Handoff    MEWS Score    HTN (hypertension)    Hypothyroid    Depression    Interstitial lung disease    Essential thrombocytopenia    AMS (altered mental status)    No significant past surgical history    History of ovarian cyst    FAILURE TO THRIVE    8    JEOVANY (acute kidney injury)    [email or web address]        PLAN:  Care as per Neurology

## 2022-05-28 NOTE — PROGRESS NOTE ADULT - ATTENDING COMMENTS
75 yo F admitted with SAH with R supraclinoid aneurysm s/p pipeline currently stable improving neurologically.  Recommendations as above.

## 2022-05-28 NOTE — PROGRESS NOTE ADULT - ASSESSMENT
AMANDA FELDER 76y Female  MRN#: [id number]   CODE STATUS:dnr      SUBJECTIVE  Patient is a 76y old Female who presents with a chief complaint of Worsening Back Pain and Metabolic Encephalopathy (28 May 2022 02:42)  Currently admitted to medicine with the primary diagnosis of AMS (altered mental status) this morning she is resting in bed and denies any overnight events.denies any blood per rectum     OBJECTIVE  PAST MEDICAL & SURGICAL HISTORY  HTN (hypertension)    Hypothyroid    Depression    Interstitial lung disease    Essential thrombocytopenia    History of ovarian cyst      ALLERGIES:  No Known Allergies    MEDICATIONS:  STANDING MEDICATIONS  acetaminophen     Tablet .. 650 milliGRAM(s) Oral once  anagrelide 0.5 milliGRAM(s) Oral every 6 hours  aspirin  chewable 81 milliGRAM(s) Oral daily  chlorhexidine 4% Liquid 1 Application(s) Topical <User Schedule>  enoxaparin Injectable 40 milliGRAM(s) SubCutaneous every 24 hours  levothyroxine 100 MICROGram(s) Oral daily  lidocaine   4% Patch 1 Patch Transdermal daily  mupirocin 2% Ointment 1 Application(s) Topical every 12 hours  niMODipine Oral Solution 30 milliGRAM(s) Enteral Tube every 2 hours  pantoprazole  Injectable 40 milliGRAM(s) IV Push every 12 hours  polyethylene glycol 3350 17 Gram(s) Oral daily  predniSONE   Tablet 15 milliGRAM(s) Oral daily  senna 2 Tablet(s) Oral at bedtime  sodium chloride 0.9% Bolus 250 milliLiter(s) IV Bolus once  sodium chloride 0.9%. 1000 milliLiter(s) IV Continuous <Continuous>  ticagrelor 90 milliGRAM(s) Oral two times a day    PRN MEDICATIONS  acetaminophen     Tablet .. 650 milliGRAM(s) Oral every 6 hours PRN  cyclobenzaprine 10 milliGRAM(s) Oral three times a day PRN  QUEtiapine 25 milliGRAM(s) Oral every 6 hours PRN      VITAL SIGNS: Last 24 Hours  T(C): 36.7 (28 May 2022 12:00), Max: 36.8 (27 May 2022 15:41)  T(F): 98 (28 May 2022 12:00), Max: 98.2 (27 May 2022 15:41)  HR: 124 (28 May 2022 14:29) (111 - 133)  BP: 113/60 (28 May 2022 14:29) (99/70 - 145/83)  BP(mean): 76 (28 May 2022 14:29) (76 - 113)  RR: 19 (28 May 2022 14:00) (18 - 23)  SpO2: 100% (28 May 2022 14:29) (97% - 100%)    LABS:                        9.6    27.84 )-----------( 636      ( 28 May 2022 06:12 )             28.4     05-28    140  |  105  |  16  ----------------------------<  99  4.4   |  14<L>  |  1.3    Ca    9.0      28 May 2022 06:12  Phos  3.2     05-27  Mg     1.9     05-28    TPro  6.4  /  Alb  3.9  /  TBili  0.6  /  DBili  x   /  AST  24  /  ALT  19  /  AlkPhos  64  05-28                  RADIOLOGY:      PHYSICAL EXAM:    GENERAL: anxious   HEENT:  Atraumatic, Normocephalic.  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 Pt s/p DSA pipeline stenting R ICA aneurysm on 5/23. S/P 1 UNIT PRBC for hb 7.3. No acute change in neuro status.    #SAH, 2/2 5mm ruptured R supraclinoid aneurysm s/p angio & pipeline stent embo 5/23   #new L1 compression fx, CPB 2/2 spinal stenosis (follows w Dr. العلي)  #Encephalopathy of unclear etiology  - Neuro Checks per neuro  - Brilinta BID and Aspirin daily   - Nimodipine 30 mg Q2H  - on Keppra for seizure ppx  - Video EEG negative  - MRA showed decreased aneurysm (5.9 -> 3.5mm), repeat MRI or CTA in 3 weeks per NSG  - Pain contro  - PT/OT/OOB    #HTN  - -200  - nimodipine    # sinus tachycardia   no sepsis, active bleed, doesn't look dehydrated. extremely anxious on exam, monitor tele. r/o recurrent GIB. monitor BM. orthostatic vital signs     #SEEMA not on CPAP, ILD  - Aspiration precautions   - Prednisone 15mg qd for ILD; pulm following  - Incentive spirometry    #hx UGIB 2/2 bleeding duodenal ulcer (recent [facility] admission for melena)  - Protonix BID due to Prednisone  - Senna/Miralax  - Passed s/s, advanced to soft and bite sized diet  - Hematology consult     #Anemia, HB 7.3 s/p 1 unit prbc   - Lovenox 40 qd 5/24   - SCDs  - Continue Anagrelide 0.5mg q6h (essential thrombocytosis)  - Negative LE dopplers from 5/19    #hypothyroidism  - Synthroid PO 100mcg qD  - TSH 70, free t4 1.0; follow up in 2 weeks    TOV    #Progress Note Handoff  Pending (specify):  PT/OT, orthostatic vitals, monitor HR, CBC and BMs  Disposition:Unknown at this time________

## 2022-05-29 LAB
ALBUMIN SERPL ELPH-MCNC: 3.5 G/DL — SIGNIFICANT CHANGE UP (ref 3.5–5.2)
ALP SERPL-CCNC: 57 U/L — SIGNIFICANT CHANGE UP (ref 30–115)
ALT FLD-CCNC: 17 U/L — SIGNIFICANT CHANGE UP (ref 0–41)
ANION GAP SERPL CALC-SCNC: 16 MMOL/L — HIGH (ref 7–14)
AST SERPL-CCNC: 18 U/L — SIGNIFICANT CHANGE UP (ref 0–41)
BASE EXCESS BLDA CALC-SCNC: -11.3 MMOL/L — LOW (ref -2–3)
BASOPHILS # BLD AUTO: 0.08 K/UL — SIGNIFICANT CHANGE UP (ref 0–0.2)
BASOPHILS NFR BLD AUTO: 0.4 % — SIGNIFICANT CHANGE UP (ref 0–1)
BILIRUB SERPL-MCNC: 0.4 MG/DL — SIGNIFICANT CHANGE UP (ref 0.2–1.2)
BUN SERPL-MCNC: 17 MG/DL — SIGNIFICANT CHANGE UP (ref 10–20)
CALCIUM SERPL-MCNC: 8.5 MG/DL — SIGNIFICANT CHANGE UP (ref 8.5–10.1)
CHLORIDE SERPL-SCNC: 109 MMOL/L — SIGNIFICANT CHANGE UP (ref 98–110)
CO2 SERPL-SCNC: 14 MMOL/L — LOW (ref 17–32)
CREAT SERPL-MCNC: 1.3 MG/DL — SIGNIFICANT CHANGE UP (ref 0.7–1.5)
EGFR: 42 ML/MIN/1.73M2 — LOW
EOSINOPHIL # BLD AUTO: 0.6 K/UL — SIGNIFICANT CHANGE UP (ref 0–0.7)
EOSINOPHIL NFR BLD AUTO: 2.6 % — SIGNIFICANT CHANGE UP (ref 0–8)
GAS PNL BLDA: SIGNIFICANT CHANGE UP
GLUCOSE SERPL-MCNC: 87 MG/DL — SIGNIFICANT CHANGE UP (ref 70–99)
HCO3 BLDA-SCNC: 12 MMOL/L — LOW (ref 21–28)
HCT VFR BLD CALC: 24.6 % — LOW (ref 37–47)
HGB BLD-MCNC: 8.4 G/DL — LOW (ref 12–16)
IMM GRANULOCYTES NFR BLD AUTO: 13 % — HIGH (ref 0.1–0.3)
LACTATE SERPL-SCNC: 1 MMOL/L — SIGNIFICANT CHANGE UP (ref 0.7–2)
LYMPHOCYTES # BLD AUTO: 1.73 K/UL — SIGNIFICANT CHANGE UP (ref 1.2–3.4)
LYMPHOCYTES # BLD AUTO: 7.6 % — LOW (ref 20.5–51.1)
MAGNESIUM SERPL-MCNC: 1.8 MG/DL — SIGNIFICANT CHANGE UP (ref 1.8–2.4)
MCHC RBC-ENTMCNC: 32.1 PG — HIGH (ref 27–31)
MCHC RBC-ENTMCNC: 34.1 G/DL — SIGNIFICANT CHANGE UP (ref 32–37)
MCV RBC AUTO: 93.9 FL — SIGNIFICANT CHANGE UP (ref 81–99)
MONOCYTES # BLD AUTO: 1.82 K/UL — HIGH (ref 0.1–0.6)
MONOCYTES NFR BLD AUTO: 8 % — SIGNIFICANT CHANGE UP (ref 1.7–9.3)
NEUTROPHILS # BLD AUTO: 15.5 K/UL — HIGH (ref 1.4–6.5)
NEUTROPHILS NFR BLD AUTO: 68.4 % — SIGNIFICANT CHANGE UP (ref 42.2–75.2)
NRBC # BLD: 1 /100 WBCS — HIGH (ref 0–0)
PCO2 BLDA: 20 MMHG — LOW (ref 25–48)
PH BLDA: 7.37 — SIGNIFICANT CHANGE UP (ref 7.35–7.45)
PLATELET # BLD AUTO: 461 K/UL — HIGH (ref 130–400)
PO2 BLDA: 106 MMHG — SIGNIFICANT CHANGE UP (ref 83–108)
POTASSIUM SERPL-MCNC: 3.7 MMOL/L — SIGNIFICANT CHANGE UP (ref 3.5–5)
POTASSIUM SERPL-SCNC: 3.7 MMOL/L — SIGNIFICANT CHANGE UP (ref 3.5–5)
PROT SERPL-MCNC: 5.8 G/DL — LOW (ref 6–8)
RBC # BLD: 2.62 M/UL — LOW (ref 4.2–5.4)
RBC # FLD: 19.4 % — HIGH (ref 11.5–14.5)
SAO2 % BLDA: 100 % — HIGH (ref 94–98)
SODIUM SERPL-SCNC: 139 MMOL/L — SIGNIFICANT CHANGE UP (ref 135–146)
WBC # BLD: 22.67 K/UL — HIGH (ref 4.8–10.8)
WBC # FLD AUTO: 22.67 K/UL — HIGH (ref 4.8–10.8)

## 2022-05-29 PROCEDURE — 99233 SBSQ HOSP IP/OBS HIGH 50: CPT

## 2022-05-29 PROCEDURE — 99223 1ST HOSP IP/OBS HIGH 75: CPT

## 2022-05-29 RX ORDER — LACTULOSE 10 G/15ML
20 SOLUTION ORAL ONCE
Refills: 0 | Status: DISCONTINUED | OUTPATIENT
Start: 2022-05-29 | End: 2022-05-31

## 2022-05-29 RX ORDER — LACTULOSE 10 G/15ML
SOLUTION ORAL
Refills: 0 | Status: DISCONTINUED | OUTPATIENT
Start: 2022-05-29 | End: 2022-05-31

## 2022-05-29 RX ORDER — LACTULOSE 10 G/15ML
20 SOLUTION ORAL ONCE
Refills: 0 | Status: COMPLETED | OUTPATIENT
Start: 2022-05-29 | End: 2022-05-29

## 2022-05-29 RX ADMIN — PANTOPRAZOLE SODIUM 40 MILLIGRAM(S): 20 TABLET, DELAYED RELEASE ORAL at 05:02

## 2022-05-29 RX ADMIN — Medication 0.5 MILLIGRAM(S): at 00:07

## 2022-05-29 RX ADMIN — NIMODIPINE 30 MILLIGRAM(S): 60 SOLUTION ORAL at 18:03

## 2022-05-29 RX ADMIN — ENOXAPARIN SODIUM 40 MILLIGRAM(S): 100 INJECTION SUBCUTANEOUS at 12:00

## 2022-05-29 RX ADMIN — NIMODIPINE 30 MILLIGRAM(S): 60 SOLUTION ORAL at 08:21

## 2022-05-29 RX ADMIN — Medication 15 MILLIGRAM(S): at 05:02

## 2022-05-29 RX ADMIN — MUPIROCIN 1 APPLICATION(S): 20 OINTMENT TOPICAL at 17:51

## 2022-05-29 RX ADMIN — TICAGRELOR 90 MILLIGRAM(S): 90 TABLET ORAL at 05:03

## 2022-05-29 RX ADMIN — CHLORHEXIDINE GLUCONATE 1 APPLICATION(S): 213 SOLUTION TOPICAL at 05:05

## 2022-05-29 RX ADMIN — SODIUM CHLORIDE 75 MILLILITER(S): 9 INJECTION INTRAMUSCULAR; INTRAVENOUS; SUBCUTANEOUS at 13:30

## 2022-05-29 RX ADMIN — Medication 25 MILLIGRAM(S): at 05:03

## 2022-05-29 RX ADMIN — LACTULOSE 20 GRAM(S): 10 SOLUTION ORAL at 18:39

## 2022-05-29 RX ADMIN — NIMODIPINE 30 MILLIGRAM(S): 60 SOLUTION ORAL at 16:10

## 2022-05-29 RX ADMIN — LIDOCAINE 1 PATCH: 4 CREAM TOPICAL at 19:39

## 2022-05-29 RX ADMIN — Medication 25 MILLIGRAM(S): at 17:37

## 2022-05-29 RX ADMIN — NIMODIPINE 30 MILLIGRAM(S): 60 SOLUTION ORAL at 02:03

## 2022-05-29 RX ADMIN — NIMODIPINE 30 MILLIGRAM(S): 60 SOLUTION ORAL at 00:06

## 2022-05-29 RX ADMIN — Medication 100 MICROGRAM(S): at 05:02

## 2022-05-29 RX ADMIN — NIMODIPINE 30 MILLIGRAM(S): 60 SOLUTION ORAL at 20:06

## 2022-05-29 RX ADMIN — Medication 81 MILLIGRAM(S): at 12:00

## 2022-05-29 RX ADMIN — LIDOCAINE 1 PATCH: 4 CREAM TOPICAL at 00:14

## 2022-05-29 RX ADMIN — Medication 0.5 MILLIGRAM(S): at 12:01

## 2022-05-29 RX ADMIN — Medication 650 MILLIGRAM(S): at 17:55

## 2022-05-29 RX ADMIN — PANTOPRAZOLE SODIUM 40 MILLIGRAM(S): 20 TABLET, DELAYED RELEASE ORAL at 17:48

## 2022-05-29 RX ADMIN — QUETIAPINE FUMARATE 25 MILLIGRAM(S): 200 TABLET, FILM COATED ORAL at 19:48

## 2022-05-29 RX ADMIN — LIDOCAINE 1 PATCH: 4 CREAM TOPICAL at 12:00

## 2022-05-29 RX ADMIN — NIMODIPINE 30 MILLIGRAM(S): 60 SOLUTION ORAL at 14:09

## 2022-05-29 RX ADMIN — POLYETHYLENE GLYCOL 3350 17 GRAM(S): 17 POWDER, FOR SOLUTION ORAL at 12:00

## 2022-05-29 RX ADMIN — Medication 0.5 MILLIGRAM(S): at 17:53

## 2022-05-29 RX ADMIN — Medication 0.5 MILLIGRAM(S): at 05:04

## 2022-05-29 RX ADMIN — TICAGRELOR 90 MILLIGRAM(S): 90 TABLET ORAL at 17:49

## 2022-05-29 RX ADMIN — SODIUM CHLORIDE 75 MILLILITER(S): 9 INJECTION INTRAMUSCULAR; INTRAVENOUS; SUBCUTANEOUS at 00:06

## 2022-05-29 RX ADMIN — MUPIROCIN 1 APPLICATION(S): 20 OINTMENT TOPICAL at 05:03

## 2022-05-29 RX ADMIN — NIMODIPINE 30 MILLIGRAM(S): 60 SOLUTION ORAL at 10:04

## 2022-05-29 NOTE — CONSULT NOTE ADULT - SUBJECTIVE AND OBJECTIVE BOX
Patient is a 76y old  Female who presents with a chief complaint of Worsening Back Pain and Metabolic Encephalopathy (29 May 2022 06:44)      HPI:  History of Present Illness  Ms. Shook is a 76 year old female patient known to have:  - Baseline AO3, lives with niece, ambulates with a walker  - SEEMA and ILD. Follows with Dr Stephens. Not on CPAP or home O2. On Prednisone 20mg QD  - Depression  - HTN. Recent Admission for HTN Urgency. Follows with Dr Moses  - Hypothyroidism   - Recent admission for a bleeding duodenal ulcer at [facility] (melena). Home med Protonix 20mg QD  - Essential Thrombocytosis. Follows with Dr Denise  - Chronic Back Pain for years secondary to Spinal Stenosis per marquise. s/p Spinal Epidural Injection. Was on PT 3x/ week but has been non compliant for 2-3 months. Follows with Dr Jaimes. Was supposed to go for a nerve ablation last Monday (cancelled due to poorly controlled HTN s/p admission)       She was brought to the ED by marquise on  for evaluation of worsening back pain and confusion x2 days.  History goes back to 2 days PTP when the patient started complaining of worsening of her chronic dull lower back pain that radiates down to her right medial aspect of thigh.  This has resulted in limited ambulation (patient refusing to leave her bed) and to being non compliant with Physical Therapy in the absence of leg weakness or paresthesias or numbness or urinary incontinence.  Per marquise, patient has been feeling down x few days with reduced PO intake, reduced sleep, loss of interest, thoughts that her family doesn't like/ support her, and confusion (some times would not recognize niclaudia).  She has an instance where she had fecal incontinence on way to bathroom 2 days ago.  In the setting of confusion and increased pain, marquise decided to bring patient to ED for evaluation.    On review of systems, marquise denies any recent fever, chills, night sweats, URTI symptoms (cough, rhinorrhea, sore throat), urinary symptoms (urinary frequency, urgency, intermittence, dysuria, foul smelling urine, cloudy urine), abdominal pain, headache, nausea, or vomiting.   No sick contacts.   No recent travel or exposure to recent travelers.      Upon presentation to the ED, the patient was hemodynamically stable:  Vital Signs in ED   - /104 mmHg  -   - RR 20  - T97.1  - SaO2 94% on RA      Investigations   Laboratory Workup  - CBC:                        11.6   20.94 )-----------( 188      ( 18 May 2022 10:50 )             34.1     - Chemistry:      140  |  96<L>  |  48<H>  ----------------------------<  109<H>  4.3   |  24  |  2.1<H>    Ca    10.4<H>      18 May 2022 10:50    TPro  7.3  /  Alb  4.8  /  TBili  0.6  /  DBili  x   /  AST  36  /  ALT  27  /  AlkPhos  61      - Coagulation Studies:  PT/INR - ( 18 May 2022 10:50 )   PT: 12.30 sec;   INR: 1.07 ratio    PTT - ( 18 May 2022 10:50 )  PTT:24.8 sec      Microbiological Workup  Urinalysis Basic - ( 18 May 2022 14:07 )    Color: Yellow / Appearance: Clear / S.021 / pH: x  Gluc: x / Ketone: Trace  / Bili: Negative / Urobili: <2 mg/dL   Blood: x / Protein: 30 mg/dL / Nitrite: Negative   Leuk Esterase: Negative / RBC: 6 /HPF / WBC 4 /HPF   Sq Epi: x / Non Sq Epi: 1 /HPF / Bacteria: Negative          Radiological Workup  * CT Abdomen and Pelvis w/ IV Cont (22 @ 14:00) No acute abnormality within the abdomen and pelvis. Age-indeterminate moderate compression fracture seen at L1, new since 2022.  * CXR CM and CHF      - Patient was given IV Cefepime 2g x1 dose in ED  - She was also given 1.4 L LR bolus   - She will be admitted for further investigations, management, and monitoring             (18 May 2022 22:59)      PAST MEDICAL & SURGICAL HISTORY:  HTN (hypertension)      Hypothyroid      Depression      Interstitial lung disease      Essential thrombocytopenia      History of ovarian cyst          PREVIOUS DIAGNOSTIC TESTING:      ECHO  FINDINGS:    STRESS  FINDINGS:    CATHETERIZATION  FINDINGS:    MEDICATIONS  (STANDING):  acetaminophen     Tablet .. 650 milliGRAM(s) Oral once  anagrelide 0.5 milliGRAM(s) Oral every 6 hours  aspirin  chewable 81 milliGRAM(s) Oral daily  chlorhexidine 4% Liquid 1 Application(s) Topical <User Schedule>  enoxaparin Injectable 40 milliGRAM(s) SubCutaneous every 24 hours  levothyroxine 100 MICROGram(s) Oral daily  lidocaine   4% Patch 1 Patch Transdermal daily  metoprolol tartrate 25 milliGRAM(s) Oral every 12 hours  mupirocin 2% Ointment 1 Application(s) Topical every 12 hours  niMODipine Oral Solution 30 milliGRAM(s) Enteral Tube every 2 hours  pantoprazole  Injectable 40 milliGRAM(s) IV Push every 12 hours  polyethylene glycol 3350 17 Gram(s) Oral daily  predniSONE   Tablet 15 milliGRAM(s) Oral daily  senna 2 Tablet(s) Oral at bedtime  sodium chloride 0.9% Bolus 250 milliLiter(s) IV Bolus once  sodium chloride 0.9%. 1000 milliLiter(s) (75 mL/Hr) IV Continuous <Continuous>  ticagrelor 90 milliGRAM(s) Oral two times a day    MEDICATIONS  (PRN):  acetaminophen     Tablet .. 650 milliGRAM(s) Oral every 6 hours PRN Mild Pain (1 - 3)  cyclobenzaprine 10 milliGRAM(s) Oral three times a day PRN Muscle Spasm  QUEtiapine 25 milliGRAM(s) Oral every 6 hours PRN agitation      FAMILY HISTORY:      SOCIAL HISTORY:  CIGARETTES:    ALCOHOL:    REVIEW OF SYSTEMS:  CONSTITUTIONAL: No fever, weight loss, or fatigue  NECK: No pain or stiffness  RESPIRATORY: No cough, wheezing, chills or hemoptysis; No shortness of breath  CARDIOVASCULAR: No chest pain, palpitations, dizziness, or leg swelling  GASTROINTESTINAL: No abdominal or epigastric pain. No nausea, vomiting, or hematemesis; No diarrhea or constipation. No melena or hematochezia.  GENITOURINARY: No dysuria, frequency, hematuria, or incontinence  NEUROLOGICAL: No headaches, memory loss, loss of strength, numbness, or tremors  SKIN: No itching, burning, rashes, or lesions   ENDOCRINE: No heat or cold intolerance; No hair loss  MUSCULOSKELETAL: No joint pain or swelling; No muscle, back, or extremity pain  HEME/LYMPH: No easy bruising, or bleeding gums          Vital Signs Last 24 Hrs  T(C): 36.6 (29 May 2022 04:00), Max: 37 (28 May 2022 20:00)  T(F): 97.9 (29 May 2022 04:00), Max: 98.6 (28 May 2022 20:00)  HR: 92 (29 May 2022 06:00) (92 - 128)  BP: 115/67 (29 May 2022 06:00) (99/70 - 169/81)  BP(mean): 93 (29 May 2022 06:00) (76 - 106)  RR: 18 (29 May 2022 04:00) (16 - 20)  SpO2: 97% (29 May 2022 04:00) (94% - 100%)        PHYSICAL EXAM:  GENERAL: NAD, well-groomed, well-developed  HEAD:  Atraumatic, Normocephalic  NECK: Supple, No JVD, Normal thyroid  NERVOUS SYSTEM:  Alert & Oriented X3, Good concentration  CHEST/LUNG: Clear to percussion bilaterally; No rales, rhonchi, wheezing, or rubs  HEART: Regular rate and rhythm; No murmurs, rubs, or gallops  ABDOMEN: Soft, Nontender, Nondistended; Bowel sounds present  EXTREMITIES:  2+ Peripheral Pulses, No clubbing, cyanosis, or edema  SKIN: No rashes or lesions    INTERPRETATION OF TELEMETRY:    ECG:    I&O's Detail    28 May 2022 07:01  -  29 May 2022 07:00  --------------------------------------------------------  IN:    sodium chloride 0.9%: 975 mL  Total IN: 975 mL    OUT:    Indwelling Catheter - Urethral (mL): 1450 mL  Total OUT: 1450 mL    Total NET: -475 mL          LABS:                        8.9    27.36 )-----------( 567      ( 28 May 2022 17:48 )             25.9     05-    140  |  105  |  16  ----------------------------<  99  4.4   |  14<L>  |  1.3    Ca    9.0      28 May 2022 06:12  Mg     1.9     -    TPro  6.4  /  Alb  3.9  /  TBili  0.6  /  DBili  x   /  AST  24  /  ALT  19  /  AlkPhos  64              I&O's Summary    28 May 2022 07:01  -  29 May 2022 07:00  --------------------------------------------------------  IN: 975 mL / OUT: 1450 mL / NET: -475 mL        RADIOLOGY & ADDITIONAL STUDIES:

## 2022-05-29 NOTE — CONSULT NOTE ADULT - SUBJECTIVE AND OBJECTIVE BOX
Patient is a 76y old  Female who presents with a chief complaint of Worsening Back Pain and Metabolic Encephalopathy (29 May 2022 08:47)    HPI: Patient is a 76 year old female patient with hx of Hypothyroidism. Depression, HTN, SEEMA and ILD. Follows with Dr Stephens. Not on CPAP or home O2. On Prednisone 20mg. Recent Admission for HTN Urgency. Also recent admission for a bleeding duodenal ulcer at [facility] (melena). Home med Protonix 20mg QD Essential Thrombocytosis. Follows with Dr Denise, Chronic Back Pain for years secondary to Spinal Stenosis per marquise. s/p Spinal Epidural Injection. Was on PT 3x/ week but has been non compliant for 2-3 months. Follows with Dr Jaimes. Was supposed to go for a nerve ablation last Monday (cancelled due to poorly controlled HTN s/p admission). She was brought to the ED by marquise on 05/18 for evaluation of worsening back pain and confusion x2 days. History goes back to 2 days PTP when the patient started complaining of worsening of her chronic dull lower back pain that radiates down to her right medial aspect of thigh. This has resulted in limited ambulation (patient refusing to leave her bed) and to being non compliant with Physical Therapy in the absence of leg weakness or paresthesias or numbness or urinary incontinence. Per marquise, patient has been feeling down x few days with reduced PO intake, reduced sleep, loss of interest, thoughts that her family doesn't like/ support her, and confusion (some times would not recognize srideviece).She has an instance where she had fecal incontinence on way to bathroom 2 days ago.In the setting of confusion and increased pain, marquise decided to bring patient to ED for evaluation. On review of systems, marquise denies any recent fever, chills, night sweats, URTI symptoms (cough, rhinorrhea, sore throat), urinary symptoms (urinary frequency, urgency, intermittence, dysuria, foul smelling urine, cloudy urine), abdominal pain, headache, nausea, or vomiting. No sick contacts. No recent travel or exposure to recent travelers.    EP consulted 2/2 tachycardia overnight with HR over 200 bpm. Patient states she felt slightly anxious with mild palpitations but no dizziness or syncope. States she has been told of high HR's in the past but never needed any intervention. Admits to having alot of health issues going on which cause her to be anxious as well. Patient now feeling fine without any symptoms.    PAST MEDICAL & SURGICAL HISTORY:  HTN (hypertension)      Hypothyroid      Depression      Interstitial lung disease      Essential thrombocytopenia      History of ovarian cyst      PREVIOUS DIAGNOSTIC TESTING:      ECHO  FINDINGS:  < from: TTE Echo Complete w/o Contrast w/ Doppler (05.24.22 @ 10:38) >  Summary:   1. Normal global left ventricular systolic function.   2. Normal left atrial size.   3. Normal right atrial size.   4. Trace mitral valve regurgitation.   5. PSAP 35.    < end of copied text >    STRESS  FINDINGS:    CATHETERIZATION  FINDINGS:    ELECTROPHYSIOLOGY STUDY  FINDINGS:    CAROTID ULTRASOUND:  FINDINGS    VENOUS DUPLEX SCAN:  FINDINGS:    CHEST CT PULMONARY ANGIO with IV Contrast:  FINDINGS:    MEDICATIONS  (STANDING):  acetaminophen     Tablet .. 650 milliGRAM(s) Oral once  anagrelide 0.5 milliGRAM(s) Oral every 6 hours  aspirin  chewable 81 milliGRAM(s) Oral daily  chlorhexidine 4% Liquid 1 Application(s) Topical <User Schedule>  enoxaparin Injectable 40 milliGRAM(s) SubCutaneous every 24 hours  levothyroxine 100 MICROGram(s) Oral daily  lidocaine   4% Patch 1 Patch Transdermal daily  metoprolol tartrate 25 milliGRAM(s) Oral every 12 hours  mupirocin 2% Ointment 1 Application(s) Topical every 12 hours  niMODipine Oral Solution 30 milliGRAM(s) Enteral Tube every 2 hours  pantoprazole  Injectable 40 milliGRAM(s) IV Push every 12 hours  polyethylene glycol 3350 17 Gram(s) Oral daily  predniSONE   Tablet 15 milliGRAM(s) Oral daily  senna 2 Tablet(s) Oral at bedtime  sodium chloride 0.9% Bolus 250 milliLiter(s) IV Bolus once  sodium chloride 0.9%. 1000 milliLiter(s) (75 mL/Hr) IV Continuous <Continuous>  ticagrelor 90 milliGRAM(s) Oral two times a day    MEDICATIONS  (PRN):  acetaminophen     Tablet .. 650 milliGRAM(s) Oral every 6 hours PRN Mild Pain (1 - 3)  cyclobenzaprine 10 milliGRAM(s) Oral three times a day PRN Muscle Spasm  QUEtiapine 25 milliGRAM(s) Oral every 6 hours PRN agitation      FAMILY HISTORY: No significant hx    SOCIAL HISTORY: No smoking, ETOH or illicit drug use    Past Surgical History: No significant hx    Allergies:  No Known Allergies      REVIEW OF SYSTEMS:  CONSTITUTIONAL: No fever, weight loss, chills, shakes, or fatigue  RESPIRATORY: No cough, wheezing, hemoptysis, or shortness of breath  CARDIOVASCULAR: +palpitations; No chest pain, dyspnea, dizziness, syncope, paroxysmal nocturnal dyspnea, orthopnea, or arm or leg swelling  GASTROINTESTINAL: No abdominal  or epigastric pain, nausea, vomiting, hematemesis, diarrhea, constipation, melena or bright red blood.  NEUROLOGICAL: +Confusion; No headaches, memory loss, slurred speech, limb weakness, loss of strength, numbness, or tremors  MUSCULOSKELETAL: +Back pain; No joint pain or swelling, muscle, or extremity pain      Vital Signs Last 24 Hrs  T(C): 35.9 (29 May 2022 07:44), Max: 37 (28 May 2022 20:00)  T(F): 96.7 (29 May 2022 07:44), Max: 98.6 (28 May 2022 20:00)  HR: 102 (29 May 2022 08:15) (92 - 128)  BP: 140/63 (29 May 2022 08:15) (99/70 - 169/81)  BP(mean): 100 (29 May 2022 08:15) (76 - 106)  RR: 18 (29 May 2022 08:00) (16 - 20)  SpO2: 98% (29 May 2022 08:15) (94% - 100%)    PHYSICAL EXAM:  GENERAL: In no apparent distress, well nourished, and hydrated.  NECK: Supple, No JVD   HEART: Regular rate and rhythm; No murmurs, rubs, or gallops.  PULMONARY: Clear to auscultation and perfusion.  No rales, wheezing, or rhonchi bilaterally.  EXTREMITIES:  2+ Peripheral Pulses, no LE edema BL  NEUROLOGICAL: Grossly nonfocal      INTERPRETATION OF TELEMETRY: Sinus tachycardia 104 bpm    ECG:  < from: 12 Lead ECG (05.28.22 @ 14:34) >  Ventricular Rate 127 BPM    Atrial Rate 127 BPM    P-R Interval 140 ms    QRS Duration 68 ms    Q-T Interval 290 ms    QTC Calculation(Bazett) 421 ms    P Axis 45 degrees    R Axis 17 degrees    T Axis 26 degrees    Diagnosis Line Sinus tachycardia  Possible Left atrial enlargement  Borderline ECG    Confirmed by Estrada Martin ([id number]) on 5/28/2022 3:27:11 PM    < end of copied text >      I&O's Detail    28 May 2022 07:01  -  29 May 2022 07:00  --------------------------------------------------------  IN:    sodium chloride 0.9%: 975 mL  Total IN: 975 mL    OUT:    Indwelling Catheter - Urethral (mL): 1450 mL  Total OUT: 1450 mL    Total NET: -475 mL          LABS:                        8.4    22.67 )-----------( 461      ( 29 May 2022 05:21 )             24.6     05-29    139  |  109  |  17  ----------------------------<  87  3.7   |  14<L>  |  1.3    Ca    8.5      29 May 2022 05:21  Mg     1.8     05-29    TPro  5.8<L>  /  Alb  3.5  /  TBili  0.4  /  DBili  x   /  AST  18  /  ALT  17  /  AlkPhos  57  05-29            BNP  I&O's Detail    28 May 2022 07:01  -  29 May 2022 07:00  --------------------------------------------------------  IN:    sodium chloride 0.9%: 975 mL  Total IN: 975 mL    OUT:    Indwelling Catheter - Urethral (mL): 1450 mL  Total OUT: 1450 mL    Total NET: -475 mL        Daily     Daily     RADIOLOGY & ADDITIONAL STUDIES:

## 2022-05-29 NOTE — PROGRESS NOTE ADULT - SUBJECTIVE AND OBJECTIVE BOX
Neurology Follow up note  Patient examined at bedside, no new issues this morning, no agitation overnight.     HPI:  Ms. Shook is a 76 year old female patient known to have:Baseline AO3, lives with niece, ambulates with a walker, SEEMA and ILD. Follows with Dr Stephens. Not on CPAP or home O2. On Prednisone 20mg QD, Depression, HTN. Recent Admission for HTN Urgency, Hypothyroidism. Recent admission for a bleeding duodenal ulcer at [facility] (melena). Home med Protonix 20mg QD Essential Thrombocytosis. Follows with Dr Denise, Chronic Back Pain for years secondary to Spinal Stenosis per marquise. s/p Spinal Epidural Injection. Was on PT 3x/ week but has been non compliant for 2-3 months. Follows with Dr Jaimes. Was supposed to go for a nerve ablation last Monday (cancelled due to poorly controlled HTN s/p admission). She was brought to the ED by marquise on  for evaluation of worsening back pain and confusion x2 days. History goes back to 2 days PTP when the patient started complaining of worsening of her chronic dull lower back pain that radiates down to her right medial aspect of thigh. This has resulted in limited ambulation (patient refusing to leave her bed) and to being non compliant with Physical Therapy in the absence of leg weakness or paresthesias or numbness or urinary incontinence. Per marquise, patient has been feeling down x few days with reduced PO intake, reduced sleep, loss of interest, thoughts that her family doesn't like/ support her, and confusion (some times would not recognize niece).She has an instance where she had fecal incontinence on way to bathroom 2 days ago.In the setting of confusion and increased pain, marquise decided to bring patient to ED for evaluation. On review of systems, marquise denies any recent fever, chills, night sweats, URTI symptoms (cough, rhinorrhea, sore throat), urinary symptoms (urinary frequency, urgency, intermittence, dysuria, foul smelling urine, cloudy urine), abdominal pain, headache, nausea, or vomiting. No sick contacts. No recent travel or exposure to recent travelers.      LABS:  cret                        8.9    27.36 )-----------( 567      ( 28 May 2022 17:48 )             25.9     05-28    140  |  105  |  16  ----------------------------<  99  4.4   |  14<L>  |  1.3    Ca    9.0      28 May 2022 06:12  Mg     1.9         TPro  6.4  /  Alb  3.9  /  TBili  0.6  /  DBili  x   /  AST  24  /  ALT  19  /  AlkPhos  64          Neurological Exam:   Mental status: Patient is awake alert oriented to self and place. Anxious, follows 1 step commands.   Cranial nerves: Normal visual field, normal eye movements, PERRLA, No facial asymmetry   Power 4+/5 UE            4+/ 5  LE  No abnormal involuntary mvmts  Sensation: Responds to pain in all 4 exts.  FTN/HKS: Grossly intact  Gait: unable.      NIHSS:    1A: LOC 0   1B: LOC Questions 2  1C: Performs Tasks 0   2: Horizontal EOMs 0  3: Visual Fields 0   4: Facial Palsy 0   5A: LUE Drift 0   5B: RUE Drift 0   6A: LLE Drift 0   6B: RLE Drift 0   7: Limb Ataxia (heel-shin, FNF) 0   8: Sensation 0   9: Language/Aphasia 0   10: Dysarthria 0   11: Extinction/Inattention 0     NIHSS Total: 2      mRs  0 No symptoms at all  1 No significant disability despite symptoms; able to carry out all usual duties and activities without assistance  2 Slight disability; unable to carry out all previous activities, but able to look after own affairs  3 Moderate disability; requiring some help, but able to walk without assistance  4 Moderately severe disability; unable to walk without assistance and unable to attend to own bodily needs without assistance  5 Severe disability; bedridden, incontinent and requiring constant nursing care and attention  6 Dead         MEDICATIONS  (STANDING):  acetaminophen     Tablet .. 650 milliGRAM(s) Oral once  anagrelide 0.5 milliGRAM(s) Oral every 6 hours  aspirin  chewable 81 milliGRAM(s) Oral daily  chlorhexidine 4% Liquid 1 Application(s) Topical <User Schedule>  enoxaparin Injectable 40 milliGRAM(s) SubCutaneous every 24 hours  levothyroxine 100 MICROGram(s) Oral daily  lidocaine   4% Patch 1 Patch Transdermal daily  metoprolol tartrate 25 milliGRAM(s) Oral every 12 hours  mupirocin 2% Ointment 1 Application(s) Topical every 12 hours  niMODipine Oral Solution 30 milliGRAM(s) Enteral Tube every 2 hours  pantoprazole  Injectable 40 milliGRAM(s) IV Push every 12 hours  polyethylene glycol 3350 17 Gram(s) Oral daily  predniSONE   Tablet 15 milliGRAM(s) Oral daily  senna 2 Tablet(s) Oral at bedtime  sodium chloride 0.9% Bolus 250 milliLiter(s) IV Bolus once  sodium chloride 0.9%. 1000 milliLiter(s) (75 mL/Hr) IV Continuous <Continuous>  ticagrelor 90 milliGRAM(s) Oral two times a day    MEDICATIONS  (PRN):  acetaminophen     Tablet .. 650 milliGRAM(s) Oral every 6 hours PRN Mild Pain (1 - 3)  cyclobenzaprine 10 milliGRAM(s) Oral three times a day PRN Muscle Spasm  QUEtiapine 25 milliGRAM(s) Oral every 6 hours PRN agitation        LABS:  cret                        8.9    27.36 )-----------( 567      ( 28 May 2022 17:48 )             25.9         140  |  105  |  16  ----------------------------<  99  4.4   |  14<L>  |  1.3    Ca    9.0      28 May 2022 06:12  Mg     1.9         TPro  6.4  /  Alb  3.9  /  TBili  0.6  /  DBili  x   /  AST  24  /  ALT  19  /  AlkPhos  64          Radiology  < from: MR Lumbar Spine w/wo IV Cont (22 @ 19:38) >  IMPRESSION:  Acute compression deformity of the L1 vertebral body with approximately   80% height loss as well as bulging of the posterior cortex resulting in   severe spinal stenosis.    Additional acute compression deformity of the L4 superior endplate.    Severe multilevel degenerative changes contributing to spinal canal as   well as neuroforaminal narrowing as detailed above.    --- End of Report ---      JO BECERRIL MD; Attending Radiologist  This document has been electronically signed. May 27 2022  9:18AM    < end of copied text >        ECHO  < from: TTE Echo Complete w/o Contrast w/ Doppler (22 @ 10:38) >    Summary:   1. Normal global left ventricular systolic function.   2. Normal left atrial size.   3. Normal right atrial size.   4. Trace mitral valve regurgitation.   5. PSAP 35.    < end of copied text >      VEEG  VEEG in the last 24 hours:    Background - continuous, symmetrical, less than optimally organized, reaching frequencies in the range of 7-8 hz    Focal and generalized slowin. mild generalized slowing  2. mild left hemispheric focal slowing   3. lower amplitude on the right side    Interictal activity - none    Events - none    Seizures -none    Impression:  Abnormal VEEG as above    Plan - as per NCC team          Electronic Signatures:  Phong Lopez)  (Signed 25-May-2022 10:10)  	Authored: EEG REPORT      Last Updated: 25-May-2022 10:10 by Phong Lopez)

## 2022-05-29 NOTE — CONSULT NOTE ADULT - ASSESSMENT
Cardiologist: Dr Moses    Assessment: 75 yo F with hx of HTN, hypothyroidism, ILD on Prednisone, SEEMA, chronic thrombocytosis and chronic back pain admitted for worsening back pain and confusion. Found to have SAH 2/2 ruptured aneurysm sp stent 5/23 and metabolic encephalopathy which is resolving. Overnight patient developed narrow complex tachycardia with HR over 200 bpm and admits to palpitations. Now feeling better    Impression:  SVT  Sinus Tachycardia  SAH 2/2 Ruptured Aneurysm  Metabolic Encephalopathy  Anemia  ILD on Prednisone  HTN  Hypothyroidism  Chronic Back Pain  Chronic Thrombocytosis  Leukocytosis    Plan:  - SVT overnight, likely triggered by underlying acute issues at this time  - Treat with BB, can titrate up if BP allows  - Continue to treat underlying issues  - Monitor electrolytes, maintain WNL  - Cont tele monitoring  - Will discuss with attending Cardiologist: Dr Moses    Assessment: 77 yo F with hx of HTN, hypothyroidism, ILD on Prednisone, SEEMA, chronic thrombocytosis and chronic back pain admitted for worsening back pain and confusion. Found to have SAH 2/2 ruptured aneurysm sp stent 5/23 and metabolic encephalopathy which is resolving. Overnight patient developed narrow complex tachycardia with HR over 200 bpm and admits to palpitations. Now feeling better    Impression:  SVT  Sinus Tachycardia  SAH 2/2 Ruptured Aneurysm  Metabolic Encephalopathy  Anemia  ILD on Prednisone  HTN  Hypothyroidism  Chronic Back Pain  Chronic Thrombocytosis  Leukocytosis    Plan:  - Recommend to start Multaq 400mg BID until Norvasc trial completed. When Norvasc is discontinued, can switch Multaq to Cardizem 120mg daily  - Cont Metoprolol 25mg BID  - Check LFT's  - Cont tele monitoring  - Treat underlying issues which will exacerbate SVT  - Monitor electrolytes, maintain WNL  - Recall EP as needed [id number] Cardiologist: Dr Moses    Assessment: 75 yo F with hx of HTN, hypothyroidism, ILD on Prednisone, SEEMA, chronic thrombocytosis and chronic back pain admitted for worsening back pain and confusion. Found to have SAH 2/2 ruptured aneurysm sp stent 5/23 and metabolic encephalopathy which is resolving. Overnight patient developed narrow complex tachycardia with HR over 200 bpm and admits to palpitations. Now feeling better    Impression:  SVT  Sinus Tachycardia  SAH 2/2 Ruptured Aneurysm  Metabolic Encephalopathy  Anemia  ILD on Prednisone  HTN  Hypothyroidism  Chronic Back Pain  Chronic Thrombocytosis  Leukocytosis    Plan:  - Recommend to start Multaq 400mg BID until Nimodipine trial completed. When Nimodipine is discontinued, can switch Multaq to Cardizem 120mg daily\  -Check LFTs after initiation of Multaq  - Cont Metoprolol 25mg BID  - Check LFT's  - Cont tele monitoring  - Treat underlying issues which will exacerbate SVT  - Monitor electrolytes, maintain WNL  - Recall EP as needed [id number]

## 2022-05-29 NOTE — PROGRESS NOTE ADULT - ATTENDING COMMENTS
77 yo F admitted with SAH with R supraclinoid aneurysm s/p pipeline currently stable improving neurologically.  Recommendations as above.

## 2022-05-29 NOTE — PROGRESS NOTE ADULT - SUBJECTIVE AND OBJECTIVE BOX
POD#6    S/P Cerebral Angiogram with Pipeline Stent    Pt seen and examined at bedside.      Vital Signs Last 24 Hrs  T(C): 35.9 (29 May 2022 07:44), Max: 37 (28 May 2022 20:00)  T(F): 96.7 (29 May 2022 07:44), Max: 98.6 (28 May 2022 20:00)  HR: 102 (29 May 2022 08:15) (92 - 128)  BP: 140/63 (29 May 2022 08:15) (99/70 - 169/81)  BP(mean): 100 (29 May 2022 08:15) (76 - 106)  RR: 18 (29 May 2022 08:00) (16 - 20)  SpO2: 98% (29 May 2022 08:15) (94% - 100%)    I&O's Detail    28 May 2022 07:01  -  29 May 2022 07:00  --------------------------------------------------------  IN:    sodium chloride 0.9%: 975 mL  Total IN: 975 mL    OUT:    Indwelling Catheter - Urethral (mL): 1450 mL  Total OUT: 1450 mL    Total NET: -475 mL        I&O's Summary    28 May 2022 07:01  -  29 May 2022 07:00  --------------------------------------------------------  IN: 975 mL / OUT: 1450 mL / NET: -475 mL        REVIEW OF SYSTEMS    [ ] A ten-point review of systems was otherwise negative except as noted.  [ ] Due to altered mental status/intubation, subjective information were not able to be obtained from the patient. History was obtained, to the extent possible, from review of the chart and collateral sources of information.      PHYSICAL EXAM:  Neurological:                LABS:                        8.4    22.67 )-----------( 461      ( 29 May 2022 05:21 )             24.6     05-29    139  |  109  |  17  ----------------------------<  87  3.7   |  14<L>  |  1.3    Ca    8.5      29 May 2022 05:21  Mg     1.8     05-29    TPro  5.8<L>  /  Alb  3.5  /  TBili  0.4  /  DBili  x   /  AST  18  /  ALT  17  /  AlkPhos  57  05-29    CSF Analysis: [] N/A      Allergies    No Known Allergies    Intolerances      MEDICATIONS:  Antibiotics:    Neuro:  acetaminophen     Tablet .. 650 milliGRAM(s) Oral every 6 hours PRN  acetaminophen     Tablet .. 650 milliGRAM(s) Oral once  cyclobenzaprine 10 milliGRAM(s) Oral three times a day PRN  QUEtiapine 25 milliGRAM(s) Oral every 6 hours PRN      IVF:  sodium chloride 0.9% Bolus 250 milliLiter(s) IV Bolus once  sodium chloride 0.9%. 1000 milliLiter(s) IV Continuous <Continuous>      CULTURES:  Culture Results:   No growth to date. (05-25 @ 05:45)  Culture Results:   No growth to date. (05-25 @ 05:45)      RADIOLOGY & ADDITIONAL TESTS:      ASSESSMENT:  76y Female s/p    AMS,JEOVANY    ^FAILURE TO THRIVE    Yes    Handoff    MEWS Score    HTN (hypertension)    Hypothyroid    Depression    Interstitial lung disease    Essential thrombocytopenia    AMS (altered mental status)    No significant past surgical history    History of ovarian cyst    FAILURE TO THRIVE    8    JEOVANY (acute kidney injury)    SysAdmin_VisitLink        PLAN:  As per Neurology POD#6    S/P Cerebral Angiogram with Pipeline Stent    Pt seen and examined at bedside. Pt without complaints. Denies HA, dizziness or visual changes.     Vital Signs Last 24 Hrs  T(C): 35.9 (29 May 2022 07:44), Max: 37 (28 May 2022 20:00)  T(F): 96.7 (29 May 2022 07:44), Max: 98.6 (28 May 2022 20:00)  HR: 102 (29 May 2022 08:15) (92 - 128)  BP: 140/63 (29 May 2022 08:15) (99/70 - 169/81)  BP(mean): 100 (29 May 2022 08:15) (76 - 106)  RR: 18 (29 May 2022 08:00) (16 - 20)  SpO2: 98% (29 May 2022 08:15) (94% - 100%)    I&O's Detail    28 May 2022 07:01  -  29 May 2022 07:00  --------------------------------------------------------  IN:    sodium chloride 0.9%: 975 mL  Total IN: 975 mL    OUT:    Indwelling Catheter - Urethral (mL): 1450 mL  Total OUT: 1450 mL    Total NET: -475 mL        I&O's Summary    28 May 2022 07:01  -  29 May 2022 07:00  --------------------------------------------------------  IN: 975 mL / OUT: 1450 mL / NET: -475 mL        REVIEW OF SYSTEMS    [X] A ten-point review of systems was otherwise negative except as noted.  [ ] Due to altered mental status/intubation, subjective information were not able to be obtained from the patient. History was obtained, to the extent possible, from review of the chart and collateral sources of information.      PHYSICAL EXAM:  Neurological:  A&Ox3  PERRL  EOMI  No droop  tongue midline  No drift  Finger to nose intact  SANCHEZ - good strength  Follows complex commands    LABS:                        8.4    22.67 )-----------( 461      ( 29 May 2022 05:21 )             24.6     05-29    139  |  109  |  17  ----------------------------<  87  3.7   |  14<L>  |  1.3    Ca    8.5      29 May 2022 05:21  Mg     1.8     05-29    TPro  5.8<L>  /  Alb  3.5  /  TBili  0.4  /  DBili  x   /  AST  18  /  ALT  17  /  AlkPhos  57  05-29    CSF Analysis: [] N/A      Allergies    No Known Allergies    Intolerances      MEDICATIONS:  Antibiotics:    Neuro:  acetaminophen     Tablet .. 650 milliGRAM(s) Oral every 6 hours PRN  acetaminophen     Tablet .. 650 milliGRAM(s) Oral once  cyclobenzaprine 10 milliGRAM(s) Oral three times a day PRN  QUEtiapine 25 milliGRAM(s) Oral every 6 hours PRN      IVF:  sodium chloride 0.9% Bolus 250 milliLiter(s) IV Bolus once  sodium chloride 0.9%. 1000 milliLiter(s) IV Continuous <Continuous>      CULTURES:  Culture Results:   No growth to date. (05-25 @ 05:45)  Culture Results:   No growth to date. (05-25 @ 05:45)      RADIOLOGY & ADDITIONAL TESTS:      ASSESSMENT:  76y Female s/p    AMS,JEOVANY    ^FAILURE TO THRIVE    Yes    Handoff    MEWS Score    HTN (hypertension)    Hypothyroid    Depression    Interstitial lung disease    Essential thrombocytopenia    AMS (altered mental status)    No significant past surgical history    History of ovarian cyst    FAILURE TO THRIVE    8    JEOVANY (acute kidney injury)    SysAdmin_VisitLink        PLAN:  As per Neurology  No Further Neurosurgical/Neuroendovascular Intervention  Will need MRA/CTA in 1 month

## 2022-05-29 NOTE — CONSULT NOTE ADULT - ASSESSMENT
ADM  with ICH   anuerysm        bach pain     with  tachy      echo  normal left   ventricular   systolic   function       rec EPS  consult

## 2022-05-29 NOTE — CONSULT NOTE ADULT - NS ATTEND AMEND GEN_ALL_CORE FT
Pt is a 77 yo F with PMHx of chronic back pain, HTN, SEEMA, HLD, essential thrombocytosis, recent GIB (duodenal ulcer), who presents with worsening back pain and encephalopathy. Pt was recently admitted with severe back pain, noted have hypertensive urgency (up 250 systolic as per pt's niece, with whom pt lives). Pt is obtunded on exam, did not answer questions, follows some commands. PERRL, face symmetric, equal tone in BUE, wiggled toes in BLE.  CT head with scattered cortical SAH in right hemisphere.   NS on board, agree with CTA h/n if able (JEOVANY), if not MRA head/neck with MRI brain.  SBP goal <160  Hold ATP/AC  Recommend NCC transfer  plan discussed with primary team and family at bedside
Complex patient   SVT at 220 bpm  No AF   need rhythm control - not a good candidate for Amio or Sotalol due to severe lung disease   recommend use of Multaq for 2-3 weeks until patient able to take diltiazem  Once patient able to take Diltiazem 120 daily, will stop Multaq

## 2022-05-29 NOTE — PROGRESS NOTE ADULT - ASSESSMENT
AMANDA FELDER 76y Female  MRN#: [id number]   CODE STATUS:_dnr      SUBJECTIVE  Patient is a 76y old Female who presents with a chief complaint of Worsening Back Pain and Metabolic Encephalopathy (29 May 2022 09:21)  Currently admitted to medicine with the primary diagnosis of AMS (altered mental status)  Today is hospital day 11d, and this morning she is resting in bed and reports no overnight events.     OBJECTIVE  PAST MEDICAL & SURGICAL HISTORY  HTN (hypertension)    Hypothyroid    Depression    Interstitial lung disease    Essential thrombocytopenia    History of ovarian cyst      ALLERGIES:  No Known Allergies    MEDICATIONS:  STANDING MEDICATIONS  acetaminophen     Tablet .. 650 milliGRAM(s) Oral once  anagrelide 0.5 milliGRAM(s) Oral every 6 hours  aspirin  chewable 81 milliGRAM(s) Oral daily  chlorhexidine 4% Liquid 1 Application(s) Topical <User Schedule>  enoxaparin Injectable 40 milliGRAM(s) SubCutaneous every 24 hours  lactulose Syrup      lactulose Syrup 20 Gram(s) Oral once  lactulose Syrup 20 Gram(s) Oral once  levothyroxine 100 MICROGram(s) Oral daily  lidocaine   4% Patch 1 Patch Transdermal daily  metoprolol tartrate 25 milliGRAM(s) Oral every 12 hours  mupirocin 2% Ointment 1 Application(s) Topical every 12 hours  niMODipine Oral Solution 30 milliGRAM(s) Enteral Tube every 2 hours  pantoprazole  Injectable 40 milliGRAM(s) IV Push every 12 hours  polyethylene glycol 3350 17 Gram(s) Oral daily  predniSONE   Tablet 15 milliGRAM(s) Oral daily  senna 2 Tablet(s) Oral at bedtime  sodium chloride 0.9% Bolus 250 milliLiter(s) IV Bolus once  sodium chloride 0.9%. 1000 milliLiter(s) IV Continuous <Continuous>  ticagrelor 90 milliGRAM(s) Oral two times a day    PRN MEDICATIONS  acetaminophen     Tablet .. 650 milliGRAM(s) Oral every 6 hours PRN  cyclobenzaprine 10 milliGRAM(s) Oral three times a day PRN  QUEtiapine 25 milliGRAM(s) Oral every 6 hours PRN      VITAL SIGNS: Last 24 Hours  T(C): 35.9 (29 May 2022 07:44), Max: 37 (28 May 2022 20:00)  T(F): 96.7 (29 May 2022 07:44), Max: 98.6 (28 May 2022 20:00)  HR: 108 (29 May 2022 10:00) (92 - 128)  BP: 142/61 (29 May 2022 10:00) (99/70 - 169/81)  BP(mean): 88 (29 May 2022 10:00) (76 - 106)  RR: 18 (29 May 2022 10:00) (16 - 20)  SpO2: 97% (29 May 2022 10:00) (94% - 100%)    LABS:                        8.4    22.67 )-----------( 461      ( 29 May 2022 05:21 )             24.6     05-29    139  |  109  |  17  ----------------------------<  87  3.7   |  14<L>  |  1.3    Ca    8.5      29 May 2022 05:21  Mg     1.8     05-29    TPro  5.8<L>  /  Alb  3.5  /  TBili  0.4  /  DBili  x   /  AST  18  /  ALT  17  /  AlkPhos  57  05-29    RADIOLOGY:      PHYSICAL EXAM:  GENERAL: anxious   HEENT:  Atraumatic, Normocephalic.  neck  No JVD  PULMONARY: Clear to auscultation bilaterally  CARDIOVASCULAR: Regular rate and rhythm  GASTROINTESTINAL: Soft, Nontender, Nondistended;  MUSCULOSKELETAL:  No clubbing, cyanosis, or edema  NEUROLOGY: AAOx3  SKIN: No rashes or lesions    ASSESSMENT & PLAN  77 y/o F W/PMH as above found to have R frontoparietal HH1/mF1 SAH, 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 Pt s/p DSA pipeline stenting R ICA aneurysm on 5/23. S/P 1 UNIT PRBC for hb 7.3. No acute change in neuro status.    #SAH, 2/2 5mm ruptured R supraclinoid aneurysm s/p angio & pipeline stent embo 5/23   #new L1 compression fx, CPB 2/2 spinal stenosis (follows w Dr. العلي)  #Encephalopathy of unclear etiology  - Neuro Checks per neuro  - Brilinta BID and Aspirin daily   - Nimodipine 30 mg Q2H  - on Keppra for seizure ppx  - Video EEG negative  - MRA showed decreased aneurysm (5.9 -> 3.5mm), repeat MRI or CTA in 3 weeks per NSG  - Pain contro  - PT/OT/OOB    #HTN  - -200  - nimodipine    # Leukocytosis   on prednisone for ILD  no other signs of infection   monitor off ABx  blood Cx     # HAGMA  check lactate     # SVT?  no sepsis, active bleed, doesn't look dehydrated. extremely anxious on exam, monitor tele. r/o recurrent GIB. monitor BM. orthostatic vital signs   metoprolol 25   EPS recs noted     #SEEMA not on CPAP, ILD  - Aspiration precautions   - Prednisone 15mg qd for ILD; pulm following  - Incentive spirometry    #hx UGIB 2/2 bleeding duodenal ulcer (recent [facility] admission for melena), stable   - Protonix BID due to Prednisone  - Senna/Miralax  - Passed s/s, advanced to soft and bite sized diet  - Hematology consult     #Anemia, HB 7.3 s/p 1 unit prbc   - Lovenox 40 qd 5/24   - SCDs  - Continue Anagrelide 0.5mg q6h (essential thrombocytosis)  - Negative LE dopplers from 5/19    #hypothyroidism  - Synthroid PO 100mcg qD  - TSH 70, free t4 1.0; follow up in 2 weeks    TOV    #Progress Note Handoff  Pending (specify):  PT/OT, orthostatic vitals, monitor HR, CBC and BMs, start BB, Blood cx, lactate  Disposition: Unknown at this time

## 2022-05-29 NOTE — PROGRESS NOTE ADULT - ASSESSMENT
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 Pt s/p DSA pipeline stenting R ICA aneurysm on 5/23. S/P 1 UNIT PRBC for hb 7.3. No acute change in neuro status.    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 العلي)  #Encephalopathy of unclear etiology  - Neuro Checks Q4H  - Brilinta BID and Aspirin daily   - Nimodipine 30 mg Q2H  - Normovolemia  - Keppra for seizure ppx  - Video EEG negative  - MRA showed decreased aneurysm (5.9 -> 3.5mm), repeat MRI or CTA in 3 weeks per NSG  - Pain control: Tylenol, Lido patch, flexeril (new L1 compression fx, HA) on MR L-spine  - PT/OT/OOB    CV:   #HTN  - -200  - nimodipine  - IVF 0.9NS @ 75cc  - encourage PO intake    Pulm:  #SEEMA not on CPAP, ILD  - Aspiration precautions   - Prednisone 15mg qd for ILD; pulm following  - Incentive spirometry    GI:  #hx UGIB 2/2 bleeding duodenal ulcer (recent [facility] admission for melena)  - Protonix BID due to Prednisone  - Senna/Miralax  - Passed s/s, advanced to soft and bite sized diet  - encourage PO intake  - track CBC    :  - Normonatremic euvolemia  - Strict Is/Os    Heme:  #Anemia, HB 7.3 s/p 1 unit prbc   - Lovenox 40 qd 5/24   - SCDs  - Continue Anagrelide 0.5mg q6h (essential thrombocytosis)  - Negative LE dopplers from 5/19  - track CBC    ID:  - Monitor fever trend and WBCs  - CXR 5/25 unchanged  - Repeat UA, negative  - MRSA positive, on 5-day course mupirocin (end date 5/29)  - repeat Bcx 5/25 showed NGTD    Endo:  #hypothyroidism  - Synthroid PO 100mcg qD  - TSH 70, free t4 1.0; follow up in 2 weeks      Albarran Reinserted 5/26 for retention    Dispo: Continue stroke unit w/ Q4 neurochecks         75 y/o F W/PMH as above found to have R frontoparietal HH1/mF1 SAH, 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 Pt s/p DSA pipeline stenting R ICA aneurysm on 5/23. S/P 1 UNIT PRBC for hb 7.3. No acute change in neuro status.    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 العلي)  #Encephalopathy of unclear etiology  - Neuro Checks Q4H  - Brilinta BID and Aspirin daily   - Nimodipine 30 mg Q2H  - Normovolemia  - Keppra for seizure ppx  - Video EEG negative  - MRA showed decreased aneurysm (5.9 -> 3.5mm), repeat MRI or CTA in 3 weeks per NSG  - Pain control: Tylenol, Lido patch, flexeril (new L1 compression fx, HA) on MR L-spine  - OOB to chair  - PT/OT/OOB    CV:   #HTN  - -200  - nimodipine 30 mg Q2 hrs  - IVF 0.9NS @ 75cc  - encourage PO intake  - EPS recs noted - recommend Multaq but would interact with current Quetiapine will hold for now  - continue metoprolol 25 mg BID    Pulm:  #SEEMA not on CPAP, ILD  - Aspiration precautions   - Prednisone 15mg qd for ILD; pulm following  - Incentive spirometry    GI:  #hx UGIB 2/2 bleeding duodenal ulcer (recent [facility] admission for melena)  - Protonix BID due to Prednisone  - Senna/Miralax  - Passed s/s, advanced to soft and bite sized diet  - encourage PO intake  - track CBC  - lactulose for BM    :  - Normonatremic euvolemia  - Strict Is/Os    Heme:  #Anemia, HB 7.3 s/p 1 unit prbc   - Lovenox 40 qd 5/24   - SCDs  - Continue Anagrelide 0.5mg q6h (essential thrombocytosis)  - Negative LE dopplers from 5/19  - track CBC    ID:  - Monitor fever trend and WBCs  - CXR 5/25 unchanged  - Repeat UA, negative  - MRSA positive, on 5-day course mupirocin (end date 5/29)  - repeat Bcx 5/25 showed NGTD    Endo:  #hypothyroidism  - Synthroid PO 100mcg qD  - TSH 70, free t4 1.0; follow up in 2 weeks      Albarran Reinserted 5/26 for retention    Dispo: Continue stroke unit w/ Q4 neurochecks

## 2022-05-30 LAB
ALBUMIN SERPL ELPH-MCNC: 3.3 G/DL — LOW (ref 3.5–5.2)
ALP SERPL-CCNC: 46 U/L — SIGNIFICANT CHANGE UP (ref 30–115)
ALT FLD-CCNC: 15 U/L — SIGNIFICANT CHANGE UP (ref 0–41)
ANION GAP SERPL CALC-SCNC: 17 MMOL/L — HIGH (ref 7–14)
AST SERPL-CCNC: 15 U/L — SIGNIFICANT CHANGE UP (ref 0–41)
BASOPHILS # BLD AUTO: 0.09 K/UL — SIGNIFICANT CHANGE UP (ref 0–0.2)
BASOPHILS NFR BLD AUTO: 0.3 % — SIGNIFICANT CHANGE UP (ref 0–1)
BILIRUB SERPL-MCNC: 0.3 MG/DL — SIGNIFICANT CHANGE UP (ref 0.2–1.2)
BUN SERPL-MCNC: 28 MG/DL — HIGH (ref 10–20)
CALCIUM SERPL-MCNC: 8.1 MG/DL — LOW (ref 8.5–10.1)
CHLORIDE SERPL-SCNC: 114 MMOL/L — HIGH (ref 98–110)
CO2 SERPL-SCNC: 9 MMOL/L — CRITICAL LOW (ref 17–32)
CREAT SERPL-MCNC: 1.1 MG/DL — SIGNIFICANT CHANGE UP (ref 0.7–1.5)
CULTURE RESULTS: SIGNIFICANT CHANGE UP
CULTURE RESULTS: SIGNIFICANT CHANGE UP
EGFR: 52 ML/MIN/1.73M2 — LOW
EOSINOPHIL # BLD AUTO: 0.32 K/UL — SIGNIFICANT CHANGE UP (ref 0–0.7)
EOSINOPHIL NFR BLD AUTO: 1.2 % — SIGNIFICANT CHANGE UP (ref 0–8)
GLUCOSE SERPL-MCNC: 87 MG/DL — SIGNIFICANT CHANGE UP (ref 70–99)
HCT VFR BLD CALC: 22.9 % — LOW (ref 37–47)
HGB BLD-MCNC: 7.6 G/DL — LOW (ref 12–16)
IMM GRANULOCYTES NFR BLD AUTO: 11.7 % — HIGH (ref 0.1–0.3)
LYMPHOCYTES # BLD AUTO: 1.15 K/UL — LOW (ref 1.2–3.4)
LYMPHOCYTES # BLD AUTO: 4.4 % — LOW (ref 20.5–51.1)
MAGNESIUM SERPL-MCNC: 1.6 MG/DL — LOW (ref 1.8–2.4)
MCHC RBC-ENTMCNC: 31.9 PG — HIGH (ref 27–31)
MCHC RBC-ENTMCNC: 33.2 G/DL — SIGNIFICANT CHANGE UP (ref 32–37)
MCV RBC AUTO: 96.2 FL — SIGNIFICANT CHANGE UP (ref 81–99)
MONOCYTES # BLD AUTO: 1.94 K/UL — HIGH (ref 0.1–0.6)
MONOCYTES NFR BLD AUTO: 7.4 % — SIGNIFICANT CHANGE UP (ref 1.7–9.3)
NEUTROPHILS # BLD AUTO: 19.75 K/UL — HIGH (ref 1.4–6.5)
NEUTROPHILS NFR BLD AUTO: 75 % — SIGNIFICANT CHANGE UP (ref 42.2–75.2)
NRBC # BLD: 0 /100 WBCS — SIGNIFICANT CHANGE UP (ref 0–0)
PLATELET # BLD AUTO: 394 K/UL — SIGNIFICANT CHANGE UP (ref 130–400)
POTASSIUM SERPL-MCNC: 3.7 MMOL/L — SIGNIFICANT CHANGE UP (ref 3.5–5)
POTASSIUM SERPL-SCNC: 3.7 MMOL/L — SIGNIFICANT CHANGE UP (ref 3.5–5)
PROT SERPL-MCNC: 5.5 G/DL — LOW (ref 6–8)
RBC # BLD: 2.38 M/UL — LOW (ref 4.2–5.4)
RBC # FLD: 19.2 % — HIGH (ref 11.5–14.5)
SODIUM SERPL-SCNC: 140 MMOL/L — SIGNIFICANT CHANGE UP (ref 135–146)
SPECIMEN SOURCE: SIGNIFICANT CHANGE UP
SPECIMEN SOURCE: SIGNIFICANT CHANGE UP
WBC # BLD: 26.32 K/UL — HIGH (ref 4.8–10.8)
WBC # FLD AUTO: 26.32 K/UL — HIGH (ref 4.8–10.8)

## 2022-05-30 PROCEDURE — 99233 SBSQ HOSP IP/OBS HIGH 50: CPT

## 2022-05-30 PROCEDURE — 71045 X-RAY EXAM CHEST 1 VIEW: CPT | Mod: 26

## 2022-05-30 RX ORDER — IPRATROPIUM/ALBUTEROL SULFATE 18-103MCG
3 AEROSOL WITH ADAPTER (GRAM) INHALATION ONCE
Refills: 0 | Status: DISCONTINUED | OUTPATIENT
Start: 2022-05-30 | End: 2022-06-07

## 2022-05-30 RX ORDER — LANOLIN ALCOHOL/MO/W.PET/CERES
5 CREAM (GRAM) TOPICAL AT BEDTIME
Refills: 0 | Status: DISCONTINUED | OUTPATIENT
Start: 2022-05-30 | End: 2022-06-07

## 2022-05-30 RX ORDER — FUROSEMIDE 40 MG
20 TABLET ORAL ONCE
Refills: 0 | Status: COMPLETED | OUTPATIENT
Start: 2022-05-30 | End: 2022-05-30

## 2022-05-30 RX ORDER — SODIUM CHLORIDE 9 MG/ML
1000 INJECTION INTRAMUSCULAR; INTRAVENOUS; SUBCUTANEOUS
Refills: 0 | Status: DISCONTINUED | OUTPATIENT
Start: 2022-05-30 | End: 2022-05-30

## 2022-05-30 RX ORDER — DRONEDARONE 400 MG/1
400 TABLET, FILM COATED ORAL
Refills: 0 | Status: DISCONTINUED | OUTPATIENT
Start: 2022-05-30 | End: 2022-06-07

## 2022-05-30 RX ORDER — PANTOPRAZOLE SODIUM 20 MG/1
8 TABLET, DELAYED RELEASE ORAL
Qty: 80 | Refills: 0 | Status: DISCONTINUED | OUTPATIENT
Start: 2022-05-30 | End: 2022-06-03

## 2022-05-30 RX ORDER — MAGNESIUM SULFATE 500 MG/ML
2 VIAL (ML) INJECTION ONCE
Refills: 0 | Status: COMPLETED | OUTPATIENT
Start: 2022-05-30 | End: 2022-05-30

## 2022-05-30 RX ORDER — METOPROLOL TARTRATE 50 MG
5 TABLET ORAL ONCE
Refills: 0 | Status: COMPLETED | OUTPATIENT
Start: 2022-05-30 | End: 2022-05-30

## 2022-05-30 RX ADMIN — NIMODIPINE 30 MILLIGRAM(S): 60 SOLUTION ORAL at 00:03

## 2022-05-30 RX ADMIN — Medication 650 MILLIGRAM(S): at 20:49

## 2022-05-30 RX ADMIN — NIMODIPINE 30 MILLIGRAM(S): 60 SOLUTION ORAL at 22:31

## 2022-05-30 RX ADMIN — Medication 0.5 MILLIGRAM(S): at 05:13

## 2022-05-30 RX ADMIN — Medication 5 MILLIGRAM(S): at 17:10

## 2022-05-30 RX ADMIN — LIDOCAINE 1 PATCH: 4 CREAM TOPICAL at 11:55

## 2022-05-30 RX ADMIN — Medication 25 MILLIGRAM(S): at 05:11

## 2022-05-30 RX ADMIN — Medication 15 MILLIGRAM(S): at 05:11

## 2022-05-30 RX ADMIN — Medication 25 GRAM(S): at 14:25

## 2022-05-30 RX ADMIN — NIMODIPINE 30 MILLIGRAM(S): 60 SOLUTION ORAL at 11:50

## 2022-05-30 RX ADMIN — DRONEDARONE 400 MILLIGRAM(S): 400 TABLET, FILM COATED ORAL at 14:44

## 2022-05-30 RX ADMIN — Medication 0.5 MILLIGRAM(S): at 00:07

## 2022-05-30 RX ADMIN — Medication 25 MILLIGRAM(S): at 18:22

## 2022-05-30 RX ADMIN — NIMODIPINE 30 MILLIGRAM(S): 60 SOLUTION ORAL at 04:10

## 2022-05-30 RX ADMIN — CHLORHEXIDINE GLUCONATE 1 APPLICATION(S): 213 SOLUTION TOPICAL at 05:14

## 2022-05-30 RX ADMIN — PANTOPRAZOLE SODIUM 40 MILLIGRAM(S): 20 TABLET, DELAYED RELEASE ORAL at 05:12

## 2022-05-30 RX ADMIN — NIMODIPINE 30 MILLIGRAM(S): 60 SOLUTION ORAL at 20:10

## 2022-05-30 RX ADMIN — Medication 100 MICROGRAM(S): at 05:11

## 2022-05-30 RX ADMIN — Medication 20 MILLIGRAM(S): at 11:49

## 2022-05-30 RX ADMIN — LIDOCAINE 1 PATCH: 4 CREAM TOPICAL at 19:40

## 2022-05-30 RX ADMIN — NIMODIPINE 30 MILLIGRAM(S): 60 SOLUTION ORAL at 11:11

## 2022-05-30 RX ADMIN — NIMODIPINE 30 MILLIGRAM(S): 60 SOLUTION ORAL at 15:59

## 2022-05-30 RX ADMIN — NIMODIPINE 30 MILLIGRAM(S): 60 SOLUTION ORAL at 08:37

## 2022-05-30 RX ADMIN — NIMODIPINE 30 MILLIGRAM(S): 60 SOLUTION ORAL at 14:45

## 2022-05-30 RX ADMIN — TICAGRELOR 90 MILLIGRAM(S): 90 TABLET ORAL at 05:11

## 2022-05-30 RX ADMIN — NIMODIPINE 30 MILLIGRAM(S): 60 SOLUTION ORAL at 17:11

## 2022-05-30 RX ADMIN — LIDOCAINE 1 PATCH: 4 CREAM TOPICAL at 00:00

## 2022-05-30 RX ADMIN — Medication 5 MILLIGRAM(S): at 22:30

## 2022-05-30 RX ADMIN — LIDOCAINE 1 PATCH: 4 CREAM TOPICAL at 23:30

## 2022-05-30 NOTE — CHART NOTE - NSCHARTNOTEFT_GEN_A_CORE
3-Day calorie Count (5/26-5/28)     Day 1: 471kcal/12g PRO     Day 2: 111kcal/5.5g PRO; 0% for breakfast     Day 3: 113kcal/5g PRO; no documentation for breakfast and dinner     3-day average: 231.6kcal/7.5g PRO

## 2022-05-30 NOTE — CHART NOTE - NSCHARTNOTEFT_GEN_A_CORE
Called by Neurology team because of melena.    Patient had one melanotic BM , about 100 ml , this morning.   HR: 120 , has history of SVT and sinus tachycardia  BP: 138/73  Patient was on ASA, anagrelide and Brilinta and lovenox for DVT PPX  no abdominal pain    Rec:  - clear liquid diet  - PPI drip  - f/u neurosurgery regarding antiplatelet agents and risk stratification for any endoscopic procedure --> may continue with Brilinta in the context of recent pipeline stenting of RCA  - give 1 UPRBC  - active type and screen  - monitor CBC  - keep hgb>8  - will follow     - Please call GI [phone] or MS teams during weekdays till 5pm or call [phone] after 5 PM on weekdays and weekends  - Follow up with our [facility] located at [address] [place] [address]. Phone Number: [phone]

## 2022-05-30 NOTE — PROVIDER CONTACT NOTE (CHANGE IN STATUS NOTIFICATION) - SITUATION
's, not within parameter. Dr. Pike [phone] notified of HR. Pt denies chest pain. Awaiting intervention.
HR >120, Pt denies chest pain.

## 2022-05-30 NOTE — PROGRESS NOTE ADULT - ATTENDING COMMENTS
77 yo F admitted with SAH with R supraclinoid aneurysm s/p pipeline currently stable neurologically with persistent sinus tachycardia EPS eval appreciated will start Multaq 400 BID and hold quetiapine for now.  Recommend clonazepam if agitated.  In meantime SOB w/ wheezing possibly fluid overload recommend diuresis and adjustment of BP medications per hospitalist.  Still has anemia trending worse with recent UGIB despite on protonix IV recommend GI evaluation.  Discussed with Dr. West - recommend continue Brilinta for now and ok to d/c anagralide with normalizing plts.  d/c ASA for now.  Hold lovenox/heparin and switch to mechanical DVTP.  Recommendations as above.

## 2022-05-30 NOTE — PROGRESS NOTE ADULT - SUBJECTIVE AND OBJECTIVE BOX
Progress Note:  Provider Speciality                            Hospitalist      AMANDA FELDER [id number] 76y Female     CHIEF PRESENTING COMPLAINT:  Patient is a 76y old  Female who presents with a chief complaint of Worsening Back Pain and Metabolic Encephalopathy (30 May 2022 09:28)        SUBJECTIVE:  Patient was seen and examined at bedside.  Looks short of breath , with mild resiratory distress  No significant overnight events reported.     HISTORY OF PRESENTING ILLNESS:  HPI:  History of Present Illness  Ms. Felder is a 76 year old female patient known to have:  - Baseline AO3, lives with niece, ambulates with a walker  - SEEMA and ILD. Follows with Dr Stephens. Not on CPAP or home O2. On Prednisone 20mg QD  - Depression  - HTN. Recent Admission for HTN Urgency. Follows with Dr Moses  - Hypothyroidism   - Recent admission for a bleeding duodenal ulcer at [facility] (melena). Home med Protonix 20mg QD  - Essential Thrombocytosis. Follows with Dr Denise  - Chronic Back Pain for years secondary to Spinal Stenosis per marquise. s/p Spinal Epidural Injection. Was on PT 3x/ week but has been non compliant for 2-3 months. Follows with Dr Jaimes. Was supposed to go for a nerve ablation last Monday (cancelled due to poorly controlled HTN s/p admission)       She was brought to the ED by marquise on  for evaluation of worsening back pain and confusion x2 days.  History goes back to 2 days PTP when the patient started complaining of worsening of her chronic dull lower back pain that radiates down to her right medial aspect of thigh.  This has resulted in limited ambulation (patient refusing to leave her bed) and to being non compliant with Physical Therapy in the absence of leg weakness or paresthesias or numbness or urinary incontinence.  Per marquise, patient has been feeling down x few days with reduced PO intake, reduced sleep, loss of interest, thoughts that her family doesn't like/ support her, and confusion (some times would not recognize marquise).  She has an instance where she had fecal incontinence on way to bathroom 2 days ago.  In the setting of confusion and increased pain, marquise decided to bring patient to ED for evaluation.    On review of systems, marquise denies any recent fever, chills, night sweats, URTI symptoms (cough, rhinorrhea, sore throat), urinary symptoms (urinary frequency, urgency, intermittence, dysuria, foul smelling urine, cloudy urine), abdominal pain, headache, nausea, or vomiting.   No sick contacts.   No recent travel or exposure to recent travelers.      Upon presentation to the ED, the patient was hemodynamically stable:  Vital Signs in ED   - /104 mmHg  -   - RR 20  - T97.1  - SaO2 94% on RA      Investigations   Laboratory Workup  - CBC:                        11.6   20.94 )-----------( 188      ( 18 May 2022 10:50 )             34.1     - Chemistry:      140  |  96<L>  |  48<H>  ----------------------------<  109<H>  4.3   |  24  |  2.1<H>    Ca    10.4<H>      18 May 2022 10:50    TPro  7.3  /  Alb  4.8  /  TBili  0.6  /  DBili  x   /  AST  36  /  ALT  27  /  AlkPhos  61      - Coagulation Studies:  PT/INR - ( 18 May 2022 10:50 )   PT: 12.30 sec;   INR: 1.07 ratio    PTT - ( 18 May 2022 10:50 )  PTT:24.8 sec      Microbiological Workup  Urinalysis Basic - ( 18 May 2022 14:07 )    Color: Yellow / Appearance: Clear / S.021 / pH: x  Gluc: x / Ketone: Trace  / Bili: Negative / Urobili: <2 mg/dL   Blood: x / Protein: 30 mg/dL / Nitrite: Negative   Leuk Esterase: Negative / RBC: 6 /HPF / WBC 4 /HPF   Sq Epi: x / Non Sq Epi: 1 /HPF / Bacteria: Negative          Radiological Workup  * CT Abdomen and Pelvis w/ IV Cont (22 @ 14:00) No acute abnormality within the abdomen and pelvis. Age-indeterminate moderate compression fracture seen at L1, new since 2022.  * CXR CM and CHF      - Patient was given IV Cefepime 2g x1 dose in ED  - She was also given 1.4 L LR bolus   - She will be admitted for further investigations, management, and monitoring             (18 May 2022 22:59)        REVIEW OF SYSTEMS:  Patient denies any headache, any vision complaints, runny nose, fever, chills. Denies chest pain. Denies nausea, vomiting, abdominal pain or diarrhoea, Denies dysuria. At least 10 systems were reviewed in ROS. All systems reviewed  are within normal limits except for the complaints as described in Subjective.    PAST MEDICAL & SURGICAL HISTORY:  PAST MEDICAL & SURGICAL HISTORY:  HTN (hypertension)      Hypothyroid      Depression      Interstitial lung disease      Essential thrombocytopenia      History of ovarian cyst              VITAL SIGNS:  Vital Signs Last 24 Hrs  T(C): 36.6 (30 May 2022 04:00), Max: 37 (30 May 2022 02:00)  T(F): 97.9 (30 May 2022 04:00), Max: 98.6 (30 May 2022 02:00)  HR: 120 (30 May 2022 10:15) (96 - 126)  BP: 130/87 (30 May 2022 10:15) (107/52 - 162/69)  BP(mean): 110 (30 May 2022 10:15) (63 - 110)  RR: 22 (30 May 2022 10:15) (16 - 22)  SpO2: 99% (30 May 2022 10:15) (96% - 99%)          PHYSICAL EXAMINATION:   Mild resiratory distress  General: No icterus  HEENT:   no JVD.  Heart: S1+S2 audible  Lungs: bilateral  moderate air entry, tachypnea, bilateral  crepitations.  Abdomen: Soft, non-tender, non-distended , no  rigidity or guarding.  CNS: Awake alert, CN  grossly intact.  Extremities:  No edema            CONSULTS:  Consultant(s) Notes Reviewed by me.   Care Discussed with Consultants/Other Providers where required.        MEDICATIONS:  MEDICATIONS  (STANDING):  albuterol/ipratropium for Nebulization. 3 milliLiter(s) Nebulizer once  chlorhexidine 4% Liquid 1 Application(s) Topical <User Schedule>  dronedarone 400 milliGRAM(s) Oral two times a day  enoxaparin Injectable 40 milliGRAM(s) SubCutaneous every 24 hours  furosemide   Injectable 20 milliGRAM(s) IV Push once  lactulose Syrup      lactulose Syrup 20 Gram(s) Oral once  levothyroxine 100 MICROGram(s) Oral daily  lidocaine   4% Patch 1 Patch Transdermal daily  magnesium sulfate  IVPB 2 Gram(s) IV Intermittent once  metoprolol tartrate 25 milliGRAM(s) Oral every 12 hours  niMODipine Oral Solution 30 milliGRAM(s) Enteral Tube every 2 hours  pantoprazole  Injectable 40 milliGRAM(s) IV Push every 12 hours  polyethylene glycol 3350 17 Gram(s) Oral daily  predniSONE   Tablet 15 milliGRAM(s) Oral daily  senna 2 Tablet(s) Oral at bedtime  ticagrelor 90 milliGRAM(s) Oral two times a day    MEDICATIONS  (PRN):  acetaminophen     Tablet .. 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 Pt s/p DSA pipeline stenting R ICA aneurysm on . S/P 1 UNIT PRBC for hb 7.3. No acute change in neuro status.            SAH due to ruptured aneurysm s/p stent    Suspected GIB, acute anemia  Volume overload  SVT  ILD/SEEMA          Stop ASA and Anagrelide & Lovenox.  Keep Brilinta  IV Protonix twice daily  Diurese Lasix IVP stat  Discontinue IVF  Transfuse one U of PRBC & get H/H 4 hrs after BT  If continue to drop , make NPO and get GI consult, if profuse bleeding get CTA & IR  SVT- EP noted. Start Multaq along with metoprolol  Continue home dose of prednisone for ILD  If desaturate , BIPAP prn or CPAP at night  Will need MRA/CTA in 1 month  Acute inpatient management  required further

## 2022-05-30 NOTE — PROGRESS NOTE ADULT - SUBJECTIVE AND OBJECTIVE BOX
Neurology Follow up note  Patient examined at bedside, no new issues this morning, no agitation overnight.     HPI:  Ms. Shook is a 76 year old female patient known to have:Baseline AO3, lives with niece, ambulates with a walker, SEEMA and ILD. Follows with Dr Stephens. Not on CPAP or home O2. On Prednisone 20mg QD, Depression, HTN. Recent Admission for HTN Urgency, Hypothyroidism. Recent admission for a bleeding duodenal ulcer at [facility] (melena). Home med Protonix 20mg QD Essential Thrombocytosis. Follows with Dr Denise, Chronic Back Pain for years secondary to Spinal Stenosis per marquise. s/p Spinal Epidural Injection. Was on PT 3x/ week but has been non compliant for 2-3 months. Follows with Dr Jaimes. Was supposed to go for a nerve ablation last Monday (cancelled due to poorly controlled HTN s/p admission). She was brought to the ED by marquise on  for evaluation of worsening back pain and confusion x2 days. History goes back to 2 days PTP when the patient started complaining of worsening of her chronic dull lower back pain that radiates down to her right medial aspect of thigh. This has resulted in limited ambulation (patient refusing to leave her bed) and to being non compliant with Physical Therapy in the absence of leg weakness or paresthesias or numbness or urinary incontinence. Per marquise, patient has been feeling down x few days with reduced PO intake, reduced sleep, loss of interest, thoughts that her family doesn't like/ support her, and confusion (some times would not recognize niece).She has an instance where she had fecal incontinence on way to bathroom 2 days ago.In the setting of confusion and increased pain, marquise decided to bring patient to ED for evaluation. On review of systems, marquise denies any recent fever, chills, night sweats, URTI symptoms (cough, rhinorrhea, sore throat), urinary symptoms (urinary frequency, urgency, intermittence, dysuria, foul smelling urine, cloudy urine), abdominal pain, headache, nausea, or vomiting. No sick contacts. No recent travel or exposure to recent travelers.        Vital Signs Last 24 Hrs  T(C): 36.6 (30 May 2022 04:00), Max: 37 (30 May 2022 02:00)  T(F): 97.9 (30 May 2022 04:00), Max: 98.6 (30 May 2022 02:00)  HR: 102 (30 May 2022 08:00) (96 - 126)  BP: 138/73 (30 May 2022 08:00) (107/52 - 162/69)  BP(mean): 106 (30 May 2022 08:00) (63 - 108)  RR: 20 (30 May 2022 08:00) (16 - 20)  SpO2: 99% (30 May 2022 08:00) (96% - 99%)            Neurological Exam:   Mental status: Patient is awake alert oriented to self and place. Anxious, follows 1 step commands.   Cranial nerves: Normal visual field, normal eye movements, PERRLA, No facial asymmetry   Power 4+/5 UE            4+/ 5  LE  No abnormal involuntary mvmts  Sensation: Responds to pain in all 4 exts.  FTN/HKS: Grossly intact  Gait: unable.      NIHSS:    1A: LOC 0   1B: LOC Questions 1  1C: Performs Tasks 0   2: Horizontal EOMs 0  3: Visual Fields 0   4: Facial Palsy 0   5A: LUE Drift 0   5B: RUE Drift 0   6A: LLE Drift 0   6B: RLE Drift 0   7: Limb Ataxia (heel-shin, FNF) 0   8: Sensation 0   9: Language/Aphasia 0   10: Dysarthria 0   11: Extinction/Inattention 0     NIHSS Total: 1      mRs  0 No symptoms at all  1 No significant disability despite symptoms; able to carry out all usual duties and activities without assistance  2 Slight disability; unable to carry out all previous activities, but able to look after own affairs  3 Moderate disability; requiring some help, but able to walk without assistance  4 Moderately severe disability; unable to walk without assistance and unable to attend to own bodily needs without assistance  5 Severe disability; bedridden, incontinent and requiring constant nursing care and attention  6 Dead         MEDICATIONS  (STANDING):  albuterol/ipratropium for Nebulization. 3 milliLiter(s) Nebulizer once  anagrelide 0.5 milliGRAM(s) Oral every 6 hours  aspirin  chewable 81 milliGRAM(s) Oral daily  chlorhexidine 4% Liquid 1 Application(s) Topical <User Schedule>  enoxaparin Injectable 40 milliGRAM(s) SubCutaneous every 24 hours  lactulose Syrup      lactulose Syrup 20 Gram(s) Oral once  levothyroxine 100 MICROGram(s) Oral daily  lidocaine   4% Patch 1 Patch Transdermal daily  metoprolol tartrate 25 milliGRAM(s) Oral every 12 hours  niMODipine Oral Solution 30 milliGRAM(s) Enteral Tube every 2 hours  pantoprazole  Injectable 40 milliGRAM(s) IV Push every 12 hours  polyethylene glycol 3350 17 Gram(s) Oral daily  predniSONE   Tablet 15 milliGRAM(s) Oral daily  senna 2 Tablet(s) Oral at bedtime  sodium chloride 0.9% Bolus 250 milliLiter(s) IV Bolus once  sodium chloride 0.9%. 1000 milliLiter(s) (75 mL/Hr) IV Continuous <Continuous>  ticagrelor 90 milliGRAM(s) Oral two times a day    MEDICATIONS  (PRN):  acetaminophen     Tablet .. 650 milliGRAM(s) Oral every 6 hours PRN Mild Pain (1 - 3)  cyclobenzaprine 10 milliGRAM(s) Oral three times a day PRN Muscle Spasm  QUEtiapine 25 milliGRAM(s) Oral every 6 hours PRN agitation          LABS:  cret                        8.4    22.67 )-----------( 461      ( 29 May 2022 05:21 )             24.6         139  |  109  |  17  ----------------------------<  87  3.7   |  14<L>  |  1.3    Ca    8.5      29 May 2022 05:21  Mg     1.8         TPro  5.8<L>  /  Alb  3.5  /  TBili  0.4  /  DBili  x   /  AST  18  /  ALT  17  /  AlkPhos  57              Radiology  < from: MR Lumbar Spine w/wo IV Cont (22 @ 19:38) >  IMPRESSION:  Acute compression deformity of the L1 vertebral body with approximately   80% height loss as well as bulging of the posterior cortex resulting in   severe spinal stenosis.    Additional acute compression deformity of the L4 superior endplate.    Severe multilevel degenerative changes contributing to spinal canal as   well as neuroforaminal narrowing as detailed above.    --- End of Report ---      JO BECERRIL MD; Attending Radiologist  This document has been electronically signed. May 27 2022  9:18AM    < end of copied text >        ECHO  < from: TTE Echo Complete w/o Contrast w/ Doppler (22 @ 10:38) >    Summary:   1. Normal global left ventricular systolic function.   2. Normal left atrial size.   3. Normal right atrial size.   4. Trace mitral valve regurgitation.   5. PSAP 35.    < end of copied text >      VEEG  VEEG in the last 24 hours:    Background - continuous, symmetrical, less than optimally organized, reaching frequencies in the range of 7-8 hz    Focal and generalized slowin. mild generalized slowing  2. mild left hemispheric focal slowing   3. lower amplitude on the right side    Interictal activity - none    Events - none    Seizures -none    Impression:  Abnormal VEEG as above    Plan - as per [facility] team

## 2022-05-30 NOTE — PROGRESS NOTE ADULT - ASSESSMENT
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 Pt s/p DSA pipeline stenting R ICA aneurysm on 5/23. S/P 1 UNIT PRBC for hb 7.3. No acute change in neuro status.    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 العلي)  #Encephalopathy of unclear etiology  - Neuro Checks Q4H  - Brilinta BID and Aspirin daily   - Nimodipine 30 mg Q2H  - Normovolemia  - Keppra for seizure ppx  - Video EEG negative  - MRA showed decreased aneurysm (5.9 -> 3.5mm), repeat MRI or CTA in 3 weeks per NSG  - Pain control: Tylenol, Lido patch, flexeril (new L1 compression fx, HA) on MR L-spine  - OOB to chair  - PT/OT/OOB    CV:   #HTN  - -160  - nimodipine 30 mg Q2 hrs  - Decreased IVF 0.9NS @ 50cc  - encourage PO intake  - EPS recs noted - recommend Multaq but would interact with current Quetiapine will hold for now  - continue metoprolol 25 mg BID    Pulm:  #SEEMA not on CPAP, ILD  she was tachypneic today and wheezing, possible fluid overload  - Aspiration precautions   - Prednisone 15mg qd for ILD; pulm following  - Incentive spirometry  - CXR   - Strict I/Os     GI:  #hx UGIB 2/2 bleeding duodenal ulcer (recent [facility] admission for melena)  - Protonix BID due to Prednisone  - Senna/Miralax  - Passed s/s, advanced to soft and bite sized diet  - encourage PO intake  - track CBC  - lactulose for BM    :  - Normonatremic euvolemia  - Strict Is/Os    Heme:  #Anemia, s/p 1 unit prbc. it is downtrending   - Lovenox 40 qd 5/24   - SCDs  - Continue Anagrelide 0.5mg q6h (essential thrombocytosis)  - Negative LE dopplers from 5/19  - track CBC  - Occult blood test     ID:  - Monitor fever trend and WBCs  - CXR 5/25 unchanged  - Repeat UA, negative  - MRSA positive, on 5-day course mupirocin completed       Endo:  #hypothyroidism  - Synthroid PO 100mcg qD  - TSH 70, free t4 1.0; follow up in 2 weeks      Albarran Reinserted 5/26 for retention    Dispo: Continue stroke unit w/ Q4 neurochecks         75 y/o F W/PMH as above found to have R frontoparietal HH1/mF1 SAH, 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 Pt s/p DSA pipeline stenting R ICA aneurysm on 5/23. S/P 1 UNIT PRBC for hb 7.3. No acute change in neuro status.    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 العلي)  #Encephalopathy of unclear etiology  - Neuro Checks Q4H  - Brilinta BID and Aspirin daily   - Nimodipine 30 mg Q2H  - Normovolemia  - Keppra for seizure ppx  - Video EEG negative  - MRA showed decreased aneurysm (5.9 -> 3.5mm), repeat MRI or CTA in 3 weeks per NSG  - Pain control: Tylenol, Lido patch, flexeril (new L1 compression fx, HA) on MR L-spine  - OOB to chair  - PT/OT/OOB    CV:   #HTN  - -160  - nimodipine 30 mg Q2 hrs  - Decreased IVF 0.9NS @ 50cc  - encourage PO intake  - EPS recs noted - recommend Multaq but would interact with current Quetiapine will hold for now  - continue metoprolol 25 mg BID    Pulm:  #SEEMA not on CPAP, ILD  she was tachypneic today and wheezing, possible fluid overload  - Aspiration precautions   - Prednisone 15mg qd for ILD; pulm following  - Incentive spirometry  - CXR   - Strict I/Os     GI:  #hx UGIB 2/2 bleeding duodenal ulcer (recent [facility] admission for melena)  She had multiple bowl movements,   - Stopped bowl regime   - Will send C. diff if continued   - Protonix BID due to Prednisone  - Senna/Miralax  - Passed s/s, advanced to soft and bite sized diet  - encourage PO intake  - track CBC  - lactulose for BM    :  - Normonatremic euvolemia  - Strict Is/Os    Heme:  #Anemia, s/p 1 unit prbc. it is downtrending   - Lovenox 40 qd 5/24   - SCDs  - Continue Anagrelide 0.5mg q6h (essential thrombocytosis)  - Negative LE dopplers from 5/19  - track CBC  - Occult blood test     ID:  - Monitor fever trend and WBCs  - CXR 5/25 unchanged  - Repeat UA, negative  - MRSA positive, on 5-day course mupirocin completed       Endo:  #hypothyroidism  - Synthroid PO 100mcg qD  - TSH 70, free t4 1.0; follow up in 2 weeks      Albarran Reinserted 5/26 for retention    Dispo: Continue stroke unit w/ Q4 neurochecks         75 y/o F W/PMH as above found to have R frontoparietal HH1/mF1 SAH, 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 Pt s/p DSA pipeline stenting R ICA aneurysm on 5/23. S/P 1 UNIT PRBC for hb 7.3. No acute change in neuro status.    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 العلي)  #Encephalopathy of unclear etiology  - Neuro Checks Q4H  - Brilinta BID   - Hold aspirin for now (low Hb)  - Nimodipine 30 mg Q2H  - Normovolemia  - Keppra for seizure ppx  - Video EEG negative  - MRA showed decreased aneurysm (5.9 -> 3.5mm), repeat MRI or CTA in 3 weeks per NSG  - Pain control: Tylenol, Lido patch, flexeril (new L1 compression fx, HA) on MR L-spine  - OOB to chair  - PT/OT/OOB    CV:   #HTN  - -160  - nimodipine 30 mg Q2 hrs  - Decreased IVF 0.9NS @ 50cc  - encourage PO intake  - EPS recs noted - recommend Multaq but would interact with current Quetiapine will hold for now  - continue metoprolol 25 mg BID    #Sinus tachycardia   - Continue metoprolol 25 mg bid  - Start Multac 400mg bid     Pulm:  #SEEMA not on CPAP, ILD  she was tachypneic today and wheezing, possible fluid overload  - Aspiration precautions   - Prednisone 15mg qd for ILD; pulm following  - Incentive spirometry  - CXR   - Strict I/Os   - Lasix 20mg IV once     GI:  #hx UGIB 2/2 bleeding duodenal ulcer (recent [facility] admission for melena)  She had multiple bowl movements,   - Stopped bowl regime   - Will send C. diff if continued   - Protonix BID due to Prednisone  - Senna/Miralax  - Passed s/s, advanced to soft and bite sized diet  - encourage PO intake  - track CBC  - lactulose for BM    :  - Normonatremic euvolemia  - Strict Is/Os  - Daily BMP  - Replace Mg     Heme:  #Anemia, s/p 1 unit prbc. it is downtrending   - Lovenox 40 qd 5/24   - SCDs  - Hold Anagrelide for now (low Hb)  - Negative LE dopplers from 5/19  - track CBC  - Occult blood test   - One unit PRC transfusion today     ID:  - Monitor fever trend and WBCs  - CXR 5/25 unchanged  - Repeat UA, negative  - MRSA positive, on 5-day course mupirocin completed       Endo:  #hypothyroidism  - Synthroid PO 100mcg qD  - TSH 70, free t4 1.0; follow up in 2 weeks      Deion Reinserted 5/26 for retention    Dispo: Continue stroke unit w/ Q4 neurochecks         77 y/o F W/PMH as above found to have R frontoparietal HH1/mF1 SAH, 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 Pt s/p DSA pipeline stenting R ICA aneurysm on 5/23. S/P 1 UNIT PRBC for hb 7.3. No acute change in neuro status.    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 العلي)  #Encephalopathy of unclear etiology  - Neuro Checks Q4H  - Brilinta BID   - Hold aspirin for now (low Hb)  - Nimodipine 30 mg Q2H  - Normovolemia  - Keppra for seizure ppx  - Video EEG negative  - MRA showed decreased aneurysm (5.9 -> 3.5mm), repeat MRI or CTA in 3 weeks per NSG  - Pain control: Tylenol, Lido patch, flexeril (new L1 compression fx, HA) on MR L-spine  - OOB to chair  - PT/OT/OOB    CV:   #HTN  - -160  - nimodipine 30 mg Q2 hrs  - Decreased IVF 0.9NS @ 50cc  - encourage PO intake  - EPS recs noted - recommend Multaq but would interact with current Quetiapine will hold for now  - continue metoprolol 25 mg BID    #Sinus tachycardia   - Continue metoprolol 25 mg bid  - Start Multac 400mg bid   - transfuse if H/H continues to decrease    Pulm:  #SEEMA not on CPAP, ILD  she was tachypneic today and wheezing, possible fluid overload  - Aspiration precautions   - Prednisone 15mg qd for ILD; pulm following  - Incentive spirometry  - CXR   - Strict I/Os   - Lasix 20mg IV once     GI:  #hx UGIB 2/2 bleeding duodenal ulcer (recent [facility] admission for melena)  She had multiple bowl movements,   - Stopped bowl regime   - Will send C. diff if continued   - Protonix BID due to Prednisone  - Senna/Miralax  - Passed s/s, advanced to soft and bite sized diet  - encourage PO intake  - track CBC  - lactulose for BM    :  - Normonatremic euvolemia  - Strict Is/Os  - Daily BMP  - Replace Mg     Heme:  #Anemia, s/p 1 unit prbc. it is downtrending   - Lovenox 40 qd 5/24   - SCDs  - Hold Anagrelide for now (low Hb)  - Negative LE dopplers from 5/19  - track CBC  - Occult blood test   - One unit PRC transfusion today     ID:  - Monitor fever trend and WBCs  - CXR 5/25 unchanged  - Repeat UA, negative  - MRSA positive, on 5-day course mupirocin completed       Endo:  #hypothyroidism  - Synthroid PO 100mcg qD  - TSH 70, free t4 1.0; follow up in 2 weeks      Deion Reinserted 5/26 for retention    Dispo: Continue stroke unit w/ Q4 neurochecks

## 2022-05-31 LAB
ALBUMIN SERPL ELPH-MCNC: 3.6 G/DL — SIGNIFICANT CHANGE UP (ref 3.5–5.2)
ALP SERPL-CCNC: 44 U/L — SIGNIFICANT CHANGE UP (ref 30–115)
ALT FLD-CCNC: 15 U/L — SIGNIFICANT CHANGE UP (ref 0–41)
ANION GAP SERPL CALC-SCNC: 18 MMOL/L — HIGH (ref 7–14)
AST SERPL-CCNC: 16 U/L — SIGNIFICANT CHANGE UP (ref 0–41)
BILIRUB SERPL-MCNC: 0.4 MG/DL — SIGNIFICANT CHANGE UP (ref 0.2–1.2)
BUN SERPL-MCNC: 47 MG/DL — HIGH (ref 10–20)
CALCIUM SERPL-MCNC: 8.9 MG/DL — SIGNIFICANT CHANGE UP (ref 8.5–10.1)
CHLORIDE SERPL-SCNC: 111 MMOL/L — HIGH (ref 98–110)
CO2 SERPL-SCNC: 12 MMOL/L — LOW (ref 17–32)
CREAT SERPL-MCNC: 1.4 MG/DL — SIGNIFICANT CHANGE UP (ref 0.7–1.5)
EGFR: 39 ML/MIN/1.73M2 — LOW
GLUCOSE SERPL-MCNC: 97 MG/DL — SIGNIFICANT CHANGE UP (ref 70–99)
HCT VFR BLD CALC: 24.7 % — LOW (ref 37–47)
HGB BLD-MCNC: 8.5 G/DL — LOW (ref 12–16)
LACTATE SERPL-SCNC: 1.1 MMOL/L — SIGNIFICANT CHANGE UP (ref 0.7–2)
MAGNESIUM SERPL-MCNC: 1.9 MG/DL — SIGNIFICANT CHANGE UP (ref 1.8–2.4)
MCHC RBC-ENTMCNC: 31.8 PG — HIGH (ref 27–31)
MCHC RBC-ENTMCNC: 34.4 G/DL — SIGNIFICANT CHANGE UP (ref 32–37)
MCV RBC AUTO: 92.5 FL — SIGNIFICANT CHANGE UP (ref 81–99)
NRBC # BLD: 0 /100 WBCS — SIGNIFICANT CHANGE UP (ref 0–0)
PLATELET # BLD AUTO: 275 K/UL — SIGNIFICANT CHANGE UP (ref 130–400)
POTASSIUM SERPL-MCNC: 3.5 MMOL/L — SIGNIFICANT CHANGE UP (ref 3.5–5)
POTASSIUM SERPL-SCNC: 3.5 MMOL/L — SIGNIFICANT CHANGE UP (ref 3.5–5)
PROT SERPL-MCNC: 5.8 G/DL — LOW (ref 6–8)
RBC # BLD: 2.67 M/UL — LOW (ref 4.2–5.4)
RBC # FLD: 18.3 % — HIGH (ref 11.5–14.5)
SARS-COV-2 RNA SPEC QL NAA+PROBE: SIGNIFICANT CHANGE UP
SODIUM SERPL-SCNC: 141 MMOL/L — SIGNIFICANT CHANGE UP (ref 135–146)
WBC # BLD: 25.2 K/UL — HIGH (ref 4.8–10.8)
WBC # FLD AUTO: 25.2 K/UL — HIGH (ref 4.8–10.8)

## 2022-05-31 PROCEDURE — 99233 SBSQ HOSP IP/OBS HIGH 50: CPT

## 2022-05-31 PROCEDURE — 99223 1ST HOSP IP/OBS HIGH 75: CPT

## 2022-05-31 RX ORDER — SODIUM BICARBONATE 1 MEQ/ML
1300 SYRINGE (ML) INTRAVENOUS EVERY 6 HOURS
Refills: 0 | Status: DISCONTINUED | OUTPATIENT
Start: 2022-05-31 | End: 2022-06-06

## 2022-05-31 RX ORDER — NIMODIPINE 60 MG/10ML
30 SOLUTION ORAL EVERY 4 HOURS
Refills: 0 | Status: DISCONTINUED | OUTPATIENT
Start: 2022-05-31 | End: 2022-06-06

## 2022-05-31 RX ORDER — SODIUM CHLORIDE 9 MG/ML
250 INJECTION INTRAMUSCULAR; INTRAVENOUS; SUBCUTANEOUS ONCE
Refills: 0 | Status: COMPLETED | OUTPATIENT
Start: 2022-05-31 | End: 2022-05-31

## 2022-05-31 RX ORDER — FUROSEMIDE 40 MG
20 TABLET ORAL ONCE
Refills: 0 | Status: COMPLETED | OUTPATIENT
Start: 2022-05-31 | End: 2022-05-31

## 2022-05-31 RX ADMIN — LIDOCAINE 1 PATCH: 4 CREAM TOPICAL at 19:26

## 2022-05-31 RX ADMIN — TICAGRELOR 90 MILLIGRAM(S): 90 TABLET ORAL at 17:05

## 2022-05-31 RX ADMIN — NIMODIPINE 30 MILLIGRAM(S): 60 SOLUTION ORAL at 08:48

## 2022-05-31 RX ADMIN — Medication 1300 MILLIGRAM(S): at 17:05

## 2022-05-31 RX ADMIN — LIDOCAINE 1 PATCH: 4 CREAM TOPICAL at 12:28

## 2022-05-31 RX ADMIN — PANTOPRAZOLE SODIUM 10 MG/HR: 20 TABLET, DELAYED RELEASE ORAL at 20:45

## 2022-05-31 RX ADMIN — Medication 1300 MILLIGRAM(S): at 12:56

## 2022-05-31 RX ADMIN — SODIUM CHLORIDE 250 MILLILITER(S): 9 INJECTION INTRAMUSCULAR; INTRAVENOUS; SUBCUTANEOUS at 09:45

## 2022-05-31 RX ADMIN — CHLORHEXIDINE GLUCONATE 1 APPLICATION(S): 213 SOLUTION TOPICAL at 05:19

## 2022-05-31 RX ADMIN — NIMODIPINE 30 MILLIGRAM(S): 60 SOLUTION ORAL at 06:21

## 2022-05-31 RX ADMIN — NIMODIPINE 30 MILLIGRAM(S): 60 SOLUTION ORAL at 00:12

## 2022-05-31 RX ADMIN — Medication 15 MILLIGRAM(S): at 05:18

## 2022-05-31 RX ADMIN — TICAGRELOR 90 MILLIGRAM(S): 90 TABLET ORAL at 05:17

## 2022-05-31 RX ADMIN — NIMODIPINE 30 MILLIGRAM(S): 60 SOLUTION ORAL at 21:36

## 2022-05-31 RX ADMIN — Medication 5 MILLIGRAM(S): at 21:36

## 2022-05-31 RX ADMIN — DRONEDARONE 400 MILLIGRAM(S): 400 TABLET, FILM COATED ORAL at 17:05

## 2022-05-31 RX ADMIN — PANTOPRAZOLE SODIUM 10 MG/HR: 20 TABLET, DELAYED RELEASE ORAL at 00:11

## 2022-05-31 RX ADMIN — Medication 650 MILLIGRAM(S): at 22:42

## 2022-05-31 RX ADMIN — Medication 650 MILLIGRAM(S): at 23:23

## 2022-05-31 RX ADMIN — Medication 100 MICROGRAM(S): at 05:18

## 2022-05-31 RX ADMIN — Medication 25 MILLIGRAM(S): at 05:17

## 2022-05-31 RX ADMIN — DRONEDARONE 400 MILLIGRAM(S): 400 TABLET, FILM COATED ORAL at 00:12

## 2022-05-31 RX ADMIN — Medication 25 MILLIGRAM(S): at 19:43

## 2022-05-31 RX ADMIN — LIDOCAINE 1 PATCH: 4 CREAM TOPICAL at 23:18

## 2022-05-31 RX ADMIN — Medication 20 MILLIGRAM(S): at 04:35

## 2022-05-31 RX ADMIN — Medication 1300 MILLIGRAM(S): at 23:18

## 2022-05-31 RX ADMIN — PANTOPRAZOLE SODIUM 10 MG/HR: 20 TABLET, DELAYED RELEASE ORAL at 13:17

## 2022-05-31 RX ADMIN — NIMODIPINE 30 MILLIGRAM(S): 60 SOLUTION ORAL at 17:04

## 2022-05-31 NOTE — PROGRESS NOTE ADULT - ASSESSMENT
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 Pt s/p DSA pipeline stenting R ICA aneurysm on 5/23. S/P 1 UNIT PRBC for hb 7.3. No acute change in neuro status. Patient received another unit of blood yesterday for Hb 7.6     Plan:  Neurological:  #SAH, 2/2 5mm ruptured R supraclinoid aneurysm s/p angio & pipeline stent embo 5/23   #new L1 compression fx, CPB 2/2 spinal stenosis (follows w Dr. العيل)  #Encephalopathy of unclear etiology  - Neuro Checks Q4H  - Brilinta BID   - Hold aspirin for now (low Hb)  - Nimodipine 30 mg Q2H  - Normovolemia  - Keppra for seizure ppx  - Video EEG negative  - MRA showed decreased aneurysm (5.9 -> 3.5mm), repeat MRI or CTA in 3 weeks per NSG  - Pain control: Tylenol, Lido patch, flexeril (new L1 compression fx, HA) on MR L-spine  - OOB to chair  - PT/OT/OOB    CV:   #HTN  - -160  - nimodipine 30 mg Q2 hrs  - Decreased IVF 0.9NS @ 50cc  - encourage PO intake  - continue metoprolol 25 mg BID    #Sinus tachycardia   - Continue metoprolol 25 mg bid  - Start Multac 400mg bid   - transfuse if H/H continues to decrease    Pulm:  #SEEMA not on CPAP, ILD  she was tachypneic yesterday and wheezing, that improved after diuresis and blood transfusion.   - Aspiration precautions   - Prednisone 15mg qd for ILD; pulm following  - Incentive spirometry  - CXR   - Strict I/Os       GI:  #hx UGIB 2/2 bleeding duodenal ulcer (recent [facility] admission for melena)  She had multiple bowl movements,   - Stopped bowl regime   - Will send C. diff if continued   - Started on pantoprazole infusion as per GI.   - Senna/Miralax  - Currently clear liquid diet as per GI   - track CBC  - lactulose for BM    :  - Normonatremic euvolemia  - Strict Is/Os  - Daily BMP  - Replace Mg     Heme:  #Anemia, s/p 2 unit prbc. on 27 and 30 of May  - Lovenox 40 qd 5/24 (held yesterday)   - SCDs  - Hold Anagrelide for now (low Hb)  - Negative LE dopplers from 5/19  - track CBC      ID:  - Monitor fever trend and WBCs  - CXR 5/25 unchanged  - Repeat UA, negative  - MRSA positive, 5-day course mupirocin completed       Endo:  #hypothyroidism  - Synthroid PO 100mcg qD  - TSH 70, free t4 1.0; follow up in 2 weeks      Albarran Reinserted 5/26 for retention    Dispo: Continue stroke unit w/ Q4 neurochecks

## 2022-05-31 NOTE — PROGRESS NOTE ADULT - SUBJECTIVE AND OBJECTIVE BOX
Neurology Follow up note  Patient examined at bedside, no new issues this morning, no agitation overnight. The patient was awake today and answering all questions.     HPI:  Ms. Shook is a 76 year old female patient known to have:Baseline AO3, lives with niece, ambulates with a walker, SEEMA and ILD. Follows with Dr Stephens. Not on CPAP or home O2. On Prednisone 20mg QD, Depression, HTN. Recent Admission for HTN Urgency, Hypothyroidism. Recent admission for a bleeding duodenal ulcer at [facility] (melena). Home med Protonix 20mg QD Essential Thrombocytosis. Follows with Dr Denise, Chronic Back Pain for years secondary to Spinal Stenosis per marquise. s/p Spinal Epidural Injection. Was on PT 3x/ week but has been non compliant for 2-3 months. Follows with Dr Jaimes. Was supposed to go for a nerve ablation last Monday (cancelled due to poorly controlled HTN s/p admission). She was brought to the ED by marquise on  for evaluation of worsening back pain and confusion x2 days. History goes back to 2 days PTP when the patient started complaining of worsening of her chronic dull lower back pain that radiates down to her right medial aspect of thigh. This has resulted in limited ambulation (patient refusing to leave her bed) and to being non compliant with Physical Therapy in the absence of leg weakness or paresthesias or numbness or urinary incontinence. Per marquise, patient has been feeling down x few days with reduced PO intake, reduced sleep, loss of interest, thoughts that her family doesn't like/ support her, and confusion (some times would not recognize marquise).She has an instance where she had fecal incontinence on way to bathroom 2 days ago.In the setting of confusion and increased pain, marquise decided to bring patient to ED for evaluation. On review of systems, marquise denies any recent fever, chills, night sweats, URTI symptoms (cough, rhinorrhea, sore throat), urinary symptoms (urinary frequency, urgency, intermittence, dysuria, foul smelling urine, cloudy urine), abdominal pain, headache, nausea, or vomiting. No sick contacts. No recent travel or exposure to recent travelers.        Vital Signs Last 24 Hrs  T(C): 36.6 (31 May 2022 04:00), Max: 36.7 (31 May 2022 02:00)  T(F): 97.9 (31 May 2022 04:00), Max: 98.1 (31 May 2022 02:00)  HR: 86 (31 May 2022 08:44) (86 - 178)  BP: 120/62 (31 May 2022 08:44) (106/67 - 157/85)  BP(mean): 85 (31 May 2022 08:44) (75 - 125)  RR: 20 (31 May 2022 08:44) (18 - 22)  SpO2: 99% (31 May 2022 08:44) (98% - 100%)            Neurological Exam:   Mental status: Patient is awake alert oriented to self and place. Anxious, follows 1 step commands.   Cranial nerves: Normal visual field, normal eye movements, PERRLA, No facial asymmetry   Power 5/5 UE            5/ 5  LE  No abnormal involuntary mvmts  Sensation: Responds to pain in all 4 exts.  FTN/HKS: Grossly intact  Gait: unable.      NIHSS:    1A: LOC 0   1B: LOC Questions 1  1C: Performs Tasks 0   2: Horizontal EOMs 0  3: Visual Fields 0   4: Facial Palsy 0   5A: LUE Drift 0   5B: RUE Drift 0   6A: LLE Drift 0   6B: RLE Drift 0   7: Limb Ataxia (heel-shin, FNF) 0   8: Sensation 0   9: Language/Aphasia 0   10: Dysarthria 0   11: Extinction/Inattention 0     NIHSS Total: 1      mRs  0 No symptoms at all  1 No significant disability despite symptoms; able to carry out all usual duties and activities without assistance  2 Slight disability; unable to carry out all previous activities, but able to look after own affairs  3 Moderate disability; requiring some help, but able to walk without assistance  4 Moderately severe disability; unable to walk without assistance and unable to attend to own bodily needs without assistance  5 Severe disability; bedridden, incontinent and requiring constant nursing care and attention  6 Dead         MEDICATIONS  (STANDING):  albuterol/ipratropium for Nebulization. 3 milliLiter(s) Nebulizer once  chlorhexidine 4% Liquid 1 Application(s) Topical <User Schedule>  dronedarone 400 milliGRAM(s) Oral two times a day  enoxaparin Injectable 40 milliGRAM(s) SubCutaneous every 24 hours  lactulose Syrup      lactulose Syrup 20 Gram(s) Oral once  levothyroxine 100 MICROGram(s) Oral daily  lidocaine   4% Patch 1 Patch Transdermal daily  melatonin 5 milliGRAM(s) Oral at bedtime  metoprolol tartrate 25 milliGRAM(s) Oral every 12 hours  niMODipine Oral Solution 30 milliGRAM(s) Enteral Tube every 2 hours  pantoprazole Infusion 8 mG/Hr (10 mL/Hr) IV Continuous <Continuous>  polyethylene glycol 3350 17 Gram(s) Oral daily  predniSONE   Tablet 15 milliGRAM(s) Oral daily  senna 2 Tablet(s) Oral at bedtime  sodium chloride 0.9% Bolus 250 milliLiter(s) IV Bolus once  ticagrelor 90 milliGRAM(s) Oral two times a day    MEDICATIONS  (PRN):  acetaminophen     Tablet .. 650 milliGRAM(s) Oral every 6 hours PRN Mild Pain (1 - 3)  cyclobenzaprine 10 milliGRAM(s) Oral three times a day PRN Muscle Spasm          LABS:  cret                        7.6    26.32 )-----------( 394      ( 30 May 2022 07:44 )             22.9         140  |  114<H>  |  28<H>  ----------------------------<  87  3.7   |  9<LL>  |  1.1    Ca    8.1<L>      30 May 2022 07:44  Mg     1.6         TPro  5.5<L>  /  Alb  3.3<L>  /  TBili  0.3  /  DBili  x   /  AST  15  /  ALT  15  /  AlkPhos  46              Radiology  < from: MR Lumbar Spine w/wo IV Cont (22 @ 19:38) >  IMPRESSION:  Acute compression deformity of the L1 vertebral body with approximately   80% height loss as well as bulging of the posterior cortex resulting in   severe spinal stenosis.    Additional acute compression deformity of the L4 superior endplate.    Severe multilevel degenerative changes contributing to spinal canal as   well as neuroforaminal narrowing as detailed above.    --- End of Report ---      JO BECERRIL MD; Attending Radiologist  This document has been electronically signed. May 27 2022  9:18AM    < end of copied text >        ECHO  < from: TTE Echo Complete w/o Contrast w/ Doppler (22 @ 10:38) >    Summary:   1. Normal global left ventricular systolic function.   2. Normal left atrial size.   3. Normal right atrial size.   4. Trace mitral valve regurgitation.   5. PSAP 35.    < end of copied text >      VEEG  VEEG in the last 24 hours:    Background - continuous, symmetrical, less than optimally organized, reaching frequencies in the range of 7-8 hz    Focal and generalized slowin. mild generalized slowing  2. mild left hemispheric focal slowing   3. lower amplitude on the right side    Interictal activity - none    Events - none    Seizures -none    Impression:  Abnormal VEEG as above    Plan - as per [facility]CC team

## 2022-05-31 NOTE — CONSULT NOTE ADULT - ASSESSMENT
Ms. Shook is a 76 year old female patient, Primarily [place] speaking, known to have, Baseline AO3, lives with niece, ambulates with a walker, SEEMA and ILD (Prednisone 20mg QD), Depression, HTN, Hypothyroidism, Essential Thrombocytosis. Follows with Dr Denise, Chronic Back Pain for years secondary to Spinal Stenosis per niece, s/p Spinal Epidural Injection and Recent admission for a bleeding duodenal ulcer at [facility] (melena), Status post coil embolization in the region of the gastroduodenal artery, was on Protonix 20mg QD at home  patient was brought to the ED by marquise on 05/18 for evaluation of worsening back pain and confusion x2 days   CTH on admission with new right sided scattered SAH, CTA with aneurysm. Patient is post 5/23 cerebral angiogram + flow diversion stent embolization of right ICA supraclinoid aneurysm    *Upper GI bleeding  -hx of duodenal ulcer s/p EGD and coil embolization in the region of the gastroduodenal artery  -currently HD stable   -hb drop from 11-12 > 7.6 s/p 1 unit 5/30  -no melena today     Comorbidities:   right ICA supraclinoid aneurysm s/p stent embolization on ASA last dose 5/29 and Brilinta   currently on Brilinta BID and lovenox 40 qd ppx   ILD on prednisone  acidosis with bicarb of 9  leukocytosis    Recommendations  -continue PPI drip  -check repeat CBC   -repeat INR  -can have clear liquid diet, do not advance   -cardiology and pulmonary risk stratification and optimization   -EGD after optimization possibly tomorrow   -2 large bore IV  -type and screen  -discussed with niclaudia Hannah    -for questions text me on  teams, call [phone] on weekdays before 5pm or call GI service at [phone]  after 5 pm and on weekends

## 2022-05-31 NOTE — CONSULT NOTE ADULT - SUBJECTIVE AND OBJECTIVE BOX
AMANDA FELDER  76y  Female    Patient is a 76y old  Female who presents with a chief complaint of Worsening Back Pain and Metabolic Encephalopathy (31 May 2022 09:26)      HPI:  History of Present Illness  Ms. Felder is a 76 year old female patient known to have:  - Baseline AO3, lives with niece, ambulates with a walker  - SEEMA and ILD. Follows with Dr Stephens. Not on CPAP or home O2. On Prednisone 20mg QD  - Depression  - HTN. Recent Admission for HTN Urgency. Follows with Dr Moses  - Hypothyroidism   - Recent admission for a bleeding duodenal ulcer at [facility] (melena). Home med Protonix 20mg QD  - Essential Thrombocytosis. Follows with Dr Miller  - Chronic Back Pain for years secondary to Spinal Stenosis per marquise. s/p Spinal Epidural Injection. Was on PT 3x/ week but has been non compliant for 2-3 months. Follows with Dr Jaimes. Was supposed to go for a nerve ablation last Monday (cancelled due to poorly controlled HTN s/p admission)       She was brought to the ED by marquise on  for evaluation of worsening back pain and confusion x2 days.  History goes back to 2 days PTP when the patient started complaining of worsening of her chronic dull lower back pain that radiates down to her right medial aspect of thigh.  This has resulted in limited ambulation (patient refusing to leave her bed) and to being non compliant with Physical Therapy in the absence of leg weakness or paresthesias or numbness or urinary incontinence.  Per marquise, patient has been feeling down x few days with reduced PO intake, reduced sleep, loss of interest, thoughts that her family doesn't like/ support her, and confusion (some times would not recognize marquise).  She has an instance where she had fecal incontinence on way to bathroom 2 days ago.  In the setting of confusion and increased pain, marquise decided to bring patient to ED for evaluation.    On review of systems, marquise denies any recent fever, chills, night sweats, URTI symptoms (cough, rhinorrhea, sore throat), urinary symptoms (urinary frequency, urgency, intermittence, dysuria, foul smelling urine, cloudy urine), abdominal pain, headache, nausea, or vomiting.   No sick contacts.   No recent travel or exposure to recent travelers.      Upon presentation to the ED, the patient was hemodynamically stable:  Vital Signs in ED   - /104 mmHg  -   - RR 20  - T97.1  - SaO2 94% on RA      Investigations   Laboratory Workup  - CBC:                        11.6   20.94 )-----------( 188      ( 18 May 2022 10:50 )             34.1     - Chemistry:      140  |  96<L>  |  48<H>  ----------------------------<  109<H>  4.3   |  24  |  2.1<H>    Ca    10.4<H>      18 May 2022 10:50    TPro  7.3  /  Alb  4.8  /  TBili  0.6  /  DBili  x   /  AST  36  /  ALT  27  /  AlkPhos  61      - Coagulation Studies:  PT/INR - ( 18 May 2022 10:50 )   PT: 12.30 sec;   INR: 1.07 ratio    PTT - ( 18 May 2022 10:50 )  PTT:24.8 sec      Microbiological Workup  Urinalysis Basic - ( 18 May 2022 14:07 )    Color: Yellow / Appearance: Clear / S.021 / pH: x  Gluc: x / Ketone: Trace  / Bili: Negative / Urobili: <2 mg/dL   Blood: x / Protein: 30 mg/dL / Nitrite: Negative   Leuk Esterase: Negative / RBC: 6 /HPF / WBC 4 /HPF   Sq Epi: x / Non Sq Epi: 1 /HPF / Bacteria: Negative          Radiological Workup  * CT Abdomen and Pelvis w/ IV Cont (22 @ 14:00) No acute abnormality within the abdomen and pelvis. Age-indeterminate moderate compression fracture seen at L1, new since 2022.  * CXR CM and CHF      - Patient was given IV Cefepime 2g x1 dose in ED  - She was also given 1.4 L LR bolus   - She will be admitted for further investigations, management, and monitoring     (18 May 2022 22:59)    Interval events: Pt initially admitted to MICU.  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 Pt s/p DSA & pipeline stent embo .  Patient was febrile on  but work-up was negative except for (+) MRSA PCR.  Patient on 5-day course of mupirocin.  Patient became awake, alert, and oriented x2-3 (person, , place) and tolerating PT/OT.   Passed S+S and tolerating PO soft and bite-sized diet.  Failed TOV on  and bran reinserted for retention.  Re-trial once ambulation improves.  Hemodynamically stable for downgrade to stroke unit for further management.    S: Patient was examined and seen at bedside. This morning pt is resting comfortably in bed and reports no new issues or overnight events. No complaints, feels better  Denies CP, SOB, N/V/D/C/AP, cough, F, chills, dizziness, new focal weakness, HA, vision changes, dysuria, or urinary symptoms, blood in stool.  ROS: all other systems reviewed and are negative    PAST MEDICAL & SURGICAL HISTORY:  HTN (hypertension)      Hypothyroid      Depression      Interstitial lung disease      Essential thrombocytopenia      History of ovarian cyst        SOCIAL HISTORY:  Tobacco: denies  Illicit drugs: denies  Alcohol: social  Family history reviewed and otherwise non-contributory  ALLERGIES: NKDA    MEDICATIONS  (STANDING):  albuterol/ipratropium for Nebulization. 3 milliLiter(s) Nebulizer once  chlorhexidine 4% Liquid 1 Application(s) Topical <User Schedule>  dronedarone 400 milliGRAM(s) Oral two times a day  enoxaparin Injectable 40 milliGRAM(s) SubCutaneous every 24 hours  lactulose Syrup      lactulose Syrup 20 Gram(s) Oral once  levothyroxine 100 MICROGram(s) Oral daily  lidocaine   4% Patch 1 Patch Transdermal daily  melatonin 5 milliGRAM(s) Oral at bedtime  metoprolol tartrate 25 milliGRAM(s) Oral every 12 hours  niMODipine Oral Solution 30 milliGRAM(s) Enteral Tube every 2 hours  pantoprazole Infusion 8 mG/Hr (10 mL/Hr) IV Continuous <Continuous>  polyethylene glycol 3350 17 Gram(s) Oral daily  predniSONE   Tablet 15 milliGRAM(s) Oral daily  senna 2 Tablet(s) Oral at bedtime  sodium chloride 0.9% Bolus 250 milliLiter(s) IV Bolus once  ticagrelor 90 milliGRAM(s) Oral two times a day    MEDICATIONS  (PRN):  acetaminophen     Tablet .. 650 milliGRAM(s) Oral every 6 hours PRN Mild Pain (1 - 3)  cyclobenzaprine 10 milliGRAM(s) Oral three times a day PRN Muscle Spasm    Home Medications:  anagrelide 0.5 mg oral capsule: 1 cap(s) orally 4 times a day (18 May 2022 23:29)  atenolol 25 mg oral tablet: 1 tab(s) orally once a day (18 May 2022 23:28)  losartan-hydrochlorothiazide 100 mg-25 mg oral tablet: 1 tab(s) orally once a day (18 May 2022 23:28)  predniSONE 20 mg oral tablet: 1 tab(s) orally once a day (18 May 2022 23:28)  Protonix 20 mg oral delayed release tablet: 1 tab(s) orally once a day (18 May 2022 23:28)          Vital Signs Last 24 Hrs  T(C): 36.6 (31 May 2022 04:00), Max: 36.7 (31 May 2022 02:00)  T(F): 97.9 (31 May 2022 04:00), Max: 98.1 (31 May 2022 02:00)  HR: 86 (31 May 2022 08:44) (86 - 178)  BP: 120/62 (31 May 2022 08:44) (106/67 - 157/85)  BP(mean): 85 (31 May 2022 08:44) (75 - 125)  RR: 20 (31 May 2022 08:44) (18 - 22)  SpO2: 99% (31 May 2022 08:44) (98% - 100%)  CAPILLARY BLOOD GLUCOSE          General: NAD. Looks stated age.  HEENT: clean oropharynx, EOMI, no LAD  Neck: trachea midline, no thyromegaly  CV: nl S1 S2; no m/r/g  Resp: decreased breath sounds at base  GI: NT/ND/S +BS  MS: no clubbing/cyanosis/edema, +pulses  Neuro: motor, sensory intact; + reflexes  Skin: no rashes, nl turgor  Psychiatric: AA0x3 w/ intact insight and judgement    LABS:                        7.6    26.32 )-----------( 394      ( 30 May 2022 07:44 )             22.9     05-30    140  |  114<H>  |  28<H>  ----------------------------<  87  3.7   |  9<LL>  |  1.1    Ca    8.1<L>      30 May 2022 07:44  Mg     1.6     -    TPro  5.5<L>  /  Alb  3.3<L>  /  TBili  0.3  /  DBili  x   /  AST  15  /  ALT  15  /  AlkPhos  46  -    LIVER FUNCTIONS - ( 30 May 2022 07:44 )  Alb: 3.3 g/dL / Pro: 5.5 g/dL / ALK PHOS: 46 U/L / ALT: 15 U/L / AST: 15 U/L / GGT: x                   ABG - ( 29 May 2022 16:00 )  pH, Arterial: 7.37  pH, Blood: x     /  pCO2: 20    /  pO2: 106   / HCO3: 12    / Base Excess: -11.3 /  SaO2: 100.0                 EKG - nonspecific changes (my read)  Chart and consultant noted personally reviewed.  Care Discussed with Consultants/Other Providers/ Housestaff [ x] YES [ ] NO   Radiology, labs, old records personally reviewed.           AMANDA FELDER  76y  Female    Patient is a 76y old  Female who presents with a chief complaint of Worsening Back Pain and Metabolic Encephalopathy (31 May 2022 09:26)      HPI:  History of Present Illness  Ms. Felder is a 76 year old female patient known to have:  - Baseline AO3, lives with niece, ambulates with a walker  - SEEMA and ILD. Follows with Dr Stephens. Not on CPAP or home O2. On Prednisone 20mg QD  - Depression  - HTN. Recent Admission for HTN Urgency. Follows with Dr Moses  - Hypothyroidism   - Recent admission for a bleeding duodenal ulcer at [facility] (melena). Home med Protonix 20mg QD  - Essential Thrombocytosis. Follows with Dr Miller  - Chronic Back Pain for years secondary to Spinal Stenosis per marquise. s/p Spinal Epidural Injection. Was on PT 3x/ week but has been non compliant for 2-3 months. Follows with Dr Jaimes. Was supposed to go for a nerve ablation last Monday (cancelled due to poorly controlled HTN s/p admission)       She was brought to the ED by marquise on  for evaluation of worsening back pain and confusion x2 days.  History goes back to 2 days PTP when the patient started complaining of worsening of her chronic dull lower back pain that radiates down to her right medial aspect of thigh.  This has resulted in limited ambulation (patient refusing to leave her bed) and to being non compliant with Physical Therapy in the absence of leg weakness or paresthesias or numbness or urinary incontinence.  Per marquise, patient has been feeling down x few days with reduced PO intake, reduced sleep, loss of interest, thoughts that her family doesn't like/ support her, and confusion (some times would not recognize marquise).  She has an instance where she had fecal incontinence on way to bathroom 2 days ago.  In the setting of confusion and increased pain, marquise decided to bring patient to ED for evaluation.    On review of systems, marquise denies any recent fever, chills, night sweats, URTI symptoms (cough, rhinorrhea, sore throat), urinary symptoms (urinary frequency, urgency, intermittence, dysuria, foul smelling urine, cloudy urine), abdominal pain, headache, nausea, or vomiting.   No sick contacts.   No recent travel or exposure to recent travelers.      Upon presentation to the ED, the patient was hemodynamically stable:  Vital Signs in ED   - /104 mmHg  -   - RR 20  - T97.1  - SaO2 94% on RA      Investigations   Laboratory Workup  - CBC:                        11.6   20.94 )-----------( 188      ( 18 May 2022 10:50 )             34.1     - Chemistry:      140  |  96<L>  |  48<H>  ----------------------------<  109<H>  4.3   |  24  |  2.1<H>    Ca    10.4<H>      18 May 2022 10:50    TPro  7.3  /  Alb  4.8  /  TBili  0.6  /  DBili  x   /  AST  36  /  ALT  27  /  AlkPhos  61      - Coagulation Studies:  PT/INR - ( 18 May 2022 10:50 )   PT: 12.30 sec;   INR: 1.07 ratio    PTT - ( 18 May 2022 10:50 )  PTT:24.8 sec      Microbiological Workup  Urinalysis Basic - ( 18 May 2022 14:07 )    Color: Yellow / Appearance: Clear / S.021 / pH: x  Gluc: x / Ketone: Trace  / Bili: Negative / Urobili: <2 mg/dL   Blood: x / Protein: 30 mg/dL / Nitrite: Negative   Leuk Esterase: Negative / RBC: 6 /HPF / WBC 4 /HPF   Sq Epi: x / Non Sq Epi: 1 /HPF / Bacteria: Negative          Radiological Workup  * CT Abdomen and Pelvis w/ IV Cont (22 @ 14:00) No acute abnormality within the abdomen and pelvis. Age-indeterminate moderate compression fracture seen at L1, new since 2022.  * CXR CM and CHF    - Patient was given IV Cefepime 2g x1 dose in ED  - She was also given 1.4 L LR bolus   - She will be admitted for further investigations, management, and monitoring     (18 May 2022 22:59)    Interval events: Pt initially admitted to MICU.  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 Pt s/p DSA & pipeline stent embo .  Patient was febrile on  but work-up was negative except for (+) MRSA PCR.  Patient on 5-day course of mupirocin.  Patient became awake, alert, and oriented x2-3 (person, , place) and tolerating PT/OT.   Passed S+S and tolerating PO soft and bite-sized diet.  Failed TOV on  and bran reinserted for retention.  Re-trial once ambulation improves.  Hemodynamically stable for downgrade to stroke unit for further management.    S: Patient was examined and seen at bedside. This morning pt is resting comfortably in bed and reports no new issues or overnight events. No specific complaints.  Denies CP, SOB, N/V/D/C/AP, cough, F, chills, dizziness, new focal weakness, HA, vision changes, dysuria, or urinary symptoms, blood in stool.  ROS: all other systems reviewed and are negative    PAST MEDICAL & SURGICAL HISTORY:  HTN (hypertension)      Hypothyroid      Depression      Interstitial lung disease      Essential thrombocytopenia      History of ovarian cyst        SOCIAL HISTORY:  Tobacco: denies  Illicit drugs: denies  Alcohol: social  Family history reviewed and otherwise non-contributory  ALLERGIES: NKDA    MEDICATIONS  (STANDING):  albuterol/ipratropium for Nebulization. 3 milliLiter(s) Nebulizer once  chlorhexidine 4% Liquid 1 Application(s) Topical <User Schedule>  dronedarone 400 milliGRAM(s) Oral two times a day  enoxaparin Injectable 40 milliGRAM(s) SubCutaneous every 24 hours  lactulose Syrup      lactulose Syrup 20 Gram(s) Oral once  levothyroxine 100 MICROGram(s) Oral daily  lidocaine   4% Patch 1 Patch Transdermal daily  melatonin 5 milliGRAM(s) Oral at bedtime  metoprolol tartrate 25 milliGRAM(s) Oral every 12 hours  niMODipine Oral Solution 30 milliGRAM(s) Enteral Tube every 2 hours  pantoprazole Infusion 8 mG/Hr (10 mL/Hr) IV Continuous <Continuous>  polyethylene glycol 3350 17 Gram(s) Oral daily  predniSONE   Tablet 15 milliGRAM(s) Oral daily  senna 2 Tablet(s) Oral at bedtime  sodium chloride 0.9% Bolus 250 milliLiter(s) IV Bolus once  ticagrelor 90 milliGRAM(s) Oral two times a day    MEDICATIONS  (PRN):  acetaminophen     Tablet .. 650 milliGRAM(s) Oral every 6 hours PRN Mild Pain (1 - 3)  cyclobenzaprine 10 milliGRAM(s) Oral three times a day PRN Muscle Spasm    Home Medications:  anagrelide 0.5 mg oral capsule: 1 cap(s) orally 4 times a day (18 May 2022 23:29)  atenolol 25 mg oral tablet: 1 tab(s) orally once a day (18 May 2022 23:28)  losartan-hydrochlorothiazide 100 mg-25 mg oral tablet: 1 tab(s) orally once a day (18 May 2022 23:28)  predniSONE 20 mg oral tablet: 1 tab(s) orally once a day (18 May 2022 23:28)  Protonix 20 mg oral delayed release tablet: 1 tab(s) orally once a day (18 May 2022 23:28)          Vital Signs Last 24 Hrs  T(C): 36.6 (31 May 2022 04:00), Max: 36.7 (31 May 2022 02:00)  T(F): 97.9 (31 May 2022 04:00), Max: 98.1 (31 May 2022 02:00)  HR: 86 (31 May 2022 08:44) (86 - 178)  BP: 120/62 (31 May 2022 08:44) (106/67 - 157/85)  BP(mean): 85 (31 May 2022 08:44) (75 - 125)  RR: 20 (31 May 2022 08:44) (18 - 22)  SpO2: 99% (31 May 2022 08:44) (98% - 100%)  CAPILLARY BLOOD GLUCOSE          General: NAD. Looks stated age.  HEENT: clean oropharynx, EOMI, no LAD  Neck: trachea midline, no thyromegaly  CV: nl S1 S2; no m/r/g  Resp: decreased breath sounds at base  GI: NT/ND/S +BS  MS: no clubbing/cyanosis/edema, +pulses  Neuro: motor, sensory intact; + reflexes  Skin: no rashes, nl turgor  Psychiatric: AA0x2 w/ compromised insight and judgement    tele: SR, nonspecific changes (on my own evaluation of tele monitor)    LABS:                        7.6    26.32 )-----------( 394      ( 30 May 2022 07:44 )             22.9     05-30    140  |  114<H>  |  28<H>  ----------------------------<  87  3.7   |  9<LL>  |  1.1    Ca    8.1<L>      30 May 2022 07:44  Mg     1.6     -    TPro  5.5<L>  /  Alb  3.3<L>  /  TBili  0.3  /  DBili  x   /  AST  15  /  ALT  15  /  AlkPhos  46  -    LIVER FUNCTIONS - ( 30 May 2022 07:44 )  Alb: 3.3 g/dL / Pro: 5.5 g/dL / ALK PHOS: 46 U/L / ALT: 15 U/L / AST: 15 U/L / GGT: x                   ABG - ( 29 May 2022 16:00 )  pH, Arterial: 7.37  pH, Blood: x     /  pCO2: 20    /  pO2: 106   / HCO3: 12    / Base Excess: -11.3 /  SaO2: 100.0                 EKG - nonspecific changes (my read)  Chart and consultant noted personally reviewed.  Care Discussed with Consultants/Other Providers/ Housestaff [ x] YES [ ] NO   Radiology, labs, old records personally reviewed.

## 2022-05-31 NOTE — PROGRESS NOTE ADULT - SUBJECTIVE AND OBJECTIVE BOX
Neurology Progress Note    Interval History:  Ms. Shook is a 75 y/o right handed female, who presented to the ER on  with complaint of worsening back pain and confusion. Patient was assessed and referred to neurosurgery for treatment of L1 compression fracture and spinal stenosis. Patient also had elevated blood pressure (190 systolic), confusion, metabolic encephalopathy and was obtunded () on exam. Patient was referred to neurocritical care for suspicion of potential subarachnoid hemorrhage. Patient underwent DSA and pipeline stent embo on  for right ICA aneurysm. Patient also has possible R frontoparietal saccular aneurysm. MRI of brain on  revealed multifocal punctate acute ischemic stroke. Currently, patient is at stroke unit laying in bed, in a confused state, with weakness of all four extremities.     HPI:  History of Present Illness  Ms. Shook is a 76 year old female patient known to have:  - Baseline AO3, lives with niece, ambulates with a walker  - SEEMA and ILD. Follows with Dr Stephens. Not on CPAP or home O2. On Prednisone 20mg QD  - Depression  - HTN. Recent Admission for HTN Urgency. Follows with Dr Moses  - Hypothyroidism   - Recent admission for a bleeding duodenal ulcer at [facility] (melena). Home med Protonix 20mg QD  - Essential Thrombocytosis. Follows with Dr Denise  - Chronic Back Pain for years secondary to Spinal Stenosis per marquise. s/p Spinal Epidural Injection. Was on PT 3x/ week but has been non compliant for 2-3 months. Follows with Dr Jaimes. Was supposed to go for a nerve ablation last Monday (cancelled due to poorly controlled HTN s/p admission)       She was brought to the ED by marquise on  for evaluation of worsening back pain and confusion x2 days.  History goes back to 2 days PTP when the patient started complaining of worsening of her chronic dull lower back pain that radiates down to her right medial aspect of thigh.  This has resulted in limited ambulation (patient refusing to leave her bed) and to being non compliant with Physical Therapy in the absence of leg weakness or paresthesias or numbness or urinary incontinence.  Per marquise, patient has been feeling down x few days with reduced PO intake, reduced sleep, loss of interest, thoughts that her family doesn't like/ support her, and confusion (some times would not recognize niece).  She has an instance where she had fecal incontinence on way to bathroom 2 days ago.  In the setting of confusion and increased pain, niece decided to bring patient to ED for evaluation.    On review of systems, niece denies any recent fever, chills, night sweats, URTI symptoms (cough, rhinorrhea, sore throat), urinary symptoms (urinary frequency, urgency, intermittence, dysuria, foul smelling urine, cloudy urine), abdominal pain, headache, nausea, or vomiting.   No sick contacts.   No recent travel or exposure to recent travelers.      Upon presentation to the ED, the patient was hemodynamically stable:  Vital Signs in ED   - /104 mmHg  -   - RR 20  - T97.1  - SaO2 94% on RA      Investigations   Laboratory Workup  - CBC:                        11.6   20.94 )-----------( 188      ( 18 May 2022 10:50 )             34.1     - Chemistry:      140  |  96<L>  |  48<H>  ----------------------------<  109<H>  4.3   |  24  |  2.1<H>    Ca    10.4<H>      18 May 2022 10:50    TPro  7.3  /  Alb  4.8  /  TBili  0.6  /  DBili  x   /  AST  36  /  ALT  27  /  AlkPhos  61      - Coagulation Studies:  PT/INR - ( 18 May 2022 10:50 )   PT: 12.30 sec;   INR: 1.07 ratio    PTT - ( 18 May 2022 10:50 )  PTT:24.8 sec      Microbiological Workup  Urinalysis Basic - ( 18 May 2022 14:07 )    Color: Yellow / Appearance: Clear / S.021 / pH: x  Gluc: x / Ketone: Trace  / Bili: Negative / Urobili: <2 mg/dL   Blood: x / Protein: 30 mg/dL / Nitrite: Negative   Leuk Esterase: Negative / RBC: 6 /HPF / WBC 4 /HPF   Sq Epi: x / Non Sq Epi: 1 /HPF / Bacteria: Negative          Radiological Workup  * CT Abdomen and Pelvis w/ IV Cont (22 @ 14:00) No acute abnormality within the abdomen and pelvis. Age-indeterminate moderate compression fracture seen at L1, new since 2022.  * CXR CM and CHF      - Patient was given IV Cefepime 2g x1 dose in ED  - She was also given 1.4 L LR bolus   - She will be admitted for further investigations, management, and monitoring             (18 May 2022 22:59)      PAST MEDICAL & SURGICAL HISTORY:  HTN (hypertension)    Hypothyroid    Depression    Interstitial lung disease    Essential thrombocytopenia    History of ovarian cyst            Medications:  acetaminophen     Tablet .. 650 milliGRAM(s) Oral every 6 hours PRN  albuterol/ipratropium for Nebulization. 3 milliLiter(s) Nebulizer once  chlorhexidine 4% Liquid 1 Application(s) Topical <User Schedule>  cyclobenzaprine 10 milliGRAM(s) Oral three times a day PRN  dronedarone 400 milliGRAM(s) Oral two times a day  enoxaparin Injectable 40 milliGRAM(s) SubCutaneous every 24 hours  lactulose Syrup      lactulose Syrup 20 Gram(s) Oral once  levothyroxine 100 MICROGram(s) Oral daily  lidocaine   4% Patch 1 Patch Transdermal daily  melatonin 5 milliGRAM(s) Oral at bedtime  metoprolol tartrate 25 milliGRAM(s) Oral every 12 hours  niMODipine Oral Solution 30 milliGRAM(s) Oral every 4 hours  pantoprazole Infusion 8 mG/Hr IV Continuous <Continuous>  polyethylene glycol 3350 17 Gram(s) Oral daily  predniSONE   Tablet 15 milliGRAM(s) Oral daily  senna 2 Tablet(s) Oral at bedtime  sodium bicarbonate 1300 milliGRAM(s) Oral every 6 hours  ticagrelor 90 milliGRAM(s) Oral two times a day      Vital Signs Last 24 Hrs  T(C): 36.6 (31 May 2022 04:00), Max: 36.7 (31 May 2022 02:00)  T(F): 97.9 (31 May 2022 04:00), Max: 98.1 (31 May 2022 02:00)  HR: 92 (31 May 2022 12:00) (84 - 178)  BP: 107/64 (31 May 2022 12:00) (89/54 - 157/85)  BP(mean): 82 (31 May 2022 12:00) (72 - 125)  RR: 20 (31 May 2022 12:00) (18 - 22)  SpO2: 100% (31 May 2022 12:00) (98% - 100%)    Neurological Exam:   Mental status: Awake, alert and oriented to person time and place.  Short term and long term memory intact.  Naming, repetition and comprehension intact.  Attention/concentration impaired.  No dysarthria, slight receptive aphasia. unable to perform hemineglect testing due to b/l UE weakness.  Cranial nerves: Pupils equally round and reactive to light, visual fields full, no nystagmus, extraocular muscles intact, V1 through V3 intact bilaterally and symmetric, face symmetric, hearing intact to finger rub, palate elevation symmetric, tongue was midline, trapezius elevates bilaterally.  Motor:  MRC grading 1/5 b/l UE/LE.   strength 2/5.  Normal tone and bulk.  No abnormal movements.    Sensation: Intact to light touch, proprioception, and pinprick.   Coordination: unable to do finger to nose and heel to shin due to weakness. No dysdiadokinesia.  Reflexes: 2+ in bilateral UE/LE, downgoing toes bilaterally.       Labs:  CBC Full  -  ( 31 May 2022 11:49 )  WBC Count : 25.20 K/uL  RBC Count : 2.67 M/uL  Hemoglobin : 8.5 g/dL  Hematocrit : 24.7 %  Platelet Count - Automated : 275 K/uL  Mean Cell Volume : 92.5 fL  Mean Cell Hemoglobin : 31.8 pg  Mean Cell Hemoglobin Concentration : 34.4 g/dL  Auto Neutrophil # : x  Auto Lymphocyte # : x  Auto Monocyte # : x  Auto Eosinophil # : x  Auto Basophil # : x  Auto Neutrophil % : x  Auto Lymphocyte % : x  Auto Monocyte % : x  Auto Eosinophil % : x  Auto Basophil % : x        141  |  111<H>  |  47<H>  ----------------------------<  97  3.5   |  12<L>  |  1.4    Ca    8.9      31 May 2022 11:49  Mg     1.9         TPro  5.8<L>  /  Alb  3.6  /  TBili  0.4  /  DBili  x   /  AST  16  /  ALT  15  /  AlkPhos  44      LIVER FUNCTIONS - ( 31 May 2022 11:49 )  Alb: 3.6 g/dL / Pro: 5.8 g/dL / ALK PHOS: 44 U/L / ALT: 15 U/L / AST: 16 U/L / GGT: x                 NIHSS:    1A: LOC 0 - 3  1B: LOC Questions 0 - 2  1C: Performs Tasks 0 - 2  2: Horizontal EOMs 0 -2  3: Visual Fields 0 - 3  4: Facial Palsy 0 - 3  5A: LUE Drift 3- 4  5B: RUE Drift 3 - 4  6A: LLE Drift 3 - 4  6B: RLE Drift 3 - 4  7: Limb Ataxia (heel-shin, FNF) 0 - 2  8: Sensation 0 - 2  9: Language/Aphasia 0 - 3  10: Dysarthria 0 - 2  11: Extinction/Inattention 0 - 2    NIHSS Total: 12

## 2022-05-31 NOTE — PROGRESS NOTE ADULT - ATTENDING COMMENTS
Pt is a 75 yo F with PMHX of chronic back pain, SEEMA, HTN, depression, essential thrombocytosis, recent admission for GIB 2/2 duodenal ulcer, who presented with AMS. 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 Now s/p pipeline stent embolization 5/23. MRI brain on 5/26 with multifocal punctate acute ischemic stroke. Favor periprocedural. Plt had increased, pt was restarted on home anagrelide, with ASA/brilinta for stent, then developed GIB, thus ASA and anagrelide held. Hb stable today on brilinta and DVT ppx. GI consulted, plan for EGD tomorrow. Continue monitoring CBC, CMP. PT/OT/ST, tele, avoid hypotension, q4 neurochecks. Adjusting nimotop 2/2 hypotension, decrease to 30mg q4 (started on 5/23).

## 2022-05-31 NOTE — CONSULT NOTE ADULT - TIME BILLING
SVT
time spent on review of labs, imaging studies, old records, obtaining history, personally examining patient, counselling and communicating with patient/ family, entering orders for medications/tests/etc, discussions with other health care providers, documentation in electronic health records, independent interpretation of labs, imaging/procedure results and care coordination.
Review of imaging and chart; obtaining history; examination of pt; discussion and coordination of care.

## 2022-05-31 NOTE — PROGRESS NOTE ADULT - ASSESSMENT
Assessment:  This is a 77y/o right handed Female w/ PMH of HTN, hypothyroid, depression, interstitial lung disease, and history of ovarian cyst. Patient presented at admission with chronic lower back pain and a state of confusion. Patient was assessed and referred to neurosurgery for treatment of L1 compression fracture and spinal stenosis. Patient was found to have elevated blood pressure (190 systolic), metabolic encephalopathy, and suspicion of hemorrhage. Patient underwent DSA and pipeline stent embo on 5/23 for right ICA aneurysm. Patient also has possible R frontoparietal saccular aneurysm. MRI of brain on 5/26 revealed multifocal punctate acute ischemic stroke. Currently, patient is at stroke unit laying in bed, in a confused state, with weakness of all four extremities.     Plan:   ·	Neuro  ·	Patient will maintain current medication regimens and status will continued to be monitored and corrected as needed.

## 2022-05-31 NOTE — CONSULT NOTE ADULT - ASSESSMENT
THIS IS AN INCOMPLETE NOTE 77 y/o F W/PMH as above found to have R frontoparietal HH1/mF1 SAH, 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 Pt s/p DSA pipeline stenting R ICA aneurysm on 5/23. S/P 1 UNIT PRBC for hb 7.3. No acute change in neuro status. Patient received another unit of blood  for Hb 7.6     #SAH, 2/2 5mm ruptured R supraclinoid aneurysm s/p angio & pipeline stent embo 5/23   #new L1 compression fx, CPB 2/2 spinal stenosis (follows w Dr. العلي)  - Neuro Checks Q4H  - Brilinta BID   - Hold aspirin for now (low Hb)  - Nimodipine   - Normovolemia  - Keppra for seizure ppx  - Video EEG negative  - MRA showed decreased aneurysm (5.9 -> 3.5mm), repeat MRI or CTA in 3 weeks per NSG  - Pain control: Tylenol, Lido patch, flexeril (new L1 compression fx, HA) on MR L-spine  - OOB to chair  - PT/OT/OOB     #HTN  - -140  - nimodipine   - Decreased IVF 0.9NS @ 50cc  - encourage PO intake  - continue metoprolol 25 mg BID    #Sinus tachycardia (at one point >150)  - Continue metoprolol 25 mg bid  - Started Multaq 400mg bid   - transfuse if H/H continues to decrease  -check d-dimer  - LE doppler    #SEEMA not on CPAP, ILD  she was tachypneic and wheezing 5/30, that improved after diuresis and blood transfusion.   - Aspiration precautions   - Prednisone 15mg qd for ILD; pulm following  - Incentive spirometry  - CXR   - Strict I/Os     #Anemia/Melena/hx recent UGIB 2/2 bleeding duodenal ulcer ([email or web address] admission for melena)  She had multiple bowl movements,   - Stopped bowl regime   - Started on pantoprazole infusion as per GI.   - Senna/Miralax  - Currently clear liquid diet as per GI   - track CBC  - lactulose for BM  - GI is considering EGD  - s/p 2 unit prbc. on 27 and 30 of May  - Lovenox 40 qd    - SCDs  - Hold Anagrelide for now (low Hb)  - Negative LE dopplers from 5/19  - track CBC    #Chronic thrombocytosis  -anagrelide on hold due to GIB/anemia    #hypothyroidism  - Synthroid PO 100mcg qD  - TSH 70, free t4 1.0; repeat in 2-4 weeks    Urinary retention  consider CIC    ?Metabolic acidosis w/ +AG ?diarrhea, starvation ketoacidosis  -check LA, ABG  -repeat labs  -give HCO3  -U/A    Very high risk pt. Px is guarded    #Progress Note Handoff  Pending: Consults____Clinical improvement and stability__x___Tests___EGD_____PT____x____  Pt/Family discussion: Pt informed and agrees with the current plan  Disposition: Home______/SNF____?___/4A______/To be determined____x____    My note supersedes the residents note should a discrepancy arise.    Chart and notes personally reviewed.  Care Discussed with Consultants/Other Providers/ Housestaff [ x] YES [ ] NO   Radiology, labs, old records personally reviewed.    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 Pt s/p DSA pipeline stenting R ICA aneurysm on 5/23. S/P 1 UNIT PRBC for hb 7.3. No acute change in neuro status. Patient received another unit of blood  for Hb 7.6     #SAH, 2/2 5mm ruptured R supraclinoid aneurysm s/p angio & pipeline stent embo 5/23   #new L1 compression fx, CPB 2/2 spinal stenosis (follows w Dr. العلي)  - Neuro Checks Q4H  - Brilinta BID   - Hold aspirin for now (low Hb)  - Nimodipine   - Normovolemia  - Keppra for seizure ppx  - Video EEG negative  - MRA showed decreased aneurysm (5.9 -> 3.5mm), repeat MRI or CTA in 3 weeks per NSG  - Pain control: Tylenol, Lido patch, flexeril (new L1 compression fx, HA) on MR L-spine  - OOB to chair  - PT/OT/OOB     #HTN  - -140  - nimodipine   - Decreased IVF 0.9NS @ 50cc  - encourage PO intake  - continue metoprolol 25 mg BID    #Sinus tachycardia (at one point >150)  - Continue metoprolol 25 mg bid  - Started Multaq 400mg bid   - transfuse if H/H continues to decrease  -check d-dimer  - LE doppler    #SEEMA not on CPAP, ILD  she was tachypneic and wheezing 5/30, that improved after diuresis and blood transfusion.   - Aspiration precautions   - Prednisone 15mg qd for ILD; pulm following  - Incentive spirometry  - CXR   - Strict I/Os     #Anemia/Melena/hx recent UGIB 2/2 bleeding duodenal ulcer (recent [facility] admission for melena)  She had multiple bowl movements,   - Stopped bowl regime   - Started on pantoprazole infusion as per GI.   - Senna/Miralax  - Currently clear liquid diet as per GI   - track CBC  - lactulose for BM  - GI is considering EGD  - s/p 2 unit prbc. on 27 and 30 of May  - Lovenox 40 qd    - SCDs  - Hold Anagrelide for now (low Hb)  - Negative LE dopplers from 5/19  - track CBC    #Chronic thrombocytosis  -anagrelide on hold due to GIB/anemia    #hypothyroidism  - Synthroid PO 100mcg qD  - TSH 70, free t4 1.0; repeat in 2-4 weeks    Urinary retention  consider CIC    ?Metabolic acidosis w/ +AG ?diarrhea, starvation ketoacidosis  -check LA, ABG  -repeat labs  -give HCO3  -U/A  -d/c'ed all laxatives    Very high risk pt. Px is guarded    #Progress Note Handoff  Pending: Consults____Clinical improvement and stability__x___Tests___EGD_____PT____x____  Pt/Family discussion: Pt informed and agrees with the current plan  Disposition: Home______/SNF____?___/4A______/To be determined____x____    My note supersedes the residents note should a discrepancy arise.    Chart and notes personally reviewed.  Care Discussed with Consultants/Other Providers/ Housestaff [ x] YES [ ] NO   Radiology, labs, old records personally reviewed.    discussed w/ housestaff, nursing, case management, neuro team               77 y/o F W/PMH as above found to have R frontoparietal HH1/mF1 SAH, 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 Pt s/p DSA pipeline stenting R ICA aneurysm on 5/23. S/P 1 UNIT PRBC for hb 7.3. No acute change in neuro status. Patient received another unit of blood  for Hb 7.6     #SAH, 2/2 5mm ruptured R supraclinoid aneurysm s/p angio & pipeline stent embo 5/23  #Ischemic strokes on MRI   #new L1 compression fx, CPB 2/2 spinal stenosis (follows w Dr. العلي)  - Neuro Checks Q4H  - Brilinta BID   - Hold aspirin for now (low Hb)  - Nimodipine   - Normovolemia  - Keppra for seizure ppx  - Video EEG negative  - MRA showed decreased aneurysm (5.9 -> 3.5mm), repeat MRI or CTA in 3 weeks per NSG  - Pain control: Tylenol, Lido patch, flexeril (new L1 compression fx, HA) on MR L-spine  - OOB to chair  - PT/OT/OOB     #HTN  - -140  - nimodipine   - Decreased IVF 0.9NS @ 50cc  - encourage PO intake  - continue metoprolol 25 mg BID    #Sinus tachycardia (at one point >150)  - Continue metoprolol 25 mg bid  - Started Multaq 400mg bid   - transfuse if H/H continues to decrease  -check d-dimer  - LE doppler    #SEEMA not on CPAP, ILD  she was tachypneic and wheezing 5/30, that improved after diuresis and blood transfusion.   - Aspiration precautions   - Prednisone 15mg qd for ILD; pulm following  - Incentive spirometry  - CXR   - Strict I/Os     #Anemia/Melena/hx recent UGIB 2/2 bleeding duodenal ulcer (recent [facility] admission for melena)  She had multiple bowl movements,   - Stopped bowl regime   - Started on pantoprazole infusion as per GI.   - Senna/Miralax  - Currently clear liquid diet as per GI   - track CBC  - lactulose for BM  - GI is considering EGD  - s/p 2 unit prbc. on 27 and 30 of May  - Lovenox 40 qd    - SCDs  - Hold Anagrelide for now (low Hb)  - Negative LE dopplers from 5/19  - track CBC    #Chronic thrombocytosis  -anagrelide on hold due to GIB/anemia    #hypothyroidism  - Synthroid PO 100mcg qD  - TSH 70, free t4 1.0; repeat in 2-4 weeks    Urinary retention  consider CIC    ?Metabolic acidosis w/ +AG ?diarrhea, starvation ketoacidosis  -check LA, ABG  -repeat labs  -give HCO3  -U/A  -d/c'ed all laxatives    Very high risk pt. Px is guarded    #Progress Note Handoff  Pending: Consults____Clinical improvement and stability__x___Tests___EGD_____PT____x____  Pt/Family discussion: Pt informed and agrees with the current plan  Disposition: Home______/SNF____?___/4A______/To be determined____x____    My note supersedes the residents note should a discrepancy arise.    Chart and notes personally reviewed.  Care Discussed with Consultants/Other Providers/ Housestaff [ x] YES [ ] NO   Radiology, labs, old records personally reviewed.    discussed w/ housestaff, nursing, case management, neuro team

## 2022-05-31 NOTE — PROGRESS NOTE ADULT - SUBJECTIVE AND OBJECTIVE BOX
Neuroendovascular Progress Note:     HPI:  Patient is a 76-year-old, Thai-speaking female who presented with AMS and worsened lower back pain who was found to have right frontoparietal SAH (HH1 mFS 1) possibly attributable to a 5mm,  right supraclinoid ICA saccular aneurysm found on CTA. Multifocal stenosis/spasm was also noted. Patient is now POD 8 s/p diagnostic cerebral angiogram + pipeline stent embolization of the right ICA aneurysm. Patient is on Lovenox 40 mg qd and Brilinta 90 mg bid. S/p 1 unit PRBC for hemoglobin 7.3  and yesterday for hemoglobin 7.6. GI following for melena. H/H today: 8.5/24.7. On exam today the patient is spontaneously awake and alert with no acute complaints.    Investigations   Laboratory Workup  - CBC:                        11.6   20.94 )-----------( 188      ( 18 May 2022 10:50 )             34.1     - Chemistry:      140  |  96<L>  |  48<H>  ----------------------------<  109<H>  4.3   |  24  |  2.1<H>    Ca    10.4<H>      18 May 2022 10:50    TPro  7.3  /  Alb  4.8  /  TBili  0.6  /  DBili  x   /  AST  36  /  ALT  27  /  AlkPhos  61      - Coagulation Studies:  PT/INR - ( 18 May 2022 10:50 )   PT: 12.30 sec;   INR: 1.07 ratio    PTT - ( 18 May 2022 10:50 )  PTT:24.8 sec    Microbiological Workup  Urinalysis Basic - ( 18 May 2022 14:07 )    Color: Yellow / Appearance: Clear / S.021 / pH: x  Gluc: x / Ketone: Trace  / Bili: Negative / Urobili: <2 mg/dL   Blood: x / Protein: 30 mg/dL / Nitrite: Negative   Leuk Esterase: Negative / RBC: 6 /HPF / WBC 4 /HPF   Sq Epi: x / Non Sq Epi: 1 /HPF / Bacteria: Negative    PMHx:  HTN (hypertension)  Hypothyroid  Depression  Interstitial lung disease  Essential thrombocytopenia    24-Hour Events: None    Medication:  albuterol/ipratropium for Nebulization. 3 milliLiter(s) Nebulizer once  chlorhexidine 4% Liquid 1 Application(s) Topical <User Schedule>  dronedarone 400 milliGRAM(s) Oral two times a day  enoxaparin Injectable 40 milliGRAM(s) SubCutaneous every 24 hours  lactulose Syrup      lactulose Syrup 20 Gram(s) Oral once  levothyroxine 100 MICROGram(s) Oral daily  lidocaine   4% Patch 1 Patch Transdermal daily  melatonin 5 milliGRAM(s) Oral at bedtime  metoprolol tartrate 25 milliGRAM(s) Oral every 12 hours  niMODipine Oral Solution 30 milliGRAM(s) Oral every 4 hours  pantoprazole Infusion 8 mG/Hr IV Continuous <Continuous>  polyethylene glycol 3350 17 Gram(s) Oral daily  predniSONE   Tablet 15 milliGRAM(s) Oral daily  senna 2 Tablet(s) Oral at bedtime  sodium bicarbonate 1300 milliGRAM(s) Oral every 6 hours  ticagrelor 90 milliGRAM(s) Oral two times a day    No Known Allergies    Recent Vitals:  T(F): 97.9 (22 @ 04:00), Max: 98.1 (22 @ 02:00)  HR: 92 (22 @ 12:00) (84 - 178)  BP: 107/64 (22 @ 12:00) (89/54 - 157/85)  RR: 20 (22 @ 12:00) (18 - 22)  SpO2: 100% (22 @ 12:00) (98% - 100%)    COVID-19 PCR: NotDetec (18 May 2022 10:10)    Recent Labs:                        8.5    25.20 )-----------( 275      ( 31 May 2022 11:49 )             24.7         141  |  111<H>  |  47<H>  ----------------------------<  97  3.5   |  12<L>  |  1.4    Ca    8.9      31 May 2022 11:49  Mg     1.9         TPro  5.8<L>  /  Alb  3.6  /  TBili  0.4  /  DBili  x   /  AST  16  /  ALT  15  /  AlkPhos  44  05-31    LIVER FUNCTIONS - ( 31 May 2022 11:49 )  Alb: 3.6 g/dL / Pro: 5.8 g/dL / ALK PHOS: 44 U/L / ALT: 15 U/L / AST: 16 U/L / GGT: x           Exam:  General: Lying in bed in NAD  Mental Status: AAO x3, attention and concentration intact, no dysarthria or aphasia.   CN: PERRL,  Motor: 5/5 b/l UE/LE,  strength 5/5. Normal bulk and tone. No abnormal movements.   Sensation: Intact to light touch.   Coordination: No dysmetria on finger-to-nose and heel-to-shin.   Gait:   NIHSS:     Radiology:   ACC: [id number] EXAM: MR ANGIO BRAIN  ACC: [id number] EXAM: MR ANGIO NECK IC  ACC: [id number] EXAM: MR BRAIN WAW IC    PROCEDURE DATE: 2022      IMPRESSION:  BRAIN  Numerous scattered punctate acute infarcts involving the bilateral cortical hemispheres.    Moderate chronic microvascular type changes as well as a chronic left frontal lobe infarct.    Scattered cortical susceptibility weighted artifact likely sequela of chronic hemosiderin deposition from remote subarachnoid hemorrhage. No evidence of acute hemorrhage.    BRAIN MRA  Since prior CTA of 2022 there has been interval placement of a flow diverting stent involving the right clinoid/supraclinoid ICA with residual flow related enhancement within the clinoid ICA aneurysm.    Multifocal scattered stenoses as detailed above.    NECK MRA  Motion limited examination.    Likely severe/critical bilateral proximal ICA stenosis though difficult to quantify due to motion limitation.    --- End of Report ---    Assessment:   76-year-old, Thai-speaking female found to have right frontoparietal SAH (HH1 mFS 1) possibly attributable to a 5mm,  right supraclinoid ICA saccular aneurysm. Multifocal stenosis/spasm was also noted. Patient is now POD 8 s/p diagnostic cerebral angiogram + pipeline stent embolization of the right ICA aneurysm. Patient is on Lovenox 40 mg qd and Brilinta 90 mg bid. S/p 1 unit PRBC for hemoglobin 7.3  and yesterday for hemoglobin 7.6. GI following for melena. H/H today: 8.5/24.7. On exam today the patient is spontaneously awake and alert with no acute complaints. Possible COVID exposure today.    Suggestions:  - Recommending repeat CTA in 3 weeks.  - From an endovascular perspective, the patient should remain on Brilinta due to recent pipeline stenting, although Aspirin may continue to be held.   - Management per stroke/medicine teams.  - [id number] Neuroendovascular Progress Note:     HPI:  Patient is a 76-year-old, Congolese-speaking female who presented with AMS and worsened lower back pain who was found to have right frontoparietal SAH (HH1 mFS 1) possibly attributable to a 5mm,  right supraclinoid ICA saccular aneurysm found on CTA. Multifocal stenosis/spasm was also noted. Patient is now POD 8 s/p diagnostic cerebral angiogram + pipeline stent embolization of the right ICA aneurysm. Patient is on Lovenox 40 mg qd and Brilinta 90 mg bid. S/p 1 unit PRBC for hemoglobin 7.3  and yesterday for hemoglobin 7.6. GI following for melena. H/H today: 8.5/24.7. On exam today the patient is spontaneously awake and alert with no acute complaints.  # [id number] used for this exam. Patient speaks English, but Congolese is patient's first/preferred language.    Investigations   Laboratory Workup  - CBC:                        11.6   20.94 )-----------( 188      ( 18 May 2022 10:50 )             34.1     - Chemistry:      140  |  96<L>  |  48<H>  ----------------------------<  109<H>  4.3   |  24  |  2.1<H>    Ca    10.4<H>      18 May 2022 10:50    TPro  7.3  /  Alb  4.8  /  TBili  0.6  /  DBili  x   /  AST  36  /  ALT  27  /  AlkPhos  61      - Coagulation Studies:  PT/INR - ( 18 May 2022 10:50 )   PT: 12.30 sec;   INR: 1.07 ratio    PTT - ( 18 May 2022 10:50 )  PTT:24.8 sec    Microbiological Workup  Urinalysis Basic - ( 18 May 2022 14:07 )    Color: Yellow / Appearance: Clear / S.021 / pH: x  Gluc: x / Ketone: Trace  / Bili: Negative / Urobili: <2 mg/dL   Blood: x / Protein: 30 mg/dL / Nitrite: Negative   Leuk Esterase: Negative / RBC: 6 /HPF / WBC 4 /HPF   Sq Epi: x / Non Sq Epi: 1 /HPF / Bacteria: Negative    PMHx:  HTN (hypertension)  Hypothyroid  Depression  Interstitial lung disease  Essential thrombocytopenia    24-Hour Events: None    Medication:  albuterol/ipratropium for Nebulization. 3 milliLiter(s) Nebulizer once  chlorhexidine 4% Liquid 1 Application(s) Topical <User Schedule>  dronedarone 400 milliGRAM(s) Oral two times a day  enoxaparin Injectable 40 milliGRAM(s) SubCutaneous every 24 hours  lactulose Syrup      lactulose Syrup 20 Gram(s) Oral once  levothyroxine 100 MICROGram(s) Oral daily  lidocaine   4% Patch 1 Patch Transdermal daily  melatonin 5 milliGRAM(s) Oral at bedtime  metoprolol tartrate 25 milliGRAM(s) Oral every 12 hours  niMODipine Oral Solution 30 milliGRAM(s) Oral every 4 hours  pantoprazole Infusion 8 mG/Hr IV Continuous <Continuous>  polyethylene glycol 3350 17 Gram(s) Oral daily  predniSONE   Tablet 15 milliGRAM(s) Oral daily  senna 2 Tablet(s) Oral at bedtime  sodium bicarbonate 1300 milliGRAM(s) Oral every 6 hours  ticagrelor 90 milliGRAM(s) Oral two times a day    No Known Allergies    Recent Vitals:  T(F): 97.9 (22 @ 04:00), Max: 98.1 (22 @ 02:00)  HR: 92 (22 @ 12:00) (84 - 178)  BP: 107/64 (22 @ 12:00) (89/54 - 157/85)  RR: 20 (22 @ 12:00) (18 - 22)  SpO2: 100% (22 @ 12:00) (98% - 100%)    COVID-19 PCR: NotDetec (18 May 2022 10:10)    Recent Labs:                        8.5    25.20 )-----------( 275      ( 31 May 2022 11:49 )             24.7         141  |  111<H>  |  47<H>  ----------------------------<  97  3.5   |  12<L>  |  1.4    Ca    8.9      31 May 2022 11:49  Mg     1.9         TPro  5.8<L>  /  Alb  3.6  /  TBili  0.4  /  DBili  x   /  AST  16  /  ALT  15  /  AlkPhos  44      LIVER FUNCTIONS - ( 31 May 2022 11:49 )  Alb: 3.6 g/dL / Pro: 5.8 g/dL / ALK PHOS: 44 U/L / ALT: 15 U/L / AST: 16 U/L / GGT: x           Exam:  General: Lying in bed in NAD  Mental Status: AAO x2-3 (to self, place, and year--not to month), attention and concentration intact, no dysarthria or aphasia.   CN: PERRL, EOMI, face symmetrical, tongue midline.  Motor: 5/5 b/l UE/LE,  strength 5/5. Normal bulk and tone. No abnormal movements.   Sensation: Intact to light touch.   Coordination: No dysmetria on finger-to-nose and heel-to-shin.   Gait:   NIHSS:     Radiology:   ACC: [id number] EXAM: MR ANGIO BRAIN  ACC: [id number] EXAM: MR ANGIO NECK IC  ACC: [id number] EXAM: MR BRAIN WAW IC    PROCEDURE DATE: 2022      IMPRESSION:  BRAIN  Numerous scattered punctate acute infarcts involving the bilateral cortical hemispheres.    Moderate chronic microvascular type changes as well as a chronic left frontal lobe infarct.    Scattered cortical susceptibility weighted artifact likely sequela of chronic hemosiderin deposition from remote subarachnoid hemorrhage. No evidence of acute hemorrhage.    BRAIN MRA  Since prior CTA of 2022 there has been interval placement of a flow diverting stent involving the right clinoid/supraclinoid ICA with residual flow related enhancement within the clinoid ICA aneurysm.    Multifocal scattered stenoses as detailed above.    NECK MRA  Motion limited examination.    Likely severe/critical bilateral proximal ICA stenosis though difficult to quantify due to motion limitation.    --- End of Report ---    Assessment:   76-year-old, Congolese-speaking female found to have right frontoparietal SAH (HH1 mFS 1) possibly attributable to a 5mm,  right supraclinoid ICA saccular aneurysm. Multifocal stenosis/spasm was also noted. Patient is now POD 8 s/p diagnostic cerebral angiogram + pipeline stent embolization of the right ICA aneurysm. Patient is on Lovenox 40 mg qd and Brilinta 90 mg bid. S/p 1 unit PRBC for hemoglobin 7.3  and yesterday for hemoglobin 7.6. GI following for melena. H/H today: 8.5/24.7. On exam today the patient is spontaneously awake and alert with no acute complaints. Possible COVID exposure today.    Suggestions:  - Recommending repeat CTA in 3 weeks.  - From an endovascular perspective, the patient should remain on Brilinta due to recent pipeline stenting, although Aspirin may continue to be held.   - Management per stroke/medicine teams.  - [id number] Neuroendovascular Progress Note:     HPI:  Patient is a 76-year-old, Dutch-speaking female who presented with AMS and worsened lower back pain who was found to have right frontoparietal SAH (HH1 mFS 1) possibly attributable to a 5mm,  right supraclinoid ICA saccular aneurysm found on CTA. Multifocal stenosis/spasm was also noted. Patient is now POD 8 s/p diagnostic cerebral angiogram + pipeline stent embolization of the right ICA aneurysm. Patient is on Lovenox 40 mg qd and Brilinta 90 mg bid. S/p 1 unit PRBC for hemoglobin 7.3  and yesterday for hemoglobin 7.6. GI following for melena. H/H today: 8.5/24.7. On exam today the patient is spontaneously awake and alert with no acute complaints.  # [id number] used for this exam. Patient speaks English, but Dutch is patient's first/preferred language.    Investigations   Laboratory Workup  - CBC:                        11.6   20.94 )-----------( 188      ( 18 May 2022 10:50 )             34.1     - Chemistry:      140  |  96<L>  |  48<H>  ----------------------------<  109<H>  4.3   |  24  |  2.1<H>    Ca    10.4<H>      18 May 2022 10:50    TPro  7.3  /  Alb  4.8  /  TBili  0.6  /  DBili  x   /  AST  36  /  ALT  27  /  AlkPhos  61      - Coagulation Studies:  PT/INR - ( 18 May 2022 10:50 )   PT: 12.30 sec;   INR: 1.07 ratio    PTT - ( 18 May 2022 10:50 )  PTT:24.8 sec    Microbiological Workup  Urinalysis Basic - ( 18 May 2022 14:07 )    Color: Yellow / Appearance: Clear / S.021 / pH: x  Gluc: x / Ketone: Trace  / Bili: Negative / Urobili: <2 mg/dL   Blood: x / Protein: 30 mg/dL / Nitrite: Negative   Leuk Esterase: Negative / RBC: 6 /HPF / WBC 4 /HPF   Sq Epi: x / Non Sq Epi: 1 /HPF / Bacteria: Negative    PMHx:  HTN (hypertension)  Hypothyroid  Depression  Interstitial lung disease  Essential thrombocytopenia    24-Hour Events: None    Medication:  albuterol/ipratropium for Nebulization. 3 milliLiter(s) Nebulizer once  chlorhexidine 4% Liquid 1 Application(s) Topical <User Schedule>  dronedarone 400 milliGRAM(s) Oral two times a day  enoxaparin Injectable 40 milliGRAM(s) SubCutaneous every 24 hours  lactulose Syrup      lactulose Syrup 20 Gram(s) Oral once  levothyroxine 100 MICROGram(s) Oral daily  lidocaine   4% Patch 1 Patch Transdermal daily  melatonin 5 milliGRAM(s) Oral at bedtime  metoprolol tartrate 25 milliGRAM(s) Oral every 12 hours  niMODipine Oral Solution 30 milliGRAM(s) Oral every 4 hours  pantoprazole Infusion 8 mG/Hr IV Continuous <Continuous>  polyethylene glycol 3350 17 Gram(s) Oral daily  predniSONE   Tablet 15 milliGRAM(s) Oral daily  senna 2 Tablet(s) Oral at bedtime  sodium bicarbonate 1300 milliGRAM(s) Oral every 6 hours  ticagrelor 90 milliGRAM(s) Oral two times a day    No Known Allergies    Recent Vitals:  T(F): 97.9 (22 @ 04:00), Max: 98.1 (22 @ 02:00)  HR: 92 (22 @ 12:00) (84 - 178)  BP: 107/64 (22 @ 12:00) (89/54 - 157/85)  RR: 20 (22 @ 12:00) (18 - 22)  SpO2: 100% (22 @ 12:00) (98% - 100%)    COVID-19 PCR: NotDetec (18 May 2022 10:10)    Recent Labs:                        8.5    25.20 )-----------( 275      ( 31 May 2022 11:49 )             24.7         141  |  111<H>  |  47<H>  ----------------------------<  97  3.5   |  12<L>  |  1.4    Ca    8.9      31 May 2022 11:49  Mg     1.9         TPro  5.8<L>  /  Alb  3.6  /  TBili  0.4  /  DBili  x   /  AST  16  /  ALT  15  /  AlkPhos  44      LIVER FUNCTIONS - ( 31 May 2022 11:49 )  Alb: 3.6 g/dL / Pro: 5.8 g/dL / ALK PHOS: 44 U/L / ALT: 15 U/L / AST: 16 U/L / GGT: x           Exam:  General: Lying in bed in NAD  Mental Status: AAO x2-3 (to self, place, and year--not to month), attention and concentration intact, no dysarthria or aphasia.   CN: PERRL, EOMI, face symmetrical, tongue midline.  Motor: B/l UE LE AG.  Sensation: Intact to light touch throughout.   Coordination: No dysmetria on finger-to-nose.    Radiology:   ACC: [id number] EXAM: MR ANGIO BRAIN  ACC: [id number] EXAM: MR ANGIO NECK IC  ACC: [id number] EXAM: MR BRAIN WAW IC    PROCEDURE DATE: 2022      IMPRESSION:  BRAIN  Numerous scattered punctate acute infarcts involving the bilateral cortical hemispheres.    Moderate chronic microvascular type changes as well as a chronic left frontal lobe infarct.    Scattered cortical susceptibility weighted artifact likely sequela of chronic hemosiderin deposition from remote subarachnoid hemorrhage. No evidence of acute hemorrhage.    BRAIN MRA  Since prior CTA of 2022 there has been interval placement of a flow diverting stent involving the right clinoid/supraclinoid ICA with residual flow related enhancement within the clinoid ICA aneurysm.    Multifocal scattered stenoses as detailed above.    NECK MRA  Motion limited examination.    Likely severe/critical bilateral proximal ICA stenosis though difficult to quantify due to motion limitation.    --- End of Report ---    Assessment:   76-year-old, Dutch-speaking female found to have right frontoparietal SAH (HH1 mFS 1) possibly attributable to a 5mm,  right supraclinoid ICA saccular aneurysm. Multifocal stenosis/spasm was also noted. Patient is now POD 8 s/p diagnostic cerebral angiogram + pipeline stent embolization of the right ICA aneurysm. Patient is on Lovenox 40 mg qd and Brilinta 90 mg bid. S/p 1 unit PRBC for hemoglobin 7.3  and yesterday for hemoglobin 7.6. GI following for melena. H/H today: 8.5/24.7. On exam today the patient is spontaneously awake and alert with no acute complaints. Possible COVID exposure today.    Suggestions:  - Recommending repeat CTA in 3 weeks.  - From an endovascular perspective, the patient should remain on Brilinta due to recent pipeline stenting, although Aspirin may continue to be held.   - Management per stroke/medicine teams.  - [id number]

## 2022-06-01 LAB
ALBUMIN SERPL ELPH-MCNC: 3.5 G/DL — SIGNIFICANT CHANGE UP (ref 3.5–5.2)
ALP SERPL-CCNC: 51 U/L — SIGNIFICANT CHANGE UP (ref 30–115)
ALT FLD-CCNC: 16 U/L — SIGNIFICANT CHANGE UP (ref 0–41)
ANION GAP SERPL CALC-SCNC: 18 MMOL/L — HIGH (ref 7–14)
AST SERPL-CCNC: 20 U/L — SIGNIFICANT CHANGE UP (ref 0–41)
BILIRUB SERPL-MCNC: 0.4 MG/DL — SIGNIFICANT CHANGE UP (ref 0.2–1.2)
BLD GP AB SCN SERPL QL: SIGNIFICANT CHANGE UP
BUN SERPL-MCNC: 34 MG/DL — HIGH (ref 10–20)
CALCIUM SERPL-MCNC: 8.8 MG/DL — SIGNIFICANT CHANGE UP (ref 8.5–10.1)
CHLORIDE SERPL-SCNC: 108 MMOL/L — SIGNIFICANT CHANGE UP (ref 98–110)
CO2 SERPL-SCNC: 15 MMOL/L — LOW (ref 17–32)
CREAT SERPL-MCNC: 1.3 MG/DL — SIGNIFICANT CHANGE UP (ref 0.7–1.5)
D DIMER BLD IA.RAPID-MCNC: 1646 NG/ML DDU — HIGH (ref 0–230)
EGFR: 42 ML/MIN/1.73M2 — LOW
GLUCOSE SERPL-MCNC: 82 MG/DL — SIGNIFICANT CHANGE UP (ref 70–99)
HCT VFR BLD CALC: 22.4 % — LOW (ref 37–47)
HCT VFR BLD CALC: 23.1 % — LOW (ref 37–47)
HGB BLD-MCNC: 7.8 G/DL — LOW (ref 12–16)
HGB BLD-MCNC: 8 G/DL — LOW (ref 12–16)
MCHC RBC-ENTMCNC: 31.6 PG — HIGH (ref 27–31)
MCHC RBC-ENTMCNC: 31.7 PG — HIGH (ref 27–31)
MCHC RBC-ENTMCNC: 34.6 G/DL — SIGNIFICANT CHANGE UP (ref 32–37)
MCHC RBC-ENTMCNC: 34.8 G/DL — SIGNIFICANT CHANGE UP (ref 32–37)
MCV RBC AUTO: 91.1 FL — SIGNIFICANT CHANGE UP (ref 81–99)
MCV RBC AUTO: 91.3 FL — SIGNIFICANT CHANGE UP (ref 81–99)
NRBC # BLD: 0 /100 WBCS — SIGNIFICANT CHANGE UP (ref 0–0)
NRBC # BLD: 0 /100 WBCS — SIGNIFICANT CHANGE UP (ref 0–0)
PLATELET # BLD AUTO: 213 K/UL — SIGNIFICANT CHANGE UP (ref 130–400)
PLATELET # BLD AUTO: 251 K/UL — SIGNIFICANT CHANGE UP (ref 130–400)
POTASSIUM SERPL-MCNC: 3.3 MMOL/L — LOW (ref 3.5–5)
POTASSIUM SERPL-SCNC: 3.3 MMOL/L — LOW (ref 3.5–5)
PROT SERPL-MCNC: 5.9 G/DL — LOW (ref 6–8)
RBC # BLD: 2.46 M/UL — LOW (ref 4.2–5.4)
RBC # BLD: 2.53 M/UL — LOW (ref 4.2–5.4)
RBC # FLD: 17.8 % — HIGH (ref 11.5–14.5)
RBC # FLD: 18.4 % — HIGH (ref 11.5–14.5)
SODIUM SERPL-SCNC: 141 MMOL/L — SIGNIFICANT CHANGE UP (ref 135–146)
WBC # BLD: 25.18 K/UL — HIGH (ref 4.8–10.8)
WBC # BLD: 28.36 K/UL — HIGH (ref 4.8–10.8)
WBC # FLD AUTO: 25.18 K/UL — HIGH (ref 4.8–10.8)
WBC # FLD AUTO: 28.36 K/UL — HIGH (ref 4.8–10.8)

## 2022-06-01 PROCEDURE — 99233 SBSQ HOSP IP/OBS HIGH 50: CPT

## 2022-06-01 PROCEDURE — 99232 SBSQ HOSP IP/OBS MODERATE 35: CPT

## 2022-06-01 PROCEDURE — 93970 EXTREMITY STUDY: CPT | Mod: 26

## 2022-06-01 RX ORDER — POTASSIUM CHLORIDE 20 MEQ
40 PACKET (EA) ORAL ONCE
Refills: 0 | Status: COMPLETED | OUTPATIENT
Start: 2022-06-01 | End: 2022-06-01

## 2022-06-01 RX ADMIN — Medication 100 MICROGRAM(S): at 05:19

## 2022-06-01 RX ADMIN — LIDOCAINE 1 PATCH: 4 CREAM TOPICAL at 23:58

## 2022-06-01 RX ADMIN — NIMODIPINE 30 MILLIGRAM(S): 60 SOLUTION ORAL at 22:21

## 2022-06-01 RX ADMIN — Medication 5 MILLIGRAM(S): at 22:20

## 2022-06-01 RX ADMIN — TICAGRELOR 90 MILLIGRAM(S): 90 TABLET ORAL at 17:07

## 2022-06-01 RX ADMIN — DRONEDARONE 400 MILLIGRAM(S): 400 TABLET, FILM COATED ORAL at 05:19

## 2022-06-01 RX ADMIN — Medication 1300 MILLIGRAM(S): at 05:19

## 2022-06-01 RX ADMIN — LIDOCAINE 1 PATCH: 4 CREAM TOPICAL at 19:20

## 2022-06-01 RX ADMIN — DRONEDARONE 400 MILLIGRAM(S): 400 TABLET, FILM COATED ORAL at 17:07

## 2022-06-01 RX ADMIN — CHLORHEXIDINE GLUCONATE 1 APPLICATION(S): 213 SOLUTION TOPICAL at 05:18

## 2022-06-01 RX ADMIN — NIMODIPINE 30 MILLIGRAM(S): 60 SOLUTION ORAL at 13:02

## 2022-06-01 RX ADMIN — NIMODIPINE 30 MILLIGRAM(S): 60 SOLUTION ORAL at 09:24

## 2022-06-01 RX ADMIN — NIMODIPINE 30 MILLIGRAM(S): 60 SOLUTION ORAL at 17:06

## 2022-06-01 RX ADMIN — PANTOPRAZOLE SODIUM 10 MG/HR: 20 TABLET, DELAYED RELEASE ORAL at 15:56

## 2022-06-01 RX ADMIN — Medication 1300 MILLIGRAM(S): at 17:07

## 2022-06-01 RX ADMIN — Medication 15 MILLIGRAM(S): at 05:19

## 2022-06-01 RX ADMIN — Medication 40 MILLIEQUIVALENT(S): at 22:20

## 2022-06-01 RX ADMIN — Medication 25 MILLIGRAM(S): at 17:07

## 2022-06-01 RX ADMIN — Medication 650 MILLIGRAM(S): at 13:33

## 2022-06-01 RX ADMIN — PANTOPRAZOLE SODIUM 10 MG/HR: 20 TABLET, DELAYED RELEASE ORAL at 06:00

## 2022-06-01 RX ADMIN — Medication 25 MILLIGRAM(S): at 05:19

## 2022-06-01 RX ADMIN — NIMODIPINE 30 MILLIGRAM(S): 60 SOLUTION ORAL at 05:18

## 2022-06-01 RX ADMIN — NIMODIPINE 30 MILLIGRAM(S): 60 SOLUTION ORAL at 02:07

## 2022-06-01 RX ADMIN — TICAGRELOR 90 MILLIGRAM(S): 90 TABLET ORAL at 08:03

## 2022-06-01 NOTE — PROGRESS NOTE ADULT - ASSESSMENT
`Ms. Shook is a 76 year old female patient, Primarily Irish speaking, known to have, Baseline AO3, lives with niece, ambulates with a walker, SEEMA and ILD (Prednisone 20mg QD), Depression, HTN, Hypothyroidism, Essential Thrombocytosis. Follows with Dr Denise, Chronic Back Pain for years secondary to Spinal Stenosis per niece, s/p Spinal Epidural Injection and Recent admission for a bleeding duodenal ulcer at [facility] (melena), Status post coil embolization in the region of the gastroduodenal artery, was on Protonix 20mg QD at home  patient was brought to the ED by marquise on 05/18 for evaluation of worsening back pain and confusion x2 days   CTH on admission with new right sided scattered SAH, CTA with aneurysm. Patient is post 5/23 cerebral angiogram + flow diversion stent embolization of right ICA supraclinoid aneurysm    *Upper GI bleeding  -hx of duodenal ulcer s/p EGD and coil embolization in the region of the gastroduodenal artery at [facility]  -currently HD stable   -hb drop from 11-12 > 7.6 s/p 1 unit 5/30 8.5 > 8   -continue to have melena  -BP stable, intermittently tachycardic at night       Comorbidities:   right ICA supraclinoid aneurysm s/p stent embolization on ASA last dose 5/29 and Brilinta   currently on Brilinta BID and lovenox 40 qd ppx   ILD on prednisone  acidosis with bicarb of 9  leukocytosis  COVID-19 exposure    Recommendations  -continue PPI drip  -check repeat CBC at 4 pm if hb stable can have full liquid diet   -cardiology and pulmonary risk stratification and optimization   -2 large bore IV  -type and screen  -holding on endoscopic procedure since HD stable and in the setting of COVID exposure with her interstitial lung disease   -if hematemesis / hypotension call GI fellow STAT  -discussed with marquise Platt    -for questions text me on  teams, call [phone] on weekdays before 5pm or call GI service at [phone]  after 5 pm and on weekends

## 2022-06-01 NOTE — PROGRESS NOTE ADULT - SUBJECTIVE AND OBJECTIVE BOX
Neuroendovascular Progress Note:     Interval History:   Patient is a 76-year-old, Ecuadorean-speaking female who presented with AMS and worsened lower back pain who was found to have right frontoparietal SAH (HH1 mFS 1) possibly attributable to a 5mm,  right supraclinoid ICA saccular aneurysm found on CTA. Multifocal stenosis/spasm was also noted. Patient is now POD 8 s/p diagnostic cerebral angiogram + pipeline stent embolization of the right ICA aneurysm. Patient is on Lovenox 40 mg qd and Brilinta 90 mg bid. S/p 1 unit PRBC for hemoglobin 7.3  and yesterday for hemoglobin 7.6. GI following for melena. H/H today: 8.5/24.7. On exam today the patient is spontaneously awake and alert with no acute complaints. Patient declined a  for this exam.     Investigations   Laboratory Workup  - CBC:                        11.6   20.94 )-----------( 188      ( 18 May 2022 10:50 )             34.1     - Chemistry:      140  |  96<L>  |  48<H>  ----------------------------<  109<H>  4.3   |  24  |  2.1<H>    Ca    10.4<H>      18 May 2022 10:50    TPro  7.3  /  Alb  4.8  /  TBili  0.6  /  DBili  x   /  AST  36  /  ALT  27  /  AlkPhos  61      - Coagulation Studies:  PT/INR - ( 18 May 2022 10:50 )   PT: 12.30 sec;   INR: 1.07 ratio    PTT - ( 18 May 2022 10:50 )  PTT:24.8 sec    Microbiological Workup  Urinalysis Basic - ( 18 May 2022 14:07 )    Color: Yellow / Appearance: Clear / S.021 / pH: x  Gluc: x / Ketone: Trace  / Bili: Negative / Urobili: <2 mg/dL   Blood: x / Protein: 30 mg/dL / Nitrite: Negative   Leuk Esterase: Negative / RBC: 6 /HPF / WBC 4 /HPF   Sq Epi: x / Non Sq Epi: 1 /HPF / Bacteria: Negative    PMHx:  HTN (hypertension)  Hypothyroi  Depression  Interstitial lung disease  Essential thrombocytopenia    24-Hour Events: None    Medication:  albuterol/ipratropium for Nebulization. 3 milliLiter(s) Nebulizer once  chlorhexidine 4% Liquid 1 Application(s) Topical <User Schedule>  dronedarone 400 milliGRAM(s) Oral two times a day  enoxaparin Injectable 40 milliGRAM(s) SubCutaneous every 24 hours  levothyroxine 100 MICROGram(s) Oral daily  lidocaine   4% Patch 1 Patch Transdermal daily  melatonin 5 milliGRAM(s) Oral at bedtime  metoprolol tartrate 25 milliGRAM(s) Oral every 12 hours  niMODipine Oral Solution 30 milliGRAM(s) Oral every 4 hours  pantoprazole Infusion 8 mG/Hr IV Continuous <Continuous>  predniSONE   Tablet 15 milliGRAM(s) Oral daily  sodium bicarbonate 1300 milliGRAM(s) Oral every 6 hours  ticagrelor 90 milliGRAM(s) Oral two times a day    No Known Allergies    Recent Vitals:  T(F): 97.1 (22 @ 09:25), Max: 97.8 (22 @ 19:42)  HR: 82 (22 @ 09:25) (78 - 106)  BP: 164/61 (22 @ 09:25) (114/60 - 164/61)  RR: 18 (22 @ 09:25) (18 - 20)  SpO2: 96% (22 @ 09:25) (95% - 99%)    COVID-19 PCR: NotDetec (31 May 2022 13:34)  COVID-19 PCR: NotDetec (18 May 2022 10:10)    Recent Labs:                        8.0    25.18 )-----------( 251      ( 2022 06:56 )             23.1     06    141  |  108  |  34<H>  ----------------------------<  82  3.3<L>   |  15<L>  |  1.3    Ca    8.8      2022 06:56  Mg     1.9         TPro  5.9<L>  /  Alb  3.5  /  TBili  0.4  /  DBili  x   /  AST  20  /  ALT  16  /  AlkPhos  51  06-    LIVER FUNCTIONS - ( 2022 06:56 )  Alb: 3.5 g/dL / Pro: 5.9 g/dL / ALK PHOS: 51 U/L / ALT: 16 U/L / AST: 20 U/L / GGT: x           Exam:  General: NAD; reporting generalized weakness and fatigue.  Mental Status: AAO x 2-3 (initially said year was 192 but corrected herself). Naming, repetition, and comprehension intact. No dysarthria or aphasia apparent (patient has limited proficiency in English--object recognition was deferred for this exam).  CN: PERRL, full visual fields, no nystagmus, tongue midline.   Motor: B/l UE/LE no drift.   Sensation: Intact to light touch throughout.  Coordination: No dysmetria on finger-to-nose.    Radiology:     ACC: [id number] EXAM: MR ANGIO BRAIN  ACC: [id number] EXAM: MR ANGIO NECK IC  ACC: [id number] EXAM: MR BRAIN WAW IC    PROCEDURE DATE: 2022      IMPRESSION:  BRAIN  Numerous scattered punctate acute infarcts involving the bilateral cortical hemispheres.    Moderate chronic microvascular type changes as well as a chronic left frontal lobe infarct.    Scattered cortical susceptibility weighted artifact likely sequela of chronic hemosiderin deposition from remote subarachnoid hemorrhage. No evidence of acute hemorrhage.    BRAIN MRA  Since prior CTA of 2022 there has been interval placement of a flow diverting stent involving the right clinoid/supraclinoid ICA with residual flow related enhancement within the clinoid ICA aneurysm.    Multifocal scattered stenoses as detailed above.    NECK MRA  Motion limited examination.    Likely severe/critical bilateral proximal ICA stenosis though difficult to quantify due to motion limitation.    --- End of Report ---    Assessment:   76-year-old, Ecuadorean-speaking female found to have right frontoparietal SAH (HH1 mFS 1) possibly attributable to a 5mm,  right supraclinoid ICA saccular aneurysm. Multifocal stenosis/spasm was also noted. Patient is now POD 8 s/p diagnostic cerebral angiogram + pipeline stent embolization of the right ICA aneurysm. Patient is on Lovenox 40 mg qd and Brilinta 90 mg bid. S/p 1 unit PRBC for hemoglobin 7.3  and  for hemoglobin 7.6. GI following for melena. H/H today: .1. On exam today the patient is spontaneously awake and alert, reporting some generalized weakness and fatigue.    Suggestions:   - Will f/u repeat CTA in 3 weeks.  - From an endovascular perspective, the patient should remain on Brilinta due to recent pipeline stenting, although Aspirin may continue to be held.  - Management per stroke/medicine teams.  - [phone]     Neuroendovascular Progress Note:     Interval History:   Patient is a 76-year-old, Kyrgyz-speaking female who presented with AMS and worsened lower back pain who was found to have right frontoparietal SAH (HH1 mFS 1) possibly attributable to a 5mm,  right supraclinoid ICA saccular aneurysm found on CTA. Multifocal stenosis/spasm was also noted. Patient is now s/p diagnostic cerebral angiogram + pipeline stent embolization of the right ICA aneurysm. Patient is on Lovenox 40 mg qd and Brilinta 90 mg bid. S/p 1 unit PRBC for hemoglobin 7.3  and yesterday for hemoglobin 7.6. GI following for melena. H/H today: 8.5/24.7. On exam today the patient is spontaneously awake and alert with no acute complaints. Patient declined a  for this exam.     Investigations   Laboratory Workup  - CBC:                        11.6   20.94 )-----------( 188      ( 18 May 2022 10:50 )             34.1     - Chemistry:      140  |  96<L>  |  48<H>  ----------------------------<  109<H>  4.3   |  24  |  2.1<H>    Ca    10.4<H>      18 May 2022 10:50    TPro  7.3  /  Alb  4.8  /  TBili  0.6  /  DBili  x   /  AST  36  /  ALT  27  /  AlkPhos  61      - Coagulation Studies:  PT/INR - ( 18 May 2022 10:50 )   PT: 12.30 sec;   INR: 1.07 ratio    PTT - ( 18 May 2022 10:50 )  PTT:24.8 sec    Microbiological Workup  Urinalysis Basic - ( 18 May 2022 14:07 )    Color: Yellow / Appearance: Clear / S.021 / pH: x  Gluc: x / Ketone: Trace  / Bili: Negative / Urobili: <2 mg/dL   Blood: x / Protein: 30 mg/dL / Nitrite: Negative   Leuk Esterase: Negative / RBC: 6 /HPF / WBC 4 /HPF   Sq Epi: x / Non Sq Epi: 1 /HPF / Bacteria: Negative    PMHx:  HTN (hypertension)  Hypothyroi  Depression  Interstitial lung disease  Essential thrombocytopenia    24-Hour Events: None    Medication:  albuterol/ipratropium for Nebulization. 3 milliLiter(s) Nebulizer once  chlorhexidine 4% Liquid 1 Application(s) Topical <User Schedule>  dronedarone 400 milliGRAM(s) Oral two times a day  enoxaparin Injectable 40 milliGRAM(s) SubCutaneous every 24 hours  levothyroxine 100 MICROGram(s) Oral daily  lidocaine   4% Patch 1 Patch Transdermal daily  melatonin 5 milliGRAM(s) Oral at bedtime  metoprolol tartrate 25 milliGRAM(s) Oral every 12 hours  niMODipine Oral Solution 30 milliGRAM(s) Oral every 4 hours  pantoprazole Infusion 8 mG/Hr IV Continuous <Continuous>  predniSONE   Tablet 15 milliGRAM(s) Oral daily  sodium bicarbonate 1300 milliGRAM(s) Oral every 6 hours  ticagrelor 90 milliGRAM(s) Oral two times a day    No Known Allergies    Recent Vitals:  T(F): 97.1 (22 @ 09:25), Max: 97.8 (22 @ 19:42)  HR: 82 (22 @ 09:25) (78 - 106)  BP: 164/61 (22 @ 09:25) (114/60 - 164/61)  RR: 18 (22 @ 09:25) (18 - 20)  SpO2: 96% (22 @ 09:25) (95% - 99%)    COVID-19 PCR: NotDetec (31 May 2022 13:34)  COVID-19 PCR: NotDetec (18 May 2022 10:10)    Recent Labs:                        8.0    25.18 )-----------( 251      ( 2022 06:56 )             23.1         141  |  108  |  34<H>  ----------------------------<  82  3.3<L>   |  15<L>  |  1.3    Ca    8.8      2022 06:56  Mg     1.9         TPro  5.9<L>  /  Alb  3.5  /  TBili  0.4  /  DBili  x   /  AST  20  /  ALT  16  /  AlkPhos  51  06    LIVER FUNCTIONS - ( 2022 06:56 )  Alb: 3.5 g/dL / Pro: 5.9 g/dL / ALK PHOS: 51 U/L / ALT: 16 U/L / AST: 20 U/L / GGT: x           Exam:  General: NAD; reporting generalized weakness and fatigue.  Mental Status: AAO x 2-3 (initially said year was 192 but corrected herself). Naming, repetition, and comprehension intact. No dysarthria or aphasia apparent (patient has limited proficiency in English--object recognition was deferred for this exam).  CN: PERRL, full visual fields, no nystagmus, tongue midline.   Motor: B/l UE/LE no drift.   Sensation: Intact to light touch throughout.  Coordination: No dysmetria on finger-to-nose.    Radiology:     ACC: [id number] EXAM: MR ANGIO BRAIN  ACC: [id number] EXAM: MR ANGIO NECK IC  ACC: [id number] EXAM: MR BRAIN WAW IC    PROCEDURE DATE: 2022      IMPRESSION:  BRAIN  Numerous scattered punctate acute infarcts involving the bilateral cortical hemispheres.    Moderate chronic microvascular type changes as well as a chronic left frontal lobe infarct.    Scattered cortical susceptibility weighted artifact likely sequela of chronic hemosiderin deposition from remote subarachnoid hemorrhage. No evidence of acute hemorrhage.    BRAIN MRA  Since prior CTA of 2022 there has been interval placement of a flow diverting stent involving the right clinoid/supraclinoid ICA with residual flow related enhancement within the clinoid ICA aneurysm.    Multifocal scattered stenoses as detailed above.    NECK MRA  Motion limited examination.    Likely severe/critical bilateral proximal ICA stenosis though difficult to quantify due to motion limitation.    --- End of Report ---    Assessment:   76-year-old, Kyrgyz-speaking female found to have right frontoparietal SAH (HH1 mFS 1) possibly attributable to a 5mm,  right supraclinoid ICA saccular aneurysm. Multifocal stenosis/spasm was also noted. Patient is now s/p diagnostic cerebral angiogram + pipeline stent embolization of the right ICA aneurysm. Patient is on Lovenox 40 mg qd and Brilinta 90 mg bid. S/p 1 unit PRBC for hemoglobin 7.3  and  for hemoglobin 7.6. GI following for melena. H/H today: .1. On exam today the patient is spontaneously awake and alert, reporting some generalized weakness and fatigue.    Suggestions:   - Will f/u repeat CTA in 3 weeks.  - From an endovascular perspective, the patient should remain on Brilinta due to recent pipeline stenting, although Aspirin may continue to be held.  - Management per stroke/medicine teams.  - [phone]     Neuroendovascular Progress Note:     Interval History:   Patient is a 76-year-old, Danish-speaking female who presented with AMS and worsened lower back pain who was found to have right frontoparietal SAH (HH1 mFS 1) possibly attributable to a 5mm,  right supraclinoid ICA saccular aneurysm found on CTA. Multifocal stenosis/spasm was also noted. Patient is now s/p diagnostic cerebral angiogram + pipeline stent embolization of the right ICA aneurysm. Patient is on Lovenox 40 mg qd and Brilinta 90 mg bid. S/p 1 unit PRBC for hemoglobin 7.3  and yesterday for hemoglobin 7.6. GI following for melena. H/H today: 8.5/24.7. On exam today the patient is spontaneously awake and alert with no acute complaints. Patient declined a  for this exam.     Investigations   Laboratory Workup  - CBC:                        11.6   20.94 )-----------( 188      ( 18 May 2022 10:50 )             34.1     - Chemistry:      140  |  96<L>  |  48<H>  ----------------------------<  109<H>  4.3   |  24  |  2.1<H>    Ca    10.4<H>      18 May 2022 10:50    TPro  7.3  /  Alb  4.8  /  TBili  0.6  /  DBili  x   /  AST  36  /  ALT  27  /  AlkPhos  61      - Coagulation Studies:  PT/INR - ( 18 May 2022 10:50 )   PT: 12.30 sec;   INR: 1.07 ratio    PTT - ( 18 May 2022 10:50 )  PTT:24.8 sec    Microbiological Workup  Urinalysis Basic - ( 18 May 2022 14:07 )    Color: Yellow / Appearance: Clear / S.021 / pH: x  Gluc: x / Ketone: Trace  / Bili: Negative / Urobili: <2 mg/dL   Blood: x / Protein: 30 mg/dL / Nitrite: Negative   Leuk Esterase: Negative / RBC: 6 /HPF / WBC 4 /HPF   Sq Epi: x / Non Sq Epi: 1 /HPF / Bacteria: Negative    PMHx:  HTN (hypertension)  Hypothyroi  Depression  Interstitial lung disease  Essential thrombocytopenia    24-Hour Events: None    Medication:  albuterol/ipratropium for Nebulization. 3 milliLiter(s) Nebulizer once  chlorhexidine 4% Liquid 1 Application(s) Topical <User Schedule>  dronedarone 400 milliGRAM(s) Oral two times a day  enoxaparin Injectable 40 milliGRAM(s) SubCutaneous every 24 hours  levothyroxine 100 MICROGram(s) Oral daily  lidocaine   4% Patch 1 Patch Transdermal daily  melatonin 5 milliGRAM(s) Oral at bedtime  metoprolol tartrate 25 milliGRAM(s) Oral every 12 hours  niMODipine Oral Solution 30 milliGRAM(s) Oral every 4 hours  pantoprazole Infusion 8 mG/Hr IV Continuous <Continuous>  predniSONE   Tablet 15 milliGRAM(s) Oral daily  sodium bicarbonate 1300 milliGRAM(s) Oral every 6 hours  ticagrelor 90 milliGRAM(s) Oral two times a day    No Known Allergies    Recent Vitals:  T(F): 97.1 (22 @ 09:25), Max: 97.8 (22 @ 19:42)  HR: 82 (22 @ 09:25) (78 - 106)  BP: 164/61 (22 @ 09:25) (114/60 - 164/61)  RR: 18 (22 @ 09:25) (18 - 20)  SpO2: 96% (22 @ 09:25) (95% - 99%)    COVID-19 PCR: NotDetec (31 May 2022 13:34)  COVID-19 PCR: NotDetec (18 May 2022 10:10)    Recent Labs:                        8.0    25.18 )-----------( 251      ( 2022 06:56 )             23.1         141  |  108  |  34<H>  ----------------------------<  82  3.3<L>   |  15<L>  |  1.3    Ca    8.8      2022 06:56  Mg     1.9         TPro  5.9<L>  /  Alb  3.5  /  TBili  0.4  /  DBili  x   /  AST  20  /  ALT  16  /  AlkPhos  51  06    LIVER FUNCTIONS - ( 2022 06:56 )  Alb: 3.5 g/dL / Pro: 5.9 g/dL / ALK PHOS: 51 U/L / ALT: 16 U/L / AST: 20 U/L / GGT: x           Exam:  General: NAD; reporting generalized weakness and fatigue.  Mental Status: AAO x 2-3 (initially said year was 192 but corrected herself). Naming, repetition, and comprehension intact. No dysarthria or aphasia apparent (patient has limited proficiency in English--object recognition was deferred for this exam).  CN: PERRL, full visual fields, no nystagmus, tongue midline.   Motor: B/l UE/LE no drift.   Sensation: Intact to light touch throughout.  Coordination: No dysmetria on finger-to-nose.    Radiology:     ACC: [id number] EXAM: MR ANGIO BRAIN  ACC: [id number] EXAM: MR ANGIO NECK IC  ACC: [id number] EXAM: MR BRAIN WAW IC    PROCEDURE DATE: 2022      IMPRESSION:  BRAIN  Numerous scattered punctate acute infarcts involving the bilateral cortical hemispheres.    Moderate chronic microvascular type changes as well as a chronic left frontal lobe infarct.    Scattered cortical susceptibility weighted artifact likely sequela of chronic hemosiderin deposition from remote subarachnoid hemorrhage. No evidence of acute hemorrhage.    BRAIN MRA  Since prior CTA of 2022 there has been interval placement of a flow diverting stent involving the right clinoid/supraclinoid ICA with residual flow related enhancement within the clinoid ICA aneurysm.    Multifocal scattered stenoses as detailed above.    NECK MRA  Motion limited examination.    Likely severe/critical bilateral proximal ICA stenosis though difficult to quantify due to motion limitation.    --- End of Report ---    Assessment:   76-year-old, Danish-speaking female found to have right frontoparietal SAH (HH1 mFS 1) possibly attributable to a 5mm,  right supraclinoid ICA saccular aneurysm. Multifocal stenosis/spasm was also noted. Patient is now s/p diagnostic cerebral angiogram + pipeline stent embolization of the right ICA aneurysm. Patient is on Lovenox 40 mg qd and Brilinta 90 mg bid. S/p 1 unit PRBC for hemoglobin 7.3  and  for hemoglobin 7.6. GI following for melena. H/H today: .1. On exam today the patient is spontaneously awake and alert, reporting some generalized weakness and fatigue. The patient was seen by Dr. West and the neuroendovascular ACP and had no questions or acute complaints at the time.    Suggestions:   - Will f/u repeat CTA in 3 weeks.  - From an endovascular perspective, the patient should remain on Brilinta due to recent pipeline stenting, although Aspirin may continue to be held.  - Management per stroke/medicine teams.  - [phone]

## 2022-06-01 NOTE — PROGRESS NOTE ADULT - SUBJECTIVE AND OBJECTIVE BOX
Gastroenterology progress note:     Patient is a 76y old  Female who presents with a chief complaint of Worsening Back Pain and Metabolic Encephalopathy (01 Jun 2022 12:39)       Admitted on: 05-18-22    We are following the patient for melena      Interval History: continue to have melena, hb 8 from 8.5  no hematemesis no abdominal pain    PAST MEDICAL & SURGICAL HISTORY:  HTN (hypertension)  Hypothyroid  Depression  Interstitial lung disease  Essential thrombocytopenia  History of ovarian cyst    MEDICATIONS  (STANDING):  albuterol/ipratropium for Nebulization. 3 milliLiter(s) Nebulizer once  chlorhexidine 4% Liquid 1 Application(s) Topical <User Schedule>  dronedarone 400 milliGRAM(s) Oral two times a day  enoxaparin Injectable 40 milliGRAM(s) SubCutaneous every 24 hours  levothyroxine 100 MICROGram(s) Oral daily  lidocaine   4% Patch 1 Patch Transdermal daily  melatonin 5 milliGRAM(s) Oral at bedtime  metoprolol tartrate 25 milliGRAM(s) Oral every 12 hours  niMODipine Oral Solution 30 milliGRAM(s) Oral every 4 hours  pantoprazole Infusion 8 mG/Hr (10 mL/Hr) IV Continuous <Continuous>  predniSONE   Tablet 15 milliGRAM(s) Oral daily  sodium bicarbonate 1300 milliGRAM(s) Oral every 6 hours  ticagrelor 90 milliGRAM(s) Oral two times a day    MEDICATIONS  (PRN):  acetaminophen     Tablet .. 650 milliGRAM(s) Oral every 6 hours PRN Mild Pain (1 - 3)  cyclobenzaprine 10 milliGRAM(s) Oral three times a day PRN Muscle Spasm      Allergies  No Known Allergies      Review of Systems:   General: no fever  HEENT: no hemoptysis  Cardiovascular:  No Chest Pain, No Palpitations  Respiratory:  No Cough, No Dyspnea  Gastrointestinal:  As described in HPI  Hematology: no bruising or hematoma   Neurology: no new motor deficit  Skin: no new rash    Physical Examination:  T(C): 36.2 (06-01-22 @ 09:25), Max: 36.6 (05-31-22 @ 19:42)  HR: 82 (06-01-22 @ 09:25) (78 - 106)  BP: 164/61 (06-01-22 @ 09:25) (114/60 - 164/61)  RR: 18 (06-01-22 @ 09:25) (18 - 20)  SpO2: 96% (06-01-22 @ 09:25) (95% - 99%)      Constitutional: No acute distress.  Head: normocephalic  Neck: supple   Eyes: EOMI  Respiratory:  No signs of respiratory distress. Lung sounds are clear bilaterally.  Cardiovascular:  S1 S2, Regular rate and rhythm.  Abdominal: Abdomen is soft, symmetric, and non-tender without distention. There are no visible lesions or scars. Bowel sounds are present and normoactive in all four quadrants. No masses, hepatomegaly, or splenomegaly are noted.   Extremities: no pitting edema  Neurology: alert oriented *3, no asterixis   Skin: No rashes, No Jaundice.      Data: (reviewed by attending)                        8.0    25.18 )-----------( 251      ( 01 Jun 2022 06:56 )             23.1     Hgb trend:  8.0  06-01-22 @ 06:56  8.5  05-31-22 @ 11:49  7.6  05-30-22 @ 07:44      05-30-22 @ 07:01  -  05-31-22 @ 07:00  --------------------------------------------------------  IN: 0 mL      06-01    141  |  108  |  34<H>  ----------------------------<  82  3.3<L>   |  15<L>  |  1.3    Ca    8.8      01 Jun 2022 06:56  Mg     1.9     05-31    TPro  5.9<L>  /  Alb  3.5  /  TBili  0.4  /  DBili  x   /  AST  20  /  ALT  16  /  AlkPhos  51  06-01    Liver panel trend:  TBili 0.4   /   AST 20   /   ALT 16   /   AlkP 51   /   Tptn 5.9   /   Alb 3.5    /   DBili --      06-01  TBili 0.4   /   AST 16   /   ALT 15   /   AlkP 44   /   Tptn 5.8   /   Alb 3.6    /   DBili --      05-31  TBili 0.3   /   AST 15   /   ALT 15   /   AlkP 46   /   Tptn 5.5   /   Alb 3.3    /   DBili --      05-30  TBili 0.4   /   AST 18   /   ALT 17   /   AlkP 57   /   Tptn 5.8   /   Alb 3.5    /   DBili --      05-29  TBili 0.6   /   AST 24   /   ALT 19   /   AlkP 64   /   Tptn 6.4   /   Alb 3.9    /   DBili --      05-28  TBili 0.5   /   AST 23   /   ALT 22   /   AlkP 55   /   Tptn 5.9   /   Alb 3.7    /   DBili --      05-27  TBili 0.7   /   AST 31   /   ALT 27   /   AlkP 57   /   Tptn 6.0   /   Alb 4.0    /   DBili --      05-26  TBili 0.6   /   AST 35   /   ALT 30   /   AlkP 52   /   Tptn 5.8   /   Alb 3.8    /   DBili --      05-25  TBili 0.5   /   AST 48   /   ALT 33   /   AlkP 50   /   Tptn 5.9   /   Alb 3.8    /   DBili --      05-24             Radiology: (reviewed by attending)       Gastroenterology progress note:     Patient is a 76y old  Female who presents with a chief complaint of Worsening Back Pain and Metabolic Encephalopathy (01 Jun 2022 12:39)       Admitted on: 05-18-22    We are following the patient for melena      Interval History: continue to have melena, hb 8 from 8.5  no hematemesis no abdominal pain    PAST MEDICAL & SURGICAL HISTORY:  HTN (hypertension)  Hypothyroid  Depression  Interstitial lung disease  Essential thrombocytopenia  History of ovarian cyst    MEDICATIONS  (STANDING):  albuterol/ipratropium for Nebulization. 3 milliLiter(s) Nebulizer once  chlorhexidine 4% Liquid 1 Application(s) Topical <User Schedule>  dronedarone 400 milliGRAM(s) Oral two times a day  enoxaparin Injectable 40 milliGRAM(s) SubCutaneous every 24 hours  levothyroxine 100 MICROGram(s) Oral daily  lidocaine   4% Patch 1 Patch Transdermal daily  melatonin 5 milliGRAM(s) Oral at bedtime  metoprolol tartrate 25 milliGRAM(s) Oral every 12 hours  niMODipine Oral Solution 30 milliGRAM(s) Oral every 4 hours  pantoprazole Infusion 8 mG/Hr (10 mL/Hr) IV Continuous <Continuous>  predniSONE   Tablet 15 milliGRAM(s) Oral daily  sodium bicarbonate 1300 milliGRAM(s) Oral every 6 hours  ticagrelor 90 milliGRAM(s) Oral two times a day    MEDICATIONS  (PRN):  acetaminophen     Tablet .. 650 milliGRAM(s) Oral every 6 hours PRN Mild Pain (1 - 3)  cyclobenzaprine 10 milliGRAM(s) Oral three times a day PRN Muscle Spasm      Allergies  No Known Allergies      Review of Systems:   General: no fever  HEENT: no hemoptysis  Cardiovascular:  No Chest Pain, No Palpitations  Respiratory:  No Cough, No Dyspnea  Gastrointestinal:  As described in HPI  Hematology: no bruising or hematoma   Skin: no new rash    Physical Examination:  T(C): 36.2 (06-01-22 @ 09:25), Max: 36.6 (05-31-22 @ 19:42)  HR: 82 (06-01-22 @ 09:25) (78 - 106)  BP: 164/61 (06-01-22 @ 09:25) (114/60 - 164/61)  RR: 18 (06-01-22 @ 09:25) (18 - 20)  SpO2: 96% (06-01-22 @ 09:25) (95% - 99%)      Constitutional: No acute distress.  Head: normocephalic  Neck: supple   Eyes: EOMI  Respiratory:  No signs of respiratory distress.   Cardiovascular:  S1 S2, Regular rate and rhythm.  Abdominal: Abdomen is soft, symmetric, and non-tender without distention. Extremities: no pitting edema  Neurology: alert oriented *3   Skin: No rashes, No Jaundice.      Data: (reviewed by attending)                        8.0    25.18 )-----------( 251      ( 01 Jun 2022 06:56 )             23.1     Hgb trend:  8.0  06-01-22 @ 06:56  8.5  05-31-22 @ 11:49  7.6  05-30-22 @ 07:44    06-01    141  |  108  |  34<H>  ----------------------------<  82  3.3<L>   |  15<L>  |  1.3    Ca    8.8      01 Jun 2022 06:56  Mg     1.9     05-31    TPro  5.9<L>  /  Alb  3.5  /  TBili  0.4  /  DBili  x   /  AST  20  /  ALT  16  /  AlkPhos  51  06-01    Liver panel trend:  TBili 0.4   /   AST 20   /   ALT 16   /   AlkP 51   /   Tptn 5.9   /   Alb 3.5    /   DBili --      06-01  TBili 0.4   /   AST 16   /   ALT 15   /   AlkP 44   /   Tptn 5.8   /   Alb 3.6    /   DBili --      05-31  TBili 0.3   /   AST 15   /   ALT 15   /   AlkP 46   /   Tptn 5.5   /   Alb 3.3    /   DBili --      05-30  TBili 0.4   /   AST 18   /   ALT 17   /   AlkP 57   /   Tptn 5.8   /   Alb 3.5    /

## 2022-06-01 NOTE — PROGRESS NOTE ADULT - ATTENDING COMMENTS
Pt is a 77 yo F with PMHX of chronic back pain, SEEMA, HTN, depression, essential thrombocytosis, recent admission for GIB 2/2 duodenal ulcer, who presented with AMS. 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 Now s/p pipeline stent embolization 5/23. MRI brain on 5/26 with multifocal punctate acute ischemic stroke. Favor periprocedural. However in light of bilateral carotid disease, obtain CUS. Plt had increased, pt was restarted on home anagrelide, with ASA/brilinta for stent, then developed GIB, thus ASA and anagrelide held. Hb stable today on brilinta and DVT ppx. S/p 1u PRBC on 5/30. GI consulted, plan for EGD if s/s persistent hemorrhage or pt becomes unstable. Continue monitoring CBC q12. PT/OT/ST, tele, avoid hypotension, q4 neurochecks. Adjusting nimotop 2/2 hypotension.

## 2022-06-01 NOTE — PROGRESS NOTE ADULT - SUBJECTIVE AND OBJECTIVE BOX
AMANDA FELDER  76y  Female    Patient is a 76y old  Female who presents with a chief complaint of Worsening Back Pain and Metabolic Encephalopathy (31 May 2022 09:26)      HPI:  History of Present Illness  Ms. Felder is a 76 year old female patient known to have:  - Baseline AO3, lives with niece, ambulates with a walker  - SEEMA and ILD. Follows with Dr Stephens. Not on CPAP or home O2. On Prednisone 20mg QD  - Depression  - HTN. Recent Admission for HTN Urgency. Follows with Dr Moses  - Hypothyroidism   - Recent admission for a bleeding duodenal ulcer at [facility] (melena). Home med Protonix 20mg QD  - Essential Thrombocytosis. Follows with Dr Miller  - Chronic Back Pain for years secondary to Spinal Stenosis per marquise. s/p Spinal Epidural Injection. Was on PT 3x/ week but has been non compliant for 2-3 months. Follows with Dr Jaimes. Was supposed to go for a nerve ablation last Monday (cancelled due to poorly controlled HTN s/p admission)       She was brought to the ED by marquise on  for evaluation of worsening back pain and confusion x2 days.  History goes back to 2 days PTP when the patient started complaining of worsening of her chronic dull lower back pain that radiates down to her right medial aspect of thigh.  This has resulted in limited ambulation (patient refusing to leave her bed) and to being non compliant with Physical Therapy in the absence of leg weakness or paresthesias or numbness or urinary incontinence.  Per marquise, patient has been feeling down x few days with reduced PO intake, reduced sleep, loss of interest, thoughts that her family doesn't like/ support her, and confusion (some times would not recognize marquise).  She has an instance where she had fecal incontinence on way to bathroom 2 days ago.  In the setting of confusion and increased pain, marquise decided to bring patient to ED for evaluation.    On review of systems, marquise denies any recent fever, chills, night sweats, URTI symptoms (cough, rhinorrhea, sore throat), urinary symptoms (urinary frequency, urgency, intermittence, dysuria, foul smelling urine, cloudy urine), abdominal pain, headache, nausea, or vomiting.   No sick contacts.   No recent travel or exposure to recent travelers.      Upon presentation to the ED, the patient was hemodynamically stable:  Vital Signs in ED   - /104 mmHg  -   - RR 20  - T97.1  - SaO2 94% on RA      Investigations   Laboratory Workup  - CBC:                        11.6   20.94 )-----------( 188      ( 18 May 2022 10:50 )             34.1     - Chemistry:      140  |  96<L>  |  48<H>  ----------------------------<  109<H>  4.3   |  24  |  2.1<H>    Ca    10.4<H>      18 May 2022 10:50    TPro  7.3  /  Alb  4.8  /  TBili  0.6  /  DBili  x   /  AST  36  /  ALT  27  /  AlkPhos  61      - Coagulation Studies:  PT/INR - ( 18 May 2022 10:50 )   PT: 12.30 sec;   INR: 1.07 ratio    PTT - ( 18 May 2022 10:50 )  PTT:24.8 sec      Microbiological Workup  Urinalysis Basic - ( 18 May 2022 14:07 )    Color: Yellow / Appearance: Clear / S.021 / pH: x  Gluc: x / Ketone: Trace  / Bili: Negative / Urobili: <2 mg/dL   Blood: x / Protein: 30 mg/dL / Nitrite: Negative   Leuk Esterase: Negative / RBC: 6 /HPF / WBC 4 /HPF   Sq Epi: x / Non Sq Epi: 1 /HPF / Bacteria: Negative          Radiological Workup  * CT Abdomen and Pelvis w/ IV Cont (22 @ 14:00) No acute abnormality within the abdomen and pelvis. Age-indeterminate moderate compression fracture seen at L1, new since 2022.  * CXR CM and CHF    - Patient was given IV Cefepime 2g x1 dose in ED  - She was also given 1.4 L LR bolus   - She will be admitted for further investigations, management, and monitoring     (18 May 2022 22:59)    Interval events: Pt initially admitted to MICU.  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 Pt s/p DSA & pipeline stent embo .  Patient was febrile on  but work-up was negative except for (+) MRSA PCR.  Patient on 5-day course of mupirocin.  Patient became awake, alert, and oriented x2-3 (person, , place) and tolerating PT/OT.   Passed S+S and tolerating PO soft and bite-sized diet.  Failed TOV on  and bran reinserted for retention.  Re-trial once ambulation improves.  Hemodynamically stable for downgrade to stroke unit for further management.    S: Patient was examined and seen at bedside. This morning pt is resting comfortably in bed and reports no new issues or overnight events. No specific complaints. As per RN, had another episode of melena  Denies CP, SOB, N/V/D/C/AP, cough, F, chills, dizziness, new focal weakness, HA, vision changes, dysuria, or urinary symptoms.  ROS: all other systems reviewed and are negative    PAST MEDICAL & SURGICAL HISTORY:  HTN (hypertension)      Hypothyroid      Depression      Interstitial lung disease      Essential thrombocytopenia      History of ovarian cyst      General: NAD. Looks stated age.  HEENT: clean oropharynx, EOMI, no LAD  Neck: trachea midline, no thyromegaly  CV: nl S1 S2; no m/r/g  Resp: decreased breath sounds at base  GI: NT/ND/S +BS  MS: no clubbing/cyanosis/edema, +pulses  Neuro: motor, sensory intact; + reflexes  Skin: no rashes, nl turgor  Psychiatric: AA0x2+ w/ fair insight and judgement    tele: SR, nonspecific changes (on my own evaluation of tele monitor)            MEDICATIONS  (STANDING):  albuterol/ipratropium for Nebulization. 3 milliLiter(s) Nebulizer once  chlorhexidine 4% Liquid 1 Application(s) Topical <User Schedule>  dronedarone 400 milliGRAM(s) Oral two times a day  enoxaparin Injectable 40 milliGRAM(s) SubCutaneous every 24 hours  levothyroxine 100 MICROGram(s) Oral daily  lidocaine   4% Patch 1 Patch Transdermal daily  melatonin 5 milliGRAM(s) Oral at bedtime  metoprolol tartrate 25 milliGRAM(s) Oral every 12 hours  niMODipine Oral Solution 30 milliGRAM(s) Oral every 4 hours  pantoprazole Infusion 8 mG/Hr (10 mL/Hr) IV Continuous <Continuous>  potassium chloride   Powder 40 milliEquivalent(s) Oral once  predniSONE   Tablet 15 milliGRAM(s) Oral daily  sodium bicarbonate 1300 milliGRAM(s) Oral every 6 hours  ticagrelor 90 milliGRAM(s) Oral two times a day    MEDICATIONS  (PRN):  acetaminophen     Tablet .. 650 milliGRAM(s) Oral every 6 hours PRN Mild Pain (1 - 3)  cyclobenzaprine 10 milliGRAM(s) Oral three times a day PRN Muscle Spasm    Home Medications:  anagrelide 0.5 mg oral capsule: 1 cap(s) orally 4 times a day (18 May 2022 23:29)  atenolol 25 mg oral tablet: 1 tab(s) orally once a day (18 May 2022 23:28)  losartan-hydrochlorothiazide 100 mg-25 mg oral tablet: 1 tab(s) orally once a day (18 May 2022 23:28)  predniSONE 20 mg oral tablet: 1 tab(s) orally once a day (18 May 2022 23:28)  Protonix 20 mg oral delayed release tablet: 1 tab(s) orally once a day (18 May 2022 23:28)    Vital Signs Last 24 Hrs  T(C): 36.8 (2022 17:00), Max: 36.8 (2022 17:00)  T(F): 98.2 (2022 17:00), Max: 98.2 (2022 17:00)  HR: 109 (2022 17:00) (78 - 109)  BP: 139/63 (2022 17:00) (123/60 - 164/61)  BP(mean): 91 (2022 17:00) (64 - 116)  RR: 16 (2022 17:00) (16 - 18)  SpO2: 100% (2022 17:00) (95% - 100%)  CAPILLARY BLOOD GLUCOSE        LABS:                        8.0    25.18 )-----------( 251      ( 2022 06:56 )             23.1     06-    141  |  108  |  34<H>  ----------------------------<  82  3.3<L>   |  15<L>  |  1.3    Ca    8.8      2022 06:56  Mg     1.9     05-31    TPro  5.9<L>  /  Alb  3.5  /  TBili  0.4  /  DBili  x   /  AST  20  /  ALT  16  /  AlkPhos  51  06-    LIVER FUNCTIONS - ( 2022 06:56 )  Alb: 3.5 g/dL / Pro: 5.9 g/dL / ALK PHOS: 51 U/L / ALT: 16 U/L / AST: 20 U/L / GGT: x                           Consultant Notes Reviewed:  [x ] YES  [ ] NO  Care Discussed with Consultants/Other Providers/ Housestaff [ x] YES  [ ] NO  Radiology, labs, new studies personally reviewed.                                    EKG - nonspecific changes (my read)  Chart and consultant noted personally reviewed.  Care Discussed with Consultants/Other Providers/ Housestaff [ x] YES [ ] NO   Radiology, labs, old records personally reviewed.

## 2022-06-01 NOTE — PROGRESS NOTE ADULT - SUBJECTIVE AND OBJECTIVE BOX
Neurology Progress Note    Interval History:  Patient was seen this morning at 8:30 am. Patient was in a good mood, reported no changes in status overnight or early this morning. Upon questioning on review of systems, patient mentioned that she did experience episodes of sweating last night. Patient states that her status from yesterday has not improved.    HPI:  History of Present Illness  Ms. Shook is a 76 year old female patient known to have:  - Baseline AO3, lives with niece, ambulates with a walker  - SEEMA and ILD. Follows with Dr Stephens. Not on CPAP or home O2. On Prednisone 20mg QD  - Depression  - HTN. Recent Admission for HTN Urgency. Follows with Dr Moses  - Hypothyroidism   - Recent admission for a bleeding duodenal ulcer at [facility] (melena). Home med Protonix 20mg QD  - Essential Thrombocytosis. Follows with Dr Denise  - Chronic Back Pain for years secondary to Spinal Stenosis per marquise. s/p Spinal Epidural Injection. Was on PT 3x/ week but has been non compliant for 2-3 months. Follows with Dr Jaimes. Was supposed to go for a nerve ablation last Monday (cancelled due to poorly controlled HTN s/p admission)       She was brought to the ED by marquise on  for evaluation of worsening back pain and confusion x2 days.  History goes back to 2 days PTP when the patient started complaining of worsening of her chronic dull lower back pain that radiates down to her right medial aspect of thigh.  This has resulted in limited ambulation (patient refusing to leave her bed) and to being non compliant with Physical Therapy in the absence of leg weakness or paresthesias or numbness or urinary incontinence.  Per marquise, patient has been feeling down x few days with reduced PO intake, reduced sleep, loss of interest, thoughts that her family doesn't like/ support her, and confusion (some times would not recognize marquise).  She has an instance where she had fecal incontinence on way to bathroom 2 days ago.  In the setting of confusion and increased pain, marquise decided to bring patient to ED for evaluation.    On review of systems, marquise denies any recent fever, chills, night sweats, URTI symptoms (cough, rhinorrhea, sore throat), urinary symptoms (urinary frequency, urgency, intermittence, dysuria, foul smelling urine, cloudy urine), abdominal pain, headache, nausea, or vomiting.   No sick contacts.   No recent travel or exposure to recent travelers.      Upon presentation to the ED, the patient was hemodynamically stable:  Vital Signs in ED   - /104 mmHg  -   - RR 20  - T97.1  - SaO2 94% on RA      Investigations   Laboratory Workup  - CBC:                        11.6   20.94 )-----------( 188      ( 18 May 2022 10:50 )             34.1     - Chemistry:      140  |  96<L>  |  48<H>  ----------------------------<  109<H>  4.3   |  24  |  2.1<H>    Ca    10.4<H>      18 May 2022 10:50    TPro  7.3  /  Alb  4.8  /  TBili  0.6  /  DBili  x   /  AST  36  /  ALT  27  /  AlkPhos  61      - Coagulation Studies:  PT/INR - ( 18 May 2022 10:50 )   PT: 12.30 sec;   INR: 1.07 ratio    PTT - ( 18 May 2022 10:50 )  PTT:24.8 sec      Microbiological Workup  Urinalysis Basic - ( 18 May 2022 14:07 )    Color: Yellow / Appearance: Clear / S.021 / pH: x  Gluc: x / Ketone: Trace  / Bili: Negative / Urobili: <2 mg/dL   Blood: x / Protein: 30 mg/dL / Nitrite: Negative   Leuk Esterase: Negative / RBC: 6 /HPF / WBC 4 /HPF   Sq Epi: x / Non Sq Epi: 1 /HPF / Bacteria: Negative          Radiological Workup  * CT Abdomen and Pelvis w/ IV Cont (22 @ 14:00) No acute abnormality within the abdomen and pelvis. Age-indeterminate moderate compression fracture seen at L1, new since 2022.  * CXR CM and CHF      - Patient was given IV Cefepime 2g x1 dose in ED  - She was also given 1.4 L LR bolus   - She will be admitted for further investigations, management, and monitoring             (18 May 2022 22:59)      PAST MEDICAL & SURGICAL HISTORY:  HTN (hypertension)    Hypothyroid    Depression    Interstitial lung disease    Essential thrombocytopenia    History of ovarian cyst            Medications:  acetaminophen     Tablet .. 650 milliGRAM(s) Oral every 6 hours PRN  albuterol/ipratropium for Nebulization. 3 milliLiter(s) Nebulizer once  chlorhexidine 4% Liquid 1 Application(s) Topical <User Schedule>  cyclobenzaprine 10 milliGRAM(s) Oral three times a day PRN  dronedarone 400 milliGRAM(s) Oral two times a day  enoxaparin Injectable 40 milliGRAM(s) SubCutaneous every 24 hours  levothyroxine 100 MICROGram(s) Oral daily  lidocaine   4% Patch 1 Patch Transdermal daily  melatonin 5 milliGRAM(s) Oral at bedtime  metoprolol tartrate 25 milliGRAM(s) Oral every 12 hours  niMODipine Oral Solution 30 milliGRAM(s) Oral every 4 hours  pantoprazole Infusion 8 mG/Hr IV Continuous <Continuous>  potassium chloride   Powder 40 milliEquivalent(s) Oral once  predniSONE   Tablet 15 milliGRAM(s) Oral daily  sodium bicarbonate 1300 milliGRAM(s) Oral every 6 hours  ticagrelor 90 milliGRAM(s) Oral two times a day      Vital Signs Last 24 Hrs  T(C): 36.8 (2022 17:00), Max: 36.8 (2022 17:00)  T(F): 98.2 (2022 17:00), Max: 98.2 (2022 17:00)  HR: 109 (2022 17:00) (78 - 109)  BP: 139/63 (2022 17:00) (123/60 - 164/61)  BP(mean): 91 (2022 17:00) (64 - 116)  RR: 16 (2022 17:00) (16 - 18)  SpO2: 100% (2022 17:00) (95% - 100%)    Neurological Exam:   Mental status: Patient was awake, alert, oriented to person and place but not time or age. short term and long term memory were intact. Naming, repetition and comprehension intact.  Attention/concentration intact.  No dysarthria, no aphasia. calculations were intact.   Cranial nerves: Pupils equally round and reactive to light, visual fields full, no nystagmus, extraocular muscles intact, V1 through V3 intact bilaterally and symmetric, face symmetric, facial muscles able to smile frown and puff out cheeks, hearing intact to finger rub, palate elevation symmetric, trapezius elevation bilateral, patient stated she could not protrude tongue.  Motor:  MRC grading 4/5 b/l UE/LE.   strength 4/5.  Normal tone and bulk. mild drift of all extremities.  No abnormal movements.    Sensation: Intact to light touch, proprioception, and pinprick.   Coordination: slowed fine finger movments, unable to perform finger to nose, postitive for mild dysdiadochokinesia.  Reflexes: 1+ in bilateral UE/LE, downgoing toes lower right extremity, positive babinski lower left extremity.  Gait and romberg were not assessed, patient was too weak and tired to get out of bed.    Labs:  CBC Full  -  ( 2022 06:56 )  WBC Count : 25.18 K/uL  RBC Count : 2.53 M/uL  Hemoglobin : 8.0 g/dL  Hematocrit : 23.1 %  Platelet Count - Automated : 251 K/uL  Mean Cell Volume : 91.3 fL  Mean Cell Hemoglobin : 31.6 pg  Mean Cell Hemoglobin Concentration : 34.6 g/dL  Auto Neutrophil # : x  Auto Lymphocyte # : x  Auto Monocyte # : x  Auto Eosinophil # : x  Auto Basophil # : x  Auto Neutrophil % : x  Auto Lymphocyte % : x  Auto Monocyte % : x  Auto Eosinophil % : x  Auto Basophil % : x    06-01    141  |  108  |  34<H>  ----------------------------<  82  3.3<L>   |  15<L>  |  1.3    Ca    8.8      2022 06:56  Mg     1.9     05-31    TPro  5.9<L>  /  Alb  3.5  /  TBili  0.4  /  DBili  x   /  AST  20  /  ALT  16  /  AlkPhos  51  06-01    LIVER FUNCTIONS - ( 2022 06:56 )  Alb: 3.5 g/dL / Pro: 5.9 g/dL / ALK PHOS: 51 U/L / ALT: 16 U/L / AST: 20 U/L / GGT: x                 NIHSS:    1A: LOC 0   1B: LOC Questions 1  1C: Performs Tasks 0   2: Horizontal EOMs 0   3: Visual Fields 0   4: Facial Palsy 0   5A: LUE Drift 1  5B: RUE Drift 1  6A: LLE Drift 1  6B: RLE Drift 1  7: Limb Ataxia (heel-shin, FNF) 0  8: Sensation 0  9: Language/Aphasia 0  10: Dysarthria 0  11: Extinction/Inattention 0    NIHSS Total: 5    Assessment:  This is a 76y Female w/ h/o worsening back pain and metabolic encephalopathy.     Plan:

## 2022-06-01 NOTE — PROGRESS NOTE ADULT - SUBJECTIVE AND OBJECTIVE BOX
Neurology Follow up note  Patient examined at bedside, no new issues this morning, no agitation overnight. The patient was awake today and answering all questions. The patient had another bowel movement with black stool overnight.     HPI:  Ms. Shook is a 76 year old female patient known to have:Baseline AO3, lives with niece, ambulates with a walker, SEEMA and ILD. Follows with Dr Stephens. Not on CPAP or home O2. On Prednisone 20mg QD, Depression, HTN. Recent Admission for HTN Urgency, Hypothyroidism. Recent admission for a bleeding duodenal ulcer at [facility] (melena). Home med Protonix 20mg QD Essential Thrombocytosis. Follows with Dr Denise, Chronic Back Pain for years secondary to Spinal Stenosis per marquise. s/p Spinal Epidural Injection. Was on PT 3x/ week but has been non compliant for 2-3 months. Follows with Dr Jaimes. Was supposed to go for a nerve ablation last Monday (cancelled due to poorly controlled HTN s/p admission). She was brought to the ED by marquise on  for evaluation of worsening back pain and confusion x2 days. History goes back to 2 days PTP when the patient started complaining of worsening of her chronic dull lower back pain that radiates down to her right medial aspect of thigh. This has resulted in limited ambulation (patient refusing to leave her bed) and to being non compliant with Physical Therapy in the absence of leg weakness or paresthesias or numbness or urinary incontinence. Per marquise, patient has been feeling down x few days with reduced PO intake, reduced sleep, loss of interest, thoughts that her family doesn't like/ support her, and confusion (some times would not recognize niece).She has an instance where she had fecal incontinence on way to bathroom 2 days ago.In the setting of confusion and increased pain, marquise decided to bring patient to ED for evaluation. On review of systems, marquise denies any recent fever, chills, night sweats, URTI symptoms (cough, rhinorrhea, sore throat), urinary symptoms (urinary frequency, urgency, intermittence, dysuria, foul smelling urine, cloudy urine), abdominal pain, headache, nausea, or vomiting. No sick contacts. No recent travel or exposure to recent travelers.        Vital Signs Last 24 Hrs  T(C): 36.1 (2022 02:04), Max: 36.6 (31 May 2022 19:42)  T(F): 96.9 (2022 02:04), Max: 97.8 (31 May 2022 19:42)  HR: 88 (2022 05:23) (78 - 106)  BP: 160/76 (2022 05:23) (101/58 - 160/76)  BP(mean): 116 (2022 05:23) (72 - 116)  RR: 18 (2022 02:04) (18 - 20)  SpO2: 95% (2022 05:23) (95% - 100%)            Neurological Exam:   Mental status: Patient is awake alert oriented to self and place. Anxious, follows 1 step commands.   Cranial nerves: Normal visual field, normal eye movements, PERRLA, No facial asymmetry   Power 5/5 UE            5/ 5  LE  No abnormal involuntary mvmts  Sensation: Responds to pain in all 4 exts.  FTN/HKS: Grossly intact  Gait: unable.      NIHSS:    1A: LOC 0   1B: LOC Questions 0  1C: Performs Tasks 0   2: Horizontal EOMs 0  3: Visual Fields 0   4: Facial Palsy 1   5A: LUE Drift 0   5B: RUE Drift 0   6A: LLE Drift 0   6B: RLE Drift 0   7: Limb Ataxia (heel-shin, FNF) 0   8: Sensation 0   9: Language/Aphasia 0   10: Dysarthria 0   11: Extinction/Inattention 0     NIHSS Total: 1      mRs  0 No symptoms at all  1 No significant disability despite symptoms; able to carry out all usual duties and activities without assistance  2 Slight disability; unable to carry out all previous activities, but able to look after own affairs  3 Moderate disability; requiring some help, but able to walk without assistance  4 Moderately severe disability; unable to walk without assistance and unable to attend to own bodily needs without assistance  5 Severe disability; bedridden, incontinent and requiring constant nursing care and attention  6 Dead         MEDICATIONS  (STANDING):  albuterol/ipratropium for Nebulization. 3 milliLiter(s) Nebulizer once  chlorhexidine 4% Liquid 1 Application(s) Topical <User Schedule>  dronedarone 400 milliGRAM(s) Oral two times a day  enoxaparin Injectable 40 milliGRAM(s) SubCutaneous every 24 hours  levothyroxine 100 MICROGram(s) Oral daily  lidocaine   4% Patch 1 Patch Transdermal daily  melatonin 5 milliGRAM(s) Oral at bedtime  metoprolol tartrate 25 milliGRAM(s) Oral every 12 hours  niMODipine Oral Solution 30 milliGRAM(s) Oral every 4 hours  pantoprazole Infusion 8 mG/Hr (10 mL/Hr) IV Continuous <Continuous>  predniSONE   Tablet 15 milliGRAM(s) Oral daily  sodium bicarbonate 1300 milliGRAM(s) Oral every 6 hours  ticagrelor 90 milliGRAM(s) Oral two times a day    MEDICATIONS  (PRN):  acetaminophen     Tablet .. 650 milliGRAM(s) Oral every 6 hours PRN Mild Pain (1 - 3)  cyclobenzaprine 10 milliGRAM(s) Oral three times a day PRN Muscle Spasm              LABS:  cret                        8.5    25.20 )-----------( 275      ( 31 May 2022 11:49 )             24.7         141  |  111<H>  |  47<H>  ----------------------------<  97  3.5   |  12<L>  |  1.4    Ca    8.9      31 May 2022 11:49  Mg     1.9         TPro  5.8<L>  /  Alb  3.6  /  TBili  0.4  /  DBili  x   /  AST  16  /  ALT  15  /  AlkPhos  44                Radiology  < from: MR Lumbar Spine w/wo IV Cont (22 @ 19:38) >  IMPRESSION:  Acute compression deformity of the L1 vertebral body with approximately   80% height loss as well as bulging of the posterior cortex resulting in   severe spinal stenosis.    Additional acute compression deformity of the L4 superior endplate.    Severe multilevel degenerative changes contributing to spinal canal as   well as neuroforaminal narrowing as detailed above.    --- End of Report ---      JO BECERRIL MD; Attending Radiologist  This document has been electronically signed. May 27 2022  9:18AM    < end of copied text >        ECHO  < from: TTE Echo Complete w/o Contrast w/ Doppler (22 @ 10:38) >    Summary:   1. Normal global left ventricular systolic function.   2. Normal left atrial size.   3. Normal right atrial size.   4. Trace mitral valve regurgitation.   5. PSAP 35.    < end of copied text >      VEEG  VEEG in the last 24 hours:    Background - continuous, symmetrical, less than optimally organized, reaching frequencies in the range of 7-8 hz    Focal and generalized slowin. mild generalized slowing  2. mild left hemispheric focal slowing   3. lower amplitude on the right side    Interictal activity - none    Events - none    Seizures -none    Impression:  Abnormal VEEG as above    Plan - as per NCC team

## 2022-06-01 NOTE — PROGRESS NOTE ADULT - ASSESSMENT
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 Pt s/p DSA pipeline stenting R ICA aneurysm on 5/23. S/P 1 UNIT PRBC for hb 7.3. No acute change in neuro status. Patient received another unit of blood  for Hb 7.6     #Anemia/Melena/hx recent UGIB 2/2 bleeding duodenal ulcer (recent [facility] admission for melena)  She had multiple bowl movements,   - Stopped bowl regime   - Started on pantoprazole infusion as per GI.   - Senna/Miralax  - Currently clear liquid diet as per GI   - track CBC  - GI is considering EGD if unstable  - s/p 2 unit prbc. on 27 and 30 of May  - Lovenox 40 qd    - SCDs  - Hold Anagrelide for now (low Hb)  - Negative LE dopplers from 5/19. 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 العلي)  - Neuro Checks Q4H  - Brilinta BID   - Hold aspirin for now (low Hb)  - Nimodipine   - Normovolemia  - Keppra for seizure ppx  - Video EEG negative  - MRA showed decreased aneurysm (5.9 -> 3.5mm), repeat MRI or CTA in 3 weeks per NSG  - Pain control: Tylenol, Lido patch, flexeril (new L1 compression fx, HA) on MR L-spine  - OOB to chair  - PT/OT/OOB     #HTN  - -140  - nimodipine   - Decreased IVF 0.9NS @ 50cc  - encourage PO intake  - continue metoprolol 25 mg BID    #Sinus tachycardia (at one point >150)  - Continue metoprolol 25 mg bid  - Started Multaq 400mg bid   - transfuse if H/H continues to decrease  - LE doppler    #SEEMA not on CPAP, ILD  she was tachypneic and wheezing 5/30, that improved after diuresis and blood transfusion.   - Aspiration precautions   - Prednisone 15mg qd for ILD; pulm following  - Incentive spirometry  - CXR   - Strict I/Os     #Chronic thrombocytosis  -anagrelide on hold due to GIB/anemia    #hypothyroidism  - Synthroid PO 100mcg qD  - TSH 70, free t4 1.0; repeat in 2-4 weeks    Urinary retention  consider CIC    ?Metabolic acidosis w/ +AG ?diarrhea, starvation ketoacidosis  -f/u ABG  -repeat labs  -cont HCO3  -d/c'ed all laxatives  -improving    Very high risk pt. Px is guarded    #Progress Note Handoff  Pending: Consults____Clinical improvement and stability__x___Tests___EGD_____PT____x____  Pt/Family discussion: Pt informed and agrees with the current plan  Disposition: Home______/SNF____?___/4A______/To be determined____x____    My note supersedes the residents note should a discrepancy arise.    Chart and notes personally reviewed.  Care Discussed with Consultants/Other Providers/ Housestaff [ x] YES [ ] NO   Radiology, labs, old records personally reviewed.    discussed w/ housestaff, nursing, case management, neuro team

## 2022-06-01 NOTE — PROGRESS NOTE ADULT - ASSESSMENT
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 Pt s/p DSA pipeline stenting R ICA aneurysm on 5/23. S/P 1 UNIT PRBC for hb 7.3. No acute change in neuro status. Patient received another unit of blood yesterday for Hb 7.6     Plan:  Neurological:  #SAH, 2/2 5mm ruptured R supraclinoid aneurysm s/p angio & pipeline stent embo 5/23   #new L1 compression fx, CPB 2/2 spinal stenosis (follows w Dr. العلي)  # Multiple ischemic strokes bilaterally, Bilateral sever stenosis of ICA  - Neuro Checks Q4H  - Brilinta BID   - Hold aspirin for now (low Hb)  - Nimodipine 30 mg Q2H  - Normovolemia  - Keppra for seizure ppx  - Video EEG negative  - MRA showed decreased aneurysm (5.9 -> 3.5mm), repeat MRI or CTA in 3 weeks per NSG  - Pain control: Tylenol, Lido patch, flexeril (new L1 compression fx, HA) on MR L-spine  - OOB to chair  - PT/OT/OOB    CV:   #HTN  - -160  - nimodipine 30 mg Q2 hrs  - Decreased IVF 0.9NS @ 50cc  - encourage PO intake  - continue metoprolol 25 mg BID    #Sinus tachycardia   - Continue metoprolol 25 mg bid  - Start Multac 400mg bid   - transfuse if H/H continues to decrease    Pulm:  #SEEMA not on CPAP, ILD  she was tachypneic yesterday and wheezing, that improved after diuresis and blood transfusion.   - Aspiration precautions   - Prednisone 15mg qd for ILD; pulm following  - Incentive spirometry  - CXR   - Strict I/Os       GI:  #hx UGIB 2/2 bleeding duodenal ulcer (recent [facility] admission for melena)  She had multiple bowl movements, last night was black stool   - Stopped bowl regime   - Started on pantoprazole infusion as per GI.   - Senna/Miralax  - Currently clear liquid diet as per GI   - track CBC  - lactulose for BM  - Endoscopy was held for concern for Covid exposure, to be consider if not stable or dropping Hb    :  - Normonatremic euvolemia  - Strict Is/Os  - Daily BMP  - Replace Mg     Heme:  #Anemia, s/p 2 unit prbc. 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 Pt s/p DSA pipeline stenting R ICA aneurysm on 5/23. S/P 1 UNIT PRBC for hb 7.3. No acute change in neuro status. Patient received another unit of blood yesterday for Hb 7.6     Plan:  Neurological:  #SAH, 2/2 5mm ruptured R supraclinoid aneurysm s/p angio & pipeline stent embo 5/23   #new L1 compression fx, CPB 2/2 spinal stenosis (follows w Dr. العلي)  # Multiple ischemic strokes bilaterally, Bilateral sever stenosis of ICA  - Neuro Checks Q4H  - Brilinta BID   - Hold aspirin for now (low Hb)  - Nimodipine 30 mg Q2H  - Normovolemia  - Keppra for seizure ppx  - Video EEG negative  - MRA showed decreased aneurysm (5.9 -> 3.5mm), repeat MRI or CTA in 3 weeks per NSG  - Pain control: Tylenol, Lido patch, flexeril (new L1 compression fx, HA) on MR L-spine  - OOB to chair  - PT/OT/OOB  - Carotid doppler  CV:   #HTN  - -160  - nimodipine 30 mg Q2 hrs  - Decreased IVF 0.9NS @ 50cc  - encourage PO intake  - continue metoprolol 25 mg BID    #Sinus tachycardia   - Continue metoprolol 25 mg bid  - Start Multac 400mg bid   - transfuse if H/H continues to decrease    Pulm:  #SEEMA not on CPAP, ILD  she was tachypneic yesterday and wheezing, that improved after diuresis and blood transfusion.   - Aspiration precautions   - Prednisone 15mg qd for ILD; pulm following  - Incentive spirometry  - CXR   - Strict I/Os       GI:  #hx UGIB 2/2 bleeding duodenal ulcer (recent [facility] admission for melena)  She had multiple bowl movements, last night was black stool   - Stopped bowl regime   - Started on pantoprazole infusion as per GI.   - Senna/Miralax  - Currently clear liquid diet as per GI   - track CBC  - lactulose for BM  - Endoscopy was held for concern for Covid exposure, to be consider if not stable or dropping Hb    :  - Normonatremic euvolemia  - Strict Is/Os  - Daily BMP  - Replace Mg     Heme:  #Anemia, s/p 2 unit prbc. on 27 and 30 of May  - Lovenox 40 qd 5/24 (held yesterday)   - SCDs  - Hold Anagrelide for now (low Hb)  - Negative LE dopplers from 5/19  - track CBC      ID:  - Monitor fever trend and WBCs  - CXR 5/25 unchanged  - Repeat UA, negative  - MRSA positive, 5-day course mupirocin completed       Endo:  #hypothyroidism  - Synthroid PO 100mcg qD  - TSH 70, free t4 1.0; follow up in 2 weeks      Deion Reinserted 5/26 for retention    Dispo: Continue stroke unit w/ Q4 neurochecks

## 2022-06-02 LAB
ALBUMIN SERPL ELPH-MCNC: 3.5 G/DL — SIGNIFICANT CHANGE UP (ref 3.5–5.2)
ALP SERPL-CCNC: 52 U/L — SIGNIFICANT CHANGE UP (ref 30–115)
ALT FLD-CCNC: 17 U/L — SIGNIFICANT CHANGE UP (ref 0–41)
ANION GAP SERPL CALC-SCNC: 18 MMOL/L — HIGH (ref 7–14)
AST SERPL-CCNC: 20 U/L — SIGNIFICANT CHANGE UP (ref 0–41)
BILIRUB SERPL-MCNC: 0.4 MG/DL — SIGNIFICANT CHANGE UP (ref 0.2–1.2)
BUN SERPL-MCNC: 23 MG/DL — HIGH (ref 10–20)
CALCIUM SERPL-MCNC: 8.7 MG/DL — SIGNIFICANT CHANGE UP (ref 8.5–10.1)
CHLORIDE SERPL-SCNC: 105 MMOL/L — SIGNIFICANT CHANGE UP (ref 98–110)
CO2 SERPL-SCNC: 17 MMOL/L — SIGNIFICANT CHANGE UP (ref 17–32)
CREAT SERPL-MCNC: 1.1 MG/DL — SIGNIFICANT CHANGE UP (ref 0.7–1.5)
EGFR: 52 ML/MIN/1.73M2 — LOW
GLUCOSE SERPL-MCNC: 129 MG/DL — HIGH (ref 70–99)
HCT VFR BLD CALC: 23.2 % — LOW (ref 37–47)
HGB BLD-MCNC: 7.8 G/DL — LOW (ref 12–16)
MCHC RBC-ENTMCNC: 31.1 PG — HIGH (ref 27–31)
MCHC RBC-ENTMCNC: 33.6 G/DL — SIGNIFICANT CHANGE UP (ref 32–37)
MCV RBC AUTO: 92.4 FL — SIGNIFICANT CHANGE UP (ref 81–99)
NRBC # BLD: 1 /100 WBCS — HIGH (ref 0–0)
PLATELET # BLD AUTO: 172 K/UL — SIGNIFICANT CHANGE UP (ref 130–400)
POTASSIUM SERPL-MCNC: 3 MMOL/L — LOW (ref 3.5–5)
POTASSIUM SERPL-SCNC: 3 MMOL/L — LOW (ref 3.5–5)
PROT SERPL-MCNC: 5.9 G/DL — LOW (ref 6–8)
RBC # BLD: 2.51 M/UL — LOW (ref 4.2–5.4)
RBC # FLD: 17.9 % — HIGH (ref 11.5–14.5)
SODIUM SERPL-SCNC: 140 MMOL/L — SIGNIFICANT CHANGE UP (ref 135–146)
WBC # BLD: 26.13 K/UL — HIGH (ref 4.8–10.8)
WBC # FLD AUTO: 26.13 K/UL — HIGH (ref 4.8–10.8)

## 2022-06-02 PROCEDURE — 99232 SBSQ HOSP IP/OBS MODERATE 35: CPT

## 2022-06-02 PROCEDURE — 99233 SBSQ HOSP IP/OBS HIGH 50: CPT

## 2022-06-02 RX ORDER — POTASSIUM CHLORIDE 20 MEQ
40 PACKET (EA) ORAL EVERY 4 HOURS
Refills: 0 | Status: COMPLETED | OUTPATIENT
Start: 2022-06-02 | End: 2022-06-03

## 2022-06-02 RX ORDER — MAGNESIUM OXIDE 400 MG ORAL TABLET 241.3 MG
400 TABLET ORAL
Refills: 0 | Status: DISCONTINUED | OUTPATIENT
Start: 2022-06-02 | End: 2022-06-07

## 2022-06-02 RX ADMIN — Medication 25 MILLIGRAM(S): at 05:53

## 2022-06-02 RX ADMIN — ENOXAPARIN SODIUM 40 MILLIGRAM(S): 100 INJECTION SUBCUTANEOUS at 11:55

## 2022-06-02 RX ADMIN — Medication 1300 MILLIGRAM(S): at 17:51

## 2022-06-02 RX ADMIN — NIMODIPINE 30 MILLIGRAM(S): 60 SOLUTION ORAL at 05:52

## 2022-06-02 RX ADMIN — NIMODIPINE 30 MILLIGRAM(S): 60 SOLUTION ORAL at 15:22

## 2022-06-02 RX ADMIN — Medication 1300 MILLIGRAM(S): at 11:56

## 2022-06-02 RX ADMIN — TICAGRELOR 90 MILLIGRAM(S): 90 TABLET ORAL at 05:52

## 2022-06-02 RX ADMIN — DRONEDARONE 400 MILLIGRAM(S): 400 TABLET, FILM COATED ORAL at 05:52

## 2022-06-02 RX ADMIN — NIMODIPINE 30 MILLIGRAM(S): 60 SOLUTION ORAL at 10:38

## 2022-06-02 RX ADMIN — Medication 40 MILLIEQUIVALENT(S): at 20:02

## 2022-06-02 RX ADMIN — NIMODIPINE 30 MILLIGRAM(S): 60 SOLUTION ORAL at 21:37

## 2022-06-02 RX ADMIN — DRONEDARONE 400 MILLIGRAM(S): 400 TABLET, FILM COATED ORAL at 17:52

## 2022-06-02 RX ADMIN — TICAGRELOR 90 MILLIGRAM(S): 90 TABLET ORAL at 17:53

## 2022-06-02 RX ADMIN — Medication 1300 MILLIGRAM(S): at 00:00

## 2022-06-02 RX ADMIN — NIMODIPINE 30 MILLIGRAM(S): 60 SOLUTION ORAL at 17:52

## 2022-06-02 RX ADMIN — NIMODIPINE 30 MILLIGRAM(S): 60 SOLUTION ORAL at 01:16

## 2022-06-02 RX ADMIN — CYCLOBENZAPRINE HYDROCHLORIDE 10 MILLIGRAM(S): 10 TABLET, FILM COATED ORAL at 14:55

## 2022-06-02 RX ADMIN — PANTOPRAZOLE SODIUM 10 MG/HR: 20 TABLET, DELAYED RELEASE ORAL at 00:00

## 2022-06-02 RX ADMIN — Medication 5 MILLIGRAM(S): at 21:37

## 2022-06-02 RX ADMIN — Medication 1300 MILLIGRAM(S): at 23:03

## 2022-06-02 RX ADMIN — Medication 1300 MILLIGRAM(S): at 05:52

## 2022-06-02 RX ADMIN — Medication 100 MICROGRAM(S): at 05:53

## 2022-06-02 RX ADMIN — Medication 15 MILLIGRAM(S): at 05:52

## 2022-06-02 RX ADMIN — Medication 25 MILLIGRAM(S): at 17:51

## 2022-06-02 RX ADMIN — CHLORHEXIDINE GLUCONATE 1 APPLICATION(S): 213 SOLUTION TOPICAL at 05:53

## 2022-06-02 NOTE — CONSULT NOTE ADULT - CONSULT REQUESTED DATE/TIME
02-Jun-2022 16:11
19-May-2022 13:12
21-May-2022 17:24
29-May-2022
20-May-2022 07:30
20-May-2022 10:11
31-May-2022
19-May-2022 15:33
29-May-2022 09:22
31-May-2022 07:22
05-May-2022 13:54
19-May-2022 11:20

## 2022-06-02 NOTE — PROGRESS NOTE ADULT - ASSESSMENT
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 Pt s/p DSA pipeline stenting R ICA aneurysm on 5/23. S/P 1 UNIT PRBC for hb 7.3. No acute change in neuro status. Patient received another unit of blood  for Hb 7.6     #Anemia/Melena/hx recent UGIB 2/2 bleeding duodenal ulcer (recent [facility] admission for melena)  She had multiple bowl movements,   - Stopped bowl regime   - Started on pantoprazole infusion as per GI.   - Senna/Miralax  - Currently clear liquid diet as per GI   - track CBC  - GI is considering EGD if unstable  - s/p 2 unit prbc. on 27 and 30 of May  - Lovenox 40 qd    - SCDs  - Hold Anagrelide for now (low Hb)  - Negative LE dopplers from 5/19. Repeat 6/1 also negative.  - track CBC keep Hgb>8  - advance diet as per GI      #SAH, 2/2 5mm ruptured R supraclinoid aneurysm s/p angio & pipeline stent embo 5/23  #Ischemic strokes on MRI   #new L1 compression fx, CPB 2/2 spinal stenosis (follows w Dr. العلي)  - Neuro Checks Q4H  - Brilinta BID   - Hold aspirin for now (low Hb)  - Nimodipine   - Normovolemia  - Keppra for seizure ppx  - Video EEG negative  - MRA showed decreased aneurysm (5.9 -> 3.5mm), repeat MRI or CTA in 3 weeks per NSG  - Pain control: Tylenol, Lido patch, flexeril (new L1 compression fx, HA) on MR L-spine  - OOB to chair  - PT/OT/OOB     #HTN  - -140  - nimodipine   - encourage PO intake  - continue metoprolol     #Sinus tachycardia (at one point >150)  - Continue metoprolol 25 mg bid and titrate PRN for better HR control  - Started Multaq 400mg bid   - transfuse if H/H continues to decrease      #SEEMA not on CPAP, ILD  she was tachypneic and wheezing 5/30, that improved after diuresis and blood transfusion.   - Aspiration precautions   - Prednisone 15mg qd for ILD; pulm following  - Incentive spirometry  - Strict I/Os     #Chronic thrombocytosis  -anagrelide on hold due to GIB/anemia    #hypothyroidism  - Synthroid PO 100mcg qD  - TSH 70, free t4 1.0; repeat in 2-4 weeks    Urinary retention  consider CIC    ?Metabolic acidosis w/ +AG ?diarrhea, starvation ketoacidosis  -f/u ABG  -repeat labs  -cont HCO3  -d/c'ed all laxatives  -improving    Suspected baseline dementia  stable    Very high risk pt. Px is guarded    #Progress Note Handoff  Pending: Consults____Clinical improvement and stability__x___Tests___CBC stability_____PT____x____  Pt/Family discussion: Pt informed and agrees with the current plan. Neuro team spoke to niece.  Disposition: Home______/SNF____vs   4A___?___/To be determined____x____    My note supersedes the residents note should a discrepancy arise.    Chart and notes personally reviewed.  Care Discussed with Consultants/Other Providers/ Housestaff [ x] YES [ ] NO   Radiology, labs, old records personally reviewed.    discussed w/ housestaff, nursing, case management, neuro team

## 2022-06-02 NOTE — CONSULT NOTE ADULT - SUBJECTIVE AND OBJECTIVE BOX
HPI:  History of Present Illness  Ms. Shook is a 76 year old female patient known to have:  - Baseline AO3, lives with niece, ambulates with a walker  - SEEMA and ILD. Follows with Dr Stephens. Not on CPAP or home O2. On Prednisone 20mg QD  - Depression  - HTN. Recent Admission for HTN Urgency. Follows with Dr Moses  - Hypothyroidism   - Recent admission for a bleeding duodenal ulcer at [facility] (melena). Home med Protonix 20mg QD  - Essential Thrombocytosis. Follows with Dr Denise  - Chronic Back Pain for years secondary to Spinal Stenosis per marquise. s/p Spinal Epidural Injection. Was on PT 3x/ week but has been non compliant for 2-3 months. Follows with Dr Jaimes. Was supposed to go for a nerve ablation last Monday (cancelled due to poorly controlled HTN s/p admission)       She was brought to the ED by marquise on  for evaluation of worsening back pain and confusion x2 days.  History goes back to 2 days PTP when the patient started complaining of worsening of her chronic dull lower back pain that radiates down to her right medial aspect of thigh.  This has resulted in limited ambulation (patient refusing to leave her bed) and to being non compliant with Physical Therapy in the absence of leg weakness or paresthesias or numbness or urinary incontinence.  Per marquise, patient has been feeling down x few days with reduced PO intake, reduced sleep, loss of interest, thoughts that her family doesn't like/ support her, and confusion (some times would not recognize niece).  She has an instance where she had fecal incontinence on way to bathroom 2 days ago.  In the setting of confusion and increased pain, marquise decided to bring patient to ED for evaluation.    On review of systems, marquise denies any recent fever, chills, night sweats, URTI symptoms (cough, rhinorrhea, sore throat), urinary symptoms (urinary frequency, urgency, intermittence, dysuria, foul smelling urine, cloudy urine), abdominal pain, headache, nausea, or vomiting.   No sick contacts.   No recent travel or exposure to recent travelers.      Upon presentation to the ED, the patient was hemodynamically stable:  Vital Signs in ED   - /104 mmHg  -   - RR 20  - T97.1  - SaO2 94% on RA      Investigations   Laboratory Workup  - CBC:                        11.6   20.94 )-----------( 188      ( 18 May 2022 10:50 )             34.1     - Chemistry:      140  |  96<L>  |  48<H>  ----------------------------<  109<H>  4.3   |  24  |  2.1<H>    Ca    10.4<H>      18 May 2022 10:50    TPro  7.3  /  Alb  4.8  /  TBili  0.6  /  DBili  x   /  AST  36  /  ALT  27  /  AlkPhos  61      - Coagulation Studies:  PT/INR - ( 18 May 2022 10:50 )   PT: 12.30 sec;   INR: 1.07 ratio    PTT - ( 18 May 2022 10:50 )  PTT:24.8 sec      Microbiological Workup  Urinalysis Basic - ( 18 May 2022 14:07 )    Color: Yellow / Appearance: Clear / S.021 / pH: x  Gluc: x / Ketone: Trace  / Bili: Negative / Urobili: <2 mg/dL   Blood: x / Protein: 30 mg/dL / Nitrite: Negative   Leuk Esterase: Negative / RBC: 6 /HPF / WBC 4 /HPF   Sq Epi: x / Non Sq Epi: 1 /HPF / Bacteria: Negative          Radiological Workup  * CT Abdomen and Pelvis w/ IV Cont (22 @ 14:00) No acute abnormality within the abdomen and pelvis. Age-indeterminate moderate compression fracture seen at L1, new since 2022.  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 Pt s/p DSA pipeline stenting R ICA aneurysm on .- MRA showed decreased aneurysm (5.9 -> 3.5mm), repeat MRI or CTA in 3 weeks per NSG    < from: MR Lumbar Spine w/wo IV Cont (22 @ 19:38) >  IMPRESSION:  Acute compression deformity of the L1 vertebral body with approximately   80% height loss as well as bulging of the posterior cortex resulting in   severe spinal stenosis.    Additional acute compression deformity of the L4 superior endplate.               PAST MEDICAL & SURGICAL HISTORY:  HTN (hypertension)      Hypothyroid      Depression      Interstitial lung disease      Essential thrombocytopenia      History of ovarian cyst          Hospital Course:    TODAY'S SUBJECTIVE & REVIEW OF SYMPTOMS:     Constitutional WNL   Cardio WNL   Resp WNL   GI WNL  Heme WNL  Endo WNL  Skin WNL  MSK back pain  Neuro WNL  Cognitive confusion  Psych WNL      MEDICATIONS  (STANDING):  albuterol/ipratropium for Nebulization. 3 milliLiter(s) Nebulizer once  chlorhexidine 4% Liquid 1 Application(s) Topical <User Schedule>  dronedarone 400 milliGRAM(s) Oral two times a day  enoxaparin Injectable 40 milliGRAM(s) SubCutaneous every 24 hours  levothyroxine 100 MICROGram(s) Oral daily  lidocaine   4% Patch 1 Patch Transdermal daily  melatonin 5 milliGRAM(s) Oral at bedtime  metoprolol tartrate 25 milliGRAM(s) Oral every 12 hours  niMODipine Oral Solution 30 milliGRAM(s) Oral every 4 hours  pantoprazole Infusion 8 mG/Hr (10 mL/Hr) IV Continuous <Continuous>  predniSONE   Tablet 15 milliGRAM(s) Oral daily  sodium bicarbonate 1300 milliGRAM(s) Oral every 6 hours  ticagrelor 90 milliGRAM(s) Oral two times a day    MEDICATIONS  (PRN):  acetaminophen     Tablet .. 650 milliGRAM(s) Oral every 6 hours PRN Mild Pain (1 - 3)  cyclobenzaprine 10 milliGRAM(s) Oral three times a day PRN Muscle Spasm      FAMILY HISTORY:      Allergies    No Known Allergies    Intolerances        SOCIAL HISTORY:    [  ] Etoh  [  ] Smoking  [  ] Substance abuse     Home Environment:  [   ] Home Alone  [ x  ] Lives with Family  [   ] Home Health Aid    Dwelling:  [   ] Apartment  [ x  ] Private House  [   ] Adult Home  [   ] Skilled Nursing Facility      [   ] Short Term  [   ] Long Term  [ x  ] Stairs       Elevator [   ]    FUNCTIONAL STATUS PTA: (Check all that apply)  Ambulation: [  x  ]Independent    [   ] Dependent     [   ] Non-Ambulatory  Assistive Device: [   ] SA Cane  [   ]  Q Cane  [ x  ] Walker  [   ]  Wheelchair  ADL : [ x  ] Independent  [    ]  Dependent       Vital Signs Last 24 Hrs  T(C): 36.3 (2022 14:07), Max: 37.1 (2022 05:00)  T(F): 97.3 (2022 14:07), Max: 98.7 (2022 05:00)  HR: 93 (2022 14:07) (79 - 109)  BP: 158/67 (2022 14:07) (139/63 - 158/67)  BP(mean): 97 (2022 14:07) (91 - 97)  RR: 18 (2022 14:07) (16 - 18)  SpO2: 96% (2022 14:07) (96% - 100%)      PHYSICAL EXAM: Awake & confused, Ox1  GENERAL: NAD  HEAD:  Normocephalic  CHEST/LUNG: Clear   HEART: S1S2+  ABDOMEN: Soft, Nontender  EXTREMITIES:  no calf tenderness    NERVOUS SYSTEM:  Cranial Nerves 2-12 intact [   ] Abnormal  [   ]  ROM: WFL all extremities [ x  ]  Abnormal [   ]  Motor Strength: WFL all extremities  [  x ]  Abnormal [   ]  Sensation: intact to light touch [ x  ] Abnormal [   ]    FUNCTIONAL STATUS:  Bed Mobility: Independent [   ]  Supervision [   ]  Needs Assistance [  x ]  N/A [   ]  Transfers: Independent [   ]  Supervision [   ]  Needs Assistance [  x ]  N/A [   ]   Ambulation: Independent [   ]  Supervision [   ]  Needs Assistance [   ]  N/A [   ]  ADL: Independent [   ] Requires Assistance [   ] N/A [   ]      LABS:                        7.8    26.13 )-----------( 172      ( 2022 07:21 )             23.2     06-02    140  |  105  |  23<H>  ----------------------------<  129<H>  3.0<L>   |  17  |  1.1    Ca    8.7      2022 07:21    TPro  5.9<L>  /  Alb  3.5  /  TBili  0.4  /  DBili  x   /  AST  20  /  ALT  17  /  AlkPhos  52  06-02          RADIOLOGY & ADDITIONAL STUDIES:   HPI:  History of Present Illness  Ms. Shook is a 76 year old female patient known to have:  - Baseline AO3, lives with niece, ambulates with a walker  - SEEMA and ILD. Follows with Dr Stephens. Not on CPAP or home O2. On Prednisone 20mg QD  - Depression  - HTN. Recent Admission for HTN Urgency. Follows with Dr Moses  - Hypothyroidism   - Recent admission for [facility] (melena). Home med Protonix 20mg QD  - Essential Thrombocytosis. Follows with Dr Denise  - Chronic Back Pain for years secondary to Spinal Stenosis per marquise. s/p Spinal Epidural Injection. Was on PT 3x/ week but has been non compliant for 2-3 months. Follows with Dr Jaimes. Was supposed to go for a nerve ablation last Monday (cancelled due to poorly controlled HTN s/p admission)       She was brought to the ED by marquise on  for evaluation of worsening back pain and confusion x2 days.  History goes back to 2 days PTP when the patient started complaining of worsening of her chronic dull lower back pain that radiates down to her right medial aspect of thigh.  This has resulted in limited ambulation (patient refusing to leave her bed) and to being non compliant with Physical Therapy in the absence of leg weakness or paresthesias or numbness or urinary incontinence.  Per marquise, patient has been feeling down x few days with reduced PO intake, reduced sleep, loss of interest, thoughts that her family doesn't like/ support her, and confusion (some times would not recognize niece).  She has an instance where she had fecal incontinence on way to bathroom 2 days ago.  In the setting of confusion and increased pain, marquise decided to bring patient to ED for evaluation.    On review of systems, marquise denies any recent fever, chills, night sweats, URTI symptoms (cough, rhinorrhea, sore throat), urinary symptoms (urinary frequency, urgency, intermittence, dysuria, foul smelling urine, cloudy urine), abdominal pain, headache, nausea, or vomiting.   No sick contacts.   No recent travel or exposure to recent travelers.      Upon presentation to the ED, the patient was hemodynamically stable:  Vital Signs in ED   - /104 mmHg  -   - RR 20  - T97.1  - SaO2 94% on RA      Investigations   Laboratory Workup  - CBC:                        11.6   20.94 )-----------( 188      ( 18 May 2022 10:50 )             34.1     - Chemistry:      140  |  96<L>  |  48<H>  ----------------------------<  109<H>  4.3   |  24  |  2.1<H>    Ca    10.4<H>      18 May 2022 10:50    TPro  7.3  /  Alb  4.8  /  TBili  0.6  /  DBili  x   /  AST  36  /  ALT  27  /  AlkPhos  61      - Coagulation Studies:  PT/INR - ( 18 May 2022 10:50 )   PT: 12.30 sec;   INR: 1.07 ratio    PTT - ( 18 May 2022 10:50 )  PTT:24.8 sec      Microbiological Workup  Urinalysis Basic - ( 18 May 2022 14:07 )    Color: Yellow / Appearance: Clear / S.021 / pH: x  Gluc: x / Ketone: Trace  / Bili: Negative / Urobili: <2 mg/dL   Blood: x / Protein: 30 mg/dL / Nitrite: Negative   Leuk Esterase: Negative / RBC: 6 /HPF / WBC 4 /HPF   Sq Epi: x / Non Sq Epi: 1 /HPF / Bacteria: Negative          Radiological Workup  * CT Abdomen and Pelvis w/ IV Cont (22 @ 14:00) No acute abnormality within the abdomen and pelvis. Age-indeterminate moderate compression fracture seen at L1, new since 2022.  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 Pt s/p DSA pipeline stenting R ICA aneurysm on .- MRA showed decreased aneurysm (5.9 -> 3.5mm), repeat MRI or CTA in 3 weeks per NSG    < from: MR Lumbar Spine w/wo IV Cont (22 @ 19:38) >  IMPRESSION:  Acute compression deformity of the L1 vertebral body with approximately   80% height loss as well as bulging of the posterior cortex resulting in   severe spinal stenosis.    Additional acute compression deformity of the L4 superior endplate.    MRI of brain done on  showed numerous scattered punctuated acute infarcts involving te bilateral cortical hemisphere            PAST MEDICAL & SURGICAL HISTORY:  HTN (hypertension)      Hypothyroid      Depression      Interstitial lung disease      Essential thrombocytopenia      History of ovarian cyst          Hospital Course:    TODAY'S SUBJECTIVE & REVIEW OF SYMPTOMS:     Constitutional WNL   Cardio WNL   Resp WNL   GI WNL  Heme WNL  Endo WNL  Skin WNL  MSK back pain  Neuro WNL  Cognitive confusion  Psych WNL      MEDICATIONS  (STANDING):  albuterol/ipratropium for Nebulization. 3 milliLiter(s) Nebulizer once  chlorhexidine 4% Liquid 1 Application(s) Topical <User Schedule>  dronedarone 400 milliGRAM(s) Oral two times a day  enoxaparin Injectable 40 milliGRAM(s) SubCutaneous every 24 hours  levothyroxine 100 MICROGram(s) Oral daily  lidocaine   4% Patch 1 Patch Transdermal daily  melatonin 5 milliGRAM(s) Oral at bedtime  metoprolol tartrate 25 milliGRAM(s) Oral every 12 hours  niMODipine Oral Solution 30 milliGRAM(s) Oral every 4 hours  pantoprazole Infusion 8 mG/Hr (10 mL/Hr) IV Continuous <Continuous>  predniSONE   Tablet 15 milliGRAM(s) Oral daily  sodium bicarbonate 1300 milliGRAM(s) Oral every 6 hours  ticagrelor 90 milliGRAM(s) Oral two times a day    MEDICATIONS  (PRN):  acetaminophen     Tablet .. 650 milliGRAM(s) Oral every 6 hours PRN Mild Pain (1 - 3)  cyclobenzaprine 10 milliGRAM(s) Oral three times a day PRN Muscle Spasm      FAMILY HISTORY:      Allergies    No Known Allergies    Intolerances        SOCIAL HISTORY:    [  ] Etoh  [  ] Smoking  [  ] Substance abuse     Home Environment:  [   ] Home Alone  [ x  ] Lives with Family  [   ] Home Health Aid    Dwelling:  [   ] Apartment  [ x  ] Private House  [   ] Adult Home  [   ] Skilled Nursing Facility      [   ] Short Term  [   ] Long Term  [ x  ] Stairs       Elevator [   ]    FUNCTIONAL STATUS PTA: (Check all that apply)  Ambulation: [  x  ]Independent    [   ] Dependent     [   ] Non-Ambulatory  Assistive Device: [   ] SA Cane  [   ]  Q Cane  [ x  ] Walker  [   ]  Wheelchair  ADL : [ x  ] Independent  [    ]  Dependent       Vital Signs Last 24 Hrs  T(C): 36.3 (2022 14:07), Max: 37.1 (2022 05:00)  T(F): 97.3 (2022 14:07), Max: 98.7 (2022 05:00)  HR: 93 (2022 14:07) (79 - 109)  BP: 158/67 (2022 14:07) (139/63 - 158/67)  BP(mean): 97 (2022 14:07) (91 - 97)  RR: 18 (2022 14:07) (16 - 18)  SpO2: 96% (2022 14:07) (96% - 100%)      PHYSICAL EXAM: Awake & confused, Ox1  GENERAL: NAD  HEAD:  Normocephalic  CHEST/LUNG: Clear   HEART: S1S2+  ABDOMEN: Soft, Nontender  EXTREMITIES:  no calf tenderness    NERVOUS SYSTEM:  Cranial Nerves 2-12 intact [   ] Abnormal  [   ]  ROM: WFL all extremities [ x  ]  Abnormal [   ]  Motor Strength: WFL all extremities  [   ]  Abnormal [  x ]4/5 all ext   Sensation: intact to light touch [ x  ] Abnormal [   ]    FUNCTIONAL STATUS:  Bed Mobility: Independent [   ]  Supervision [   ]  Needs Assistance [  x ]  N/A [   ]  Transfers: Independent [   ]  Supervision [   ]  Needs Assistance [  x ]  N/A [   ]   Ambulation: Independent [   ]  Supervision [   ]  Needs Assistance [   ]  N/A [   ]  ADL: Independent [   ] Requires Assistance [   ] N/A [   ]      LABS:                        7.8    26.13 )-----------( 172      ( 2022 07:21 )             23.2     06-02    140  |  105  |  23<H>  ----------------------------<  129<H>  3.0<L>   |  17  |  1.1    Ca    8.7      2022 07:21    TPro  5.9<L>  /  Alb  3.5  /  TBili  0.4  /  DBili  x   /  AST  20  /  ALT  17  /  AlkPhos  52  06-02          RADIOLOGY & ADDITIONAL STUDIES:

## 2022-06-02 NOTE — PROGRESS NOTE ADULT - SUBJECTIVE AND OBJECTIVE BOX
Gastroenterology progress note:     Patient is a 76y old  Female who presents with a chief complaint of Worsening Back Pain and Metabolic Encephalopathy (01 Jun 2022 18:21)       Admitted on: 05-18-22    We are following the patient for melena      Interval History: 1 BM this am, not sure if black. remains HD stable  Hb 7.8 stable     PAST MEDICAL & SURGICAL HISTORY:  HTN (hypertension)  Hypothyroid  Depression  Interstitial lung disease  Essential thrombocytopenia  History of ovarian cyst    MEDICATIONS  (STANDING):  albuterol/ipratropium for Nebulization. 3 milliLiter(s) Nebulizer once  chlorhexidine 4% Liquid 1 Application(s) Topical <User Schedule>  dronedarone 400 milliGRAM(s) Oral two times a day  enoxaparin Injectable 40 milliGRAM(s) SubCutaneous every 24 hours  levothyroxine 100 MICROGram(s) Oral daily  lidocaine   4% Patch 1 Patch Transdermal daily  melatonin 5 milliGRAM(s) Oral at bedtime  metoprolol tartrate 25 milliGRAM(s) Oral every 12 hours  niMODipine Oral Solution 30 milliGRAM(s) Oral every 4 hours  pantoprazole Infusion 8 mG/Hr (10 mL/Hr) IV Continuous <Continuous>  predniSONE   Tablet 15 milliGRAM(s) Oral daily  sodium bicarbonate 1300 milliGRAM(s) Oral every 6 hours  ticagrelor 90 milliGRAM(s) Oral two times a day    MEDICATIONS  (PRN):  acetaminophen     Tablet .. 650 milliGRAM(s) Oral every 6 hours PRN Mild Pain (1 - 3)  cyclobenzaprine 10 milliGRAM(s) Oral three times a day PRN Muscle Spasm      Allergies  No Known Allergies      Review of Systems:   General: no fever  HEENT: no hemoptysis  Cardiovascular:  No Chest Pain, No Palpitations  Respiratory:  No Cough, No Dyspnea  Gastrointestinal:  As described in HPI  Hematology: no bruising or hematoma   Neurology: no new motor deficit  Skin: no new rash    Physical Examination:  T(C): 37.1 (06-02-22 @ 05:00), Max: 37.1 (06-02-22 @ 05:00)  HR: 93 (06-02-22 @ 05:00) (79 - 109)  BP: 155/63 (06-02-22 @ 05:00) (127/46 - 164/61)  RR: 18 (06-02-22 @ 05:00) (16 - 18)  SpO2: 100% (06-01-22 @ 17:00) (96% - 100%)      Constitutional: No acute distress.  Head: normocephalic  Neck: supple   Eyes: EOMI  Respiratory:  No signs of respiratory distress. Lung sounds are clear bilaterally.  Cardiovascular:  S1 S2, Regular rate and rhythm.  Abdominal: Abdomen is soft, symmetric, and non-tender without distention.   Extremities: no pitting edema  Neurology: alert oriented *3, no asterixis   Skin: No rashes, No Jaundice.      Data: (reviewed by attending)                        7.8    26.13 )-----------( 172      ( 02 Jun 2022 07:21 )             23.2     Hgb trend:  7.8  06-02-22 @ 07:21  7.8  06-01-22 @ 17:50  8.0  06-01-22 @ 06:56  8.5  05-31-22 @ 11:49       140  |  105  |  23<H>  ----------------------------<  129<H>  3.0<L>   |  17  |  1.1    Ca    8.7      02 Jun 2022 07:21  Mg     1.9     05-31    TPro  5.9<L>  /  Alb  3.5  /  TBili  0.4  /  DBili  x   /  AST  20  /  ALT  17  /  AlkPhos  52  06-02    Liver panel trend:  TBili 0.4   /   AST 20   /   ALT 17   /   AlkP 52   /   Tptn 5.9   /   Alb 3.5    /   DBili --      06-02  TBili 0.4   /   AST 20   /   ALT 16   /   AlkP 51   /   Tptn 5.9   /   Alb 3.5    /   DBili --      06-01  TBili 0.4   /   AST 16   /   ALT 15   /   AlkP 44   /   Tptn 5.8   /   Alb 3.6    /   DBili --      05-31  TBili 0.3   /   AST 15   /   ALT 15   /   AlkP 46   /   Tptn 5.5   /   Alb 3.3    /   DBili --      05-30  TBili 0.4   /   AST 18   /   ALT 17   /   AlkP 57   /   Tptn 5.8   /   Alb 3.5    /   DBili --      05-29  TBili 0.6   /   AST 24   /   ALT 19   /   AlkP 64   /   Tptn 6.4   /   Alb 3.9    /   DBili --      05-28  TBili 0.5   /   AST 23   /   ALT 22   /   AlkP 55   /   Tptn 5.9   /   Alb 3.7    /   DBili --      05-27  TBili 0.7   /   AST 31   /   ALT 27   /   AlkP 57   /   Tptn 6.0   /   Alb 4.0    /   DBili --      05-26  TBili 0.6   /   AST 35   /   ALT 30   /   AlkP 52   /   Tptn 5.8   /   Alb 3.8    /   DBili --      05-25  TBili 0.5   /   AST 48   /   ALT 33   /   AlkP 50   /   Tptn 5.9   /   Alb 3.8    /   DBili --      05-24

## 2022-06-02 NOTE — CONSULT NOTE ADULT - REASON FOR ADMISSION
Worsening Back Pain and Metabolic Encephalopathy

## 2022-06-02 NOTE — CONSULT NOTE ADULT - ASSESSMENT
IMPRESSION: Rehab of SAH / encephalopathy / acute L1,2 lumbar compression fx / s/p R ICA aneurysm stent     PRECAUTIONS: [   ] Cardiac  [   ] Respiratory  [   ] Seizures [   ] Contact Isolation  [ x  ] Droplet Isolation  [   ] Other    Weight Bearing Status:     RECOMMENDATION:    Out of Bed to Chair     DVT/Decubiti Prophylaxis    REHAB PLAN:     [  x  ] Bedside P/T 3-5 times a week   [ x   ]   Bedside O/T  2-3 times a week             [   x ] Speech Therapy               [    ]  No Rehab Therapy Indicated   Conditioning/ROM                                    ADL  Bed Mobility                                               Conditioning/ROM  Transfers                                                     Bed Mobility  Sitting /Standing Balance                         Transfers                                        Gait Training                                               Sitting/Standing Balance  Stair Training [   ]Applicable                    Home equipment Eval                                                                        Splinting  [   ] Only      GOALS:   ADL   [  x  ]   Independent                    Transfers  [  x  ] Independent                          Ambulation  [   x ] Independent     [   x  ] With device                            [    ]  CG                                                         [    ]  CG                                                                  [    ] CG                            [    ] Min A                                                   [    ] Min A                                                              [    ] Min  A          DISCHARGE PLAN:   [    ]  Good candidate for Intensive Rehabilitation/Hospital based                                             Will tolerate 3hrs Intensive Rehab Daily                                       [     ]  Short Term Rehab in Skilled Nursing Facility                                       [     ]  Home with Outpatient or  services                                         [   x  ]  Possible Candidate for Intensive Hospital based Rehab                                        IMPRESSION: Rehab of bilateral acute CVA / SAH / encephalopathy / acute L1,2 lumbar compression fx / s/p R ICA aneurysm stent embolization      PRECAUTIONS: [   ] Cardiac  [   ] Respiratory  [   ] Seizures [   ] Contact Isolation  [ x  ] Droplet Isolation  [   ] Other    Weight Bearing Status:     RECOMMENDATION:    Out of Bed to Chair     DVT/Decubiti Prophylaxis    REHAB PLAN:     [  x  ] Bedside P/T 3-5 times a week   [ x   ]   Bedside O/T  2-3 times a week             [   x ] Speech Therapy               [    ]  No Rehab Therapy Indicated   Conditioning/ROM                                    ADL  Bed Mobility                                               Conditioning/ROM  Transfers                                                     Bed Mobility  Sitting /Standing Balance                         Transfers                                        Gait Training                                               Sitting/Standing Balance  Stair Training [   ]Applicable                    Home equipment Eval                                                                        Splinting  [   ] Only      GOALS:   ADL   [  x  ]   Independent                    Transfers  [  x  ] Independent                          Ambulation  [   x ] Independent     [   x  ] With device                            [    ]  CG                                                         [    ]  CG                                                                  [    ] CG                            [    ] Min A                                                   [    ] Min A                                                              [    ] Min  A          DISCHARGE PLAN:   [    ]  Good candidate for Intensive Rehabilitation/Hospital based                                             Will tolerate 3hrs Intensive Rehab Daily                                       [     ]  Short Term Rehab in Skilled Nursing Facility                                       [     ]  Home with Outpatient or  services                                         [   x  ]  Possible Candidate for Intensive Hospital based Rehab

## 2022-06-02 NOTE — PROGRESS NOTE ADULT - ASSESSMENT
`Ms. Shook is a 76 year old female patient, Primarily Occitan speaking, known to have, Baseline AO3, lives with niece, ambulates with a walker, SEEMA and ILD (Prednisone 20mg QD), Depression, HTN, Hypothyroidism, Essential Thrombocytosis. Follows with Dr Denise, Chronic Back Pain for years secondary to Spinal Stenosis per niece, s/p Spinal Epidural Injection and Recent admission for a bleeding duodenal ulcer at [facility] (melena), Status post coil embolization in the region of the gastroduodenal artery, was on Protonix 20mg QD at home  patient was brought to the ED by marquise on 05/18 for evaluation of worsening back pain and confusion x2 days   CTH on admission with new right sided scattered SAH, CTA with aneurysm. 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 on ASA last dose 5/29 and Brilinta   currently on Brilinta BID and lovenox 40 qd ppx   ILD on prednisone  acidosis with bicarb of 9  leukocytosis  COVID-19 exposure    Recommendations  -continue PPI drip for today, in am switch to IV PPI BID   -can advance diet to soft  -trend CBC and transfuse to keep Hb > 8   -2 large bore IV  -type and screen  -holding on endoscopic procedure since HD stable and Hb stable and in the setting of COVID exposure with her interstitial lung disease   -if hematemesis / hypotension call GI fellow STAT  -discussed yesterday with marquise Platt    -for questions text me on  teams, call [phone] on weekdays before 5pm or call GI service at [phone]  after 5 pm and on weekends

## 2022-06-02 NOTE — PROGRESS NOTE ADULT - SUBJECTIVE AND OBJECTIVE BOX
Neurology Follow up note  Patient examined at bedside, no new issues this morning, no agitation overnight. The patient was awake today and answering all questions. Hb stable and patient feels good     HPI:  Ms. Shook is a 76 year old female patient known to have:Baseline AO3, lives with niece, ambulates with a walker, SEEMA and ILD. Follows with Dr Stephens. Not on CPAP or home O2. On Prednisone 20mg QD, Depression, HTN. Recent Admission for HTN Urgency, Hypothyroidism. Recent admission for a bleeding duodenal ulcer at [facility] (melena). Home med Protonix 20mg QD Essential Thrombocytosis. Follows with Dr Denise, Chronic Back Pain for years secondary to Spinal Stenosis per marquise. s/p Spinal Epidural Injection. Was on PT 3x/ week but has been non compliant for 2-3 months. Follows with Dr Jaimes. Was supposed to go for a nerve ablation last Monday (cancelled due to poorly controlled HTN s/p admission). She was brought to the ED by marquise on  for evaluation of worsening back pain and confusion x2 days. History goes back to 2 days PTP when the patient started complaining of worsening of her chronic dull lower back pain that radiates down to her right medial aspect of thigh. This has resulted in limited ambulation (patient refusing to leave her bed) and to being non compliant with Physical Therapy in the absence of leg weakness or paresthesias or numbness or urinary incontinence. Per marquise, patient has been feeling down x few days with reduced PO intake, reduced sleep, loss of interest, thoughts that her family doesn't like/ support her, and confusion (some times would not recognize niclaudia).She has an instance where she had fecal incontinence on way to bathroom 2 days ago.In the setting of confusion and increased pain, marquise decided to bring patient to ED for evaluation. On review of systems, marquise denies any recent fever, chills, night sweats, URTI symptoms (cough, rhinorrhea, sore throat), urinary symptoms (urinary frequency, urgency, intermittence, dysuria, foul smelling urine, cloudy urine), abdominal pain, headache, nausea, or vomiting. No sick contacts. No recent travel or exposure to recent travelers.        Vital Signs Last 24 Hrs  T(C): 37.1 (2022 05:00), Max: 37.1 (2022 05:00)  T(F): 98.7 (2022 05:00), Max: 98.7 (2022 05:00)  HR: 93 (2022 05:00) (79 - 109)  BP: 155/63 (2022 05:00) (127/46 - 155/63)  BP(mean): 91 (2022 17:00) (64 - 91)  RR: 18 (2022 05:00) (16 - 18)  SpO2: 100% (2022 17:00) (96% - 100%)            Neurological Exam:   Mental status: Patient is awake alert oriented to self and place. Anxious, follows 1 step commands.   Cranial nerves: Normal visual field, normal eye movements, PERRLA, No facial asymmetry   Power 5/5 UE            5/ 5  LE  No abnormal involuntary mvmts  Sensation: Responds to pain in all 4 exts.  FTN/HKS: Grossly intact  Gait: unable.      NIHSS:    1A: LOC 0   1B: LOC Questions 2  1C: Performs Tasks 0   2: Horizontal EOMs 0  3: Visual Fields 0   4: Facial Palsy 1   5A: LUE Drift 0   5B: RUE Drift 0   6A: LLE Drift 0   6B: RLE Drift 0   7: Limb Ataxia (heel-shin, FNF) 0   8: Sensation 0   9: Language/Aphasia 0   10: Dysarthria 0   11: Extinction/Inattention 0     NIHSS Total: 3      mRs  0 No symptoms at all  1 No significant disability despite symptoms; able to carry out all usual duties and activities without assistance  2 Slight disability; unable to carry out all previous activities, but able to look after own affairs  3 Moderate disability; requiring some help, but able to walk without assistance  4 Moderately severe disability; unable to walk without assistance and unable to attend to own bodily needs without assistance  5 Severe disability; bedridden, incontinent and requiring constant nursing care and attention  6 Dead         MEDICATIONS  (STANDING):  albuterol/ipratropium for Nebulization. 3 milliLiter(s) Nebulizer once  chlorhexidine 4% Liquid 1 Application(s) Topical <User Schedule>  dronedarone 400 milliGRAM(s) Oral two times a day  enoxaparin Injectable 40 milliGRAM(s) SubCutaneous every 24 hours  levothyroxine 100 MICROGram(s) Oral daily  lidocaine   4% Patch 1 Patch Transdermal daily  melatonin 5 milliGRAM(s) Oral at bedtime  metoprolol tartrate 25 milliGRAM(s) Oral every 12 hours  niMODipine Oral Solution 30 milliGRAM(s) Oral every 4 hours  pantoprazole Infusion 8 mG/Hr (10 mL/Hr) IV Continuous <Continuous>  predniSONE   Tablet 15 milliGRAM(s) Oral daily  sodium bicarbonate 1300 milliGRAM(s) Oral every 6 hours  ticagrelor 90 milliGRAM(s) Oral two times a day    MEDICATIONS  (PRN):  acetaminophen     Tablet .. 650 milliGRAM(s) Oral every 6 hours PRN Mild Pain (1 - 3)  cyclobenzaprine 10 milliGRAM(s) Oral three times a day PRN Muscle Spasm                LABS:  cret                        7.8    26.13 )-----------( 172      ( 2022 07:21 )             23.2     06-02    140  |  105  |  23<H>  ----------------------------<  129<H>  3.0<L>   |  17  |  1.1    Ca    8.7      2022 07:21  Mg     1.9     05-    TPro  5.9<L>  /  Alb  3.5  /  TBili  0.4  /  DBili  x   /  AST  20  /  ALT  17  /  AlkPhos  52  06-02              Radiology  < from: MR Lumbar Spine w/wo IV Cont (22 @ 19:38) >  IMPRESSION:  Acute compression deformity of the L1 vertebral body with approximately   80% height loss as well as bulging of the posterior cortex resulting in   severe spinal stenosis.    Additional acute compression deformity of the L4 superior endplate.    Severe multilevel degenerative changes contributing to spinal canal as   well as neuroforaminal narrowing as detailed above.    --- End of Report ---      JO BECERRIL MD; Attending Radiologist  This document has been electronically signed. May 27 2022  9:18AM    < end of copied text >        ECHO  < from: TTE Echo Complete w/o Contrast w/ Doppler (22 @ 10:38) >    Summary:   1. Normal global left ventricular systolic function.   2. Normal left atrial size.   3. Normal right atrial size.   4. Trace mitral valve regurgitation.   5. PSAP 35.    < end of copied text >      VEEG  VEEG in the last 24 hours:    Background - continuous, symmetrical, less than optimally organized, reaching frequencies in the range of 7-8 hz    Focal and generalized slowin. mild generalized slowing  2. mild left hemispheric focal slowing   3. lower amplitude on the right side    Interictal activity - none    Events - none    Seizures -none    Impression:  Abnormal VEEG as above    Plan - as per [facility] team

## 2022-06-02 NOTE — PROGRESS NOTE ADULT - SUBJECTIVE AND OBJECTIVE BOX
AMANDA FELDER  76y  Female    Patient is a 76y old  Female who presents with a chief complaint of Worsening Back Pain and Metabolic Encephalopathy (31 May 2022 09:26)      HPI:  History of Present Illness  Ms. Felder is a 76 year old female patient known to have:  - Baseline AO3, lives with niece, ambulates with a walker  - SEEMA and ILD. Follows with Dr Stephens. Not on CPAP or home O2. On Prednisone 20mg QD  - Depression  - HTN. Recent Admission for HTN Urgency. Follows with Dr Moses  - Hypothyroidism   - Recent admission for a bleeding duodenal ulcer at [facility] (melena). Home med Protonix 20mg QD  - Essential Thrombocytosis. Follows with Dr Miller  - Chronic Back Pain for years secondary to Spinal Stenosis per marquise. s/p Spinal Epidural Injection. Was on PT 3x/ week but has been non compliant for 2-3 months. Follows with Dr Jaimes. Was supposed to go for a nerve ablation last Monday (cancelled due to poorly controlled HTN s/p admission)       She was brought to the ED by marquise on  for evaluation of worsening back pain and confusion x2 days.  History goes back to 2 days PTP when the patient started complaining of worsening of her chronic dull lower back pain that radiates down to her right medial aspect of thigh.  This has resulted in limited ambulation (patient refusing to leave her bed) and to being non compliant with Physical Therapy in the absence of leg weakness or paresthesias or numbness or urinary incontinence.  Per marquise, patient has been feeling down x few days with reduced PO intake, reduced sleep, loss of interest, thoughts that her family doesn't like/ support her, and confusion (some times would not recognize marquise).  She has an instance where she had fecal incontinence on way to bathroom 2 days ago.  In the setting of confusion and increased pain, marquise decided to bring patient to ED for evaluation.    On review of systems, marquise denies any recent fever, chills, night sweats, URTI symptoms (cough, rhinorrhea, sore throat), urinary symptoms (urinary frequency, urgency, intermittence, dysuria, foul smelling urine, cloudy urine), abdominal pain, headache, nausea, or vomiting.   No sick contacts.   No recent travel or exposure to recent travelers.      Upon presentation to the ED, the patient was hemodynamically stable:  Vital Signs in ED   - /104 mmHg  -   - RR 20  - T97.1  - SaO2 94% on RA      Investigations   Laboratory Workup  - CBC:                        11.6   20.94 )-----------( 188      ( 18 May 2022 10:50 )             34.1     - Chemistry:      140  |  96<L>  |  48<H>  ----------------------------<  109<H>  4.3   |  24  |  2.1<H>    Ca    10.4<H>      18 May 2022 10:50    TPro  7.3  /  Alb  4.8  /  TBili  0.6  /  DBili  x   /  AST  36  /  ALT  27  /  AlkPhos  61      - Coagulation Studies:  PT/INR - ( 18 May 2022 10:50 )   PT: 12.30 sec;   INR: 1.07 ratio    PTT - ( 18 May 2022 10:50 )  PTT:24.8 sec      Microbiological Workup  Urinalysis Basic - ( 18 May 2022 14:07 )    Color: Yellow / Appearance: Clear / S.021 / pH: x  Gluc: x / Ketone: Trace  / Bili: Negative / Urobili: <2 mg/dL   Blood: x / Protein: 30 mg/dL / Nitrite: Negative   Leuk Esterase: Negative / RBC: 6 /HPF / WBC 4 /HPF   Sq Epi: x / Non Sq Epi: 1 /HPF / Bacteria: Negative          Radiological Workup  * CT Abdomen and Pelvis w/ IV Cont (22 @ 14:00) No acute abnormality within the abdomen and pelvis. Age-indeterminate moderate compression fracture seen at L1, new since 2022.  * CXR CM and CHF    - Patient was given IV Cefepime 2g x1 dose in ED  - She was also given 1.4 L LR bolus   - She will be admitted for further investigations, management, and monitoring     (18 May 2022 22:59)    Interval events: Pt initially admitted to MICU.  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 Pt s/p DSA & pipeline stent embo .  Patient was febrile on  but work-up was negative except for (+) MRSA PCR.  Patient on 5-day course of mupirocin.  Patient became awake, alert, and oriented x2-3 (person, , place) and tolerating PT/OT.   Passed S+S and tolerating PO soft and bite-sized diet.  Failed TOV on  and bran reinserted for retention.  Re-trial once ambulation improves.  Hemodynamically stable for downgrade to stroke unit for further management.    S: Patient was examined and seen at bedside. This morning pt is resting comfortably in bed and reports no new issues or overnight events. No specific complaints. As per RN, had a BM that appeared light brown.  Denies CP, SOB, N/V/D/C/AP, cough, F, chills, dizziness, new focal weakness, HA, vision changes, dysuria, or urinary symptoms.  ROS: all other systems reviewed and are negative    PAST MEDICAL & SURGICAL HISTORY:  HTN (hypertension)      Hypothyroid      Depression      Interstitial lung disease      Essential thrombocytopenia      History of ovarian cyst      General: NAD. Looks stated age.  HEENT: clean oropharynx, EOMI, no LAD  Neck: trachea midline, no thyromegaly  CV: nl S1 S2; no m/r/g  Resp: decreased breath sounds at base  GI: NT/ND/S +BS  MS: no clubbing/cyanosis/edema, +pulses  Neuro: motor, sensory intact; + reflexes  Skin: no rashes, nl turgor  Psychiatric: AA0x2     tele: SR, nonspecific changes (on my own evaluation of tele monitor)            MEDICATIONS  (STANDING):  albuterol/ipratropium for Nebulization. 3 milliLiter(s) Nebulizer once  chlorhexidine 4% Liquid 1 Application(s) Topical <User Schedule>  dronedarone 400 milliGRAM(s) Oral two times a day  enoxaparin Injectable 40 milliGRAM(s) SubCutaneous every 24 hours  levothyroxine 100 MICROGram(s) Oral daily  lidocaine   4% Patch 1 Patch Transdermal daily  melatonin 5 milliGRAM(s) Oral at bedtime  metoprolol tartrate 25 milliGRAM(s) Oral every 12 hours  niMODipine Oral Solution 30 milliGRAM(s) Oral every 4 hours  pantoprazole Infusion 8 mG/Hr (10 mL/Hr) IV Continuous <Continuous>  predniSONE   Tablet 15 milliGRAM(s) Oral daily  sodium bicarbonate 1300 milliGRAM(s) Oral every 6 hours  ticagrelor 90 milliGRAM(s) Oral two times a day    MEDICATIONS  (PRN):  acetaminophen     Tablet .. 650 milliGRAM(s) Oral every 6 hours PRN Mild Pain (1 - 3)  cyclobenzaprine 10 milliGRAM(s) Oral three times a day PRN Muscle Spasm    Home Medications:  anagrelide 0.5 mg oral capsule: 1 cap(s) orally 4 times a day (18 May 2022 23:29)  atenolol 25 mg oral tablet: 1 tab(s) orally once a day (18 May 2022 23:28)  losartan-hydrochlorothiazide 100 mg-25 mg oral tablet: 1 tab(s) orally once a day (18 May 2022 23:28)  predniSONE 20 mg oral tablet: 1 tab(s) orally once a day (18 May 2022 23:28)  Protonix 20 mg oral delayed release tablet: 1 tab(s) orally once a day (18 May 2022 23:28)    Vital Signs Last 24 Hrs  T(C): 36.3 (2022 14:07), Max: 37.1 (2022 05:00)  T(F): 97.3 (2022 14:07), Max: 98.7 (2022 05:00)  HR: 93 (2022 14:07) (79 - 93)  BP: 158/67 (2022 14:07) (140/80 - 158/67)  BP(mean): 97 (2022 14:07) (97 - 97)  RR: 18 (2022 14:07) (18 - 18)  SpO2: 96% (2022 14:07) (96% - 96%)  CAPILLARY BLOOD GLUCOSE        LABS:                        7.8    26.13 )-----------( 172      ( 2022 07:21 )             23.2     06-02    140  |  105  |  23<H>  ----------------------------<  129<H>  3.0<L>   |  17  |  1.1    Ca    8.7      2022 07:21    TPro  5.9<L>  /  Alb  3.5  /  TBili  0.4  /  DBili  x   /  AST  20  /  ALT  17  /  AlkPhos  52  06-02    LIVER FUNCTIONS - ( 2022 07:21 )  Alb: 3.5 g/dL / Pro: 5.9 g/dL / ALK PHOS: 52 U/L / ALT: 17 U/L / AST: 20 U/L / GGT: x                           Consultant Notes Reviewed:  [x ] YES  [ ] NO  Care Discussed with Consultants/Other Providers/ Housestaff [ x] YES  [ ] NO  Radiology, labs, new studies personally reviewed.                                        EKG - nonspecific changes (my read)  Chart and consultant noted personally reviewed.  Care Discussed with Consultants/Other Providers/ Housestaff [ x] YES [ ] NO   Radiology, labs, old records personally reviewed.

## 2022-06-02 NOTE — CONSULT NOTE ADULT - PROVIDER SPECIALTY LIST ADULT
Neurology
Neurosurgery
Cardiology
Critical Care
Gastroenterology
Nephrology
Physiatry
Hospitalist
NSICU
Neurology
Electrophysiology
Endocrinology

## 2022-06-02 NOTE — PROGRESS NOTE ADULT - ATTENDING COMMENTS
Patient seen and examined and agree with above except as noted.  Patients history, notes, labs, imaging, vitals and meds reviewed personally.  No new complaints  Doing well    Plan a sabove

## 2022-06-02 NOTE — PROGRESS NOTE ADULT - ASSESSMENT
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 Pt s/p DSA pipeline stenting R ICA aneurysm on 5/23. S/P 1 UNIT PRBC for hb 7.3. No acute change in neuro status. Patient received another unit of blood yesterday for Hb 7.6     Plan:  Neurological:  #SAH, 2/2 5mm ruptured R supraclinoid aneurysm s/p angio & pipeline stent embo 5/23   #new L1 compression fx, CPB 2/2 spinal stenosis (follows w Dr. العلي)  # Multiple ischemic strokes bilaterally, Bilateral sever stenosis of ICA  - Neuro Checks Q4H  - Brilinta BID   - Hold aspirin for now (low Hb)  - Nimodipine 30 mg Q2H  - Normovolemia  - Keppra for seizure ppx  - Video EEG negative  - MRA showed decreased aneurysm (5.9 -> 3.5mm), repeat MRI or CTA in 3 weeks per NSG  - Pain control: Tylenol, Lido patch, flexeril (new L1 compression fx, HA) on MR L-spine  - OOB to chair  - PT/OT/OOB      CV:   #HTN  - -160  - nimodipine 30 mg Q2 hrs  - Decreased IVF 0.9NS @ 50cc  - encourage PO intake  - continue metoprolol 25 mg BID    #Sinus tachycardia   - Continue metoprolol 25 mg bid  - Started Multac 400mg bid   - transfuse if H/H continues to decrease    Pulm:  #SEEMA not on CPAP, ILD  she was tachypneic and wheezing, that improved after diuresis and blood transfusion.   - Aspiration precautions   - Prednisone 15mg qd for ILD; pulm following  - Incentive spirometry  - CXR   - Strict I/Os       GI:  #hx UGIB 2/2 bleeding duodenal ulcer (recent [facility] admission for melena)  She had multiple bowl movements, last night was black stool   - Stopped bowl regime   - Started on pantoprazole infusion as per GI. (continue as per GI for today)  - Senna/Miralax  - Currently clear liquid diet as per GI   - track CBC  - lactulose for BM  - Endoscopy was held for concern for Covid exposure, to be consider if not stable, or dropping Hb    :  - Normonatremic euvolemia  - Strict Is/Os  - Daily BMP  - Replace Mg     Heme:  #Anemia, s/p 2 unit prbc. on 27 and 30 of May  - Lovenox 40 qd 5/24 (held yesterday)   - SCDs  - Hold Anagrelide for now (low Hb)  - Negative LE dopplers from 5/19  - track CBC      ID:  - Monitor fever trend and WBCs  - CXR 5/25 unchanged  - Repeat UA, negative  - MRSA positive, 5-day course mupirocin completed       Endo:  #hypothyroidism  - Synthroid PO 100mcg qD  - TSH 70, free t4 1.0; follow up in 2 weeks      Albarran Reinserted 5/26 for retention    Dispo: Continue stroke unit w/ Q4 neurochecks

## 2022-06-02 NOTE — CONSULT NOTE ADULT - CONSULT REASON
Subarachnoid hemorrhage on CT
hemalatha
SAH
worsening back pain and AMS
tachycardia
stroke, ILD, SEEMA, GIB, anemia, ET, medical mgmt
Tachycardia
primary hypothyroidism
SAH
ILD back pain
JEOVANY
ams

## 2022-06-02 NOTE — CONSULT NOTE ADULT - CONSULT REQUESTED BY NAME
Neurosurgery
neurology
hospitalist
Dr Moreno
Zac
ed
med
Dr. Saenz
Medicine
Dr Pike
Tiffanie
medicine

## 2022-06-03 ENCOUNTER — TRANSCRIPTION ENCOUNTER (OUTPATIENT)
Age: 77
End: 2022-06-03

## 2022-06-03 LAB
ALBUMIN SERPL ELPH-MCNC: 3.5 G/DL — SIGNIFICANT CHANGE UP (ref 3.5–5.2)
ALP SERPL-CCNC: 53 U/L — SIGNIFICANT CHANGE UP (ref 30–115)
ALT FLD-CCNC: 16 U/L — SIGNIFICANT CHANGE UP (ref 0–41)
ANION GAP SERPL CALC-SCNC: 19 MMOL/L — HIGH (ref 7–14)
AST SERPL-CCNC: 22 U/L — SIGNIFICANT CHANGE UP (ref 0–41)
BASE EXCESS BLDA CALC-SCNC: -3.2 MMOL/L — LOW (ref -2–3)
BILIRUB SERPL-MCNC: 0.4 MG/DL — SIGNIFICANT CHANGE UP (ref 0.2–1.2)
BUN SERPL-MCNC: 15 MG/DL — SIGNIFICANT CHANGE UP (ref 10–20)
CALCIUM SERPL-MCNC: 8.9 MG/DL — SIGNIFICANT CHANGE UP (ref 8.5–10.1)
CHLORIDE SERPL-SCNC: 105 MMOL/L — SIGNIFICANT CHANGE UP (ref 98–110)
CO2 SERPL-SCNC: 16 MMOL/L — LOW (ref 17–32)
CREAT SERPL-MCNC: 1.1 MG/DL — SIGNIFICANT CHANGE UP (ref 0.7–1.5)
EGFR: 52 ML/MIN/1.73M2 — LOW
GAS PNL BLDA: SIGNIFICANT CHANGE UP
GLUCOSE SERPL-MCNC: 119 MG/DL — HIGH (ref 70–99)
HCO3 BLDA-SCNC: 19 MMOL/L — LOW (ref 21–28)
HCT VFR BLD CALC: 22.5 % — LOW (ref 37–47)
HGB BLD-MCNC: 7.7 G/DL — LOW (ref 12–16)
HOROWITZ INDEX BLDA+IHG-RTO: 21 — SIGNIFICANT CHANGE UP
MAGNESIUM SERPL-MCNC: 1.8 MG/DL — SIGNIFICANT CHANGE UP (ref 1.8–2.4)
MCHC RBC-ENTMCNC: 32.2 PG — HIGH (ref 27–31)
MCHC RBC-ENTMCNC: 34.2 G/DL — SIGNIFICANT CHANGE UP (ref 32–37)
MCV RBC AUTO: 94.1 FL — SIGNIFICANT CHANGE UP (ref 81–99)
NRBC # BLD: 1 /100 WBCS — HIGH (ref 0–0)
PCO2 BLDA: 27 MMHG — SIGNIFICANT CHANGE UP (ref 25–48)
PH BLDA: 7.46 — HIGH (ref 7.35–7.45)
PLATELET # BLD AUTO: 195 K/UL — SIGNIFICANT CHANGE UP (ref 130–400)
PO2 BLDA: 67 MMHG — LOW (ref 83–108)
POTASSIUM SERPL-MCNC: 4.2 MMOL/L — SIGNIFICANT CHANGE UP (ref 3.5–5)
POTASSIUM SERPL-SCNC: 4.2 MMOL/L — SIGNIFICANT CHANGE UP (ref 3.5–5)
PROT SERPL-MCNC: 5.9 G/DL — LOW (ref 6–8)
RBC # BLD: 2.39 M/UL — LOW (ref 4.2–5.4)
RBC # FLD: 18.3 % — HIGH (ref 11.5–14.5)
SAO2 % BLDA: 95.8 % — SIGNIFICANT CHANGE UP (ref 94–98)
SODIUM SERPL-SCNC: 140 MMOL/L — SIGNIFICANT CHANGE UP (ref 135–146)
WBC # BLD: 27.67 K/UL — HIGH (ref 4.8–10.8)
WBC # FLD AUTO: 27.67 K/UL — HIGH (ref 4.8–10.8)

## 2022-06-03 PROCEDURE — 99232 SBSQ HOSP IP/OBS MODERATE 35: CPT

## 2022-06-03 PROCEDURE — 71275 CT ANGIOGRAPHY CHEST: CPT | Mod: 26

## 2022-06-03 PROCEDURE — 99233 SBSQ HOSP IP/OBS HIGH 50: CPT

## 2022-06-03 PROCEDURE — 99232 SBSQ HOSP IP/OBS MODERATE 35: CPT | Mod: GC

## 2022-06-03 RX ORDER — PANTOPRAZOLE SODIUM 20 MG/1
40 TABLET, DELAYED RELEASE ORAL
Refills: 0 | Status: DISCONTINUED | OUTPATIENT
Start: 2022-06-03 | End: 2022-06-07

## 2022-06-03 RX ORDER — METOPROLOL TARTRATE 50 MG
1 TABLET ORAL
Qty: 60 | Refills: 0
Start: 2022-06-03 | End: 2022-07-02

## 2022-06-03 RX ORDER — DRONEDARONE 400 MG/1
1 TABLET, FILM COATED ORAL
Qty: 60 | Refills: 0
Start: 2022-06-03 | End: 2022-07-02

## 2022-06-03 RX ORDER — PANTOPRAZOLE SODIUM 20 MG/1
1 TABLET, DELAYED RELEASE ORAL
Qty: 0 | Refills: 0 | DISCHARGE

## 2022-06-03 RX ORDER — ATENOLOL 25 MG/1
1 TABLET ORAL
Qty: 0 | Refills: 0 | DISCHARGE

## 2022-06-03 RX ORDER — ANAGRELIDE HCL 0.5 MG
1 CAPSULE ORAL
Qty: 0 | Refills: 0 | DISCHARGE

## 2022-06-03 RX ORDER — POTASSIUM CHLORIDE 20 MEQ
20 PACKET (EA) ORAL ONCE
Refills: 0 | Status: COMPLETED | OUTPATIENT
Start: 2022-06-03 | End: 2022-06-03

## 2022-06-03 RX ORDER — LEVOTHYROXINE SODIUM 125 MCG
1 TABLET ORAL
Qty: 30 | Refills: 0
Start: 2022-06-03 | End: 2022-07-02

## 2022-06-03 RX ORDER — TICAGRELOR 90 MG/1
1 TABLET ORAL
Qty: 60 | Refills: 0
Start: 2022-06-03 | End: 2022-07-02

## 2022-06-03 RX ORDER — PANTOPRAZOLE SODIUM 20 MG/1
1 TABLET, DELAYED RELEASE ORAL
Qty: 0 | Refills: 0 | DISCHARGE
Start: 2022-06-03

## 2022-06-03 RX ADMIN — Medication 20 MILLIEQUIVALENT(S): at 08:20

## 2022-06-03 RX ADMIN — PANTOPRAZOLE SODIUM 40 MILLIGRAM(S): 20 TABLET, DELAYED RELEASE ORAL at 18:05

## 2022-06-03 RX ADMIN — Medication 40 MILLIEQUIVALENT(S): at 05:26

## 2022-06-03 RX ADMIN — Medication 1300 MILLIGRAM(S): at 05:26

## 2022-06-03 RX ADMIN — Medication 25 MILLIGRAM(S): at 18:03

## 2022-06-03 RX ADMIN — CHLORHEXIDINE GLUCONATE 1 APPLICATION(S): 213 SOLUTION TOPICAL at 05:20

## 2022-06-03 RX ADMIN — MAGNESIUM OXIDE 400 MG ORAL TABLET 400 MILLIGRAM(S): 241.3 TABLET ORAL at 08:20

## 2022-06-03 RX ADMIN — Medication 650 MILLIGRAM(S): at 18:13

## 2022-06-03 RX ADMIN — DRONEDARONE 400 MILLIGRAM(S): 400 TABLET, FILM COATED ORAL at 05:22

## 2022-06-03 RX ADMIN — Medication 650 MILLIGRAM(S): at 18:51

## 2022-06-03 RX ADMIN — NIMODIPINE 30 MILLIGRAM(S): 60 SOLUTION ORAL at 11:20

## 2022-06-03 RX ADMIN — NIMODIPINE 30 MILLIGRAM(S): 60 SOLUTION ORAL at 05:26

## 2022-06-03 RX ADMIN — Medication 1300 MILLIGRAM(S): at 23:50

## 2022-06-03 RX ADMIN — NIMODIPINE 30 MILLIGRAM(S): 60 SOLUTION ORAL at 14:37

## 2022-06-03 RX ADMIN — Medication 100 MICROGRAM(S): at 05:21

## 2022-06-03 RX ADMIN — DRONEDARONE 400 MILLIGRAM(S): 400 TABLET, FILM COATED ORAL at 18:04

## 2022-06-03 RX ADMIN — Medication 5 MILLIGRAM(S): at 21:40

## 2022-06-03 RX ADMIN — TICAGRELOR 90 MILLIGRAM(S): 90 TABLET ORAL at 05:27

## 2022-06-03 RX ADMIN — Medication 1300 MILLIGRAM(S): at 18:03

## 2022-06-03 RX ADMIN — PANTOPRAZOLE SODIUM 40 MILLIGRAM(S): 20 TABLET, DELAYED RELEASE ORAL at 14:38

## 2022-06-03 RX ADMIN — Medication 1300 MILLIGRAM(S): at 11:56

## 2022-06-03 RX ADMIN — ENOXAPARIN SODIUM 40 MILLIGRAM(S): 100 INJECTION SUBCUTANEOUS at 11:56

## 2022-06-03 RX ADMIN — PANTOPRAZOLE SODIUM 10 MG/HR: 20 TABLET, DELAYED RELEASE ORAL at 09:50

## 2022-06-03 RX ADMIN — NIMODIPINE 30 MILLIGRAM(S): 60 SOLUTION ORAL at 18:05

## 2022-06-03 RX ADMIN — MAGNESIUM OXIDE 400 MG ORAL TABLET 400 MILLIGRAM(S): 241.3 TABLET ORAL at 18:03

## 2022-06-03 RX ADMIN — Medication 15 MILLIGRAM(S): at 05:27

## 2022-06-03 RX ADMIN — Medication 25 MILLIGRAM(S): at 05:25

## 2022-06-03 RX ADMIN — MAGNESIUM OXIDE 400 MG ORAL TABLET 400 MILLIGRAM(S): 241.3 TABLET ORAL at 11:56

## 2022-06-03 RX ADMIN — NIMODIPINE 30 MILLIGRAM(S): 60 SOLUTION ORAL at 21:40

## 2022-06-03 RX ADMIN — TICAGRELOR 90 MILLIGRAM(S): 90 TABLET ORAL at 18:04

## 2022-06-03 RX ADMIN — LIDOCAINE 1 PATCH: 4 CREAM TOPICAL at 11:56

## 2022-06-03 NOTE — PROGRESS NOTE ADULT - SUBJECTIVE AND OBJECTIVE BOX
Neuroendovascular Progress Note:     Interval History:   Patient is a 76-year-old, Lao-speaking female who presented with AMS and worsened lower back pain who was found to have right frontoparietal SAH (HH1 mFS 1) possibly attributable to a 5mm,  right supraclinoid ICA saccular aneurysm found on CTA. Multifocal stenosis/spasm was also noted. Patient is now s/p diagnostic cerebral angiogram + pipeline stent embolization of the right ICA aneurysm. Patient is on Lovenox 40 mg qd and Brilinta 90 mg bid. S/p 1 unit PRBC for hemoglobin 7.3  and yesterday for hemoglobin 7.6. GI following for melena. H/H have been stable at 7.7. On exam, the patient is AAOx3 with no acute complaints.    Investigations   Laboratory Workup  - CBC:                        11.6   20.94 )-----------( 188      ( 18 May 2022 10:50 )             34.1     - Chemistry:      140  |  96<L>  |  48<H>  ----------------------------<  109<H>  4.3   |  24  |  2.1<H>    Ca    10.4<H>      18 May 2022 10:50    TPro  7.3  /  Alb  4.8  /  TBili  0.6  /  DBili  x   /  AST  36  /  ALT  27  /  AlkPhos  61      - Coagulation Studies:  PT/INR - ( 18 May 2022 10:50 )   PT: 12.30 sec;   INR: 1.07 ratio    PTT - ( 18 May 2022 10:50 )  PTT:24.8 sec      Microbiological Workup  Urinalysis Basic - ( 18 May 2022 14:07 )    Color: Yellow / Appearance: Clear / S.021 / pH: x  Gluc: x / Ketone: Trace  / Bili: Negative / Urobili: <2 mg/dL   Blood: x / Protein: 30 mg/dL / Nitrite: Negative   Leuk Esterase: Negative / RBC: 6 /HPF / WBC 4 /HPF   Sq Epi: x / Non Sq Epi: 1 /HPF / Bacteria: Negative    PMHx:  HTN (hypertension)  Hypothyroid  Depression  Interstitial lung disease  Essential thrombocytopenia    24-Hour Events: None    Medication:  albuterol/ipratropium for Nebulization. 3 milliLiter(s) Nebulizer once  chlorhexidine 4% Liquid 1 Application(s) Topical <User Schedule>  dronedarone 400 milliGRAM(s) Oral two times a day  enoxaparin Injectable 40 milliGRAM(s) SubCutaneous every 24 hours  levothyroxine 100 MICROGram(s) Oral daily  lidocaine   4% Patch 1 Patch Transdermal daily  magnesium oxide 400 milliGRAM(s) Oral three times a day with meals  melatonin 5 milliGRAM(s) Oral at bedtime  metoprolol tartrate 25 milliGRAM(s) Oral every 12 hours  niMODipine Oral Solution 30 milliGRAM(s) Oral every 4 hours  pantoprazole    Tablet 40 milliGRAM(s) Oral two times a day  predniSONE   Tablet 15 milliGRAM(s) Oral daily  sodium bicarbonate 1300 milliGRAM(s) Oral every 6 hours  ticagrelor 90 milliGRAM(s) Oral two times a day    No Known Allergies    Recent Vitals:  T(F): 97.3 (22 @ 14:19), Max: 97.9 (22 @ 05:00)  HR: 107 (22 @ 18:02) (80 - 107)  BP: 165/73 (22 @ 18:02) (124/61 - 174/75)  RR: 16 (22 @ 14:19) (16 - 18)  SpO2: --    COVID-19 PCR: NotDetec (31 May 2022 13:34)  COVID-19 PCR: NotDetec (18 May 2022 10:10)    Recent Labs:                        7.7    27.67 )-----------( 195      ( 2022 11:54 )             22.5     06-03    140  |  105  |  15  ----------------------------<  119<H>  4.2   |  16<L>  |  1.1    Ca    8.9      2022 11:54  Mg     1.8     06-03    TPro  5.9<L>  /  Alb  3.5  /  TBili  0.4  /  DBili  x   /  AST  22  /  ALT  16  /  AlkPhos  53  06-03    LIVER FUNCTIONS - ( 2022 11:54 )  Alb: 3.5 g/dL / Pro: 5.9 g/dL / ALK PHOS: 53 U/L / ALT: 16 U/L / AST: 22 U/L / GGT: x           Exam:  General: NAD  Mental Status: AAOx3. Naming, repetition, and comprehension intact. No dysarthria or aphasia.  CN: PERRLA, full visual fields, no nystagmus, tongue midline.   Motor: B/l UE/LE no drift.   Sensation: Intact to light touch throughout.  Coordination: No dysmetria on finger-to-nose.    Radiology:     ACC: [id number] EXAM: MR ANGIO BRAIN  ACC: [id number] EXAM: MR ANGIO NECK IC  ACC: [id number] EXAM: MR BRAIN WAW IC    PROCEDURE DATE: 2022      IMPRESSION:  BRAIN  Numerous scattered punctate acute infarcts involving the bilateral cortical hemispheres.    Moderate chronic microvascular type changes as well as a chronic left frontal lobe infarct.    Scattered cortical susceptibility weighted artifact likely sequela of chronic hemosiderin deposition from remote subarachnoid hemorrhage. No evidence of acute hemorrhage.    BRAIN MRA  Since prior CTA of 2022 there has been interval placement of a flow diverting stent involving the right clinoid/supraclinoid ICA with residual flow related enhancement within the clinoid ICA aneurysm.    Multifocal scattered stenoses as detailed above.    NECK MRA  Motion limited examination.    Likely severe/critical bilateral proximal ICA stenosis though difficult to quantify due to motion limitation.    --- End of Report ---    Assessment:   76-year-old, Lao-speaking female found to have right frontoparietal SAH (HH1 mFS 1) possibly attributable to a 5mm,  right supraclinoid ICA saccular aneurysm. Multifocal stenosis/spasm was also noted. Patient is now s/p diagnostic cerebral angiogram + pipeline stent embolization of the right ICA aneurysm. Patient is on Lovenox 40 mg qd and Brilinta 90 mg bid. S/p 1 unit PRBC for hemoglobin 7.3  and  for hemoglobin 7.6. GI following for melena. On exam today the patient is spontaneously awake and alert; eager to move around more. The patient was seen by Dr. West and the neuroendovascular ACP and had no questions or acute complaints at the time. Platelet count 195.     Suggestions:  - The patient should continue on Brilinta monotherapy 90 mg bid and follow up in the outpatient clinic with Dr. West after discharge.   - Hematology involvement (patient follows Dr. Miller) may be beneficial in the context of essential thrombocytosis management, should it arise, given recent pipeline stent embolization.  - Management per stroke/neurovascular and medicine teams.    [phone]

## 2022-06-03 NOTE — DISCHARGE NOTE PROVIDER - NSDCMRMEDTOKEN_GEN_ALL_CORE_FT
dronedarone 400 mg oral tablet: 1 tab(s) orally 2 times a day  levothyroxine 100 mcg (0.1 mg) oral tablet: 1 tab(s) orally once a day  losartan-hydrochlorothiazide 100 mg-25 mg oral tablet: 1 tab(s) orally once a day  metoprolol tartrate 25 mg oral tablet: 1 tab(s) orally every 12 hours  pantoprazole 40 mg oral delayed release tablet: 1 tab(s) orally 2 times a day  predniSONE 20 mg oral tablet: 1 tab(s) orally once a day  ticagrelor 90 mg oral tablet: 1 tab(s) orally 2 times a day   dronedarone 400 mg oral tablet: 1 tab(s) orally 2 times a day  levothyroxine 100 mcg (0.1 mg) oral tablet: 1 tab(s) orally once a day  losartan-hydrochlorothiazide 100 mg-25 mg oral tablet: 1 tab(s) orally once a day  metoprolol tartrate 25 mg oral tablet: 1 tab(s) orally every 12 hours  niMODipine 30 mg oral capsule: 2 cap(s) orally every 4 hours  pantoprazole 40 mg oral delayed release tablet: 1 tab(s) orally 2 times a day  predniSONE 20 mg oral tablet: 1 tab(s) orally once a day  ticagrelor 90 mg oral tablet: 1 tab(s) orally 2 times a day

## 2022-06-03 NOTE — PROGRESS NOTE ADULT - ASSESSMENT
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 Pt s/p DSA pipeline stenting R ICA aneurysm on 5/23. S/P 1 UNIT PRBC for hb 7.3. No acute change in neuro status. Patient received another unit of blood yesterday for Hb 7.6   Today is day 19 from bleed    Plan:  Neurological:  #SAH, 2/2 5mm ruptured R supraclinoid aneurysm s/p angio & pipeline stent embo 5/23   #new L1 compression fx, CPB 2/2 spinal stenosis (follows w Dr. العلي)  # Multiple ischemic strokes bilaterally, Bilateral sever stenosis of ICA  - Neuro Checks Q4H  - Brilinta BID   - Hold aspirin for now (low Hb)  - Nimodipine 30 mg Q2H  - Normovolemia  - Keppra for seizure ppx  - Video EEG negative  - MRA showed decreased aneurysm (5.9 -> 3.5mm), repeat MRI or CTA in 3 weeks per NSG  - Pain control: Tylenol, Lido patch, flexeril (new L1 compression fx, HA) on MR L-spine  - OOB to chair  - PT/OT/OOB      CV:   #HTN  - -160  - nimodipine 30 mg Q2 hrs  - Decreased IVF 0.9NS @ 50cc  - encourage PO intake  - continue metoprolol 25 mg BID    #Sinus tachycardia   - Continue metoprolol 25 mg bid  - Started Multac 400mg bid   - transfuse if H/H continues to decrease    Pulm:  #SEEMA not on CPAP, ILD  she was tachypneic and wheezing, that improved after diuresis and blood transfusion.   - Aspiration precautions   - Prednisone 15mg qd for ILD; pulm following  - Incentive spirometry  - CXR   - Strict I/Os       GI:  #hx UGIB 2/2 bleeding duodenal ulcer (recent [facility] admission for melena)  She had multiple bowl movements, last night was black stool   - Stopped bowl regime   - Started on pantoprazole infusion as per GI. (continue as per GI for today)  - Senna/Miralax  - Currently soft liquid diet as per GI   - track CBC  - Change pantoprazole 40 mg IV bid   - Endoscopy was held for concern for Covid exposure, to be consider if not stable, or dropping Hb    :  - Normonatremic euvolemia  - Strict Is/Os  - Daily BMP  - Replace Mg     Heme:  #Anemia, s/p 2 unit prbc. on 27 and 30 of May  - Lovenox 40 qd 5/24 (held yesterday)   - SCDs  - Hold Anagrelide for now (low Hb)  - Negative LE dopplers from 5/19  - track CBC      ID:  - Monitor fever trend and WBCs  - CXR 5/25 unchanged  - Repeat UA, negative  - MRSA positive, 5-day course mupirocin completed       Endo:  #hypothyroidism  - Synthroid PO 100mcg qD  - TSH 70, free t4 1.0; follow up in 2 weeks      Deion Reinserted 5/26 for retention    Dispo: Continue stroke unit w/ Q4 neurochecks

## 2022-06-03 NOTE — DISCHARGE NOTE PROVIDER - CARE PROVIDERS DIRECT ADDRESSES
,[email or web address]

## 2022-06-03 NOTE — DISCHARGE NOTE PROVIDER - NSDCHC_MEDRECSTATUS_GEN_ALL_CORE
Admission Reconciliation is Completed  Discharge Reconciliation is Completed No Admission Reconciliation is Completed  Discharge Reconciliation is Not Complete

## 2022-06-03 NOTE — CHART NOTE - NSCHARTNOTEFT_GEN_A_CORE
The patient ABG suggested respiratory alkalosis. The patient is known to have ILD but r/o PE was considered.   CT chest angiogram PE protocol was ordered

## 2022-06-03 NOTE — DISCHARGE NOTE PROVIDER - CARE PROVIDER_API CALL
Stephen Martínez)  EEGEpilepsy; Neurology  [address], [address]  [place] [address]  Phone: [phone]  Fax: [phone]  Follow Up Time: 1 month    Loi Valente)  Gastroenterology; Internal Medicine  [address] [place] [address]  Phone: [phone]  Fax: [phone]  Follow Up Time: 2 weeks    Keaton West; [facility])  [facility]  [address] [place] [address]  Phone: [phone]  Fax: [phone]  Follow Up Time: 2 weeks    Celso Denise)  Hematology; Internal Medicine; Medical Oncology  [address] [place] [address]  Phone: [phone]  Fax: [phone]  Follow Up Time: 2 weeks    Gama Brooks)  Cardiac Electrophysiology; Cardiovascular Disease; [facility] Medicine  [address] [place] [address]  Phone: [phone]  Fax: [phone]  Follow Up Time: 1 month

## 2022-06-03 NOTE — DISCHARGE NOTE PROVIDER - PROVIDER TOKENS
PROVIDER:[TOKEN:[[id number]],FOLLOWUP:[1 month]],PROVIDER:[TOKEN:[[id number]],FOLLOWUP:[2 weeks]],PROVIDER:[TOKEN:[[id number]],FOLLOWUP:[2 weeks]],PROVIDER:[TOKEN:[[id number]],FOLLOWUP:[2 weeks]],PROVIDER:[TOKEN:[[id number]],FOLLOWUP:[1 month]]

## 2022-06-03 NOTE — PROGRESS NOTE ADULT - SUBJECTIVE AND OBJECTIVE BOX
Gastroenterology progress note:     Patient is a 76y old  Female who presents with a chief complaint of Worsening Back Pain and Metabolic Encephalopathy (03 Jun 2022 11:13)       Admitted on: 05-18-22    We are following the patient for upper GI bleeding      Interval History: 1 Bm this am, patient and nurse do not know if it was Melanotic. no pain no hematemesis     PAST MEDICAL & SURGICAL HISTORY:  HTN (hypertension)   Hypothyroid  Depression  Interstitial lung disease  Essential thrombocytopenia  History of ovarian cyst      MEDICATIONS  (STANDING):  albuterol/ipratropium for Nebulization. 3 milliLiter(s) Nebulizer once  chlorhexidine 4% Liquid 1 Application(s) Topical <User Schedule>  dronedarone 400 milliGRAM(s) Oral two times a day  enoxaparin Injectable 40 milliGRAM(s) SubCutaneous every 24 hours  levothyroxine 100 MICROGram(s) Oral daily  lidocaine   4% Patch 1 Patch Transdermal daily  magnesium oxide 400 milliGRAM(s) Oral three times a day with meals  melatonin 5 milliGRAM(s) Oral at bedtime  metoprolol tartrate 25 milliGRAM(s) Oral every 12 hours  niMODipine Oral Solution 30 milliGRAM(s) Oral every 4 hours  pantoprazole Infusion 8 mG/Hr (10 mL/Hr) IV Continuous <Continuous>  predniSONE   Tablet 15 milliGRAM(s) Oral daily  sodium bicarbonate 1300 milliGRAM(s) Oral every 6 hours  ticagrelor 90 milliGRAM(s) Oral two times a day    MEDICATIONS  (PRN):  acetaminophen     Tablet .. 650 milliGRAM(s) Oral every 6 hours PRN Mild Pain (1 - 3)  cyclobenzaprine 10 milliGRAM(s) Oral three times a day PRN Muscle Spasm    Allergies  No Known Allergies    Review of Systems:   General: no fever  HEENT: no hemoptysis  Cardiovascular:  No Chest Pain, No Palpitations  Respiratory:  No Cough, No Dyspnea  Gastrointestinal:  As described in HPI  Hematology: no bruising or hematoma   Neurology: no new motor deficit  Skin: no new rash    Physical Examination:  T(C): 36.6 (06-03-22 @ 05:00), Max: 36.6 (06-03-22 @ 05:00)  HR: 90 (06-03-22 @ 11:00) (80 - 100)  BP: 124/61 (06-03-22 @ 11:00) (124/61 - 174/75)  RR: 18 (06-02-22 @ 20:00) (18 - 18)  SpO2: 96% (06-02-22 @ 14:07) (96% - 96%)      Constitutional: No acute distress.  Head: normocephalic  Neck: supple   Eyes: EOMI  Respiratory:  No signs of respiratory distress. Lung sounds are clear bilaterally.  Cardiovascular:  S1 S2, Regular rate and rhythm.  Abdominal: Abdomen is soft, symmetric, and non-tender without distention.    Extremities: no pitting edema  Neurology: alert oriented *3, no asterixis   Skin: No rashes, No Jaundice.      Data: (reviewed by attending)                        7.7    27.67 )-----------( 195      ( 03 Jun 2022 11:54 )             22.5     Hgb trend:  7.7  06-03-22 @ 11:54  7.8  06-02-22 @ 07:21  7.8  06-01-22 @ 17:50  8.0  06-01-22 @ 06:56        06-02    140  |  105  |  23<H>  ----------------------------<  129<H>  3.0<L>   |  17  |  1.1    Ca    8.7      02 Jun 2022 07:21    TPro  5.9<L>  /  Alb  3.5  /  TBili  0.4  /  DBili  x   /  AST  20  /  ALT  17  /  AlkPhos  52  06-02    Liver panel trend:  TBili 0.4   /   AST 20   /   ALT 17   /   AlkP 52   /   Tptn 5.9   /   Alb 3.5    /   DBili --      06-02  TBili 0.4   /   AST 20   /   ALT 16   /   AlkP 51   /   Tptn 5.9   /   Alb 3.5    /   DBili --      06-01  TBili 0.4   /   AST 16   /   ALT 15   /   AlkP 44   /   Tptn 5.8   /   Alb 3.6    /   DBili --      05-31  TBili 0.3   /   AST 15   /   ALT 15   /   AlkP 46   /   Tptn 5.5   /   Alb 3.3    /   DBili --      05-30

## 2022-06-03 NOTE — PROGRESS NOTE ADULT - SUBJECTIVE AND OBJECTIVE BOX
Neurology Follow up note  Patient examined at bedside, no new issues this morning, no agitation overnight. The patient was awake today and answering all questions. Hb stable and patient feels good     HPI:  Ms. Shook is a 76 year old female patient known to have:Baseline AO3, lives with niece, ambulates with a walker, SEEMA and ILD. Follows with Dr Stephens. Not on CPAP or home O2. On Prednisone 20mg QD, Depression, HTN. Recent Admission for HTN Urgency, Hypothyroidism. Recent admission for a bleeding duodenal ulcer at [facility] (melena). Home med Protonix 20mg QD Essential Thrombocytosis. Follows with Dr Denise, Chronic Back Pain for years secondary to Spinal Stenosis per marquise. s/p Spinal Epidural Injection. Was on PT 3x/ week but has been non compliant for 2-3 months. Follows with Dr Jaimes. Was supposed to go for a nerve ablation last Monday (cancelled due to poorly controlled HTN s/p admission). She was brought to the ED by marquise on  for evaluation of worsening back pain and confusion x2 days. History goes back to 2 days PTP when the patient started complaining of worsening of her chronic dull lower back pain that radiates down to her right medial aspect of thigh. This has resulted in limited ambulation (patient refusing to leave her bed) and to being non compliant with Physical Therapy in the absence of leg weakness or paresthesias or numbness or urinary incontinence. Per marquise, patient has been feeling down x few days with reduced PO intake, reduced sleep, loss of interest, thoughts that her family doesn't like/ support her, and confusion (some times would not recognize niclaudia).She has an instance where she had fecal incontinence on way to bathroom 2 days ago.In the setting of confusion and increased pain, marquise decided to bring patient to ED for evaluation. On review of systems, marquise denies any recent fever, chills, night sweats, URTI symptoms (cough, rhinorrhea, sore throat), urinary symptoms (urinary frequency, urgency, intermittence, dysuria, foul smelling urine, cloudy urine), abdominal pain, headache, nausea, or vomiting. No sick contacts. No recent travel or exposure to recent travelers.        Vital Signs Last 24 Hrs  T(C): 36.6 (2022 05:00), Max: 36.6 (2022 05:00)  T(F): 97.9 (2022 05:00), Max: 97.9 (2022 05:00)  HR: 100 (2022 05:00) (80 - 100)  BP: 174/75 (2022 05:00) (141/65 - 174/75)  BP(mean): 97 (2022 14:07) (97 - 97)  RR: 18 (2022 20:00) (18 - 18)  SpO2: 96% (2022 14:07) (96% - 96%)          Neurological Exam:   Mental status: Patient is awake alert oriented to self and place. Anxious, follows 1 step commands.   Cranial nerves: Normal visual field, normal eye movements, PERRLA, No facial asymmetry   Power 5/5 UE            5/ 5  LE  No abnormal involuntary mvmts  Sensation: Responds to pain in all 4 exts.  FTN/HKS: Grossly intact  Gait: unable.      NIHSS:    1A: LOC 0   1B: LOC Questions 1  1C: Performs Tasks 0   2: Horizontal EOMs 0  3: Visual Fields 0   4: Facial Palsy 1   5A: LUE Drift 0   5B: RUE Drift 0   6A: LLE Drift 0   6B: RLE Drift 0   7: Limb Ataxia (heel-shin, FNF) 0   8: Sensation 0   9: Language/Aphasia 0   10: Dysarthria 0   11: Extinction/Inattention 0     NIHSS Total: 2      mRs  0 No symptoms at all  1 No significant disability despite symptoms; able to carry out all usual duties and activities without assistance  2 Slight disability; unable to carry out all previous activities, but able to look after own affairs  3 Moderate disability; requiring some help, but able to walk without assistance  4 Moderately severe disability; unable to walk without assistance and unable to attend to own bodily needs without assistance  5 Severe disability; bedridden, incontinent and requiring constant nursing care and attention  6 Dead         MEDICATIONS  (STANDING):  albuterol/ipratropium for Nebulization. 3 milliLiter(s) Nebulizer once  chlorhexidine 4% Liquid 1 Application(s) Topical <User Schedule>  dronedarone 400 milliGRAM(s) Oral two times a day  enoxaparin Injectable 40 milliGRAM(s) SubCutaneous every 24 hours  levothyroxine 100 MICROGram(s) Oral daily  lidocaine   4% Patch 1 Patch Transdermal daily  magnesium oxide 400 milliGRAM(s) Oral three times a day with meals  melatonin 5 milliGRAM(s) Oral at bedtime  metoprolol tartrate 25 milliGRAM(s) Oral every 12 hours  niMODipine Oral Solution 30 milliGRAM(s) Oral every 4 hours  pantoprazole Infusion 8 mG/Hr (10 mL/Hr) IV Continuous <Continuous>  predniSONE   Tablet 15 milliGRAM(s) Oral daily  sodium bicarbonate 1300 milliGRAM(s) Oral every 6 hours  ticagrelor 90 milliGRAM(s) Oral two times a day    MEDICATIONS  (PRN):  acetaminophen     Tablet .. 650 milliGRAM(s) Oral every 6 hours PRN Mild Pain (1 - 3)  cyclobenzaprine 10 milliGRAM(s) Oral three times a day PRN Muscle Spasm                LABS:  cret                        7.8    26.13 )-----------( 172      ( 2022 07:21 )             23.2     06-02    140  |  105  |  23<H>  ----------------------------<  129<H>  3.0<L>   |  17  |  1.1    Ca    8.7      2022 07:21    TPro  5.9<L>  /  Alb  3.5  /  TBili  0.4  /  DBili  x   /  AST  20  /  ALT  17  /  AlkPhos  52  06-02                Radiology  < from: MR Lumbar Spine w/wo IV Cont (22 @ 19:38) >  IMPRESSION:  Acute compression deformity of the L1 vertebral body with approximately   80% height loss as well as bulging of the posterior cortex resulting in   severe spinal stenosis.    Additional acute compression deformity of the L4 superior endplate.    Severe multilevel degenerative changes contributing to spinal canal as   well as neuroforaminal narrowing as detailed above.    --- End of Report ---      JO BECERRIL MD; Attending Radiologist  This document has been electronically signed. May 27 2022  9:18AM    < end of copied text >        ECHO  < from: TTE Echo Complete w/o Contrast w/ Doppler (22 @ 10:38) >    Summary:   1. Normal global left ventricular systolic function.   2. Normal left atrial size.   3. Normal right atrial size.   4. Trace mitral valve regurgitation.   5. PSAP 35.    < end of copied text >      VEEG  VEEG in the last 24 hours:    Background - continuous, symmetrical, less than optimally organized, reaching frequencies in the range of 7-8 hz    Focal and generalized slowin. mild generalized slowing  2. mild left hemispheric focal slowing   3. lower amplitude on the right side    Interictal activity - none    Events - none    Seizures -none    Impression:  Abnormal VEEG as above    Plan - as per NCC team   Neurology Follow up note  Patient examined at bedside, no new issues this morning, no agitation overnight. The patient was awake today and answering all questions. Hb stable and patient feels good     HPI:  Ms. Shook is a 76 year old female patient known to have:Baseline AO3, lives with niece, ambulates with a walker, SEEMA and ILD. Follows with Dr Stephens. Not on CPAP or home O2. On Prednisone 20mg QD, Depression, HTN. Recent Admission for HTN Urgency, Hypothyroidism. Recent admission for a bleeding duodenal ulcer at [facility] (melena). Home med Protonix 20mg QD Essential Thrombocytosis. Follows with Dr Denise, Chronic Back Pain for years secondary to Spinal Stenosis per marquise. s/p Spinal Epidural Injection. Was on PT 3x/ week but has been non compliant for 2-3 months. Follows with Dr Jaimes. Was supposed to go for a nerve ablation last Monday (cancelled due to poorly controlled HTN s/p admission). She was brought to the ED by marquise on  for evaluation of worsening back pain and confusion x2 days. History goes back to 2 days PTP when the patient started complaining of worsening of her chronic dull lower back pain that radiates down to her right medial aspect of thigh. This has resulted in limited ambulation (patient refusing to leave her bed) and to being non compliant with Physical Therapy in the absence of leg weakness or paresthesias or numbness or urinary incontinence. Per marquise, patient has been feeling down x few days with reduced PO intake, reduced sleep, loss of interest, thoughts that her family doesn't like/ support her, and confusion (some times would not recognize niclaudia).She has an instance where she had fecal incontinence on way to bathroom 2 days ago.In the setting of confusion and increased pain, marquise decided to bring patient to ED for evaluation. On review of systems, marquise denies any recent fever, chills, night sweats, URTI symptoms (cough, rhinorrhea, sore throat), urinary symptoms (urinary frequency, urgency, intermittence, dysuria, foul smelling urine, cloudy urine), abdominal pain, headache, nausea, or vomiting. No sick contacts. No recent travel or exposure to recent travelers.        Vital Signs Last 24 Hrs  T(C): 36.6 (2022 05:00), Max: 36.6 (2022 05:00)  T(F): 97.9 (2022 05:00), Max: 97.9 (2022 05:00)  HR: 100 (2022 05:00) (80 - 100)  BP: 174/75 (2022 05:00) (141/65 - 174/75)  BP(mean): 97 (2022 14:07) (97 - 97)  RR: 18 (2022 20:00) (18 - 18)  SpO2: 96% (2022 14:07) (96% - 96%)          Neurological Exam:   Mental status: Patient is awake alert oriented to self and place. Anxious, follows 1 step commands.   Cranial nerves: Normal visual field, normal eye movements, PERRLA, No facial asymmetry   Power 5/5 UE            5/ 5  LE  No abnormal involuntary mvmts  Sensation: Responds to pain in all 4 exts.  FTN/HKS: Grossly intact  Gait: unable.      NIHSS:    1A: LOC 0   1B: LOC Questions 1  1C: Performs Tasks 0   2: Horizontal EOMs 0  3: Visual Fields 0   4: Facial Palsy 1   5A: LUE Drift 0   5B: RUE Drift 0   6A: LLE Drift 0   6B: RLE Drift 0   7: Limb Ataxia (heel-shin, FNF) 0   8: Sensation 0   9: Language/Aphasia 0   10: Dysarthria 0   11: Extinction/Inattention 0     NIHSS Total: 2      mRs  0 No symptoms at all  1 No significant disability despite symptoms; able to carry out all usual duties and activities without assistance  2 Slight disability; unable to carry out all previous activities, but able to look after own affairs  3 Moderate disability; requiring some help, but able to walk without assistance  4 Moderately severe disability; unable to walk without assistance and unable to attend to own bodily needs without assistance  5 Severe disability; bedridden, incontinent and requiring constant nursing care and attention  6 Dead         MEDICATIONS  (STANDING):  albuterol/ipratropium for Nebulization. 3 milliLiter(s) Nebulizer once  chlorhexidine 4% Liquid 1 Application(s) Topical <User Schedule>  dronedarone 400 milliGRAM(s) Oral two times a day  enoxaparin Injectable 40 milliGRAM(s) SubCutaneous every 24 hours  levothyroxine 100 MICROGram(s) Oral daily  lidocaine   4% Patch 1 Patch Transdermal daily  magnesium oxide 400 milliGRAM(s) Oral three times a day with meals  melatonin 5 milliGRAM(s) Oral at bedtime  metoprolol tartrate 25 milliGRAM(s) Oral every 12 hours  niMODipine Oral Solution 30 milliGRAM(s) Oral every 4 hours  pantoprazole Infusion 8 mG/Hr (10 mL/Hr) IV Continuous <Continuous>  predniSONE   Tablet 15 milliGRAM(s) Oral daily  sodium bicarbonate 1300 milliGRAM(s) Oral every 6 hours  ticagrelor 90 milliGRAM(s) Oral two times a day    MEDICATIONS  (PRN):  acetaminophen     Tablet .. 650 milliGRAM(s) Oral every 6 hours PRN Mild Pain (1 - 3)  cyclobenzaprine 10 milliGRAM(s) Oral three times a day PRN Muscle Spasm                LABS:  cret                        7.8    26.13 )-----------( 172      ( 2022 07:21 )             23.2     06-02    140  |  105  |  23<H>  ----------------------------<  129<H>  3.0<L>   |  17  |  1.1    Ca    8.7      2022 07:21    TPro  5.9<L>  /  Alb  3.5  /  TBili  0.4  /  DBili  x   /  AST  20  /  ALT  17  /  AlkPhos  52  06-02                Radiology  < from: MR Lumbar Spine w/wo IV Cont (22 @ 19:38) >  IMPRESSION:  Acute compression deformity of the L1 vertebral body with approximately   80% height loss as well as bulging of the posterior cortex resulting in   severe spinal stenosis.    Additional acute compression deformity of the L4 superior endplate.    Severe multilevel degenerative changes contributing to spinal canal as   well as neuroforaminal narrowing as detailed above.    --- End of Report ---      JO BECERRIL MD; Attending Radiologist  This document has been electronically signed. May 27 2022  9:18AM    < end of copied text >    MRI/MRA Brain and Neck    IMPRESSION:  BRAIN  Numerous scattered punctate acute infarcts involving the bilateral cortical hemispheres.    Moderate chronic microvascular type changes as well as a chronic left frontal lobe infarct.    Scattered cortical susceptibility weighted artifact likely sequela of chronic hemosiderin deposition from remote subarachnoid hemorrhage. No evidence of acute hemorrhage.    BRAIN MRA  Since prior CTA of 2022 there has been interval placement of a flow diverting stent involving the right clinoid/supraclinoid ICA with residual flow related enhancement within the clinoid ICA aneurysm.    Multifocal scattered stenoses as detailed above.        ECHO  < from: TTE Echo Complete w/o Contrast w/ Doppler (22 @ 10:38) >    Summary:   1. Normal global left ventricular systolic function.   2. Normal left atrial size.   3. Normal right atrial size.   4. Trace mitral valve regurgitation.   5. PSAP 35.    < end of copied text >      VEEG  VEEG in the last 24 hours:    Background - continuous, symmetrical, less than optimally organized, reaching frequencies in the range of 7-8 hz    Focal and generalized slowin. mild generalized slowing  2. mild left hemispheric focal slowing   3. lower amplitude on the right side    Interictal activity - none    Events - none    Seizures -none    Impression:  Abnormal VEEG as above    Plan - as per NCC team

## 2022-06-03 NOTE — PROGRESS NOTE ADULT - SUBJECTIVE AND OBJECTIVE BOX
AMANDA FELDER  76y  Female    Patient is a 76y old  Female who presents with a chief complaint of Worsening Back Pain and Metabolic Encephalopathy (31 May 2022 09:26)      HPI:  History of Present Illness  Ms. Felder is a 76 year old female patient known to have:  - Baseline AO3, lives with niece, ambulates with a walker  - SEEMA and ILD. Follows with Dr Stephens. Not on CPAP or home O2. On Prednisone 20mg QD  - Depression  - HTN. Recent Admission for HTN Urgency. Follows with Dr Moses  - Hypothyroidism   - Recent admission for a bleeding duodenal ulcer at [facility] (melena). Home med Protonix 20mg QD  - Essential Thrombocytosis. Follows with Dr Miller  - Chronic Back Pain for years secondary to Spinal Stenosis per marquise. s/p Spinal Epidural Injection. Was on PT 3x/ week but has been non compliant for 2-3 months. Follows with Dr Jaimes. Was supposed to go for a nerve ablation last Monday (cancelled due to poorly controlled HTN s/p admission)       She was brought to the ED by marquise on  for evaluation of worsening back pain and confusion x2 days.  History goes back to 2 days PTP when the patient started complaining of worsening of her chronic dull lower back pain that radiates down to her right medial aspect of thigh.  This has resulted in limited ambulation (patient refusing to leave her bed) and to being non compliant with Physical Therapy in the absence of leg weakness or paresthesias or numbness or urinary incontinence.  Per marquise, patient has been feeling down x few days with reduced PO intake, reduced sleep, loss of interest, thoughts that her family doesn't like/ support her, and confusion (some times would not recognize marquise).  She has an instance where she had fecal incontinence on way to bathroom 2 days ago.  In the setting of confusion and increased pain, marquise decided to bring patient to ED for evaluation.    On review of systems, marquise denies any recent fever, chills, night sweats, URTI symptoms (cough, rhinorrhea, sore throat), urinary symptoms (urinary frequency, urgency, intermittence, dysuria, foul smelling urine, cloudy urine), abdominal pain, headache, nausea, or vomiting.   No sick contacts.   No recent travel or exposure to recent travelers.      Upon presentation to the ED, the patient was hemodynamically stable:  Vital Signs in ED   - /104 mmHg  -   - RR 20  - T97.1  - SaO2 94% on RA      Investigations   Laboratory Workup  - CBC:                        11.6   20.94 )-----------( 188      ( 18 May 2022 10:50 )             34.1     - Chemistry:      140  |  96<L>  |  48<H>  ----------------------------<  109<H>  4.3   |  24  |  2.1<H>    Ca    10.4<H>      18 May 2022 10:50    TPro  7.3  /  Alb  4.8  /  TBili  0.6  /  DBili  x   /  AST  36  /  ALT  27  /  AlkPhos  61      - Coagulation Studies:  PT/INR - ( 18 May 2022 10:50 )   PT: 12.30 sec;   INR: 1.07 ratio    PTT - ( 18 May 2022 10:50 )  PTT:24.8 sec      Microbiological Workup  Urinalysis Basic - ( 18 May 2022 14:07 )    Color: Yellow / Appearance: Clear / S.021 / pH: x  Gluc: x / Ketone: Trace  / Bili: Negative / Urobili: <2 mg/dL   Blood: x / Protein: 30 mg/dL / Nitrite: Negative   Leuk Esterase: Negative / RBC: 6 /HPF / WBC 4 /HPF   Sq Epi: x / Non Sq Epi: 1 /HPF / Bacteria: Negative          Radiological Workup  * CT Abdomen and Pelvis w/ IV Cont (22 @ 14:00) No acute abnormality within the abdomen and pelvis. Age-indeterminate moderate compression fracture seen at L1, new since 2022.  * CXR CM and CHF    - Patient was given IV Cefepime 2g x1 dose in ED  - She was also given 1.4 L LR bolus   - She will be admitted for further investigations, management, and monitoring     (18 May 2022 22:59)    Interval events: Pt initially admitted to MICU.  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 Pt s/p DSA & pipeline stent embo .  Patient was febrile on  but work-up was negative except for (+) MRSA PCR.  Patient on 5-day course of mupirocin.  Patient became awake, alert, and oriented x2-3 (person, , place) and tolerating PT/OT.   Passed S+S and tolerating PO soft and bite-sized diet.  Failed TOV on  and bran reinserted for retention.  Re-trial once ambulation improves.  Hemodynamically stable for downgrade to stroke unit for further management.    S: Patient was examined and seen at bedside. This morning pt is resting comfortably in bed and reports no new issues or overnight events. No specific complaints. No further bleeding.  Denies CP, SOB, N/V/D/C/AP, cough, F, chills, dizziness, new focal weakness, HA, vision changes, dysuria, or urinary symptoms.  ROS: all other systems reviewed and are negative    PAST MEDICAL & SURGICAL HISTORY:  HTN (hypertension)      Hypothyroid      Depression      Interstitial lung disease      Essential thrombocytopenia      History of ovarian cyst      General: NAD. Looks stated age.  HEENT: clean oropharynx, EOMI, no LAD  Neck: trachea midline, no thyromegaly  CV: nl S1 S2; no m/r/g  Resp: decreased breath sounds at base  GI: NT/ND/S +BS  MS: no clubbing/cyanosis/edema, +pulses  Neuro: motor, sensory intact; + reflexes  Skin: no rashes, nl turgor  Psychiatric: AA0x2     tele: SR, nonspecific changes (on my own evaluation of tele monitor)            MEDICATIONS  (STANDING):  albuterol/ipratropium for Nebulization. 3 milliLiter(s) Nebulizer once  chlorhexidine 4% Liquid 1 Application(s) Topical <User Schedule>  dronedarone 400 milliGRAM(s) Oral two times a day  enoxaparin Injectable 40 milliGRAM(s) SubCutaneous every 24 hours  levothyroxine 100 MICROGram(s) Oral daily  lidocaine   4% Patch 1 Patch Transdermal daily  magnesium oxide 400 milliGRAM(s) Oral three times a day with meals  melatonin 5 milliGRAM(s) Oral at bedtime  metoprolol tartrate 25 milliGRAM(s) Oral every 12 hours  niMODipine Oral Solution 30 milliGRAM(s) Oral every 4 hours  pantoprazole    Tablet 40 milliGRAM(s) Oral two times a day  predniSONE   Tablet 15 milliGRAM(s) Oral daily  sodium bicarbonate 1300 milliGRAM(s) Oral every 6 hours  ticagrelor 90 milliGRAM(s) Oral two times a day    MEDICATIONS  (PRN):  acetaminophen     Tablet .. 650 milliGRAM(s) Oral every 6 hours PRN Mild Pain (1 - 3)  cyclobenzaprine 10 milliGRAM(s) Oral three times a day PRN Muscle Spasm    Home Medications:  losartan-hydrochlorothiazide 100 mg-25 mg oral tablet: 1 tab(s) orally once a day (18 May 2022 23:28)  pantoprazole 40 mg oral delayed release tablet: 1 tab(s) orally 2 times a day (2022 14:07)  predniSONE 20 mg oral tablet: 1 tab(s) orally once a day (18 May 2022 23:28)    Vital Signs Last 24 Hrs  T(C): 36.3 (2022 14:19), Max: 36.6 (2022 05:00)  T(F): 97.3 (2022 14:19), Max: 97.9 (2022 05:00)  HR: 99 (2022 15:00) (80 - 100)  BP: 133/63 (2022 15:00) (124/61 - 174/75)  BP(mean): 97 (2022 14:19) (97 - 97)  RR: 16 (2022 14:19) (16 - 18)  SpO2: --  CAPILLARY BLOOD GLUCOSE        LABS:                        7.7    27.67 )-----------( 195      ( 2022 11:54 )             22.5     06-03    140  |  105  |  15  ----------------------------<  119<H>  4.2   |  16<L>  |  1.1    Ca    8.9      2022 11:54  Mg     1.8     06-03    TPro  5.9<L>  /  Alb  3.5  /  TBili  0.4  /  DBili  x   /  AST  22  /  ALT  16  /  AlkPhos  53  06-03    LIVER FUNCTIONS - ( 2022 11:54 )  Alb: 3.5 g/dL / Pro: 5.9 g/dL / ALK PHOS: 53 U/L / ALT: 16 U/L / AST: 22 U/L / GGT: x                   ABG - ( 2022 15:08 )  pH, Arterial: 7.46  pH, Blood: x     /  pCO2: 27    /  pO2: 67    / HCO3: 19    / Base Excess: -3.2  /  SaO2: 95.8                    Consultant Notes Reviewed:  [x ] YES  [ ] NO  Care Discussed with Consultants/Other Providers/ Housestaff [ x] YES  [ ] NO  Radiology, labs, new studies personally reviewed.

## 2022-06-03 NOTE — PROGRESS NOTE ADULT - ATTENDING COMMENTS
Patient seen and examined and agree with above except as noted.  Patients history, notes, labs, imaging, vitals and meds reviewed personally.  Patient is feeling better today neurologically  She is slightly SOB  No new deficits on exam  No further GI bleeding    Plan as above

## 2022-06-03 NOTE — PROGRESS NOTE ADULT - ASSESSMENT
`Ms. Shook is a 76 year old female patient, Primarily [place] speaking, known to have, Baseline AO3, lives with niece, ambulates with a walker, SEEMA and ILD (Prednisone 20mg QD), Depression, HTN, Hypothyroidism, Essential Thrombocytosis. Follows with Dr Denise, Chronic Back Pain for years secondary to Spinal Stenosis per niece, s/p Spinal Epidural Injection and Recent admission for a bleeding duodenal ulcer at [facility] (melena), Status post coil embolization in the region of the gastroduodenal artery, was on Protonix 20mg QD at home  patient was brought to the ED by niece on 05/18 for evaluation of worsening back pain and confusion x2 days   CTH on admission with new right sided scattered SAH, CTA with aneurysm. 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 on ASA last dose 5/29 and Brilinta   currently on Brilinta BID and lovenox 40 qd ppx   ILD on prednisone  acidosis with bicarb of 9  leukocytosis  COVID-19 exposure    Recommendations  -can switch PPI drip to po BID   -diet as tolerated    -trend CBC and transfuse to keep Hb > 8   -2 large bore IV  -type and screen  -holding on endoscopic procedure since HD stable and Hb stable and in the setting of COVID exposure with her interstitial lung disease   -if hematemesis / hypotension call GI fellow STAT  -EGD as OP      -for questions text me on [facility] teams, call [phone] on weekdays before 5pm or call GI service at [phone]  after 5 pm and on weekends

## 2022-06-03 NOTE — PROGRESS NOTE ADULT - ASSESSMENT
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 Pt s/p DSA pipeline stenting R ICA aneurysm on 5/23. S/P 1 UNIT PRBC for hb 7.3. No acute change in neuro status. Patient received another unit of blood  for Hb 7.6     #Anemia/Melena/hx recent UGIB 2/2 bleeding duodenal ulcer (recent [facility] admission for melena)  She had multiple bowl movements,   - Stopped bowl regime   - Started on pantoprazole infusion as per GI.   - Senna/Miralax  - Currently clear liquid diet as per GI   - track CBC  - GI is considering EGD if unstable  - s/p 2 unit prbc. on 27 and 30 of May  - Lovenox 40 qd    - SCDs  - Hold Anagrelide for now (low Hb)  - Negative LE dopplers from 5/19. Repeat 6/1 also negative.  - track CBC keep Hgb>8  - advance diet as per GI      #SAH, 2/2 5mm ruptured R supraclinoid aneurysm s/p angio & pipeline stent embo 5/23  #Ischemic strokes on MRI   #new L1 compression fx, CPB 2/2 spinal stenosis (follows w Dr. العلي)  - Neuro Checks Q4H  - Brilinta BID   - Hold aspirin for now (low Hb)  - Nimodipine   - Normovolemia  - Keppra for seizure ppx  - Video EEG negative  - MRA showed decreased aneurysm (5.9 -> 3.5mm), repeat MRI or CTA in 3 weeks per NSG  - Pain control: Tylenol, Lido patch, flexeril (new L1 compression fx, HA) on MR L-spine  - OOB to chair  - PT/OT/OOB     #HTN  - -140  - nimodipine   - encourage PO intake  - continue metoprolol     #Sinus tachycardia (at one point >150)  - Continue metoprolol 25 mg bid and titrate PRN for better HR control  - Started Multaq 400mg bid   - transfuse if H/H continues to decrease      #SEEMA not on CPAP, ILD  she was tachypneic and wheezing 5/30, that improved after diuresis and blood transfusion.   - Aspiration precautions   - Prednisone 15mg qd for ILD; pulm following  - Incentive spirometry  - Strict I/Os     #Chronic thrombocytosis  -anagrelide on hold due to GIB/anemia  -as per neuro who spoke to heme team, can hold as long as Plts are not going very high    #hypothyroidism  - Synthroid PO 100mcg qD  - TSH 70, free t4 1.0; repeat in 2-4 weeks    Urinary retention  voiding trial and if fails, consider CIC    Resp alkolosis ?Etiology ?ILD, ?anxiety. No evidence of pain  -would get CTA chest to r/o PE    Suspected baseline dementia  stable    Very high risk pt. Px is guarded    #Progress Note Handoff  Pending: Consults____Clinical improvement and stability__x___Tests___CBC stability, CTA lungs_____PT____x____  Pt/Family discussion: Pt informed and agrees with the current plan. Neuro team spoke to niece.  Disposition: Home______/SNF____vs   4A___?___/To be determined____x____    My note supersedes the residents note should a discrepancy arise.    Chart and notes personally reviewed.  Care Discussed with Consultants/Other Providers/ Housestaff [ x] YES [ ] NO   Radiology, labs, old records personally reviewed.    discussed w/ housestaff, nursing, case management, neuro team, physiatry

## 2022-06-03 NOTE — DISCHARGE NOTE PROVIDER - HOSPITAL COURSE
Hospital course:  76y Female with PMH SEEMA and ILD, Depression, HTN, Hypothyroidism, Essential Thrombocytosis, GI bleed from peptic ulcer treated by cauterization, who presented with two days of confusion.     During this hospital course, patient had a SAH, and ischemic stroke located bilaterally as seen on MRI and CT.   The patient was admitted to the NeuroICU. Angiography showed aneurysm. Neurosurgery did intervention with a pipeline     The SAH most likely caused by the aneurysm, and the ischemic strokes most likely post procedural. The patient has bilateral carotid stenosis. She currently on Nimodipine (started on admission, and bleed suspected to be on 05/16/2022).   GI bleed: During hospitalization, the patient had GI bleed and her Hb dropped. She received two units of PRC during hospitalization. Her Anagrelide, and aspirin were stopped and continued on Brilinta 90 mg bid as recommended by neurosurgery team. GI followed the patient and endoscopy was not done. She was started on pantoprazole infusion, then changed to 40 mg bid   Tachycardia: Pt also had tachycardia episodes up to 220 bpm. She was seen by electrophysiology team reviewed the case and she was started on Multac 400 mg bid.   Essential thrombosis: Anagrelide was stopped because of the GI bleed. After discussion with hematology team, the patient can stay of Anagrelide and follow as out patient with Dr. Denise     Patient had the following workup done in house:  CT Head No Cont 05.19.22: New scattered subarachnoid hemorrhage in the right cerebral sulci which appear mildly effaced compared tothe prior CT head dated 4/25/2021. 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   CT Head No Cont 05.19.22 @ 12:56: Focal gray-white distinction loss involving the frontal cortical region possibly representing an area of age-indeterminate ischemic change. Similar right frontoparietal sulcal effacement which may represent subacute subarachnoid hemorrhagic products though a follow-up brain MRI with contrast is recommended for further evaluation.  MR Head: Numerous scattered punctate acute infarcts involving the bilateral cortical hemispheres. Moderate chronic microvascular type changes as well as a chronic left   frontal lobe infarct. Scattered cortical susceptibility weighted artifact likely sequela of chronic hemosiderin deposition from remote subarachnoid hemorrhage. No   evidence of acute hemorrhage.   BRAIN MRA: Since prior CTA of 5/21/2022 there has been interval placement of a flow diverting stent involving the right clinoid/supraclinoid ICA with   residual flow related enhancement within the clinoid ICA aneurysm. Multifocal scattered stenoses as detailed above.  NECK MRA: Motion limited examination. Likely severe/critical bilateral proximal ICA stenosis though difficult to quantify due to motion limitation.  CTA neck: Motion and dense calcific atherosclerosis at the carotid bulbs bilaterally limit evaluation; presumed at least moderate to severe stenosis bilaterally. Short segment beading of the mid left internal carotid artery may represent fibromuscular dysplasia. Small 1 mm medially and posteriorly oriented outpouching in the distal cervical left internal carotid artery.  CTA brain: 5 mm posteriorly/inferiorly oriented saccular aneurysm of the clinoid segment of the right internal carotid artery. Multifocal stenoses involving the anterior circulation as detailed above. Right internal carotid artery has mild stenosis 20-39%. Left internal carotid has stenosis 60-79%  VA Duplex Carotid, Bilat (05.22.22 @ 10:53): Right internal carotid artery has mild stenosis 20-39%. Left internal carotid has stenosis 60-79%  IR Neuro (05.23.22 @ 17:27): Successful flow diverter placement for 5 mm wide necked right-sided supraclinoid aneurysm Likely fibromuscular dysplasia of the right-sided internal carotid artery Approximately 70% calcified stenosis noted within the right-sided internal carotid artery.  Echo: Normal  CT Chest No Cont 05.19.22 @ 18:43: Diffuse interstitial lung disease characterized by subpleural reticular opacities mosaic attenuation. Interval decrease in the amount of bilateral groundglass opacities from earlier study.  VA Duplex Lower Ext Vein Scan, Bilat 5.19.22 @ 18:32: No evidence of deep venous thrombosis in either lower extremity  US Renal (05.19.22 @ 18:37): No hydronephrosis bilaterally. Nonspecific bilateral trace perinephric fluid.    Physical exam at discharge: On the day of discharge, the patient was seen and examined. Symptoms improved. Vital signs are stable. Labs and imaging reviewed. Patient is medically optimized and hemodynamically stable. Return precautions discussed, medication teach back done, and importance of physician followup emphasized. The patient verbalized understanding.       Hospital course:  76y Female with PMH SEEMA and ILD, Depression, HTN, Hypothyroidism, Essential Thrombocytosis, GI bleed from peptic ulcer treated by cauterization, who presented with two days of confusion.     During this hospital course, patient had a SAH, and ischemic stroke located bilaterally as seen on MRI and CT.   The patient was admitted to the NeuroICU. Angiography showed aneurysm. Neurosurgery did intervention with a pipeline     The SAH most likely caused by the aneurysm, and the ischemic strokes most likely post procedural. The patient has bilateral carotid stenosis. She currently on Nimodipine (started on admission, and bleed suspected to be on 05/16/2022).   GI bleed: During hospitalization, the patient had GI bleed and her Hb dropped. She received two units of PRC during hospitalization. Her Anagrelide, and aspirin were stopped and continued on Brilinta 90 mg bid as recommended by neurosurgery team. GI followed the patient and endoscopy was not done. She was started on pantoprazole infusion, then changed to 40 mg bid   Tachycardia: Pt also had tachycardia episodes up to 220 bpm. She was seen by electrophysiology team reviewed the case and she was started on Multac 400 mg bid.   Essential thrombosis: Anagrelide was stopped because of the GI bleed. After discussion with hematology team, the patient can stay of Anagrelide and follow as out patient with Dr. Denise     Patient had the following workup done in house:  CT Head No Cont 05.19.22: New scattered subarachnoid hemorrhage in the right cerebral sulci which appear mildly effaced compared tothe prior CT head dated 4/25/2021. 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   CT Head No Cont 05.19.22 @ 12:56: Focal gray-white distinction loss involving the frontal cortical region possibly representing an area of age-indeterminate ischemic change. Similar right frontoparietal sulcal effacement which may represent subacute subarachnoid hemorrhagic products though a follow-up brain MRI with contrast is recommended for further evaluation.  MR Head: Numerous scattered punctate acute infarcts involving the bilateral cortical hemispheres. Moderate chronic microvascular type changes as well as a chronic left   frontal lobe infarct. Scattered cortical susceptibility weighted artifact likely sequela of chronic hemosiderin deposition from remote subarachnoid hemorrhage. No   evidence of acute hemorrhage.   BRAIN MRA: Since prior CTA of 5/21/2022 there has been interval placement of a flow diverting stent involving the right clinoid/supraclinoid ICA with   residual flow related enhancement within the clinoid ICA aneurysm. Multifocal scattered stenoses as detailed above.  NECK MRA: Motion limited examination. Likely severe/critical bilateral proximal ICA stenosis though difficult to quantify due to motion limitation.  CTA neck: Motion and dense calcific atherosclerosis at the carotid bulbs bilaterally limit evaluation; presumed at least moderate to severe stenosis bilaterally. Short segment beading of the mid left internal carotid artery may represent fibromuscular dysplasia. Small 1 mm medially and posteriorly oriented outpouching in the distal cervical left internal carotid artery.  CTA brain: 5 mm posteriorly/inferiorly oriented saccular aneurysm of the clinoid segment of the right internal carotid artery. Multifocal stenoses involving the anterior circulation as detailed above. Right internal carotid artery has mild stenosis 20-39%. Left internal carotid has stenosis 60-79%  VA Duplex Carotid, Bilat (05.22.22 @ 10:53): Right internal carotid artery has mild stenosis 20-39%. Left internal carotid has stenosis 60-79%  IR Neuro (05.23.22 @ 17:27): Successful flow diverter placement for 5 mm wide necked right-sided supraclinoid aneurysm Likely fibromuscular dysplasia of the right-sided internal carotid artery Approximately 70% calcified stenosis noted within the right-sided internal carotid artery.  Echo: Normal  CT Chest No Cont 05.19.22 @ 18:43: Diffuse interstitial lung disease characterized by subpleural reticular opacities mosaic attenuation. Interval decrease in the amount of bilateral groundglass opacities from earlier study.  VA Duplex Lower Ext Vein Scan, Bilat 5.19.22 @ 18:32: No evidence of deep venous thrombosis in either lower extremity  US Renal (05.19.22 @ 18:37): No hydronephrosis bilaterally. Nonspecific bilateral trace perinephric fluid.    Physical exam at discharge: On the day of discharge, the patient was seen and examined. Symptoms improved. Vital signs are stable. Labs and imaging reviewed. Patient is medically optimized and hemodynamically stable. Return precautions discussed, medication teach back done, and importance of physician followup emphasized. The patient verbalized understanding.      Stroke attending Attestation:    Pt is a 75 yo F with PMHX of chronic back pain, SEEMA, HTN, depression, essential thrombocytosis, recent admission for GIB 2/2 duodenal ulcer, who presented with AMS. 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 Now s/p pipeline stent embolization 5/23. MRI brain on 5/26 with multifocal punctate acute ischemic stroke. Favor periprocedural. However in light of bilateral carotid disease, CUS ordered, pending, may f/u outpatient. Plt had increased, pt was restarted on home anagrelide, with ASA/brilinta for stent, then developed GIB, thus ASA and anagrelide held. Continue brillinta 90mg BID. No recent bloody BM, Hb has been stable.  Nimotop last day 6/13. D/c to [facility] acute rehab. F/u in stroke clinic, with Heme/Onc, NS and PCP outpatient.      Hospital course:  76y Female with PMH SEEMA and ILD, Depression, HTN, Hypothyroidism, Essential Thrombocytosis, GI bleed from peptic ulcer treated by cauterization, who presented with two days of confusion.     During this hospital course, patient had a SAH, and ischemic stroke located bilaterally as seen on MRI and CT.   The patient was admitted to the NeuroICU. Angiography showed aneurysm. Neurosurgery did intervention with a pipeline     The SAH most likely caused by the aneurysm, and the ischemic strokes most likely post procedural. The patient has bilateral carotid stenosis. She currently on Nimodipine (started on admission, and bleed suspected to be on 05/16/2022).   GI bleed: During hospitalization, the patient had GI bleed and her Hb dropped. She received two units of PRC during hospitalization. Her Anagrelide, and aspirin were stopped and continued on Brilinta 90 mg bid as recommended by neurosurgery team. GI followed the patient and endoscopy was not done. She was started on pantoprazole infusion, then changed to 40 mg bid   Tachycardia: Pt also had tachycardia episodes up to 220 bpm. She was seen by electrophysiology team reviewed the case and she was started on Multac 400 mg bid.   Essential thrombosis: Anagrelide was stopped because of the GI bleed. After discussion with hematology team, the patient can stay of Anagrelide and follow as out patient with Dr. Denise     Patient had the following workup done in house:  CT Head No Cont 05.19.22: New scattered subarachnoid hemorrhage in the right cerebral sulci which appear mildly effaced compared tothe prior CT head dated 4/25/2021. 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   CT Head No Cont 05.19.22 @ 12:56: Focal gray-white distinction loss involving the frontal cortical region possibly representing an area of age-indeterminate ischemic change. Similar right frontoparietal sulcal effacement which may represent subacute subarachnoid hemorrhagic products though a follow-up brain MRI with contrast is recommended for further evaluation.  MR Head: Numerous scattered punctate acute infarcts involving the bilateral cortical hemispheres. Moderate chronic microvascular type changes as well as a chronic left   frontal lobe infarct. Scattered cortical susceptibility weighted artifact likely sequela of chronic hemosiderin deposition from remote subarachnoid hemorrhage. No   evidence of acute hemorrhage.   BRAIN MRA: Since prior CTA of 5/21/2022 there has been interval placement of a flow diverting stent involving the right clinoid/supraclinoid ICA with   residual flow related enhancement within the clinoid ICA aneurysm. Multifocal scattered stenoses as detailed above.  NECK MRA: Motion limited examination. Likely severe/critical bilateral proximal ICA stenosis though difficult to quantify due to motion limitation.  CTA neck: Motion and dense calcific atherosclerosis at the carotid bulbs bilaterally limit evaluation; presumed at least moderate to severe stenosis bilaterally. Short segment beading of the mid left internal carotid artery may represent fibromuscular dysplasia. Small 1 mm medially and posteriorly oriented outpouching in the distal cervical left internal carotid artery.  CTA brain: 5 mm posteriorly/inferiorly oriented saccular aneurysm of the clinoid segment of the right internal carotid artery. Multifocal stenoses involving the anterior circulation as detailed above. Right internal carotid artery has mild stenosis 20-39%. Left internal carotid has stenosis 60-79%  VA Duplex Carotid, Bilat (05.22.22 @ 10:53): Right internal carotid artery has mild stenosis 20-39%. Left internal carotid has stenosis 60-79%  IR Neuro (05.23.22 @ 17:27): Successful flow diverter placement for 5 mm wide necked right-sided supraclinoid aneurysm Likely fibromuscular dysplasia of the right-sided internal carotid artery Approximately 70% calcified stenosis noted within the right-sided internal carotid artery.  Echo: Normal  CT Chest No Cont 05.19.22 @ 18:43: Diffuse interstitial lung disease characterized by subpleural reticular opacities mosaic attenuation. Interval decrease in the amount of bilateral groundglass opacities from earlier study.  VA Duplex Lower Ext Vein Scan, Bilat 5.19.22 @ 18:32: No evidence of deep venous thrombosis in either lower extremity  US Renal (05.19.22 @ 18:37): No hydronephrosis bilaterally. Nonspecific bilateral trace perinephric fluid.    Physical exam at discharge: On the day of discharge, the patient was seen and examined. Symptoms improved. Vital signs are stable. Labs and imaging reviewed. Patient is medically optimized and hemodynamically stable. Return precautions discussed, medication teach back done, and importance of physician followup emphasized. The patient verbalized understanding.      Stroke attending Attestation:    Pt is a 77 yo F with PMHX of chronic back pain, SEEMA, HTN, depression, essential thrombocytosis, recent admission for GIB 2/2 duodenal ulcer, who presented with AMS. 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 Now s/p pipeline stent embolization 5/23. MRI brain on 5/26 with multifocal punctate acute ischemic stroke. Favor periprocedural. However in light of bilateral carotid disease, CUS ordered, pending, may f/u outpatient. Plt had increased, pt was restarted on home anagrelide, with ASA/brilinta for stent, then developed GIB, thus ASA and anagrelide held. Continue brillinta 90mg BID. No recent bloody BM, Hb has been stable.  Nimotop last day 6/13. D/c home with [facility]. F/u in stroke clinic, with Heme/Onc, NS and PCP outpatient.

## 2022-06-03 NOTE — DISCHARGE NOTE PROVIDER - NSDCCPCAREPLAN_GEN_ALL_CORE_FT
PRINCIPAL DISCHARGE DIAGNOSIS  Diagnosis: SAH (subarachnoid hemorrhage)  Assessment and Plan of Treatment: During this hospital admission, you had a hemorrhagic stroke. During an hemorrhagic stroke, blood leaks out of your blood vessels into your brain because of a break in the blood vessel wall. This can damage areas in the brain that control other parts of the body. The blood will get absorbed back into your body over time like a bruise.   Doing your regular tasks may be difficult after you've had a stroke, but you can learn new ways to manage your daily activities. In fact, doing daily activities may help you to regain muscle strength. Be patient, give yourself time to adjust, and appreciate the progress you make. When showering or bathing, test the water temperature with a hand or foot that was not affected by the stroke. Use grab bars, a shower seat, a hand-held showerhead, and a long-handled brush. For dressing, dress while sitting, starting with the affected side or limb. Wear shirts that pull easily over your head and pants or skirts with elastic waistbands. Use zippers with loops attached to the pull tabs. You will learn additional new ways to manage after a stroke in rehabilitation. It is normal to feel fatigue after a stroke, while some days may be worse than others, you will continue to improve.  Call 911 right away if you have any of the following symptoms of another stroke:  B: Balance: Sudden: Dizziness, loss of balance, or a sense of falling, difficulty with coordinating movement  E: Eyes: Sudden double vision or trouble seeing in one or both eyes  F: Face: Sudden uneven face  A: Arms (Legs): Sudden weakness, tingling, or loss of feeling on one side of your face or body  S: Speech: Sudden trouble talking or slurred speech, sudden difficulty understanding others  T: Time: Please call 911 right away and go to the emergency room  •Sudden, severe headache  •Blackouts or seizures        SECONDARY DISCHARGE DIAGNOSES  Diagnosis: JEOVANY (acute kidney injury)  Assessment and Plan of Treatment:     Diagnosis: Acute ischemic stroke  Assessment and Plan of Treatment: During this hospital admission, you had an ischemic stroke. During an ischemic stroke, blood stops flowing to part of your brain because of a blockage in the blood vessel. This can damage areas in the brain that control other parts of the body.  Please take your brillitna for blood thinning and Atorvastatin for cholesterol medication/blood vessel protection as prescribed to prevent further strokes. Do not skip doses and do not run low on your medication. If you run low on your medication, please contact your doctor.  You will follow up outpatient with the stroke Nurse Practitioner as scheduled below.  Doing your regular tasks may be difficult after you've had a stroke, but you can learn new ways to manage your daily activities. In fact, doing daily activities may help you to regain muscle strength. Be patient, give yourself time to adjust, and appreciate the progress you make. For example, when showering or bathing, test the water temperature with a hand or foot that was not affected by the stroke, use grab bars, a shower seat, a hand-held showerhead, etc. It is normal to feel fatigue after a stroke, while some days may be worse than others, you will continue to improve.  Call 911 right away if you have any of the following symptoms of another stroke:  B: Balance: Sudden: Dizziness, loss of balance, or a sense of falling, difficulty with coordinating movement  E: Eyes: Sudden double vision or trouble seeing in one or both eyes  F: Face: Sudden uneven face  A: Arms (Legs): Sudden weakness, tingling, or loss of feeling on one side of your face or body  S: Speech: Sudden trouble talking or slurred speech, sudden difficulty understanding others  T: Time: Please call 911 right away and go to the emergency room  •Sudden, severe headache  •Blackouts or seizures      Diagnosis: GI bleed  Assessment and Plan of Treatment:     Diagnosis: Essential thrombocytosis  Assessment and Plan of Treatment:     Diagnosis: ILD (interstitial lung disease)  Assessment and Plan of Treatment:     Diagnosis: HTN (hypertension)  Assessment and Plan of Treatment: Hypertension is the medical term for high blood pressure. Blood pressure refers to the pressure that blood applies to the inner walls of the arteries. Arteries carry blood from the heart to other organs and parts of the body. Untreated high blood pressure increases the strain on the heart and arteries, eventually causing organ damage. High blood pressure increases the risk of heart failure, heart attack (myocardial infarction), stroke, and kidney failure. High blood pressure does not usually cause any symptoms. Treatment of hypertension usually begins with lifestyle changes. Making these lifestyle changes involves little or no risk. Recommended changes often include reducing the amount of salt in your diet, losing weight if you are overweight or obese, avoiding drinking too much alcohol, stopping smoking and exercising at least 30 minutes per day most days of the week. If you are prescribed medication for your hypertension it is important to take these as prescribed to prevent the possible complications of uncontrolled hypertension.      Diagnosis: Hypothyroid  Assessment and Plan of Treatment:     Diagnosis: Other depression  Assessment and Plan of Treatment:

## 2022-06-04 LAB
ALBUMIN SERPL ELPH-MCNC: 3.5 G/DL — SIGNIFICANT CHANGE UP (ref 3.5–5.2)
ALP SERPL-CCNC: 60 U/L — SIGNIFICANT CHANGE UP (ref 30–115)
ALT FLD-CCNC: 14 U/L — SIGNIFICANT CHANGE UP (ref 0–41)
ANION GAP SERPL CALC-SCNC: 18 MMOL/L — HIGH (ref 7–14)
AST SERPL-CCNC: 20 U/L — SIGNIFICANT CHANGE UP (ref 0–41)
BILIRUB SERPL-MCNC: 0.3 MG/DL — SIGNIFICANT CHANGE UP (ref 0.2–1.2)
BUN SERPL-MCNC: 17 MG/DL — SIGNIFICANT CHANGE UP (ref 10–20)
CALCIUM SERPL-MCNC: 8.8 MG/DL — SIGNIFICANT CHANGE UP (ref 8.5–10.1)
CHLORIDE SERPL-SCNC: 110 MMOL/L — SIGNIFICANT CHANGE UP (ref 98–110)
CO2 SERPL-SCNC: 19 MMOL/L — SIGNIFICANT CHANGE UP (ref 17–32)
CREAT SERPL-MCNC: 1.3 MG/DL — SIGNIFICANT CHANGE UP (ref 0.7–1.5)
EGFR: 42 ML/MIN/1.73M2 — LOW
GLUCOSE SERPL-MCNC: 104 MG/DL — HIGH (ref 70–99)
HCT VFR BLD CALC: 22.9 % — LOW (ref 37–47)
HCT VFR BLD CALC: 28.5 % — LOW (ref 37–47)
HGB BLD-MCNC: 7.7 G/DL — LOW (ref 12–16)
HGB BLD-MCNC: 9.8 G/DL — LOW (ref 12–16)
MCHC RBC-ENTMCNC: 30.8 PG — SIGNIFICANT CHANGE UP (ref 27–31)
MCHC RBC-ENTMCNC: 31.8 PG — HIGH (ref 27–31)
MCHC RBC-ENTMCNC: 33.6 G/DL — SIGNIFICANT CHANGE UP (ref 32–37)
MCHC RBC-ENTMCNC: 34.4 G/DL — SIGNIFICANT CHANGE UP (ref 32–37)
MCV RBC AUTO: 89.6 FL — SIGNIFICANT CHANGE UP (ref 81–99)
MCV RBC AUTO: 94.6 FL — SIGNIFICANT CHANGE UP (ref 81–99)
NRBC # BLD: 2 /100 WBCS — HIGH (ref 0–0)
NRBC # BLD: 2 /100 WBCS — HIGH (ref 0–0)
PLATELET # BLD AUTO: 266 K/UL — SIGNIFICANT CHANGE UP (ref 130–400)
PLATELET # BLD AUTO: 324 K/UL — SIGNIFICANT CHANGE UP (ref 130–400)
POTASSIUM SERPL-MCNC: 4.3 MMOL/L — SIGNIFICANT CHANGE UP (ref 3.5–5)
POTASSIUM SERPL-SCNC: 4.3 MMOL/L — SIGNIFICANT CHANGE UP (ref 3.5–5)
PROT SERPL-MCNC: 5.9 G/DL — LOW (ref 6–8)
RBC # BLD: 2.42 M/UL — LOW (ref 4.2–5.4)
RBC # BLD: 3.18 M/UL — LOW (ref 4.2–5.4)
RBC # FLD: 16.9 % — HIGH (ref 11.5–14.5)
RBC # FLD: 18.8 % — HIGH (ref 11.5–14.5)
SODIUM SERPL-SCNC: 147 MMOL/L — HIGH (ref 135–146)
WBC # BLD: 27.96 K/UL — HIGH (ref 4.8–10.8)
WBC # BLD: 29.17 K/UL — HIGH (ref 4.8–10.8)
WBC # FLD AUTO: 27.96 K/UL — HIGH (ref 4.8–10.8)
WBC # FLD AUTO: 29.17 K/UL — HIGH (ref 4.8–10.8)

## 2022-06-04 PROCEDURE — 99233 SBSQ HOSP IP/OBS HIGH 50: CPT

## 2022-06-04 PROCEDURE — 99232 SBSQ HOSP IP/OBS MODERATE 35: CPT

## 2022-06-04 RX ADMIN — NIMODIPINE 30 MILLIGRAM(S): 60 SOLUTION ORAL at 21:58

## 2022-06-04 RX ADMIN — Medication 15 MILLIGRAM(S): at 05:21

## 2022-06-04 RX ADMIN — Medication 1300 MILLIGRAM(S): at 11:14

## 2022-06-04 RX ADMIN — Medication 25 MILLIGRAM(S): at 17:03

## 2022-06-04 RX ADMIN — PANTOPRAZOLE SODIUM 40 MILLIGRAM(S): 20 TABLET, DELAYED RELEASE ORAL at 17:04

## 2022-06-04 RX ADMIN — ENOXAPARIN SODIUM 40 MILLIGRAM(S): 100 INJECTION SUBCUTANEOUS at 11:13

## 2022-06-04 RX ADMIN — NIMODIPINE 30 MILLIGRAM(S): 60 SOLUTION ORAL at 10:09

## 2022-06-04 RX ADMIN — Medication 1300 MILLIGRAM(S): at 05:21

## 2022-06-04 RX ADMIN — Medication 1300 MILLIGRAM(S): at 17:04

## 2022-06-04 RX ADMIN — MAGNESIUM OXIDE 400 MG ORAL TABLET 400 MILLIGRAM(S): 241.3 TABLET ORAL at 11:14

## 2022-06-04 RX ADMIN — DRONEDARONE 400 MILLIGRAM(S): 400 TABLET, FILM COATED ORAL at 17:05

## 2022-06-04 RX ADMIN — MAGNESIUM OXIDE 400 MG ORAL TABLET 400 MILLIGRAM(S): 241.3 TABLET ORAL at 17:03

## 2022-06-04 RX ADMIN — LIDOCAINE 1 PATCH: 4 CREAM TOPICAL at 11:13

## 2022-06-04 RX ADMIN — MAGNESIUM OXIDE 400 MG ORAL TABLET 400 MILLIGRAM(S): 241.3 TABLET ORAL at 07:53

## 2022-06-04 RX ADMIN — TICAGRELOR 90 MILLIGRAM(S): 90 TABLET ORAL at 17:05

## 2022-06-04 RX ADMIN — Medication 1300 MILLIGRAM(S): at 23:05

## 2022-06-04 RX ADMIN — CHLORHEXIDINE GLUCONATE 1 APPLICATION(S): 213 SOLUTION TOPICAL at 05:23

## 2022-06-04 RX ADMIN — TICAGRELOR 90 MILLIGRAM(S): 90 TABLET ORAL at 05:20

## 2022-06-04 RX ADMIN — Medication 100 MICROGRAM(S): at 05:20

## 2022-06-04 RX ADMIN — NIMODIPINE 30 MILLIGRAM(S): 60 SOLUTION ORAL at 02:43

## 2022-06-04 RX ADMIN — NIMODIPINE 30 MILLIGRAM(S): 60 SOLUTION ORAL at 14:28

## 2022-06-04 RX ADMIN — Medication 25 MILLIGRAM(S): at 05:20

## 2022-06-04 RX ADMIN — NIMODIPINE 30 MILLIGRAM(S): 60 SOLUTION ORAL at 05:22

## 2022-06-04 RX ADMIN — PANTOPRAZOLE SODIUM 40 MILLIGRAM(S): 20 TABLET, DELAYED RELEASE ORAL at 05:23

## 2022-06-04 RX ADMIN — Medication 5 MILLIGRAM(S): at 21:58

## 2022-06-04 RX ADMIN — DRONEDARONE 400 MILLIGRAM(S): 400 TABLET, FILM COATED ORAL at 05:21

## 2022-06-04 RX ADMIN — LIDOCAINE 1 PATCH: 4 CREAM TOPICAL at 18:14

## 2022-06-04 RX ADMIN — NIMODIPINE 30 MILLIGRAM(S): 60 SOLUTION ORAL at 17:05

## 2022-06-04 NOTE — PROGRESS NOTE ADULT - ASSESSMENT
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 Pt s/p DSA pipeline stenting R ICA aneurysm on 5/23. S/P 1 UNIT PRBC for hb 7.3. No acute change in neuro status. Patient received another unit of blood  for Hb 7.6     #Anemia/Melena/hx recent UGIB 2/2 bleeding duodenal ulcer (recent [facility] admission for melena)  She had multiple bowl movements,   - Stopped bowl regime   - Started on pantoprazole infusion as per GI.   - Senna/Miralax  - Currently clear liquid diet as per GI   - track CBC  - GI is considering EGD if unstable  - s/p 2 unit prbc. on 27 and 30 of May  - Lovenox 40 qd    - SCDs  - Hold Anagrelide for now (low Hb)  - Negative LE dopplers from 5/19. Repeat 6/1 also negative.  - track CBC keep Hgb>7.5  - advanced diet as per GI and pt tolerating      #SAH, 2/2 5mm ruptured R supraclinoid aneurysm s/p angio & pipeline stent embo 5/23  #Ischemic strokes on MRI   #new L1 compression fx, CPB 2/2 spinal stenosis (follows w Dr. العلي)  - Neuro Checks Q4H  - Brilinta BID   - Hold aspirin for now (low Hb)  - Nimodipine   - Normovolemia  - Keppra for seizure ppx  - Video EEG negative  - MRA showed decreased aneurysm (5.9 -> 3.5mm), repeat MRI or CTA in 3 weeks per NSG  - Pain control: Tylenol, Lido patch, flexeril (new L1 compression fx, HA) on MR L-spine  - OOB to chair  - PT/OT/OOB     #HTN  - -140  - nimodipine for 21 days  - encourage PO intake  - continue metoprolol     #Sinus tachycardia (at one point >150)  - Continue metoprolol 25 mg bid and titrate PRN for better HR control  - Started Multaq 400mg bid   - transfuse if H/H continues to decrease      #SEEMA not on CPAP, ILD  she was tachypneic and wheezing 5/30, that improved after diuresis and blood transfusion.   - Aspiration precautions   - Prednisone 15mg qd for ILD; pulm following  - Incentive spirometry  - Strict I/Os     #Chronic thrombocytosis  -anagrelide on hold due to GIB/anemia  -as per neuro who spoke to heme team, can hold as long as Plts are not going very high    #hypothyroidism  - Synthroid PO 100mcg qD  - TSH 70, free t4 1.0; repeat in 2-4 weeks    Urinary retention  voiding trial and if fails, consider CIC    Resp alkolosis ?Etiology ?ILD, ?anxiety. No evidence of pain  -CTA chest negative  to r/o PE    Suspected baseline dementia  stable    Very high risk pt. Px is guarded    #Progress Note Handoff  Pending: Consults____Clinical improvement and stability__x___Tests__4A auth_  Pt/Family discussion: Pt informed and agrees with the current plan. Neuro team spoke to niece.  Disposition: Home______/SNF____vs   4A___?___/To be determined____x____    My note supersedes the residents note should a discrepancy arise.    Chart and notes personally reviewed.  Care Discussed with Consultants/Other Providers/ Housestaff [ x] YES [ ] NO   Radiology, labs, old records personally reviewed.    discussed w/ housestaff, nursing, case management, neuro team

## 2022-06-04 NOTE — PROGRESS NOTE ADULT - ATTENDING COMMENTS
I have personally seen and examined this patient.  I have fully participated in the care of this patient.  I have reviewed all pertinent clinical information, including history, physical exam, plan and note.  Patient alert and oriented. No focal deficit. On the chair. H.7, Will give 1 U PRBC. Protonix. Neurowise stable to discharge. Start working with PT.  I have reviewed all pertinent clinical information and reviewed all relevant imaging and diagnostic studies personally.  Recommendations as above.  Agree with above assessment except as noted.

## 2022-06-04 NOTE — PROGRESS NOTE ADULT - SUBJECTIVE AND OBJECTIVE BOX
Neurology Progress Note    HPI:  This is a 76 year old female patient known to have baseline AO3, lives with niece, ambulates with a walker, SEEMA and ILD. Follows with Dr Stephens. Not on CPAP or home O2. On Prednisone 20mg QD, Depression, HTN with recent admission for HTN Urgency. Follows with Dr Moses, hypothyroidism, recent admission for a bleeding duodenal ulcer at [facility] (melena). Home med Protonix 20mg QD, essential Thrombocytosis. Follows with Dr Denise, chronic Back Pain for years secondary to Spinal Stenosis per marquise. s/p Spinal Epidural Injection. Was on PT 3x/ week but has been non compliant for 2-3 months. Follows with Dr Jaimes. Was supposed to go for a nerve ablation last Monday (cancelled due to poorly controlled HTN s/p admission). She was brought to the ED by marquise on  for evaluation of worsening back pain and confusion x2 days. History goes back to 2 days PTP when the patient started complaining of worsening of her chronic dull lower back pain that radiates down to her right medial aspect of thigh. This has resulted in limited ambulation (patient refusing to leave her bed) and to being non compliant with Physical Therapy in the absence of leg weakness or paresthesias or numbness or urinary incontinence. Per marquise, patient has been feeling down x few days with reduced PO intake, reduced sleep, loss of interest, thoughts that her family doesn't like/ support her, and confusion (some times would not recognize niece). She has an instance where she had fecal incontinence on way to bathroom 2 days ago. In the setting of confusion and increased pain, marquise decided to bring patient to ED for evaluation.      PAST MEDICAL & SURGICAL HISTORY:  HTN (hypertension)  Hypothyroid  Depression  Interstitial lung disease  Essential thrombocytopenia  History of ovarian cyst      Medications:  acetaminophen     Tablet .. 650 milliGRAM(s) Oral every 6 hours PRN  albuterol/ipratropium for Nebulization. 3 milliLiter(s) Nebulizer once  chlorhexidine 4% Liquid 1 Application(s) Topical <User Schedule>  cyclobenzaprine 10 milliGRAM(s) Oral three times a day PRN  dronedarone 400 milliGRAM(s) Oral two times a day  enoxaparin Injectable 40 milliGRAM(s) SubCutaneous every 24 hours  levothyroxine 100 MICROGram(s) Oral daily  lidocaine   4% Patch 1 Patch Transdermal daily  magnesium oxide 400 milliGRAM(s) Oral three times a day with meals  melatonin 5 milliGRAM(s) Oral at bedtime  metoprolol tartrate 25 milliGRAM(s) Oral every 12 hours  niMODipine Oral Solution 30 milliGRAM(s) Oral every 4 hours  pantoprazole    Tablet 40 milliGRAM(s) Oral two times a day  predniSONE   Tablet 15 milliGRAM(s) Oral daily  sodium bicarbonate 1300 milliGRAM(s) Oral every 6 hours  ticagrelor 90 milliGRAM(s) Oral two times a day      Vital Signs Last 24 Hrs  T(C): 36.7 (2022 14:47), Max: 36.8 (2022 04:45)  T(F): 98 (2022 14:47), Max: 98.3 (2022 04:45)  HR: 91 (2022 14:47) (72 - 107)  BP: 155/65 (2022 14:47) (125/61 - 188/76)  BP(mean): 90 (2022 06:54) (90 - 90)  RR: 16 (2022 14:47) (16 - 18)  SpO2: 96% (2022 10:00) (96% - 96%)      Neurological Exam:   Mental status: Patient is awake alert oriented to self and place. No dysarthria, no aphasia  Cranial nerves: Normal visual field, normal eye movements, PERRLA, No facial asymmetry   Motor strength: 5/5 UE, 5/5 LE. No abnormal involuntary mvmts  Sensation: Responds to pain in all 4 extremities  Coordination: Grossly intact  Reflexes 2+ DTRs in bilateral UE/LE  Gait: Deferred    NIHSS Total: 2      Labs:  CBC Full  -  ( 2022 07:23 )  WBC Count : 27.96 K/uL  RBC Count : 2.42 M/uL  Hemoglobin : 7.7 g/dL  Hematocrit : 22.9 %  Platelet Count - Automated : 266 K/uL  Mean Cell Volume : 94.6 fL  Mean Cell Hemoglobin : 31.8 pg  Mean Cell Hemoglobin Concentration : 33.6 g/dL      06-04    147<H>  |  110  |  17  ----------------------------<  104<H>  4.3   |  19  |  1.3    Ca    8.8      2022 07:23  Mg     1.8     06-03    TPro  5.9<L>  /  Alb  3.5  /  TBili  0.3  /  DBili  x   /  AST  20  /  ALT  14  /  AlkPhos  60  06-04    LIVER FUNCTIONS - ( 2022 07:23 )  Alb: 3.5 g/dL / Pro: 5.9 g/dL / ALK PHOS: 60 U/L / ALT: 14 U/L / AST: 20 U/L / GGT: x           Radiology  < from: MR Lumbar Spine w/wo IV Cont (22 @ 19:38) >  IMPRESSION:  Acute compression deformity of the L1 vertebral body with approximately   80% height loss as well as bulging of the posterior cortex resulting in   severe spinal stenosis.    Additional acute compression deformity of the L4 superior endplate.    Severe multilevel degenerative changes contributing to spinal canal as   well as neuroforaminal narrowing as detailed above.    --- End of Report ---      JO BECERRIL MD; Attending Radiologist  This document has been electronically signed. May 27 2022  9:18AM    < end of copied text >    MRI/MRA Brain and Neck    IMPRESSION:  BRAIN  Numerous scattered punctate acute infarcts involving the bilateral cortical hemispheres.    Moderate chronic microvascular type changes as well as a chronic left frontal lobe infarct.    Scattered cortical susceptibility weighted artifact likely sequela of chronic hemosiderin deposition from remote subarachnoid hemorrhage. No evidence of acute hemorrhage.    BRAIN MRA  Since prior CTA of 2022 there has been interval placement of a flow diverting stent involving the right clinoid/supraclinoid ICA with residual flow related enhancement within the clinoid ICA aneurysm.    Multifocal scattered stenoses as detailed above.    ECHO  < from: TTE Echo Complete w/o Contrast w/ Doppler (22 @ 10:38) >    Summary:   1. Normal global left ventricular systolic function.   2. Normal left atrial size.   3. Normal right atrial size.   4. Trace mitral valve regurgitation.   5. PSAP 35.    < end of copied text >    VEEG  VEEG in the last 24 hours:    Background - continuous, symmetrical, less than optimally organized, reaching frequencies in the range of 7-8 hz    Focal and generalized slowin. mild generalized slowing  2. mild left hemispheric focal slowing   3. lower amplitude on the right side    Interictal activity - none    Events - none    Seizures -none    Impression:  Abnormal VEEG as above    Plan - as per NCC team   Neurology Progress Note    HPI:  This is a 76 year old female patient known to have baseline AO3, lives with niece, ambulates with a walker, SEEMA and ILD. Follows with Dr Stephens. Not on CPAP or home O2. On Prednisone 20mg QD, Depression, HTN with recent admission for HTN Urgency. Follows with Dr Moses, hypothyroidism, recent admission for a bleeding duodenal ulcer at [facility] (melena). Home med Protonix 20mg QD, essential Thrombocytosis. Follows with Dr Denise, chronic Back Pain for years secondary to Spinal Stenosis per marquise. s/p Spinal Epidural Injection. Was on PT 3x/ week but has been non compliant for 2-3 months. Follows with Dr Jaimes. Was supposed to go for a nerve ablation last Monday (cancelled due to poorly controlled HTN s/p admission). She was brought to the ED by marquise on  for evaluation of worsening back pain and confusion x2 days. History goes back to 2 days PTP when the patient started complaining of worsening of her chronic dull lower back pain that radiates down to her right medial aspect of thigh. This has resulted in limited ambulation (patient refusing to leave her bed) and to being non compliant with Physical Therapy in the absence of leg weakness or paresthesias or numbness or urinary incontinence. Per marquise, patient has been feeling down x few days with reduced PO intake, reduced sleep, loss of interest, thoughts that her family doesn't like/ support her, and confusion (some times would not recognize niece). She has an instance where she had fecal incontinence on way to bathroom 2 days ago. In the setting of confusion and increased pain, marquise decided to bring patient to ED for evaluation.      Interval History:  Patient seen and examined at bedside. There were no acute events overnight. Patient feels well, denied any complaints.      PAST MEDICAL & SURGICAL HISTORY:  HTN (hypertension)  Hypothyroid  Depression  Interstitial lung disease  Essential thrombocytopenia  History of ovarian cyst      Medications:  acetaminophen     Tablet .. 650 milliGRAM(s) Oral every 6 hours PRN  albuterol/ipratropium for Nebulization. 3 milliLiter(s) Nebulizer once  chlorhexidine 4% Liquid 1 Application(s) Topical <User Schedule>  cyclobenzaprine 10 milliGRAM(s) Oral three times a day PRN  dronedarone 400 milliGRAM(s) Oral two times a day  enoxaparin Injectable 40 milliGRAM(s) SubCutaneous every 24 hours  levothyroxine 100 MICROGram(s) Oral daily  lidocaine   4% Patch 1 Patch Transdermal daily  magnesium oxide 400 milliGRAM(s) Oral three times a day with meals  melatonin 5 milliGRAM(s) Oral at bedtime  metoprolol tartrate 25 milliGRAM(s) Oral every 12 hours  niMODipine Oral Solution 30 milliGRAM(s) Oral every 4 hours  pantoprazole    Tablet 40 milliGRAM(s) Oral two times a day  predniSONE   Tablet 15 milliGRAM(s) Oral daily  sodium bicarbonate 1300 milliGRAM(s) Oral every 6 hours  ticagrelor 90 milliGRAM(s) Oral two times a day      Vital Signs Last 24 Hrs  T(C): 36.7 (2022 14:47), Max: 36.8 (2022 04:45)  T(F): 98 (2022 14:47), Max: 98.3 (2022 04:45)  HR: 91 (2022 14:47) (72 - 107)  BP: 155/65 (2022 14:47) (125/61 - 188/76)  BP(mean): 90 (2022 06:54) (90 - 90)  RR: 16 (2022 14:47) (16 - 18)  SpO2: 96% (2022 10:00) (96% - 96%)      Neurological Exam:   Mental status: Patient is awake alert oriented to self and place. No dysarthria, no aphasia  Cranial nerves: Normal visual field, normal eye movements, PERRLA, No facial asymmetry   Motor strength: 5/5 UE, 5/5 LE. No abnormal involuntary mvmts  Sensation: Responds to pain in all 4 extremities  Coordination: Grossly intact  Reflexes 2+ DTRs in bilateral UE/LE  Gait: Deferred    NIHSS Total: 2      Labs:  CBC Full  -  ( 2022 07:23 )  WBC Count : 27.96 K/uL  RBC Count : 2.42 M/uL  Hemoglobin : 7.7 g/dL  Hematocrit : 22.9 %  Platelet Count - Automated : 266 K/uL  Mean Cell Volume : 94.6 fL  Mean Cell Hemoglobin : 31.8 pg  Mean Cell Hemoglobin Concentration : 33.6 g/dL      -    147<H>  |  110  |  17  ----------------------------<  104<H>  4.3   |  19  |  1.3    Ca    8.8      2022 07:23  Mg     1.8     06-03    TPro  5.9<L>  /  Alb  3.5  /  TBili  0.3  /  DBili  x   /  AST  20  /  ALT  14  /  AlkPhos  60  06-04    LIVER FUNCTIONS - ( 2022 07:23 )  Alb: 3.5 g/dL / Pro: 5.9 g/dL / ALK PHOS: 60 U/L / ALT: 14 U/L / AST: 20 U/L / GGT: x           Radiology  < from: MR Lumbar Spine w/wo IV Cont (22 @ 19:38) >  IMPRESSION:  Acute compression deformity of the L1 vertebral body with approximately   80% height loss as well as bulging of the posterior cortex resulting in   severe spinal stenosis.    Additional acute compression deformity of the L4 superior endplate.    Severe multilevel degenerative changes contributing to spinal canal as   well as neuroforaminal narrowing as detailed above.    --- End of Report ---      JO BECERRIL MD; Attending Radiologist  This document has been electronically signed. May 27 2022  9:18AM    < end of copied text >    MRI/MRA Brain and Neck    IMPRESSION:  BRAIN  Numerous scattered punctate acute infarcts involving the bilateral cortical hemispheres.    Moderate chronic microvascular type changes as well as a chronic left frontal lobe infarct.    Scattered cortical susceptibility weighted artifact likely sequela of chronic hemosiderin deposition from remote subarachnoid hemorrhage. No evidence of acute hemorrhage.    BRAIN MRA  Since prior CTA of 2022 there has been interval placement of a flow diverting stent involving the right clinoid/supraclinoid ICA with residual flow related enhancement within the clinoid ICA aneurysm.    Multifocal scattered stenoses as detailed above.    ECHO  < from: TTE Echo Complete w/o Contrast w/ Doppler (22 @ 10:38) >    Summary:   1. Normal global left ventricular systolic function.   2. Normal left atrial size.   3. Normal right atrial size.   4. Trace mitral valve regurgitation.   5. PSAP 35.    < end of copied text >    VEEG  VEEG in the last 24 hours:    Background - continuous, symmetrical, less than optimally organized, reaching frequencies in the range of 7-8 hz    Focal and generalized slowin. mild generalized slowing  2. mild left hemispheric focal slowing   3. lower amplitude on the right side    Interictal activity - none    Events - none    Seizures -none    Impression:  Abnormal VEEG as above    Plan - as per NCC team

## 2022-06-04 NOTE — PROGRESS NOTE ADULT - SUBJECTIVE AND OBJECTIVE BOX
AMANDA FELDER  76y  Female    Patient is a 76y old  Female who presents with a chief complaint of Worsening Back Pain and Metabolic Encephalopathy (31 May 2022 09:26)      HPI:  History of Present Illness  Ms. Felder is a 76 year old female patient known to have:  - Baseline AO3, lives with niece, ambulates with a walker  - SEEMA and ILD. Follows with Dr Stephens. Not on CPAP or home O2. On Prednisone 20mg QD  - Depression  - HTN. Recent Admission for HTN Urgency. Follows with Dr Moses  - Hypothyroidism   - Recent admission for a bleeding duodenal ulcer at [facility] (melena). Home med Protonix 20mg QD  - Essential Thrombocytosis. Follows with Dr Miller  - Chronic Back Pain for years secondary to Spinal Stenosis per marquise. s/p Spinal Epidural Injection. Was on PT 3x/ week but has been non compliant for 2-3 months. Follows with Dr Jaimes. Was supposed to go for a nerve ablation last Monday (cancelled due to poorly controlled HTN s/p admission)       She was brought to the ED by marquise on  for evaluation of worsening back pain and confusion x2 days.  History goes back to 2 days PTP when the patient started complaining of worsening of her chronic dull lower back pain that radiates down to her right medial aspect of thigh.  This has resulted in limited ambulation (patient refusing to leave her bed) and to being non compliant with Physical Therapy in the absence of leg weakness or paresthesias or numbness or urinary incontinence.  Per marquise, patient has been feeling down x few days with reduced PO intake, reduced sleep, loss of interest, thoughts that her family doesn't like/ support her, and confusion (some times would not recognize marquise).  She has an instance where she had fecal incontinence on way to bathroom 2 days ago.  In the setting of confusion and increased pain, marquise decided to bring patient to ED for evaluation.    On review of systems, marquise denies any recent fever, chills, night sweats, URTI symptoms (cough, rhinorrhea, sore throat), urinary symptoms (urinary frequency, urgency, intermittence, dysuria, foul smelling urine, cloudy urine), abdominal pain, headache, nausea, or vomiting.   No sick contacts.   No recent travel or exposure to recent travelers.      Upon presentation to the ED, the patient was hemodynamically stable:  Vital Signs in ED   - /104 mmHg  -   - RR 20  - T97.1  - SaO2 94% on RA      Investigations   Laboratory Workup  - CBC:                        11.6   20.94 )-----------( 188      ( 18 May 2022 10:50 )             34.1     - Chemistry:      140  |  96<L>  |  48<H>  ----------------------------<  109<H>  4.3   |  24  |  2.1<H>    Ca    10.4<H>      18 May 2022 10:50    TPro  7.3  /  Alb  4.8  /  TBili  0.6  /  DBili  x   /  AST  36  /  ALT  27  /  AlkPhos  61      - Coagulation Studies:  PT/INR - ( 18 May 2022 10:50 )   PT: 12.30 sec;   INR: 1.07 ratio    PTT - ( 18 May 2022 10:50 )  PTT:24.8 sec      Microbiological Workup  Urinalysis Basic - ( 18 May 2022 14:07 )    Color: Yellow / Appearance: Clear / S.021 / pH: x  Gluc: x / Ketone: Trace  / Bili: Negative / Urobili: <2 mg/dL   Blood: x / Protein: 30 mg/dL / Nitrite: Negative   Leuk Esterase: Negative / RBC: 6 /HPF / WBC 4 /HPF   Sq Epi: x / Non Sq Epi: 1 /HPF / Bacteria: Negative          Radiological Workup  * CT Abdomen and Pelvis w/ IV Cont (22 @ 14:00) No acute abnormality within the abdomen and pelvis. Age-indeterminate moderate compression fracture seen at L1, new since 2022.  * CXR CM and CHF    - Patient was given IV Cefepime 2g x1 dose in ED  - She was also given 1.4 L LR bolus   - She will be admitted for further investigations, management, and monitoring     (18 May 2022 22:59)    Interval events: Pt initially admitted to MICU.  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 Pt s/p DSA & pipeline stent embo .  Patient was febrile on  but work-up was negative except for (+) MRSA PCR.  Patient on 5-day course of mupirocin.  Patient became awake, alert, and oriented x2-3 (person, , place) and tolerating PT/OT.   Passed S+S and tolerating PO soft and bite-sized diet.  Failed TOV on  and bran reinserted for retention.  Re-trial once ambulation improves.  Hemodynamically stable for downgrade to stroke unit for further management.    S: Patient was examined and seen at bedside. This morning pt is resting comfortably in bed and reports no new issues or overnight events. No specific complaints. No further bleeding.  Denies CP, SOB, N/V/D/C/AP, cough, F, chills, dizziness, new focal weakness, HA, vision changes, dysuria, or urinary symptoms.  ROS: all other systems reviewed and are negative    PAST MEDICAL & SURGICAL HISTORY:  HTN (hypertension)      Hypothyroid      Depression      Interstitial lung disease      Essential thrombocytopenia      History of ovarian cyst      General: NAD. Looks stated age.  HEENT: clean oropharynx, EOMI, no LAD  Neck: trachea midline, no thyromegaly  CV: nl S1 S2; no m/r/g  Resp: decreased breath sounds at base  GI: NT/ND/S +BS  MS: no clubbing/cyanosis/edema, +pulses  Neuro: motor, sensory intact; + reflexes  Skin: no rashes, nl turgor  Psychiatric: AA0x2   +Bran    tele: SR, nonspecific changes (on my own evaluation of tele monitor)            MEDICATIONS  (STANDING):  albuterol/ipratropium for Nebulization. 3 milliLiter(s) Nebulizer once  chlorhexidine 4% Liquid 1 Application(s) Topical <User Schedule>  dronedarone 400 milliGRAM(s) Oral two times a day  enoxaparin Injectable 40 milliGRAM(s) SubCutaneous every 24 hours  levothyroxine 100 MICROGram(s) Oral daily  lidocaine   4% Patch 1 Patch Transdermal daily  magnesium oxide 400 milliGRAM(s) Oral three times a day with meals  melatonin 5 milliGRAM(s) Oral at bedtime  metoprolol tartrate 25 milliGRAM(s) Oral every 12 hours  niMODipine Oral Solution 30 milliGRAM(s) Oral every 4 hours  pantoprazole    Tablet 40 milliGRAM(s) Oral two times a day  predniSONE   Tablet 15 milliGRAM(s) Oral daily  sodium bicarbonate 1300 milliGRAM(s) Oral every 6 hours  ticagrelor 90 milliGRAM(s) Oral two times a day    MEDICATIONS  (PRN):  acetaminophen     Tablet .. 650 milliGRAM(s) Oral every 6 hours PRN Mild Pain (1 - 3)  cyclobenzaprine 10 milliGRAM(s) Oral three times a day PRN Muscle Spasm    Home Medications:  losartan-hydrochlorothiazide 100 mg-25 mg oral tablet: 1 tab(s) orally once a day (18 May 2022 23:28)  pantoprazole 40 mg oral delayed release tablet: 1 tab(s) orally 2 times a day (2022 14:07)  predniSONE 20 mg oral tablet: 1 tab(s) orally once a day (18 May 2022 23:28)    Vital Signs Last 24 Hrs  T(C): 36.7 (2022 10:00), Max: 36.8 (2022 04:45)  T(F): 98 (2022 10:00), Max: 98.3 (2022 04:45)  HR: 92 (2022 10:00) (72 - 107)  BP: 130/60 (2022 10:00) (125/61 - 188/76)  BP(mean): 90 (2022 06:54) (90 - 97)  RR: 16 (2022 10:00) (16 - 18)  SpO2: 96% (2022 10:00) (96% - 96%)  CAPILLARY BLOOD GLUCOSE        LABS:                        7.7    27.96 )-----------( 266      ( 2022 07:23 )             22.9     06-04    147<H>  |  110  |  17  ----------------------------<  104<H>  4.3   |  19  |  1.3    Ca    8.8      2022 07:23  Mg     1.8     06-03    TPro  5.9<L>  /  Alb  3.5  /  TBili  0.3  /  DBili  x   /  AST  20  /  ALT  14  /  AlkPhos  60  06-04    LIVER FUNCTIONS - ( 2022 07:23 )  Alb: 3.5 g/dL / Pro: 5.9 g/dL / ALK PHOS: 60 U/L / ALT: 14 U/L / AST: 20 U/L / GGT: x                   ABG - ( 2022 15:08 )  pH, Arterial: 7.46  pH, Blood: x     /  pCO2: 27    /  pO2: 67    / HCO3: 19    / Base Excess: -3.2  /  SaO2: 95.8                    Consultant Notes Reviewed:  [x ] YES  [ ] NO  Care Discussed with Consultants/Other Providers/ Housestaff [ x] YES  [ ] NO  Radiology, labs, new studies personally reviewed.

## 2022-06-04 NOTE — PROGRESS NOTE ADULT - ASSESSMENT
77 yo F PMHx as above found to have R frontoparietal HH1/mF1 SAH, 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 Pt s/p DSA pipeline stenting R ICA aneurysm on 5/23. S/P 1 UNIT PRBC for hb 7.3. No acute change in neuro status. Patient received another unit of blood yesterday for Hb 7.6, Hb 7.7 this AM.    Plan:  Neuro  #SAH, 2/2 5mm ruptured R supraclinoid aneurysm s/p angio & pipeline stent embo 5/23   #New L1 compression fx, CPB 2/2 spinal stenosis (follows w Dr. العلي)  # Multiple ischemic strokes bilaterally, Bilateral sever stenosis of ICA  - Neuro Checks Q4H  - Brilinta BID   - Hold aspirin for now (low Hb)  - Nimodipine 30 mg Q4 hrs  - Normovolemia  - Keppra for seizure ppx  - Video EEG was negative  - MRA showed decreased aneurysm (5.9 -> 3.5mm), repeat MRI or CTA in 3 weeks per NSG  - Pain control: Tylenol, Lido patch, flexeril (new L1 compression fx, HA) on MR L-spine  - OOB to chair  - PT/OT/OOB      Cardio   #HTN  - -160  - Continue nimodipine 30 mg Q4 hrs  - Continue metoprolol 25 mg BID  - Encourage PO intake    #Sinus tachycardia   - Continue metoprolol 25 mg BID  - Started Multac 400mg bid   - Transfuse if H/H continues to decrease  - Was given another unit PRBCs today for Hb 7.7    Pulm:  #SEEMA not on CPAP, ILD  She was tachypneic and wheezing, that improved after diuresis and blood transfusion.   - Aspiration precautions   - Prednisone 15mg qd for ILD; pulm following  - Incentive spirometry  - CXR   - Strict I/Os       GI:  #hx UGIB 2/2 bleeding duodenal ulcer (recent [facility] admission for melena)  She had multiple bowl movements during the admission, no more dark stools  - Stopped bowl regime   - Started on pantoprazole infusion as per GI. (continue as per GI for today)  - Senna/Miralax  - Currently soft liquid diet as per GI   - Changed pantoprazole 40 mg IV bid   - Endoscopy was held for concern for Covid exposure, to be consider if not stable, or dropping Hb  - Was given another unit PRBCs today for Hb 7.7  - F/u repeat CBC      :  - Normonatremic euvolemia  - Strict Is/Os  - Daily BMP  - Replace Mg     Heme:  #Anemia, s/p 2 unit prbc. on 27 and 30 of May  - Lovenox 40 qd 5/24 (held yesterday)   - SCDs  - Hold Anagrelide for now (low Hb)  - Negative LE dopplers from 5/19  - Was given another unit PRBCs today for Hb 7.7  - F/u repeat CBC      ID:  - Monitor fever trend and WBCs  - CXR 5/25 unchanged  - Repeat UA, negative  - MRSA positive, 5-day course mupirocin completed       Endo:  #hypothyroidism  - Continue Synthroid PO 100mcg qD  - TSH 70, free T4 1.0; follow up in 2 weeks      Albarran Reinserted 5/26 for retention, can attempt voiding trial

## 2022-06-05 LAB
ALBUMIN SERPL ELPH-MCNC: 4.1 G/DL — SIGNIFICANT CHANGE UP (ref 3.5–5.2)
ALP SERPL-CCNC: 69 U/L — SIGNIFICANT CHANGE UP (ref 30–115)
ALT FLD-CCNC: 19 U/L — SIGNIFICANT CHANGE UP (ref 0–41)
ANION GAP SERPL CALC-SCNC: 17 MMOL/L — HIGH (ref 7–14)
APPEARANCE UR: ABNORMAL
AST SERPL-CCNC: 24 U/L — SIGNIFICANT CHANGE UP (ref 0–41)
BACTERIA # UR AUTO: NEGATIVE — SIGNIFICANT CHANGE UP
BILIRUB SERPL-MCNC: 0.5 MG/DL — SIGNIFICANT CHANGE UP (ref 0.2–1.2)
BILIRUB UR-MCNC: NEGATIVE — SIGNIFICANT CHANGE UP
BUN SERPL-MCNC: 16 MG/DL — SIGNIFICANT CHANGE UP (ref 10–20)
CALCIUM SERPL-MCNC: 9.4 MG/DL — SIGNIFICANT CHANGE UP (ref 8.5–10.1)
CHLORIDE SERPL-SCNC: 100 MMOL/L — SIGNIFICANT CHANGE UP (ref 98–110)
CO2 SERPL-SCNC: 20 MMOL/L — SIGNIFICANT CHANGE UP (ref 17–32)
COLOR SPEC: YELLOW — SIGNIFICANT CHANGE UP
CREAT SERPL-MCNC: 1.3 MG/DL — SIGNIFICANT CHANGE UP (ref 0.7–1.5)
DIFF PNL FLD: ABNORMAL
EGFR: 42 ML/MIN/1.73M2 — LOW
EPI CELLS # UR: 0 /HPF — SIGNIFICANT CHANGE UP (ref 0–5)
GLUCOSE SERPL-MCNC: 134 MG/DL — HIGH (ref 70–99)
GLUCOSE UR QL: NEGATIVE — SIGNIFICANT CHANGE UP
HCT VFR BLD CALC: 31.3 % — LOW (ref 37–47)
HGB BLD-MCNC: 10.7 G/DL — LOW (ref 12–16)
HYALINE CASTS # UR AUTO: 1 /LPF — SIGNIFICANT CHANGE UP (ref 0–7)
KETONES UR-MCNC: NEGATIVE — SIGNIFICANT CHANGE UP
LEUKOCYTE ESTERASE UR-ACNC: ABNORMAL
MCHC RBC-ENTMCNC: 31.3 PG — HIGH (ref 27–31)
MCHC RBC-ENTMCNC: 34.2 G/DL — SIGNIFICANT CHANGE UP (ref 32–37)
MCV RBC AUTO: 91.5 FL — SIGNIFICANT CHANGE UP (ref 81–99)
NITRITE UR-MCNC: NEGATIVE — SIGNIFICANT CHANGE UP
NRBC # BLD: 2 /100 WBCS — HIGH (ref 0–0)
PH UR: 7 — SIGNIFICANT CHANGE UP (ref 5–8)
PLATELET # BLD AUTO: 409 K/UL — HIGH (ref 130–400)
POTASSIUM SERPL-MCNC: 4.1 MMOL/L — SIGNIFICANT CHANGE UP (ref 3.5–5)
POTASSIUM SERPL-SCNC: 4.1 MMOL/L — SIGNIFICANT CHANGE UP (ref 3.5–5)
PROT SERPL-MCNC: 6.7 G/DL — SIGNIFICANT CHANGE UP (ref 6–8)
PROT UR-MCNC: ABNORMAL
RBC # BLD: 3.42 M/UL — LOW (ref 4.2–5.4)
RBC # FLD: 17.7 % — HIGH (ref 11.5–14.5)
RBC CASTS # UR COMP ASSIST: 46 /HPF — HIGH (ref 0–4)
SARS-COV-2 RNA SPEC QL NAA+PROBE: SIGNIFICANT CHANGE UP
SODIUM SERPL-SCNC: 137 MMOL/L — SIGNIFICANT CHANGE UP (ref 135–146)
SP GR SPEC: 1.02 — SIGNIFICANT CHANGE UP (ref 1.01–1.03)
UROBILINOGEN FLD QL: SIGNIFICANT CHANGE UP
WBC # BLD: 33.11 K/UL — HIGH (ref 4.8–10.8)
WBC # FLD AUTO: 33.11 K/UL — HIGH (ref 4.8–10.8)
WBC UR QL: 55 /HPF — HIGH (ref 0–5)

## 2022-06-05 PROCEDURE — 99233 SBSQ HOSP IP/OBS HIGH 50: CPT

## 2022-06-05 RX ADMIN — MAGNESIUM OXIDE 400 MG ORAL TABLET 400 MILLIGRAM(S): 241.3 TABLET ORAL at 11:46

## 2022-06-05 RX ADMIN — LIDOCAINE 1 PATCH: 4 CREAM TOPICAL at 11:45

## 2022-06-05 RX ADMIN — NIMODIPINE 30 MILLIGRAM(S): 60 SOLUTION ORAL at 05:33

## 2022-06-05 RX ADMIN — MAGNESIUM OXIDE 400 MG ORAL TABLET 400 MILLIGRAM(S): 241.3 TABLET ORAL at 07:31

## 2022-06-05 RX ADMIN — TICAGRELOR 90 MILLIGRAM(S): 90 TABLET ORAL at 17:14

## 2022-06-05 RX ADMIN — MAGNESIUM OXIDE 400 MG ORAL TABLET 400 MILLIGRAM(S): 241.3 TABLET ORAL at 17:15

## 2022-06-05 RX ADMIN — Medication 25 MILLIGRAM(S): at 17:15

## 2022-06-05 RX ADMIN — Medication 15 MILLIGRAM(S): at 05:30

## 2022-06-05 RX ADMIN — Medication 5 MILLIGRAM(S): at 22:09

## 2022-06-05 RX ADMIN — ENOXAPARIN SODIUM 40 MILLIGRAM(S): 100 INJECTION SUBCUTANEOUS at 11:45

## 2022-06-05 RX ADMIN — NIMODIPINE 30 MILLIGRAM(S): 60 SOLUTION ORAL at 13:08

## 2022-06-05 RX ADMIN — NIMODIPINE 30 MILLIGRAM(S): 60 SOLUTION ORAL at 03:14

## 2022-06-05 RX ADMIN — NIMODIPINE 30 MILLIGRAM(S): 60 SOLUTION ORAL at 17:16

## 2022-06-05 RX ADMIN — Medication 1300 MILLIGRAM(S): at 05:31

## 2022-06-05 RX ADMIN — DRONEDARONE 400 MILLIGRAM(S): 400 TABLET, FILM COATED ORAL at 17:14

## 2022-06-05 RX ADMIN — Medication 650 MILLIGRAM(S): at 14:49

## 2022-06-05 RX ADMIN — Medication 100 MICROGRAM(S): at 05:31

## 2022-06-05 RX ADMIN — NIMODIPINE 30 MILLIGRAM(S): 60 SOLUTION ORAL at 10:07

## 2022-06-05 RX ADMIN — PANTOPRAZOLE SODIUM 40 MILLIGRAM(S): 20 TABLET, DELAYED RELEASE ORAL at 17:15

## 2022-06-05 RX ADMIN — NIMODIPINE 30 MILLIGRAM(S): 60 SOLUTION ORAL at 22:09

## 2022-06-05 RX ADMIN — PANTOPRAZOLE SODIUM 40 MILLIGRAM(S): 20 TABLET, DELAYED RELEASE ORAL at 05:32

## 2022-06-05 RX ADMIN — CHLORHEXIDINE GLUCONATE 1 APPLICATION(S): 213 SOLUTION TOPICAL at 05:44

## 2022-06-05 RX ADMIN — Medication 1300 MILLIGRAM(S): at 17:15

## 2022-06-05 RX ADMIN — Medication 1300 MILLIGRAM(S): at 11:46

## 2022-06-05 RX ADMIN — DRONEDARONE 400 MILLIGRAM(S): 400 TABLET, FILM COATED ORAL at 05:31

## 2022-06-05 RX ADMIN — TICAGRELOR 90 MILLIGRAM(S): 90 TABLET ORAL at 05:30

## 2022-06-05 RX ADMIN — Medication 25 MILLIGRAM(S): at 05:32

## 2022-06-05 NOTE — PROGRESS NOTE ADULT - ATTENDING COMMENTS
I have personally seen and examined this patient on 6/5.   I have fully participated in the care of this patient.  I have reviewed all pertinent clinical information, including history, physical exam, plan and note.  Stable. HCT improved with PRBC transfusion . Continue to hold ASA and continue Brilinta. Rehab placement.   I have reviewed all pertinent clinical information and reviewed all relevant imaging and diagnostic studies personally.  Recommendations as above.  Agree with above assessment except as noted.

## 2022-06-05 NOTE — PROGRESS NOTE ADULT - ASSESSMENT
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 Pt s/p DSA pipeline stenting R ICA aneurysm on 5/23. S/P 1 UNIT PRBC for hb 7.3. No acute change in neuro status. Patient received another unit of blood  for Hb 7.6     #Anemia/Melena/hx recent UGIB 2/2 bleeding duodenal ulcer (recent [facility] admission for melena)  She had multiple bowl movements,   - Stopped bowl regime   - Started on pantoprazole infusion as per GI.   - Senna/Miralax  - Currently clear liquid diet as per GI   - track CBC  - GI is considering EGD if unstable  - s/p 2 unit prbc. on 27 and 30 of May  - Lovenox 40 qd    - SCDs  - Hold Anagrelide for now (low Hb)  - Negative LE dopplers from 5/19. Repeat 6/1 also negative.  - track CBC keep Hgb>7.5  - advanced diet as per GI and pt tolerating      #SAH, 2/2 5mm ruptured R supraclinoid aneurysm s/p angio & pipeline stent embo 5/23  #Ischemic strokes on MRI   #new L1 compression fx, CPB 2/2 spinal stenosis (follows w Dr. العلي)  - Neuro Checks Q4H  - Brilinta BID   - Hold aspirin for now (low Hb)  - Nimodipine   - Normovolemia  - Keppra for seizure ppx  - Video EEG negative  - MRA showed decreased aneurysm (5.9 -> 3.5mm), repeat MRI or CTA in 3 weeks per NSG  - Pain control: Tylenol, Lido patch, flexeril (new L1 compression fx, HA) on MR L-spine  - OOB to chair  - PT/OT/OOB     #HTN  - -140  - nimodipine for 21 days  - encourage PO intake  - continue metoprolol   - consider adding ACE or ARB for better BP control     #Sinus tachycardia (at one point >150)  - Continue metoprolol and titrate PRN for better HR control  - Started Multaq 400mg bid   - transfuse if H/H continues to decrease      #SEEMA not on CPAP, ILD  she was tachypneic and wheezing 5/30, that improved after diuresis and blood transfusion.   - Aspiration precautions   - Prednisone 15mg qd for ILD; pulm following  - Incentive spirometry  - Strict I/Os     #Chronic thrombocytosis/ET  -anagrelide on hold due to GIB/anemia  -as per neuro who spoke to heme team, can hold as long as Plts are not going very high    #Leukocytosis  -likely due to ET but would r/o infection. Check U/A, UCx  - consider heme eval    #hypothyroidism  - Synthroid PO 100mcg qD  - TSH 70, free t4 1.0; repeat in 2-4 weeks    Urinary retention  voiding trial and if fails, consider CIC    Resp alkolosis ?Etiology ?ILD, ?anxiety. No evidence of pain  -CTA chest negative  to r/o PE  -improving    Suspected baseline dementia  stable    Very high risk pt. Px is guarded    #Progress Note Handoff  Pending: Consults____Clinical improvement and stability__x__U/A, UCx_Tests__4A auth_  Pt/Family discussion: Pt informed and agrees with the current plan. Neuro team spoke to niece.  Disposition: Home______/SNF____vs   4A___?___/To be determined____x____    My note supersedes the residents note should a discrepancy arise.    Chart and notes personally reviewed.  Care Discussed with Consultants/Other Providers/ Housestaff [ x] YES [ ] NO   Radiology, labs, old records personally reviewed.    discussed w/ housestaff, nursing, case management, neuro team

## 2022-06-05 NOTE — PROGRESS NOTE ADULT - SUBJECTIVE AND OBJECTIVE BOX
AMANDA FELDER  76y  Female    Patient is a 76y old  Female who presents with a chief complaint of Worsening Back Pain and Metabolic Encephalopathy (31 May 2022 09:26)      HPI:  History of Present Illness  Ms. Felder is a 76 year old female patient known to have:  - Baseline AO3, lives with niece, ambulates with a walker  - SEEMA and ILD. Follows with Dr Stephens. Not on CPAP or home O2. On Prednisone 20mg QD  - Depression  - HTN. Recent Admission for HTN Urgency. Follows with Dr Moses  - Hypothyroidism   - Recent admission for a bleeding duodenal ulcer at [facility] (melena). Home med Protonix 20mg QD  - Essential Thrombocytosis. Follows with Dr Miller  - Chronic Back Pain for years secondary to Spinal Stenosis per marquise. s/p Spinal Epidural Injection. Was on PT 3x/ week but has been non compliant for 2-3 months. Follows with Dr Jaimes. Was supposed to go for a nerve ablation last Monday (cancelled due to poorly controlled HTN s/p admission)       She was brought to the ED by marquise on  for evaluation of worsening back pain and confusion x2 days.  History goes back to 2 days PTP when the patient started complaining of worsening of her chronic dull lower back pain that radiates down to her right medial aspect of thigh.  This has resulted in limited ambulation (patient refusing to leave her bed) and to being non compliant with Physical Therapy in the absence of leg weakness or paresthesias or numbness or urinary incontinence.  Per marquise, patient has been feeling down x few days with reduced PO intake, reduced sleep, loss of interest, thoughts that her family doesn't like/ support her, and confusion (some times would not recognize marquise).  She has an instance where she had fecal incontinence on way to bathroom 2 days ago.  In the setting of confusion and increased pain, marquise decided to bring patient to ED for evaluation.    On review of systems, marquise denies any recent fever, chills, night sweats, URTI symptoms (cough, rhinorrhea, sore throat), urinary symptoms (urinary frequency, urgency, intermittence, dysuria, foul smelling urine, cloudy urine), abdominal pain, headache, nausea, or vomiting.   No sick contacts.   No recent travel or exposure to recent travelers.      Upon presentation to the ED, the patient was hemodynamically stable:  Vital Signs in ED   - /104 mmHg  -   - RR 20  - T97.1  - SaO2 94% on RA      Investigations   Laboratory Workup  - CBC:                        11.6   20.94 )-----------( 188      ( 18 May 2022 10:50 )             34.1     - Chemistry:      140  |  96<L>  |  48<H>  ----------------------------<  109<H>  4.3   |  24  |  2.1<H>    Ca    10.4<H>      18 May 2022 10:50    TPro  7.3  /  Alb  4.8  /  TBili  0.6  /  DBili  x   /  AST  36  /  ALT  27  /  AlkPhos  61      - Coagulation Studies:  PT/INR - ( 18 May 2022 10:50 )   PT: 12.30 sec;   INR: 1.07 ratio    PTT - ( 18 May 2022 10:50 )  PTT:24.8 sec      Microbiological Workup  Urinalysis Basic - ( 18 May 2022 14:07 )    Color: Yellow / Appearance: Clear / S.021 / pH: x  Gluc: x / Ketone: Trace  / Bili: Negative / Urobili: <2 mg/dL   Blood: x / Protein: 30 mg/dL / Nitrite: Negative   Leuk Esterase: Negative / RBC: 6 /HPF / WBC 4 /HPF   Sq Epi: x / Non Sq Epi: 1 /HPF / Bacteria: Negative          Radiological Workup  * CT Abdomen and Pelvis w/ IV Cont (22 @ 14:00) No acute abnormality within the abdomen and pelvis. Age-indeterminate moderate compression fracture seen at L1, new since 2022.  * CXR CM and CHF    - Patient was given IV Cefepime 2g x1 dose in ED  - She was also given 1.4 L LR bolus   - She will be admitted for further investigations, management, and monitoring     (18 May 2022 22:59)    Interval events: Pt initially admitted to MICU.  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 Pt s/p DSA & pipeline stent embo .  Patient was febrile on  but work-up was negative except for (+) MRSA PCR.  Patient on 5-day course of mupirocin.  Patient became awake, alert, and oriented x2-3 (person, , place) and tolerating PT/OT.   Passed S+S and tolerating PO soft and bite-sized diet.  Failed TOV on  and bran reinserted for retention.  Re-trial once ambulation improves.  Hemodynamically stable for downgrade to stroke unit for further management.    S: Patient was examined and seen at bedside. This morning pt is resting comfortably in bed and reports no new issues or overnight events. No specific complaints. No further bleeding. Periodically confused as per staff.  Denies CP, SOB, N/V/D/C/AP, cough, F, chills, dizziness, new focal weakness, HA, vision changes, dysuria, or urinary symptoms.  ROS: all other systems reviewed and are negative    PAST MEDICAL & SURGICAL HISTORY:  HTN (hypertension)      Hypothyroid      Depression      Interstitial lung disease      Essential thrombocytopenia      History of ovarian cyst      General: NAD. Looks stated age.  HEENT: clean oropharynx, EOMI, no LAD  Neck: trachea midline, no thyromegaly  CV: nl S1 S2; no m/r/g  Resp: decreased breath sounds at base  GI: NT/ND/S +BS  MS: no clubbing/cyanosis/edema, +pulses  Neuro: motor, sensory intact; + reflexes  Skin: no rashes, nl turgor  Psychiatric: AA0x2   +Bran    tele: SR, nonspecific changes (on my own evaluation of tele monitor)            MEDICATIONS  (STANDING):  albuterol/ipratropium for Nebulization. 3 milliLiter(s) Nebulizer once  chlorhexidine 4% Liquid 1 Application(s) Topical <User Schedule>  dronedarone 400 milliGRAM(s) Oral two times a day  enoxaparin Injectable 40 milliGRAM(s) SubCutaneous every 24 hours  levothyroxine 100 MICROGram(s) Oral daily  lidocaine   4% Patch 1 Patch Transdermal daily  magnesium oxide 400 milliGRAM(s) Oral three times a day with meals  melatonin 5 milliGRAM(s) Oral at bedtime  metoprolol tartrate 25 milliGRAM(s) Oral every 12 hours  niMODipine Oral Solution 30 milliGRAM(s) Oral every 4 hours  pantoprazole    Tablet 40 milliGRAM(s) Oral two times a day  predniSONE   Tablet 15 milliGRAM(s) Oral daily  sodium bicarbonate 1300 milliGRAM(s) Oral every 6 hours  ticagrelor 90 milliGRAM(s) Oral two times a day    MEDICATIONS  (PRN):  acetaminophen     Tablet .. 650 milliGRAM(s) Oral every 6 hours PRN Mild Pain (1 - 3)  cyclobenzaprine 10 milliGRAM(s) Oral three times a day PRN Muscle Spasm    Home Medications:  losartan-hydrochlorothiazide 100 mg-25 mg oral tablet: 1 tab(s) orally once a day (18 May 2022 23:28)  pantoprazole 40 mg oral delayed release tablet: 1 tab(s) orally 2 times a day (2022 14:07)  predniSONE 20 mg oral tablet: 1 tab(s) orally once a day (18 May 2022 23:28)    Vital Signs Last 24 Hrs  T(C): 36.1 (2022 10:16), Max: 36.7 (2022 14:47)  T(F): 97 (2022 10:16), Max: 98.1 (2022 18:42)  HR: 88 (2022 13:10) (79 - 98)  BP: 136/60 (2022 13:10) (136/60 - 176/75)  BP(mean): 86 (2022 13:10) (86 - 103)  RR: 16 (2022 10:16) (16 - 16)  SpO2: 98% (2022 18:42) (98% - 98%)  CAPILLARY BLOOD GLUCOSE        LABS:                        10.7   33.11 )-----------( 409      ( 2022 12:15 )             31.3     06-05    137  |  100  |  16  ----------------------------<  134<H>  4.1   |  20  |  1.3    Ca    9.4      2022 12:15    TPro  6.7  /  Alb  4.1  /  TBili  0.5  /  DBili  x   /  AST  24  /  ALT  19  /  AlkPhos  69  06-05    LIVER FUNCTIONS - ( 2022 12:15 )  Alb: 4.1 g/dL / Pro: 6.7 g/dL / ALK PHOS: 69 U/L / ALT: 19 U/L / AST: 24 U/L / GGT: x                   ABG - ( 2022 15:08 )  pH, Arterial: 7.46  pH, Blood: x     /  pCO2: 27    /  pO2: 67    / HCO3: 19    / Base Excess: -3.2  /  SaO2: 95.8                    Consultant Notes Reviewed:  [x ] YES  [ ] NO  Care Discussed with Consultants/Other Providers/ Housestaff [ x] YES  [ ] NO  Radiology, labs, new studies personally reviewed.

## 2022-06-05 NOTE — PROGRESS NOTE ADULT - ASSESSMENT
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 Pt s/p DSA pipeline stenting R ICA aneurysm on 5/23. S/P 1 UNIT PRBC for hb 7.3. No acute change in neuro status. Patient received another unit of blood yesterday for Hb 7.6, Hb 7.7 this AM.    Plan:  Neuro  #SAH, 2/2 5mm ruptured R supraclinoid aneurysm s/p angio & pipeline stent embo 5/23   #New L1 compression fx, CPB 2/2 spinal stenosis (follows w Dr. العلي)  # Multiple ischemic strokes bilaterally, Bilateral sever stenosis of ICA  - Neuro Checks Q8H  - Brilinta BID   - Hold aspirin for now (low Hb)  - Nimodipine 30 mg Q4 hrs  - Normovolemia  - Keppra for seizure ppx  - Video EEG was negative  - MRA showed decreased aneurysm (5.9 -> 3.5mm), repeat MRI or CTA in 3 weeks per NSG  - Pain control: Tylenol, Lido patch, flexeril (new L1 compression fx, HA) on MR L-spine  - OOB to chair  - PT/OT/OOB      Cardio   #HTN  - -160  - Continue nimodipine 30 mg Q4 hrs  - Continue metoprolol 25 mg BID  - Encourage PO intake    #Sinus tachycardia   - Continue metoprolol 25 mg BID  - Started Multac 400mg bid   - Transfuse if H/H continues to decrease  - Was given another unit PRBCs today for Hb 7.7    Pulm:  #SEEMA not on CPAP, ILD  She was tachypneic and wheezing, that improved after diuresis and blood transfusion.   - Aspiration precautions   - Prednisone 15mg qd for ILD; pulm following  - Incentive spirometry  - CXR   - Strict I/Os       GI:  #hx UGIB 2/2 bleeding duodenal ulcer (recent [facility] admission for melena)  She had multiple bowl movements during the admission, no more dark stools  - Stopped bowl regime   - Started on pantoprazole infusion as per GI. (continue as per GI for today)  - Senna/Miralax  - Currently soft liquid diet as per GI   - Changed pantoprazole 40 mg IV bid   - Endoscopy was held for concern for Covid exposure, to be consider if not stable, or dropping Hb  - Was given another unit PRBCs today for Hb 7.7  - F/u repeat CBC      :  - Normonatremic euvolemia  - Strict Is/Os  - Daily BMP  - Replace Mg     Heme:  #Anemia, s/p 2 unit prbc. on 27 and 30 of May, another unit was given on 06/04  - Lovenox 40 qd 5/24 (held yesterday)   - SCDs  - Hold Anagrelide for now (low Hb)  - Negative LE dopplers from 5/19  - Was given another unit PRBCs today for Hb 7.7  - F/u repeat CBC      ID:  - Monitor fever trend and WBCs  - CXR 5/25 unchanged  - Repeat UA, negative  - MRSA positive, 5-day course mupirocin completed       Endo:  #hypothyroidism  - Continue Synthroid PO 100mcg qD  - TSH 70, free T4 1.0; follow up in 2 weeks      Albarran Reinserted 5/26 for retention, can attempt voiding trial   75 yo F PMHx as above found to have R frontoparietal HH1/mF1 SAH, 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 Pt s/p DSA pipeline stenting R ICA aneurysm on 5/23. S/P 1 UNIT PRBC for hb 7.3. No acute change in neuro status. Patient received another unit of blood yesterday for Hb 7.6, Hb 7.7 this AM.    Plan:  Neuro  #SAH, 2/2 5mm ruptured R supraclinoid aneurysm s/p angio & pipeline stent embo 5/23   #New L1 compression fx, CPB 2/2 spinal stenosis (follows w Dr. العلي)  # Multiple ischemic strokes bilaterally, Bilateral sever stenosis of ICA  - Neuro Checks Q8H  - Brilinta BID   - Hold aspirin for now (low Hb)  - Nimodipine 30 mg Q4 hrs  - Normovolemia  - Keppra for seizure ppx  - Video EEG was negative  - MRA showed decreased aneurysm (5.9 -> 3.5mm), repeat MRI or CTA in 3 weeks per NSG  - Pain control: Tylenol, Lido patch, flexeril (new L1 compression fx, HA) on MR L-spine  - OOB to chair  - PT/OT/OOB      Cardio   #HTN  - -160  - Continue nimodipine 30 mg Q4 hrs  - Continue metoprolol 25 mg BID  - Encourage PO intake    #Sinus tachycardia   - Continue metoprolol 25 mg BID  - Started Multac 400mg bid   - Transfuse if H/H continues to decrease  - Was given another unit PRBCs today for Hb 7.7    Pulm:  #SEEMA not on CPAP, ILD  She was tachypneic and wheezing, that improved after diuresis and blood transfusion.   - Aspiration precautions   - Prednisone 15mg qd for ILD; pulm following  - Incentive spirometry  - CXR   - Strict I/Os       GI:  #hx UGIB 2/2 bleeding duodenal ulcer (recent [facility] admission for melena)  She had multiple bowl movements during the admission, no more dark stools  - Stopped bowl regime   - Started on pantoprazole infusion as per GI. (continue as per GI for today)  - Senna/Miralax  - Currently soft liquid diet as per GI   - Changed pantoprazole 40 mg IV bid   - Endoscopy was held for concern for Covid exposure, to be consider if not stable, or dropping Hb  - Was given another unit PRBCs today for Hb 7.7  - F/u repeat CBC      :  - Normonatremic euvolemia  - Strict Is/Os  - Daily BMP  - Replace Mg   - Remove Albarran  - UA, urine culture     Heme:  #Anemia, s/p 2 unit prbc. on 27 and 30 of May, another unit was given on 06/04  - Lovenox 40 qd 5/24 (held yesterday)   - SCDs  - Hold Anagrelide for now (low Hb)  - Negative LE dopplers from 5/19  - Was given another unit PRBCs today for Hb 7.7  - F/u repeat CBC      ID:  - Monitor fever trend and WBCs  - CXR 5/25 unchanged  - Repeat UA, negative  - MRSA positive, 5-day course mupirocin completed       Endo:  #hypothyroidism  - Continue Synthroid PO 100mcg qD  - TSH 70, free T4 1.0; follow up in 2 weeks      Albarran Reinserted 5/26 for retention, can attempt voiding trial   75 yo F PMHx as above found to have R frontoparietal HH1/mF1 SAH, 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 Pt s/p DSA pipeline stenting R ICA aneurysm on 5/23. S/P 1 UNIT PRBC for hb 7.3. No acute change in neuro status. Patient received another unit of blood yesterday for Hb 7.6, Hb 9.7 this AM.    Plan:  Neuro  #SAH, 2/2 5mm ruptured R supraclinoid aneurysm s/p angio & pipeline stent embo 5/23   #New L1 compression fx, CPB 2/2 spinal stenosis (follows w Dr. العلي)  # Multiple ischemic strokes bilaterally, Bilateral sever stenosis of ICA  - Neuro Checks Q8H  - Brilinta BID   - Hold aspirin for now (low Hb)  - Nimodipine 30 mg Q4 hrs  - Normovolemia  - Keppra for seizure ppx  - Video EEG was negative  - MRA showed decreased aneurysm (5.9 -> 3.5mm), repeat MRI or CTA in 3 weeks per NSG  - Pain control: Tylenol, Lido patch, flexeril (new L1 compression fx, HA) on MR L-spine  - OOB to chair  - PT/OT/OOB      Cardio   #HTN  - -160  - Continue nimodipine 30 mg Q4 hrs  - Continue metoprolol 25 mg BID  - Encourage PO intake    #Sinus tachycardia   - Continue metoprolol 25 mg BID  - Started Multac 400mg bid   - Transfuse if H/H continues to decrease  - Was given another unit PRBCs today for Hb 7.7    Pulm:  #SEEMA not on CPAP, ILD  She was tachypneic and wheezing, that improved after diuresis and blood transfusion.   - Aspiration precautions   - Prednisone 15mg qd for ILD; pulm following  - Incentive spirometry  - CXR   - Strict I/Os       GI:  #hx UGIB 2/2 bleeding duodenal ulcer (recent [facility] admission for melena)  She had multiple bowl movements during the admission, no more dark stools  - Stopped bowl regime   - Started on pantoprazole infusion as per GI. (continue as per GI for today)  - Senna/Miralax  - Currently soft liquid diet as per GI   - Changed pantoprazole 40 mg IV bid   - Endoscopy was held for concern for Covid exposure, to be consider if not stable, or dropping Hb  - Was given another unit PRBCs today for Hb 7.7  - F/u repeat CBC      :  - Normonatremic euvolemia  - Strict Is/Os  - Daily BMP  - Replace Mg   - Remove Albarran  - UA, urine culture     Heme:  #Anemia, s/p 2 unit prbc. on 27 and 30 of May, another unit was given on 06/04  - Lovenox 40 qd 5/24 (held yesterday)   - SCDs  - Hold Anagrelide for now (low Hb)  - Negative LE dopplers from 5/19  - Was given another unit PRBCs today for Hb 7.7  - F/u repeat CBC      ID:  - Monitor fever trend and WBCs  - CXR 5/25 unchanged  - Repeat UA, negative  - MRSA positive, 5-day course mupirocin completed       Endo:  #hypothyroidism  - Continue Synthroid PO 100mcg qD  - TSH 70, free T4 1.0; follow up in 2 weeks      Albarran Reinserted 5/26 for retention, can attempt voiding trial

## 2022-06-05 NOTE — PROGRESS NOTE ADULT - SUBJECTIVE AND OBJECTIVE BOX
Neurology Progress Note    HPI:  This is a 76 year old female patient known to have baseline AO3, lives with niece, ambulates with a walker, SEEMA and ILD. Follows with Dr Stephens. Not on CPAP or home O2. On Prednisone 20mg QD, Depression, HTN with recent admission for HTN Urgency. Follows with Dr Moses, hypothyroidism, recent admission for a bleeding duodenal ulcer at [facility] (melena). Home med Protonix 20mg QD, essential Thrombocytosis. Follows with Dr Denise, chronic Back Pain for years secondary to Spinal Stenosis per marquise. s/p Spinal Epidural Injection. Was on PT 3x/ week but has been non compliant for 2-3 months. Follows with Dr Jaimes. Was supposed to go for a nerve ablation last Monday (cancelled due to poorly controlled HTN s/p admission). She was brought to the ED by marquise on  for evaluation of worsening back pain and confusion x2 days. History goes back to 2 days PTP when the patient started complaining of worsening of her chronic dull lower back pain that radiates down to her right medial aspect of thigh. This has resulted in limited ambulation (patient refusing to leave her bed) and to being non compliant with Physical Therapy in the absence of leg weakness or paresthesias or numbness or urinary incontinence. Per marquise, patient has been feeling down x few days with reduced PO intake, reduced sleep, loss of interest, thoughts that her family doesn't like/ support her, and confusion (some times would not recognize niece). She has an instance where she had fecal incontinence on way to bathroom 2 days ago. In the setting of confusion and increased pain, marquise decided to bring patient to ED for evaluation.      Interval History:  Patient seen and examined at bedside. There were no acute events overnight. Patient feels well, denied any complaints. Patient removed her IV line last night.       PAST MEDICAL & SURGICAL HISTORY:  HTN (hypertension)  Hypothyroid  Depression  Interstitial lung disease  Essential thrombocytopenia  History of ovarian cyst      MEDICATIONS  (STANDING):  albuterol/ipratropium for Nebulization. 3 milliLiter(s) Nebulizer once  chlorhexidine 4% Liquid 1 Application(s) Topical <User Schedule>  dronedarone 400 milliGRAM(s) Oral two times a day  enoxaparin Injectable 40 milliGRAM(s) SubCutaneous every 24 hours  levothyroxine 100 MICROGram(s) Oral daily  lidocaine   4% Patch 1 Patch Transdermal daily  magnesium oxide 400 milliGRAM(s) Oral three times a day with meals  melatonin 5 milliGRAM(s) Oral at bedtime  metoprolol tartrate 25 milliGRAM(s) Oral every 12 hours  niMODipine Oral Solution 30 milliGRAM(s) Oral every 4 hours  pantoprazole    Tablet 40 milliGRAM(s) Oral two times a day  predniSONE   Tablet 15 milliGRAM(s) Oral daily  sodium bicarbonate 1300 milliGRAM(s) Oral every 6 hours  ticagrelor 90 milliGRAM(s) Oral two times a day    MEDICATIONS  (PRN):  acetaminophen     Tablet .. 650 milliGRAM(s) Oral every 6 hours PRN Mild Pain (1 - 3)  cyclobenzaprine 10 milliGRAM(s) Oral three times a day PRN Muscle Spasm      Vital Signs Last 24 Hrs  T(C): 35.2 (2022 05:00), Max: 36.7 (2022 14:47)  T(F): 95.4 (2022 05:00), Max: 98.1 (2022 18:42)  HR: 92 (2022 05:00) (79 - 92)  BP: 176/75 (2022 05:00) (155/65 - 176/75)  BP(mean): 103 (2022 03:16) (103 - 103)  RR: 16 (2022 05:00) (16 - 16)  SpO2: 98% (2022 18:42) (98% - 98%)      Neurological Exam:   Mental status: Patient is awake alert oriented to self and place. No dysarthria, no aphasia  Cranial nerves: Normal visual field, normal eye movements, PERRLA, No facial asymmetry   Motor strength: 5/5 UE, 5/5 LE. No abnormal involuntary mvmts  Sensation: Responds to pain in all 4 extremities  Coordination: Grossly intact  Reflexes 2+ DTRs in bilateral UE/LE  Gait: Deferred    NIHSS Total: 1        LABS:  cret                        9.8    29.17 )-----------( 324      ( 2022 21:42 )             28.5     06-04    147<H>  |  110  |  17  ----------------------------<  104<H>  4.3   |  19  |  1.3    Ca    8.8      2022 07:23  Mg     1.8     06-03    TPro  5.9<L>  /  Alb  3.5  /  TBili  0.3  /  DBili  x   /  AST  20  /  ALT  14  /  AlkPhos  60  06-04      Radiology  < from: MR Lumbar Spine w/wo IV Cont (22 @ 19:38) >  IMPRESSION:  Acute compression deformity of the L1 vertebral body with approximately   80% height loss as well as bulging of the posterior cortex resulting in   severe spinal stenosis.    Additional acute compression deformity of the L4 superior endplate.    Severe multilevel degenerative changes contributing to spinal canal as   well as neuroforaminal narrowing as detailed above.    --- End of Report ---      JO BECERRIL MD; Attending Radiologist  This document has been electronically signed. May 27 2022  9:18AM    < end of copied text >    MRI/MRA Brain and Neck    IMPRESSION:  BRAIN  Numerous scattered punctate acute infarcts involving the bilateral cortical hemispheres.    Moderate chronic microvascular type changes as well as a chronic left frontal lobe infarct.    Scattered cortical susceptibility weighted artifact likely sequela of chronic hemosiderin deposition from remote subarachnoid hemorrhage. No evidence of acute hemorrhage.    BRAIN MRA  Since prior CTA of 2022 there has been interval placement of a flow diverting stent involving the right clinoid/supraclinoid ICA with residual flow related enhancement within the clinoid ICA aneurysm.    Multifocal scattered stenoses as detailed above.    ECHO  < from: TTE Echo Complete w/o Contrast w/ Doppler (22 @ 10:38) >    Summary:   1. Normal global left ventricular systolic function.   2. Normal left atrial size.   3. Normal right atrial size.   4. Trace mitral valve regurgitation.   5. PSAP 35.    < end of copied text >    VEEG  VEEG in the last 24 hours:    Background - continuous, symmetrical, less than optimally organized, reaching frequencies in the range of 7-8 hz    Focal and generalized slowin. mild generalized slowing  2. mild left hemispheric focal slowing   3. lower amplitude on the right side    Interictal activity - none    Events - none    Seizures -none    Impression:  Abnormal VEEG as above    Plan - as per [facility] team

## 2022-06-06 LAB
ALBUMIN SERPL ELPH-MCNC: 3.8 G/DL — SIGNIFICANT CHANGE UP (ref 3.5–5.2)
ALP SERPL-CCNC: 56 U/L — SIGNIFICANT CHANGE UP (ref 30–115)
ALT FLD-CCNC: 17 U/L — SIGNIFICANT CHANGE UP (ref 0–41)
ANION GAP SERPL CALC-SCNC: 19 MMOL/L — HIGH (ref 7–14)
ANION GAP SERPL CALC-SCNC: 19 MMOL/L — HIGH (ref 7–14)
AST SERPL-CCNC: 26 U/L — SIGNIFICANT CHANGE UP (ref 0–41)
BILIRUB SERPL-MCNC: 0.4 MG/DL — SIGNIFICANT CHANGE UP (ref 0.2–1.2)
BUN SERPL-MCNC: 15 MG/DL — SIGNIFICANT CHANGE UP (ref 10–20)
BUN SERPL-MCNC: 15 MG/DL — SIGNIFICANT CHANGE UP (ref 10–20)
CALCIUM SERPL-MCNC: 9.1 MG/DL — SIGNIFICANT CHANGE UP (ref 8.5–10.1)
CALCIUM SERPL-MCNC: 9.5 MG/DL — SIGNIFICANT CHANGE UP (ref 8.5–10.1)
CHLORIDE SERPL-SCNC: 101 MMOL/L — SIGNIFICANT CHANGE UP (ref 98–110)
CHLORIDE SERPL-SCNC: 99 MMOL/L — SIGNIFICANT CHANGE UP (ref 98–110)
CO2 SERPL-SCNC: 22 MMOL/L — SIGNIFICANT CHANGE UP (ref 17–32)
CO2 SERPL-SCNC: 23 MMOL/L — SIGNIFICANT CHANGE UP (ref 17–32)
CREAT SERPL-MCNC: 1.3 MG/DL — SIGNIFICANT CHANGE UP (ref 0.7–1.5)
CREAT SERPL-MCNC: 1.5 MG/DL — SIGNIFICANT CHANGE UP (ref 0.7–1.5)
EGFR: 36 ML/MIN/1.73M2 — LOW
EGFR: 42 ML/MIN/1.73M2 — LOW
GLUCOSE SERPL-MCNC: 146 MG/DL — HIGH (ref 70–99)
GLUCOSE SERPL-MCNC: 92 MG/DL — SIGNIFICANT CHANGE UP (ref 70–99)
HCT VFR BLD CALC: 31.9 % — LOW (ref 37–47)
HGB BLD-MCNC: 10.8 G/DL — LOW (ref 12–16)
MAGNESIUM SERPL-MCNC: 1.9 MG/DL — SIGNIFICANT CHANGE UP (ref 1.8–2.4)
MCHC RBC-ENTMCNC: 31.4 PG — HIGH (ref 27–31)
MCHC RBC-ENTMCNC: 33.9 G/DL — SIGNIFICANT CHANGE UP (ref 32–37)
MCV RBC AUTO: 92.7 FL — SIGNIFICANT CHANGE UP (ref 81–99)
NRBC # BLD: 2 /100 WBCS — HIGH (ref 0–0)
PLATELET # BLD AUTO: 450 K/UL — HIGH (ref 130–400)
POTASSIUM SERPL-MCNC: 3.3 MMOL/L — LOW (ref 3.5–5)
POTASSIUM SERPL-MCNC: 4 MMOL/L — SIGNIFICANT CHANGE UP (ref 3.5–5)
POTASSIUM SERPL-SCNC: 3.3 MMOL/L — LOW (ref 3.5–5)
POTASSIUM SERPL-SCNC: 4 MMOL/L — SIGNIFICANT CHANGE UP (ref 3.5–5)
PROT SERPL-MCNC: 6.2 G/DL — SIGNIFICANT CHANGE UP (ref 6–8)
RBC # BLD: 3.44 M/UL — LOW (ref 4.2–5.4)
RBC # FLD: 17.8 % — HIGH (ref 11.5–14.5)
SODIUM SERPL-SCNC: 140 MMOL/L — SIGNIFICANT CHANGE UP (ref 135–146)
SODIUM SERPL-SCNC: 143 MMOL/L — SIGNIFICANT CHANGE UP (ref 135–146)
WBC # BLD: 30.48 K/UL — HIGH (ref 4.8–10.8)
WBC # FLD AUTO: 30.48 K/UL — HIGH (ref 4.8–10.8)

## 2022-06-06 PROCEDURE — 99232 SBSQ HOSP IP/OBS MODERATE 35: CPT

## 2022-06-06 RX ORDER — NIMODIPINE 60 MG/10ML
60 SOLUTION ORAL EVERY 4 HOURS
Refills: 0 | Status: DISCONTINUED | OUTPATIENT
Start: 2022-06-06 | End: 2022-06-07

## 2022-06-06 RX ADMIN — MAGNESIUM OXIDE 400 MG ORAL TABLET 400 MILLIGRAM(S): 241.3 TABLET ORAL at 08:02

## 2022-06-06 RX ADMIN — MAGNESIUM OXIDE 400 MG ORAL TABLET 400 MILLIGRAM(S): 241.3 TABLET ORAL at 17:44

## 2022-06-06 RX ADMIN — DRONEDARONE 400 MILLIGRAM(S): 400 TABLET, FILM COATED ORAL at 05:59

## 2022-06-06 RX ADMIN — Medication 100 MICROGRAM(S): at 05:59

## 2022-06-06 RX ADMIN — LIDOCAINE 1 PATCH: 4 CREAM TOPICAL at 12:40

## 2022-06-06 RX ADMIN — Medication 15 MILLIGRAM(S): at 05:59

## 2022-06-06 RX ADMIN — Medication 25 MILLIGRAM(S): at 17:44

## 2022-06-06 RX ADMIN — NIMODIPINE 60 MILLIGRAM(S): 60 SOLUTION ORAL at 21:27

## 2022-06-06 RX ADMIN — LIDOCAINE 1 PATCH: 4 CREAM TOPICAL at 19:36

## 2022-06-06 RX ADMIN — TICAGRELOR 90 MILLIGRAM(S): 90 TABLET ORAL at 17:44

## 2022-06-06 RX ADMIN — NIMODIPINE 30 MILLIGRAM(S): 60 SOLUTION ORAL at 05:58

## 2022-06-06 RX ADMIN — NIMODIPINE 30 MILLIGRAM(S): 60 SOLUTION ORAL at 10:01

## 2022-06-06 RX ADMIN — NIMODIPINE 60 MILLIGRAM(S): 60 SOLUTION ORAL at 17:45

## 2022-06-06 RX ADMIN — ENOXAPARIN SODIUM 40 MILLIGRAM(S): 100 INJECTION SUBCUTANEOUS at 12:40

## 2022-06-06 RX ADMIN — Medication 1300 MILLIGRAM(S): at 00:18

## 2022-06-06 RX ADMIN — Medication 1300 MILLIGRAM(S): at 06:00

## 2022-06-06 RX ADMIN — DRONEDARONE 400 MILLIGRAM(S): 400 TABLET, FILM COATED ORAL at 17:45

## 2022-06-06 RX ADMIN — Medication 1300 MILLIGRAM(S): at 12:39

## 2022-06-06 RX ADMIN — MAGNESIUM OXIDE 400 MG ORAL TABLET 400 MILLIGRAM(S): 241.3 TABLET ORAL at 12:42

## 2022-06-06 RX ADMIN — TICAGRELOR 90 MILLIGRAM(S): 90 TABLET ORAL at 05:59

## 2022-06-06 RX ADMIN — NIMODIPINE 30 MILLIGRAM(S): 60 SOLUTION ORAL at 02:22

## 2022-06-06 RX ADMIN — PANTOPRAZOLE SODIUM 40 MILLIGRAM(S): 20 TABLET, DELAYED RELEASE ORAL at 05:59

## 2022-06-06 RX ADMIN — NIMODIPINE 60 MILLIGRAM(S): 60 SOLUTION ORAL at 14:12

## 2022-06-06 RX ADMIN — Medication 5 MILLIGRAM(S): at 21:27

## 2022-06-06 RX ADMIN — CHLORHEXIDINE GLUCONATE 1 APPLICATION(S): 213 SOLUTION TOPICAL at 06:00

## 2022-06-06 RX ADMIN — Medication 25 MILLIGRAM(S): at 06:01

## 2022-06-06 RX ADMIN — PANTOPRAZOLE SODIUM 40 MILLIGRAM(S): 20 TABLET, DELAYED RELEASE ORAL at 17:44

## 2022-06-06 NOTE — PROGRESS NOTE ADULT - SUBJECTIVE AND OBJECTIVE BOX
AMANDA FELDER  76y  Female    Patient is a 76y old  Female who presents with a chief complaint of Worsening Back Pain and Metabolic Encephalopathy (31 May 2022 09:26)      HPI:  History of Present Illness  Ms. Felder is a 76 year old female patient known to have:  - Baseline AO3, lives with niece, ambulates with a walker  - SEEMA and ILD. Follows with Dr Stephens. Not on CPAP or home O2. On Prednisone 20mg QD  - Depression  - HTN. Recent Admission for HTN Urgency. Follows with Dr Moses  - Hypothyroidism   - Recent admission for a bleeding duodenal ulcer at [facility] (melena). Home med Protonix 20mg QD  - Essential Thrombocytosis. Follows with Dr Miller  - Chronic Back Pain for years secondary to Spinal Stenosis per marquise. s/p Spinal Epidural Injection. Was on PT 3x/ week but has been non compliant for 2-3 months. Follows with Dr Jaimes. Was supposed to go for a nerve ablation last Monday (cancelled due to poorly controlled HTN s/p admission)       She was brought to the ED by marquise on  for evaluation of worsening back pain and confusion x2 days.  History goes back to 2 days PTP when the patient started complaining of worsening of her chronic dull lower back pain that radiates down to her right medial aspect of thigh.  This has resulted in limited ambulation (patient refusing to leave her bed) and to being non compliant with Physical Therapy in the absence of leg weakness or paresthesias or numbness or urinary incontinence.  Per marquise, patient has been feeling down x few days with reduced PO intake, reduced sleep, loss of interest, thoughts that her family doesn't like/ support her, and confusion (some times would not recognize marquise).  She has an instance where she had fecal incontinence on way to bathroom 2 days ago.  In the setting of confusion and increased pain, marquise decided to bring patient to ED for evaluation.    On review of systems, marquise denies any recent fever, chills, night sweats, URTI symptoms (cough, rhinorrhea, sore throat), urinary symptoms (urinary frequency, urgency, intermittence, dysuria, foul smelling urine, cloudy urine), abdominal pain, headache, nausea, or vomiting.   No sick contacts.   No recent travel or exposure to recent travelers.      Upon presentation to the ED, the patient was hemodynamically stable:  Vital Signs in ED   - /104 mmHg  -   - RR 20  - T97.1  - SaO2 94% on RA      Investigations   Laboratory Workup  - CBC:                        11.6   20.94 )-----------( 188      ( 18 May 2022 10:50 )             34.1     - Chemistry:      140  |  96<L>  |  48<H>  ----------------------------<  109<H>  4.3   |  24  |  2.1<H>    Ca    10.4<H>      18 May 2022 10:50    TPro  7.3  /  Alb  4.8  /  TBili  0.6  /  DBili  x   /  AST  36  /  ALT  27  /  AlkPhos  61      - Coagulation Studies:  PT/INR - ( 18 May 2022 10:50 )   PT: 12.30 sec;   INR: 1.07 ratio    PTT - ( 18 May 2022 10:50 )  PTT:24.8 sec      Microbiological Workup  Urinalysis Basic - ( 18 May 2022 14:07 )    Color: Yellow / Appearance: Clear / S.021 / pH: x  Gluc: x / Ketone: Trace  / Bili: Negative / Urobili: <2 mg/dL   Blood: x / Protein: 30 mg/dL / Nitrite: Negative   Leuk Esterase: Negative / RBC: 6 /HPF / WBC 4 /HPF   Sq Epi: x / Non Sq Epi: 1 /HPF / Bacteria: Negative          Radiological Workup  * CT Abdomen and Pelvis w/ IV Cont (22 @ 14:00) No acute abnormality within the abdomen and pelvis. Age-indeterminate moderate compression fracture seen at L1, new since 2022.  * CXR CM and CHF    - Patient was given IV Cefepime 2g x1 dose in ED  - She was also given 1.4 L LR bolus   - She will be admitted for further investigations, management, and monitoring     (18 May 2022 22:59)    Interval events: Pt initially admitted to MICU.  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 Pt s/p DSA & pipeline stent embo .  Patient was febrile on  but work-up was negative except for (+) MRSA PCR.  Patient on 5-day course of mupirocin.  Patient became awake, alert, and oriented x2-3 (person, , place) and tolerating PT/OT.   Passed S+S and tolerating PO soft and bite-sized diet.  Failed TOV on  and bran reinserted for retention.  Re-trial once ambulation improves.  Hemodynamically stable for downgrade to stroke unit for further management.    S: Patient was examined and seen at bedside. This morning pt is resting comfortably in bed and reports no new issues or overnight events. No specific complaints. No further bleeding. Periodically confused as per staff. Passed voiding trial.  Denies CP, SOB, N/V/D/C/AP, cough, F, chills, dizziness, new focal weakness, HA, vision changes, dysuria, or urinary symptoms.  ROS: all other systems reviewed and are negative    PAST MEDICAL & SURGICAL HISTORY:  HTN (hypertension)      Hypothyroid      Depression      Interstitial lung disease      Essential thrombocytopenia      History of ovarian cyst      General: NAD. Looks stated age.  HEENT: clean oropharynx, EOMI, no LAD  Neck: trachea midline, no thyromegaly  CV: nl S1 S2; no m/r/g  Resp: decreased breath sounds at base  GI: NT/ND/S +BS  MS: no clubbing/cyanosis/edema, +pulses  Neuro: motor, sensory intact; + reflexes  Skin: no rashes, nl turgor  Psychiatric: AA0x2       tele: SR, nonspecific changes (on my own evaluation of tele monitor)              MEDICATIONS  (STANDING):  albuterol/ipratropium for Nebulization. 3 milliLiter(s) Nebulizer once  chlorhexidine 4% Liquid 1 Application(s) Topical <User Schedule>  dronedarone 400 milliGRAM(s) Oral two times a day  enoxaparin Injectable 40 milliGRAM(s) SubCutaneous every 24 hours  levothyroxine 100 MICROGram(s) Oral daily  lidocaine   4% Patch 1 Patch Transdermal daily  magnesium oxide 400 milliGRAM(s) Oral three times a day with meals  melatonin 5 milliGRAM(s) Oral at bedtime  metoprolol tartrate 25 milliGRAM(s) Oral every 12 hours  niMODipine 60 milliGRAM(s) Oral every 4 hours  pantoprazole    Tablet 40 milliGRAM(s) Oral two times a day  predniSONE   Tablet 15 milliGRAM(s) Oral daily  sodium bicarbonate 1300 milliGRAM(s) Oral every 6 hours  ticagrelor 90 milliGRAM(s) Oral two times a day    MEDICATIONS  (PRN):  acetaminophen     Tablet .. 650 milliGRAM(s) Oral every 6 hours PRN Mild Pain (1 - 3)  cyclobenzaprine 10 milliGRAM(s) Oral three times a day PRN Muscle Spasm    Home Medications:  losartan-hydrochlorothiazide 100 mg-25 mg oral tablet: 1 tab(s) orally once a day (18 May 2022 23:28)  pantoprazole 40 mg oral delayed release tablet: 1 tab(s) orally 2 times a day (2022 14:07)  predniSONE 20 mg oral tablet: 1 tab(s) orally once a day (18 May 2022 23:28)    Vital Signs Last 24 Hrs  T(C): 36.1 (2022 13:40), Max: 36.1 (2022 04:28)  T(F): 97 (2022 13:40), Max: 97 (2022 04:28)  HR: 91 (2022 14:10) (78 - 91)  BP: 133/62 (2022 14:10) (123/56 - 185/76)  BP(mean): 89 (2022 14:10) (84 - 109)  RR: 18 (2022 04:28) (18 - 18)  SpO2: --  CAPILLARY BLOOD GLUCOSE        LABS:                        10.8   30.48 )-----------( 450      ( 2022 11:00 )             31.9         143  |  101  |  15  ----------------------------<  146<H>  4.0   |  23  |  1.5    Ca    9.5      2022 11:00  Mg     1.9         TPro  6.2  /  Alb  3.8  /  TBili  0.4  /  DBili  x   /  AST  26  /  ALT  17  /  AlkPhos  56      LIVER FUNCTIONS - ( 2022 07:00 )  Alb: 3.8 g/dL / Pro: 6.2 g/dL / ALK PHOS: 56 U/L / ALT: 17 U/L / AST: 26 U/L / GGT: x                 Urinalysis Basic - ( 2022 13:47 )    Color: Yellow / Appearance: Slightly Turbid / S.022 / pH: x  Gluc: x / Ketone: Negative  / Bili: Negative / Urobili: <2 mg/dL   Blood: x / Protein: 30 mg/dL / Nitrite: Negative   Leuk Esterase: Large / RBC: 46 /HPF / WBC 55 /HPF   Sq Epi: x / Non Sq Epi: 0 /HPF / Bacteria: Negative              Consultant Notes Reviewed:  [x ] YES  [ ] NO  Care Discussed with Consultants/Other Providers/ Housestaff [ x] YES  [ ] NO  Radiology, labs, new studies personally reviewed.

## 2022-06-06 NOTE — CHART NOTE - NSCHARTNOTESELECT_GEN_ALL_CORE
Event Note
Neuroendovascular
Nutrition Support/Nutrition Services
Nutrition/Event Note
TCD
TCDs/Event Note
Transfer Note
Calorie Count/Event Note
Event Note
Neuroendovascular
Neuroendovascular
Neurosurgery/Event Note
Nutrition Support/Nutrition Services
TCD results/Event Note
gastroenterology/Event Note

## 2022-06-06 NOTE — CHART NOTE - NSCHARTNOTEFT_GEN_A_CORE
Patient is now POD 14 s/p stent assisted embolization of right ICA aneurysm. Patient seen and examined early this afternoon on 3E, without complaints, in good spirits.   Right groin CDI without evidence of bleeding hematoma oozing infection warmth. Distal pulses intact to palpation. NIHSS remains 0. H/H stable.     Please keep patient on Brilinta 90 mg BID to prevent stent thrombosis.     Patient is tentatively for 4a soon. Will continue to follow on this hospitalization and will schedule for outpatient follow up with Dr. West once discharged. \    Repeat CTA in ~ 2-3 wk.     [phone]

## 2022-06-06 NOTE — PROGRESS NOTE ADULT - ASSESSMENT
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 Pt s/p DSA pipeline stenting R ICA aneurysm on 5/23. S/P 1 unit PRBC. No acute changes in neurologic status. Patient received another unit of PRBC for Hb 7.7, repeat Hb 10.8 this AM. Neurology was consulted for further recommendations, pending discharge to rehab.    Plan:  Neuro  #SAH, 2/2 5mm ruptured R supraclinoid aneurysm s/p angio & pipeline stent embo 5/23   #New L1 compression fx, CPB 2/2 spinal stenosis (follows w Dr. العلي)  # Multiple ischemic strokes bilaterally, Bilateral sever stenosis of ICA  - Neuro Checks Q8H  - Continue Brilinta BID   - Hold aspirin for now (low Hb)  - Continue Nimodipine 60 mg Q4 hrs, needs roughly 1 more week of Nimodipine  - Maintain normovolemia  - D/C'd Keppra for seizure ppx  - Video EEG was negative  - MRA showed decreased aneurysm (5.9 -> 3.5mm), repeat MRI or CTA in 3 weeks per NSG  - Pain control: Tylenol, Lido patch, flexeril (new L1 compression fx, HA) on MR L-spine  - OOB to chair  - PT/OT/OOB    Cardio   #HTN  - -160  - Continue Nimodipine 60 mg Q4 hrs  - Continue Metoprolol 25 mg BID  - Encourage PO intake    #Sinus tachycardia   - Continue Metoprolol 25 mg BID  - Started Multac 400mg BID  - Transfuse if H/H continues to decrease  - Was given another unit PRBCs for Hb 7.7, improved to 10.8    Pulm  #SEEMA not on CPAP, ILD  - She was tachypneic and wheezing, that improved after diuresis and blood transfusion  - Aspiration precautions   - Prednisone 15mg qd for ILD; pulm following  - Incentive spirometry  - CXR   - Strict I/Os     GI  #hx UGIB 2/2 bleeding duodenal ulcer (recent [facility] admission for melena)  She had multiple bowl movements during the admission, no more dark stools  - Stopped bowel regime   - Started on pantoprazole infusion as per GI. (continue as per GI for today)  - Senna/Miralax  - Currently soft liquid diet as per GI   - Changed pantoprazole 40 mg IV BID  - Endoscopy was held for concern for Covid exposure, to be consider if not stable, or dropping Hb  - Was given another 1 unit PRBCs for Hb 7.7, repeat Hb 10.8  - F/u CBC    :  - Normonatremic euvolemia  - Strict Is/Os  - Daily BMP  - Replace Mg   - Removed Albarran  - UA showed large leukocyte, negative nitrites    Heme:  #Anemia, s/p 2 unit prbc. on 27 and 30 of May, another unit was given on 06/04  - Lovenox 40 qd 5/24 (held yesterday)   - SCDs  - Hold Anagrelide for now (low Hb)  - Negative LE dopplers from 5/19  - Was given another unit PRBCs today for Hb 7.7  - F/u repeat CBC      ID:  - Monitor fever trend and WBCs, leukocytosis could be multifactorial due to essential thrombocytosis, also has interstitial lung disease  - CXR 5/25 unchanged  - UA showed large leukocyte, negative nitrites  - MRSA positive, 5-day course mupirocin completed       Endo:  #hypothyroidism  - Continue Synthroid PO 100mcg qD  - TSH 70, free T4 1.0; follow up in 2 weeks      Dispo: Pending 4A, currently on isolation for COVID

## 2022-06-06 NOTE — PROGRESS NOTE ADULT - SUBJECTIVE AND OBJECTIVE BOX
Neurology Progress Note    HPI:  This is a 76 year old F patient known to have baseline AAOx3, lives with niece, ambulates with a walker, SEEMA and ILD. Follows with Dr Stephens. Not on CPAP or home O2. On Prednisone 20mg QD, Depression, HTN with recent admission for HTN Urgency. Follows with Dr Moses, hypothyroidism, recent admission for a bleeding duodenal ulcer at [facility] (melena). Home med Protonix 20mg QD, essential Thrombocytosis. Follows with Dr Denise, chronic Back Pain for years secondary to Spinal Stenosis per marquise. s/p Spinal Epidural Injection. Was on PT 3x/ week but has been non compliant for 2-3 months. Follows with Dr Jaimes. Was supposed to go for a nerve ablation last Monday (cancelled due to poorly controlled HTN s/p admission). She was brought to the ED by marquise on  for evaluation of worsening back pain and confusion x2 days. History goes back to 2 days PTP when the patient started complaining of worsening of her chronic dull lower back pain that radiates down to her right medial aspect of thigh. This has resulted in limited ambulation (patient refusing to leave her bed) and to being non compliant with Physical Therapy in the absence of leg weakness or paresthesias or numbness or urinary incontinence. Per marqusie, patient has been feeling down x few days with reduced PO intake, reduced sleep, loss of interest, thoughts that her family doesn't like/ support her, and confusion (some times would not recognize niece). She has an instance where she had fecal incontinence on way to bathroom 2 days ago. In the setting of confusion and increased pain, marquise decided to bring patient to ED for evaluation.      Interval History:  Patient seen and examined at bedside. There were no acute events overnight. Patient feels well, denied any complaints. She states she would like to continue to work with physical therapy today.      PAST MEDICAL & SURGICAL HISTORY:  HTN (hypertension)  Hypothyroid  Depression  Interstitial lung disease  Essential thrombocytopenia  History of ovarian cyst      Medications:  acetaminophen     Tablet .. 650 milliGRAM(s) Oral every 6 hours PRN  albuterol/ipratropium for Nebulization. 3 milliLiter(s) Nebulizer once  chlorhexidine 4% Liquid 1 Application(s) Topical <User Schedule>  cyclobenzaprine 10 milliGRAM(s) Oral three times a day PRN  dronedarone 400 milliGRAM(s) Oral two times a day  enoxaparin Injectable 40 milliGRAM(s) SubCutaneous every 24 hours  levothyroxine 100 MICROGram(s) Oral daily  lidocaine   4% Patch 1 Patch Transdermal daily  magnesium oxide 400 milliGRAM(s) Oral three times a day with meals  melatonin 5 milliGRAM(s) Oral at bedtime  metoprolol tartrate 25 milliGRAM(s) Oral every 12 hours  niMODipine 60 milliGRAM(s) Oral every 4 hours  pantoprazole    Tablet 40 milliGRAM(s) Oral two times a day  predniSONE   Tablet 15 milliGRAM(s) Oral daily  sodium bicarbonate 1300 milliGRAM(s) Oral every 6 hours  ticagrelor 90 milliGRAM(s) Oral two times a day      Vital Signs Last 24 Hrs  T(C): 36.1 (2022 13:40), Max: 36.1 (2022 04:28)  T(F): 97 (2022 13:40), Max: 97 (2022 04:28)  HR: 91 (2022 14:10) (78 - 91)  BP: 133/62 (2022 14:10) (123/56 - 185/76)  BP(mean): 89 (2022 14:10) (84 - 109)  RR: 18 (2022 04:28) (18 - 18)      Neurological Exam:   Mental status: Awake, alert and oriented x3. No dysarthria, no aphasia.   Cranial nerves: Pupils equally round and reactive to light, visual fields full, no nystagmus, extraocular muscles intact, V1 through V3 intact bilaterally and symmetric, face symmetric, hearing intact to finger rub, palate elevation symmetric, tongue was midline.  Motor: MRC grading 5/5 B/L UE/LE.  strength 5/5. Normal tone and bulk. No abnormal movements.    Sensation: Intact to light touch, proprioception, and pinprick.   Coordination: No dysmetria on finger-to-nose and heel-to-shin.  No dysdiadokinesia.  Reflexes: 2+ in bilateral UE/LE, downgoing toes bilaterally. (-) Robles.  Gait: Narrow and steady. No ataxia.  Romberg negative      NIHSS 0      Labs:  CBC Full  -  ( 2022 11:00 )  WBC Count : 30.48 K/uL  RBC Count : 3.44 M/uL  Hemoglobin : 10.8 g/dL  Hematocrit : 31.9 %  Platelet Count - Automated : 450 K/uL  Mean Cell Volume : 92.7 fL  Mean Cell Hemoglobin : 31.4 pg  Mean Cell Hemoglobin Concentration : 33.9 g/dL        143  |  101  |  15  ----------------------------<  146<H>  4.0   |  23  |  1.5    Ca    9.5      2022 11:00  Mg     1.9         TPro  6.2  /  Alb  3.8  /  TBili  0.4  /  DBili  x   /  AST  26  /  ALT  17  /  AlkPhos  56      LIVER FUNCTIONS - ( 2022 07:00 )  Alb: 3.8 g/dL / Pro: 6.2 g/dL / ALK PHOS: 56 U/L / ALT: 17 U/L / AST: 26 U/L / GGT: x             Urinalysis Basic - ( 2022 13:47 )    Color: Yellow / Appearance: Slightly Turbid / S.022 / pH: x  Gluc: x / Ketone: Negative  / Bili: Negative / Urobili: <2 mg/dL   Blood: x / Protein: 30 mg/dL / Nitrite: Negative   Leuk Esterase: Large / RBC: 46 /HPF / WBC 55 /HPF   Sq Epi: x / Non Sq Epi: 0 /HPF / Bacteria: Negative

## 2022-06-06 NOTE — PROGRESS NOTE ADULT - ASSESSMENT
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 Pt s/p DSA pipeline stenting R ICA aneurysm on 5/23. S/P 1 UNIT PRBC for hb 7.3. No acute change in neuro status. Patient received another unit of blood  for Hb 7.6     #Anemia/Melena/hx recent UGIB 2/2 bleeding duodenal ulcer (recent [facility] admission for melena)  She had multiple bowl movements,   - Stopped bowl regime   - Started on pantoprazole infusion as per GI.   - Senna/Miralax  - Currently clear liquid diet as per GI   - track CBC  - GI is considering EGD if unstable  - s/p 2 unit prbc. on 27 and 30 of May  - Lovenox 40 qd    - SCDs  - Hold Anagrelide for now (low Hb)  - Negative LE dopplers from 5/19. Repeat 6/1 also negative.  - track CBC keep Hgb>7.5  - advanced diet as per GI and pt tolerating      #SAH, 2/2 5mm ruptured R supraclinoid aneurysm s/p angio & pipeline stent embo 5/23  #Ischemic strokes on MRI   #new L1 compression fx, CPB 2/2 spinal stenosis (follows w Dr. العلي)  - Neuro Checks Q4H  - Brilinta BID   - Hold aspirin for now (low Hb)  - Nimodipine   - Normovolemia  - Keppra for seizure ppx  - Video EEG negative  - MRA showed decreased aneurysm (5.9 -> 3.5mm), repeat MRI or CTA in 3 weeks per NSG  - Pain control: Tylenol, Lido patch, flexeril (new L1 compression fx, HA) on MR L-spine  - OOB to chair  - PT/OT/OOB     #HTN  - -140  - nimodipine for 21 days  - encourage PO intake  - continue metoprolol   - consider adding ACE or ARB for better BP control     #Sinus tachycardia (at one point >150)  - Continue metoprolol and titrate PRN for better HR control  - Started Multaq 400mg bid   - transfuse if H/H continues to decrease      #SEEMA not on CPAP, ILD  she was tachypneic and wheezing 5/30, that improved after diuresis and blood transfusion.   - Aspiration precautions   - Prednisone 15mg qd for ILD; pulm following  - Incentive spirometry  - Strict I/Os     #Chronic thrombocytosis/ET  -anagrelide on hold due to GIB/anemia  -as per neuro who spoke to heme team, can hold as long as Plts are not going very high    #Leukocytosis  -likely due to ET. U/A negative  - consider heme evalv (sees Dr. Miller outpt)    #hypothyroidism  - Synthroid PO 100mcg qD  - TSH 70, free t4 1.0; repeat in 2-4 weeks    Urinary retention  passed voiding trial    Resp alkolosis ?Etiology ?ILD, ?anxiety. No evidence of pain  -CTA chest negative  to r/o PE  -improving    Suspected baseline early dementia  stable    Covid Exposure  -was exposed last week. Thereafter, pt and another roommate who was exposed moved to isolation. Exposed roommate tested positive on 6/5.  - pt is a-Sx  -monitor for Sx  -epidemiology f/u if pt still needs to be under droplet isolation    Very high risk pt. Px is guarded    #Progress Note Handoff  Pending: Consults____Clinical improvement and stability__x_Tests__4A auth_ Epidemiology clearance due to a 2nd exposure  Pt/Family discussion: Pt informed and agrees with the current plan. Neuro team spoke to niece.  Disposition: Home______/SNF____vs   4A___?___/To be determined____x____    My note supersedes the residents note should a discrepancy arise.    Chart and notes personally reviewed.  Care Discussed with Consultants/Other Providers/ Housestaff [ x] YES [ ] NO   Radiology, labs, old records personally reviewed.    discussed w/ housestaff, nursing, case management, neuro team

## 2022-06-06 NOTE — PROGRESS NOTE ADULT - ATTENDING COMMENTS
Pt is a 75 yo F with PMHX of chronic back pain, SEEMA, HTN, depression, essential thrombocytosis, recent admission for GIB 2/2 duodenal ulcer, who presented with AMS. 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 Now s/p pipeline stent embolization 5/23. MRI brain on 5/26 with multifocal punctate acute ischemic stroke. Favor periprocedural. However in light of bilateral carotid disease, CUS pending. Continue brillinta 90mg BID. PT/OT/ST, tele. Nimotop last day 6/13. no recent bloody BM, Hb stable.   PT/OT/ST, tele, -160.   Dispo planning: likely 4A, however given multiple recent covid exposure, will check whether pt still able to be accepted to rehab unit.

## 2022-06-07 ENCOUNTER — TRANSCRIPTION ENCOUNTER (OUTPATIENT)
Age: 77
End: 2022-06-07

## 2022-06-07 VITALS — TEMPERATURE: 98 F | HEART RATE: 83 BPM | SYSTOLIC BLOOD PRESSURE: 123 MMHG | DIASTOLIC BLOOD PRESSURE: 59 MMHG

## 2022-06-07 LAB
-  AMIKACIN: SIGNIFICANT CHANGE UP
-  AMOXICILLIN/CLAVULANIC ACID: SIGNIFICANT CHANGE UP
-  AMPICILLIN/SULBACTAM: SIGNIFICANT CHANGE UP
-  AMPICILLIN: SIGNIFICANT CHANGE UP
-  AZTREONAM: SIGNIFICANT CHANGE UP
-  CEFAZOLIN: SIGNIFICANT CHANGE UP
-  CEFEPIME: SIGNIFICANT CHANGE UP
-  CEFOXITIN: SIGNIFICANT CHANGE UP
-  CEFTRIAXONE: SIGNIFICANT CHANGE UP
-  CIPROFLOXACIN: SIGNIFICANT CHANGE UP
-  ERTAPENEM: SIGNIFICANT CHANGE UP
-  GENTAMICIN: SIGNIFICANT CHANGE UP
-  IMIPENEM: SIGNIFICANT CHANGE UP
-  LEVOFLOXACIN: SIGNIFICANT CHANGE UP
-  MEROPENEM: SIGNIFICANT CHANGE UP
-  NITROFURANTOIN: SIGNIFICANT CHANGE UP
-  PIPERACILLIN/TAZOBACTAM: SIGNIFICANT CHANGE UP
-  TIGECYCLINE: SIGNIFICANT CHANGE UP
-  TOBRAMYCIN: SIGNIFICANT CHANGE UP
-  TRIMETHOPRIM/SULFAMETHOXAZOLE: SIGNIFICANT CHANGE UP
ALBUMIN SERPL ELPH-MCNC: 3.6 G/DL — SIGNIFICANT CHANGE UP (ref 3.5–5.2)
ALP SERPL-CCNC: 55 U/L — SIGNIFICANT CHANGE UP (ref 30–115)
ALT FLD-CCNC: 17 U/L — SIGNIFICANT CHANGE UP (ref 0–41)
ANION GAP SERPL CALC-SCNC: 16 MMOL/L — HIGH (ref 7–14)
AST SERPL-CCNC: 22 U/L — SIGNIFICANT CHANGE UP (ref 0–41)
BILIRUB SERPL-MCNC: 0.4 MG/DL — SIGNIFICANT CHANGE UP (ref 0.2–1.2)
BUN SERPL-MCNC: 16 MG/DL — SIGNIFICANT CHANGE UP (ref 10–20)
CALCIUM SERPL-MCNC: 8.9 MG/DL — SIGNIFICANT CHANGE UP (ref 8.5–10.1)
CHLORIDE SERPL-SCNC: 100 MMOL/L — SIGNIFICANT CHANGE UP (ref 98–110)
CO2 SERPL-SCNC: 24 MMOL/L — SIGNIFICANT CHANGE UP (ref 17–32)
CREAT SERPL-MCNC: 1.3 MG/DL — SIGNIFICANT CHANGE UP (ref 0.7–1.5)
EGFR: 42 ML/MIN/1.73M2 — LOW
GLUCOSE SERPL-MCNC: 95 MG/DL — SIGNIFICANT CHANGE UP (ref 70–99)
HCT VFR BLD CALC: 29.2 % — LOW (ref 37–47)
HGB BLD-MCNC: 9.6 G/DL — LOW (ref 12–16)
MAGNESIUM SERPL-MCNC: 1.9 MG/DL — SIGNIFICANT CHANGE UP (ref 1.8–2.4)
MCHC RBC-ENTMCNC: 31 PG — SIGNIFICANT CHANGE UP (ref 27–31)
MCHC RBC-ENTMCNC: 32.9 G/DL — SIGNIFICANT CHANGE UP (ref 32–37)
MCV RBC AUTO: 94.2 FL — SIGNIFICANT CHANGE UP (ref 81–99)
METHOD TYPE: SIGNIFICANT CHANGE UP
NRBC # BLD: 2 /100 WBCS — HIGH (ref 0–0)
PHOSPHATE SERPL-MCNC: 3.3 MG/DL — SIGNIFICANT CHANGE UP (ref 2.1–4.9)
PLATELET # BLD AUTO: 386 K/UL — SIGNIFICANT CHANGE UP (ref 130–400)
POTASSIUM SERPL-MCNC: 3.3 MMOL/L — LOW (ref 3.5–5)
POTASSIUM SERPL-SCNC: 3.3 MMOL/L — LOW (ref 3.5–5)
PROT SERPL-MCNC: 6.1 G/DL — SIGNIFICANT CHANGE UP (ref 6–8)
RBC # BLD: 3.1 M/UL — LOW (ref 4.2–5.4)
RBC # FLD: 17.7 % — HIGH (ref 11.5–14.5)
SODIUM SERPL-SCNC: 140 MMOL/L — SIGNIFICANT CHANGE UP (ref 135–146)
WBC # BLD: 25.21 K/UL — HIGH (ref 4.8–10.8)
WBC # FLD AUTO: 25.21 K/UL — HIGH (ref 4.8–10.8)

## 2022-06-07 PROCEDURE — 99232 SBSQ HOSP IP/OBS MODERATE 35: CPT

## 2022-06-07 PROCEDURE — 99238 HOSP IP/OBS DSCHRG MGMT 30/<: CPT

## 2022-06-07 RX ORDER — NIMODIPINE 60 MG/10ML
2 SOLUTION ORAL
Qty: 0 | Refills: 0 | DISCHARGE
Start: 2022-06-07

## 2022-06-07 RX ORDER — POTASSIUM CHLORIDE 20 MEQ
40 PACKET (EA) ORAL ONCE
Refills: 0 | Status: COMPLETED | OUTPATIENT
Start: 2022-06-07 | End: 2022-06-07

## 2022-06-07 RX ADMIN — MAGNESIUM OXIDE 400 MG ORAL TABLET 400 MILLIGRAM(S): 241.3 TABLET ORAL at 11:42

## 2022-06-07 RX ADMIN — DRONEDARONE 400 MILLIGRAM(S): 400 TABLET, FILM COATED ORAL at 17:11

## 2022-06-07 RX ADMIN — PANTOPRAZOLE SODIUM 40 MILLIGRAM(S): 20 TABLET, DELAYED RELEASE ORAL at 05:04

## 2022-06-07 RX ADMIN — TICAGRELOR 90 MILLIGRAM(S): 90 TABLET ORAL at 05:04

## 2022-06-07 RX ADMIN — DRONEDARONE 400 MILLIGRAM(S): 400 TABLET, FILM COATED ORAL at 05:04

## 2022-06-07 RX ADMIN — ENOXAPARIN SODIUM 40 MILLIGRAM(S): 100 INJECTION SUBCUTANEOUS at 11:42

## 2022-06-07 RX ADMIN — Medication 25 MILLIGRAM(S): at 17:08

## 2022-06-07 RX ADMIN — PANTOPRAZOLE SODIUM 40 MILLIGRAM(S): 20 TABLET, DELAYED RELEASE ORAL at 17:07

## 2022-06-07 RX ADMIN — MAGNESIUM OXIDE 400 MG ORAL TABLET 400 MILLIGRAM(S): 241.3 TABLET ORAL at 07:50

## 2022-06-07 RX ADMIN — MAGNESIUM OXIDE 400 MG ORAL TABLET 400 MILLIGRAM(S): 241.3 TABLET ORAL at 17:08

## 2022-06-07 RX ADMIN — CHLORHEXIDINE GLUCONATE 1 APPLICATION(S): 213 SOLUTION TOPICAL at 05:05

## 2022-06-07 RX ADMIN — NIMODIPINE 60 MILLIGRAM(S): 60 SOLUTION ORAL at 05:04

## 2022-06-07 RX ADMIN — TICAGRELOR 90 MILLIGRAM(S): 90 TABLET ORAL at 17:07

## 2022-06-07 RX ADMIN — NIMODIPINE 60 MILLIGRAM(S): 60 SOLUTION ORAL at 17:09

## 2022-06-07 RX ADMIN — NIMODIPINE 60 MILLIGRAM(S): 60 SOLUTION ORAL at 09:54

## 2022-06-07 RX ADMIN — LIDOCAINE 1 PATCH: 4 CREAM TOPICAL at 11:42

## 2022-06-07 RX ADMIN — NIMODIPINE 60 MILLIGRAM(S): 60 SOLUTION ORAL at 01:45

## 2022-06-07 RX ADMIN — Medication 40 MILLIEQUIVALENT(S): at 17:08

## 2022-06-07 RX ADMIN — Medication 25 MILLIGRAM(S): at 05:04

## 2022-06-07 RX ADMIN — Medication 100 MICROGRAM(S): at 05:03

## 2022-06-07 RX ADMIN — LIDOCAINE 1 PATCH: 4 CREAM TOPICAL at 00:24

## 2022-06-07 RX ADMIN — Medication 15 MILLIGRAM(S): at 05:03

## 2022-06-07 RX ADMIN — NIMODIPINE 60 MILLIGRAM(S): 60 SOLUTION ORAL at 13:24

## 2022-06-07 NOTE — DISCHARGE NOTE NURSING/CASE MANAGEMENT/SOCIAL WORK - NSDCPEPTSTROKESIGNS_GEN_ALL_CORE
Sudden numbness or weakness of the face, arm, or leg, especially on one side of the body. Confusion, trouble speaking or understanding. Trouble seeing in one or both eyes. Trouble walking, dizziness, loss of balance or coordination. Severe headache. Acitretin Counseling:  I discussed with the patient the risks of acitretin including but not limited to hair loss, dry lips/skin/eyes, liver damage, hyperlipidemia, depression/suicidal ideation, photosensitivity.  Serious rare side effects can include but are not limited to pancreatitis, pseudotumor cerebri, bony changes, clot formation/stroke/heart attack.  Patient understands that alcohol is contraindicated since it can result in liver toxicity and significantly prolong the elimination of the drug by many years.

## 2022-06-07 NOTE — PROGRESS NOTE ADULT - SUBJECTIVE AND OBJECTIVE BOX
Neuroendovascular Progress Note:     Interval History:  Patient is a 76-year-old, Yemeni-speaking female who presented with AMS and worsened lower back pain who was found to have right frontoparietal SAH (HH1 mFS 1) possibly attributable to a 5mm,  right supraclinoid ICA saccular aneurysm found on CTA. Multifocal stenosis/spasm was also noted. Patient is now s/p diagnostic cerebral angiogram + pipeline stent embolization of the right ICA aneurysm. Patient is on Lovenox 40 mg qd and Brilinta 90 mg bid. S/p 1 unit PRBC for hemoglobin 7.3  and yesterday for hemoglobin 7.6. GI following for melena. H/H have been stable. On exam, the patient is AAOx3 with no acute complaints.    PMHx:  HTN (hypertension)  Hypothyroid  Depression  Interstitial lung disease  Essential thrombocytopenia    24-Hour Events: None    Medication:  albuterol/ipratropium for Nebulization. 3 milliLiter(s) Nebulizer once  chlorhexidine 4% Liquid 1 Application(s) Topical <User Schedule>  dronedarone 400 milliGRAM(s) Oral two times a day  enoxaparin Injectable 40 milliGRAM(s) SubCutaneous every 24 hours  levothyroxine 100 MICROGram(s) Oral daily  lidocaine   4% Patch 1 Patch Transdermal daily  magnesium oxide 400 milliGRAM(s) Oral three times a day with meals  melatonin 5 milliGRAM(s) Oral at bedtime  metoprolol tartrate 25 milliGRAM(s) Oral every 12 hours  niMODipine 60 milliGRAM(s) Oral every 4 hours  pantoprazole    Tablet 40 milliGRAM(s) Oral two times a day  predniSONE   Tablet 15 milliGRAM(s) Oral daily  ticagrelor 90 milliGRAM(s) Oral two times a day    No Known Allergies    Recent Vitals:  T(F): 97.2 (22 @ 04:43), Max: 97.2 (22 @ 04:43)  HR: 80 (22 @ 04:43) (73 - 97)  BP: 166/69 (22 @ 04:43) (123/56 - 185/76)  RR: 18 (22 @ 01:35) (18 - 18)  SpO2: --    COVID-19 PCR: NotDetec (18 May 2022 10:10)    Recent Labs:                        9.6    25.21 )-----------( 386      ( 2022 06:29 )             29.2     06-07    140  |  100  |  16  ----------------------------<  95  3.3<L>   |  24  |  1.3    Ca    8.9      2022 06:29  Phos  3.3     06-07  Mg     1.9     06-07    TPro  6.1  /  Alb  3.6  /  TBili  0.4  /  DBili  x   /  AST  22  /  ALT  17  /  AlkPhos  55  06-07    Urinalysis Basic - ( 2022 13:47 )    Color: Yellow / Appearance: Slightly Turbid / S.022 / pH: x  Gluc: x / Ketone: Negative  / Bili: Negative / Urobili: <2 mg/dL   Blood: x / Protein: 30 mg/dL / Nitrite: Negative   Leuk Esterase: Large / RBC: 46 /HPF / WBC 55 /HPF   Sq Epi: x / Non Sq Epi: 0 /HPF / Bacteria: Negative    LIVER FUNCTIONS - ( 2022 06:29 )  Alb: 3.6 g/dL / Pro: 6.1 g/dL / ALK PHOS: 55 U/L / ALT: 17 U/L / AST: 22 U/L / GGT: x           Exam:  General: NAD--requesting physical therapy/more activity.  Mental Status: AAOx3, no dysarthria or aphasia, concentration intact, naming and repetition intact.  CN: PERRL, EOMI, visual fields intact, no facial asymmetry, V1-V3 intact bilaterally.  Motor: B/l UE/LE 5/5. No abnormal movements.  Sensation: Intact to light touch throughout.  Extremities: Mild bruising/ecchymosis near arteriotomy site and on b/l UE; arteriotomy site clean and dry, dressing has been removed and there is no sign of infection/bleeding at site.    Radiology:   ACC: [id number] EXAM: MR ANGIO BRAIN  ACC: [id number] EXAM: MR ANGIO NECK IC  ACC: [id number] EXAM: MR BRAIN WAW IC    PROCEDURE DATE: 2022    IMPRESSION:  BRAIN  Numerous scattered punctate acute infarcts involving the bilateral cortical hemispheres.    Moderate chronic microvascular type changes as well as a chronic left frontal lobe infarct.    Scattered cortical susceptibility weighted artifact likely sequela of chronic hemosiderin deposition from remote subarachnoid hemorrhage. No evidence of acute hemorrhage.    BRAIN MRA  Since prior CTA of 2022 there has been interval placement of a flow diverting stent involving the right clinoid/supraclinoid ICA with residual flow related enhancement within the clinoid ICA aneurysm.    Multifocal scattered stenoses as detailed above.    NECK MRA  Motion limited examination.    Likely severe/critical bilateral proximal ICA stenosis though difficult to quantify due to motion limitation.    Assessment:   76-year-old, [place]i-speaking female who presented with AMS and worsened lower back painand was found to have right frontoparietal SAH (HH1 mFS 1), possibly attributable to a 5mm, right supraclinoid ICA saccular aneurysm found on CTA. Multifocal stenosis/spasm also noted. Patient is now s/p diagnostic cerebral angiogram + pipeline stent embolization of the right ICA aneurysm. Patient is on Lovenox 40 mg qd and Brilinta 90 mg bid. S/p 1 unit PRBC for hemoglobin 7.3  and 1 unit for hemoglobin 7.6. GI following for melena. H/H have been stable (now 9.6/29.2). Still on droplet precautions for Covid exposure--awaiting placement in 4A. On exam, the patient is AAOx3 without acute complaints. The placement of a pipeline stent and plans for follow up were discussed at length by Dr. West with the patient who has no further questions at this time.    Suggestions:  - From an endovascular perspective, the patient should continue Brilinta 90 mg bid.   - Patient should follow up in 2-3 weeks with Dr. West with a CTA in the outpatient clinic.   [id number]

## 2022-06-07 NOTE — PROGRESS NOTE ADULT - ASSESSMENT
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 Pt s/p DSA pipeline stenting R ICA aneurysm on 5/23. S/P 1 unit PRBC. No acute changes in neurologic status. Patient received another unit of PRBC for Hb 7.7, repeat Hb 10.8 this AM. Neurology was consulted for further recommendations, pending discharge to rehab.    Plan:  Neuro  #SAH, 2/2 5mm ruptured R supraclinoid aneurysm s/p angio & pipeline stent embo 5/23   #New L1 compression fx, CPB 2/2 spinal stenosis (follows w Dr. العلي)  # Multiple ischemic strokes bilaterally, Bilateral sever stenosis of ICA  - Neuro Checks Q8H  - Continue Brilinta BID   - Hold aspirin for now (low Hb)  - Continue Nimodipine 60 mg Q4 hrs, needs roughly 1 more week of Nimodipine to finish 6/13  - Maintain normovolemia  - D/C'd Keppra for seizure ppx  - Video EEG was negative  - MRA showed decreased aneurysm (5.9 -> 3.5mm), repeat MRI or CTA in 3 weeks per NSG  - Pain control: Tylenol, Lido patch, flexeril (new L1 compression fx, HA) on MR L-spine  - OOB to chair  - PT/OT/OOB    Cardio   #HTN  - -160  - Continue Nimodipine 60 mg Q4 hrs until 6/13  - Continue Metoprolol 25 mg BID  - Encourage PO intake    #Sinus tachycardia   - Continue Metoprolol 25 mg BID  - Started Multac 400mg BID  - Transfuse if H/H continues to decrease  - Was given another unit PRBCs for Hb 7.7, improved to 10.8    Pulm  #SEEMA not on CPAP, ILD  - She was tachypneic and wheezing, that improved after diuresis and blood transfusion  - Aspiration precautions   - Prednisone 15mg qd for ILD; pulm following  - Incentive spirometry  - CXR   - Strict I/Os     GI  #Hx UGIB 2/2 bleeding duodenal ulcer (recent [facility] admission for melena)  She had multiple bowl movements during the admission, no more dark stools  - Stopped bowel regime   - Started on pantoprazole infusion as per GI. (continue as per GI for today)  - Senna/Miralax  - Currently soft liquid diet as per GI   - Changed pantoprazole 40 mg IV BID  - Endoscopy was held for concern for Covid exposure, to be consider if not stable, or dropping Hb  - Was given another 1 unit PRBCs for Hb 7.7, repeat Hb 10.8  - F/u CBC    :  - Normonatremic euvolemia  - Strict Is/Os  - Daily BMP  - Replace Mg   - Removed Albarran  - UA showed large leukocyte, negative nitrites    Heme:  #Anemia, s/p 2 unit prbc. on 27 and 30 of May, another unit was given on 06/04  - Lovenox 40 qd 5/24 (held yesterday)   - SCDs  - Hold Anagrelide for now (low Hb)  - Negative LE dopplers from 5/19  - Was given another unit PRBCs today for Hb 7.7  - F/u repeat CBC    ID:  - Monitor fever trend and WBCs, leukocytosis could be multifactorial due to essential thrombocytosis, also has interstitial lung disease  - CXR 5/25 unchanged  - UA showed large leukocyte, negative nitrites  - MRSA positive, 5-day course mupirocin completed     Endo:  #Hypothyroidism  - Continue Synthroid PO 100mcg qD  - TSH 70, free T4 1.0; follow up in 2 weeks    Dispo: Acute Rehab 4A   77 yo F PMHx as above found to have R frontoparietal HH1/mF1 SAH, 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 Pt s/p DSA pipeline stenting R ICA aneurysm on 5/23. S/P 1 unit PRBC. No acute changes in neurologic status. Patient received another unit of PRBC for Hb 7.7, repeat Hb 10.8 this AM. Neurology was consulted for further recommendations, pending discharge to rehab.    Plan:  Neuro  #SAH, 2/2 5mm ruptured R supraclinoid aneurysm s/p angio & pipeline stent embo 5/23   #New L1 compression fx, CPB 2/2 spinal stenosis (follows w Dr. العلي)  # Multiple ischemic strokes bilaterally, Bilateral severe stenosis of ICA  - Neuro Checks Q8H  - Continue Brilinta BID   - Hold aspirin for now (low Hb)  - Continue Nimodipine 60 mg Q4 hrs, needs roughly 1 more week of Nimodipine to finish 6/13  - Maintain normovolemia  - D/C'd Keppra for seizure ppx  - Video EEG was negative  - MRA showed decreased aneurysm (5.9 -> 3.5mm), repeat MRI or CTA in 3 weeks per NSG  - Pain control: Tylenol, Lido patch, flexeril (new L1 compression fx, HA) on MR L-spine  - OOB to chair  - PT/OT/OOB    Cardio   #HTN  - -160  - Continue Nimodipine 60 mg Q4 hrs until 6/13  - Continue Metoprolol 25 mg BID  - Encourage PO intake    #Sinus tachycardia   - Continue Metoprolol 25 mg BID  - Started Multac 400mg BID  - Transfuse if H/H continues to decrease  - Was given another unit PRBCs for Hb 7.7, improved to 10.8    Pulm  #SEEMA not on CPAP, ILD  - She was tachypneic and wheezing, that improved after diuresis and blood transfusion  - Aspiration precautions   - Prednisone 15mg qd for ILD; pulm following  - Incentive spirometry  - CXR   - Strict I/Os     GI  #Hx UGIB 2/2 bleeding duodenal ulcer (recent [facility] admission for melena)  She had multiple bowl movements during the admission, no more dark stools  - Stopped bowel regime   - Started on pantoprazole infusion as per GI. (continue as per GI for today)  - Senna/Miralax  - Currently soft liquid diet as per GI   - Changed pantoprazole 40 mg IV BID  - Endoscopy was held for concern for Covid exposure, to be consider if not stable, or dropping Hb  - Was given another 1 unit PRBCs for Hb 7.7, repeat Hb 10.8  - F/u CBC    :  - Normonatremic euvolemia  - Strict Is/Os  - Daily BMP  - Replace Mg   - Removed Albarran  - UA showed large leukocyte, negative nitrites    Heme:  #Anemia, s/p 2 unit prbc. on 27 and 30 of May, another unit was given on 06/04  - Lovenox 40 qd 5/24 (held yesterday)   - SCDs  - Hold Anagrelide for now (low Hb)  - Negative LE dopplers from 5/19  - Was given another unit PRBCs today for Hb 7.7  - F/u repeat CBC    ID:  - Monitor fever trend and WBCs, leukocytosis could be multifactorial due to essential thrombocytosis, also has interstitial lung disease  - CXR 5/25 unchanged  - UA showed large leukocyte, negative nitrites  - MRSA positive, 5-day course mupirocin completed     Endo:  #Hypothyroidism  - Continue Synthroid PO 100mcg qD  - TSH 70, free T4 1.0; follow up in 2 weeks    Dispo: Acute Rehab 4A

## 2022-06-07 NOTE — PROGRESS NOTE ADULT - NS ATTEST RISK GEN_ALL_CORE
Risk Statement (NON-critical care)

## 2022-06-07 NOTE — PROGRESS NOTE ADULT - ATTENDING SUPERVISION STATEMENT
Fellow
Fellow
Resident
Fellow
Resident

## 2022-06-07 NOTE — PROGRESS NOTE ADULT - TIME BILLING
time spent on review of labs, imaging studies, old records, obtaining history, personally examining patient, counselling and communicating with patient/ family, entering orders for medications/tests/etc, discussions with other health care providers, documentation in electronic health records, independent interpretation of labs, imaging/procedure results and care coordination.
time spent on review of labs, imaging studies, old records, obtaining history, personally examining patient, counselling and communicating with patient/ family, entering orders for medications/tests/etc, discussions with other health care providers, documentation in electronic health records, independent interpretation of labs, imaging/procedure results and care coordination.
as above
as above
time spent on review of labs, imaging studies, old records, obtaining history, personally examining patient, counselling and communicating with patient/ family, entering orders for medications/tests/etc, discussions with other health care providers, documentation in electronic health records, independent interpretation of labs, imaging/procedure results and care coordination.
#Progress Note Handoff  Pending (specify):  neuro imaging, neuro follow up, CC, follow up Cr  Family discussion: house staff updated pt family  Disposition: SICU
Review of imaging and chart; obtaining history; examination of pt; discussion and coordination of care.
Review of imaging and chart; obtaining history; examination of pt; discussion and coordination of care.
time spent on review of labs, imaging studies, old records, obtaining history, personally examining patient, counselling and communicating with patient/ family, entering orders for medications/tests/etc, discussions with other health care providers, documentation in electronic health records, independent interpretation of labs, imaging/procedure results and care coordination.
as above + care coordination
#Progress Note Handoff  Pending (specify): Follow-up neurology, neurosurgery, interventional neurology, critical care, endocrine, angiogram today  Family discussion: Spoke to  family on the phone today  Disposition: Pt will be transferred to [facility] service post angio
Review of imaging and chart; obtaining history; examination of pt; discussion and coordination of care.
Review of imaging and chart; obtaining history; examination of pt; discussion and coordination of care.
time spent on review of labs, imaging studies, old records, obtaining history, personally examining patient, counselling and communicating with patient/ family, entering orders for medications/tests/etc, discussions with other health care providers, documentation in electronic health records, independent interpretation of labs, imaging/procedure results and care coordination.

## 2022-06-07 NOTE — DISCHARGE NOTE NURSING/CASE MANAGEMENT/SOCIAL WORK - NSDCPEFALRISK_GEN_ALL_CORE
For information on Fall & Injury Prevention, visit: [email or web address] OR  [email or web address] OR  https://www.cdc.gov/steadi/patient.html

## 2022-06-07 NOTE — PROGRESS NOTE ADULT - SUBJECTIVE AND OBJECTIVE BOX
AMANDA FELDER  76y  Female    Patient is a 76y old  Female who presents with a chief complaint of Worsening Back Pain and Metabolic Encephalopathy (31 May 2022 09:26)      HPI:  History of Present Illness  Ms. Felder is a 76 year old female patient known to have:  - Baseline AO3, lives with niece, ambulates with a walker  - SEEMA and ILD. Follows with Dr Stephens. Not on CPAP or home O2. On Prednisone 20mg QD  - Depression  - HTN. Recent Admission for HTN Urgency. Follows with Dr Moses  - Hypothyroidism   - Recent admission for a bleeding duodenal ulcer at [facility] (melena). Home med Protonix 20mg QD  - Essential Thrombocytosis. Follows with Dr Miller  - Chronic Back Pain for years secondary to Spinal Stenosis per marquise. s/p Spinal Epidural Injection. Was on PT 3x/ week but has been non compliant for 2-3 months. Follows with Dr Jaimes. Was supposed to go for a nerve ablation last Monday (cancelled due to poorly controlled HTN s/p admission)       She was brought to the ED by marquise on  for evaluation of worsening back pain and confusion x2 days.  History goes back to 2 days PTP when the patient started complaining of worsening of her chronic dull lower back pain that radiates down to her right medial aspect of thigh.  This has resulted in limited ambulation (patient refusing to leave her bed) and to being non compliant with Physical Therapy in the absence of leg weakness or paresthesias or numbness or urinary incontinence.  Per marquise, patient has been feeling down x few days with reduced PO intake, reduced sleep, loss of interest, thoughts that her family doesn't like/ support her, and confusion (some times would not recognize marquise).  She has an instance where she had fecal incontinence on way to bathroom 2 days ago.  In the setting of confusion and increased pain, marquise decided to bring patient to ED for evaluation.    On review of systems, marquise denies any recent fever, chills, night sweats, URTI symptoms (cough, rhinorrhea, sore throat), urinary symptoms (urinary frequency, urgency, intermittence, dysuria, foul smelling urine, cloudy urine), abdominal pain, headache, nausea, or vomiting.   No sick contacts.   No recent travel or exposure to recent travelers.      Upon presentation to the ED, the patient was hemodynamically stable:  Vital Signs in ED   - /104 mmHg  -   - RR 20  - T97.1  - SaO2 94% on RA      Investigations   Laboratory Workup  - CBC:                        11.6   20.94 )-----------( 188      ( 18 May 2022 10:50 )             34.1     - Chemistry:      140  |  96<L>  |  48<H>  ----------------------------<  109<H>  4.3   |  24  |  2.1<H>    Ca    10.4<H>      18 May 2022 10:50    TPro  7.3  /  Alb  4.8  /  TBili  0.6  /  DBili  x   /  AST  36  /  ALT  27  /  AlkPhos  61      - Coagulation Studies:  PT/INR - ( 18 May 2022 10:50 )   PT: 12.30 sec;   INR: 1.07 ratio    PTT - ( 18 May 2022 10:50 )  PTT:24.8 sec      Microbiological Workup  Urinalysis Basic - ( 18 May 2022 14:07 )    Color: Yellow / Appearance: Clear / S.021 / pH: x  Gluc: x / Ketone: Trace  / Bili: Negative / Urobili: <2 mg/dL   Blood: x / Protein: 30 mg/dL / Nitrite: Negative   Leuk Esterase: Negative / RBC: 6 /HPF / WBC 4 /HPF   Sq Epi: x / Non Sq Epi: 1 /HPF / Bacteria: Negative          Radiological Workup  * CT Abdomen and Pelvis w/ IV Cont (22 @ 14:00) No acute abnormality within the abdomen and pelvis. Age-indeterminate moderate compression fracture seen at L1, new since 2022.  * CXR CM and CHF    - Patient was given IV Cefepime 2g x1 dose in ED  - She was also given 1.4 L LR bolus   - She will be admitted for further investigations, management, and monitoring     (18 May 2022 22:59)    Interval events: Pt initially admitted to MICU.  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 Pt s/p DSA & pipeline stent embo .  Patient was febrile on  but work-up was negative except for (+) MRSA PCR.  Patient on 5-day course of mupirocin.  Patient became awake, alert, and oriented x2-3 (person, , place) and tolerating PT/OT.   Passed S+S and tolerating PO soft and bite-sized diet.  Failed TOV on  and bran reinserted for retention.  Re-trial once ambulation improves.  Hemodynamically stable for downgrade to stroke unit for further management.    S: Patient was examined and seen at bedside. This morning pt is resting comfortably in bed and reports no new issues or overnight events. No specific complaints. No further bleeding. Periodically confused as per staff.  Denies dysuria.  Denies CP, SOB, N/V/D/C/AP, cough, F, chills, dizziness, new focal weakness, HA, vision changes, dysuria, or urinary symptoms.  ROS: all other systems reviewed and are negative    PAST MEDICAL & SURGICAL HISTORY:  HTN (hypertension)      Hypothyroid      Depression      Interstitial lung disease      Essential thrombocytopenia      History of ovarian cyst      General: NAD. Looks stated age.  HEENT: clean oropharynx, EOMI, no LAD  Neck: trachea midline, no thyromegaly  CV: nl S1 S2; no m/r/g  Resp: decreased breath sounds at base  GI: NT/ND/S +BS  MS: no clubbing/cyanosis/edema, +pulses  Neuro: motor, sensory intact; + reflexes  Skin: no rashes, nl turgor  Psychiatric: AA0x2       tele: SR, nonspecific changes (on my own evaluation of tele monitor)            MEDICATIONS  (STANDING):  albuterol/ipratropium for Nebulization. 3 milliLiter(s) Nebulizer once  chlorhexidine 4% Liquid 1 Application(s) Topical <User Schedule>  dronedarone 400 milliGRAM(s) Oral two times a day  enoxaparin Injectable 40 milliGRAM(s) SubCutaneous every 24 hours  levothyroxine 100 MICROGram(s) Oral daily  lidocaine   4% Patch 1 Patch Transdermal daily  magnesium oxide 400 milliGRAM(s) Oral three times a day with meals  melatonin 5 milliGRAM(s) Oral at bedtime  metoprolol tartrate 25 milliGRAM(s) Oral every 12 hours  niMODipine 60 milliGRAM(s) Oral every 4 hours  pantoprazole    Tablet 40 milliGRAM(s) Oral two times a day  predniSONE   Tablet 15 milliGRAM(s) Oral daily  ticagrelor 90 milliGRAM(s) Oral two times a day    MEDICATIONS  (PRN):  acetaminophen     Tablet .. 650 milliGRAM(s) Oral every 6 hours PRN Mild Pain (1 - 3)  cyclobenzaprine 10 milliGRAM(s) Oral three times a day PRN Muscle Spasm    Home Medications:  losartan-hydrochlorothiazide 100 mg-25 mg oral tablet: 1 tab(s) orally once a day (18 May 2022 23:28)  niMODipine 30 mg oral capsule: 2 cap(s) orally every 4 hours (2022 12:56)  pantoprazole 40 mg oral delayed release tablet: 1 tab(s) orally 2 times a day (2022 14:07)  predniSONE 20 mg oral tablet: 1 tab(s) orally once a day (18 May 2022 23:28)    Vital Signs Last 24 Hrs  T(C): 36.6 (2022 13:00), Max: 36.6 (2022 13:00)  T(F): 97.8 (2022 13:00), Max: 97.8 (2022 13:00)  HR: 83 (2022 13:00) (73 - 97)  BP: 123/59 (2022 13:00) (123/59 - 166/69)  BP(mean): 96 (2022 17:08) (96 - 96)  RR: 18 (2022 01:35) (18 - 18)  SpO2: --  CAPILLARY BLOOD GLUCOSE        LABS:                        9.6    25.21 )-----------( 386      ( 2022 06:29 )             29.2     06-07    140  |  100  |  16  ----------------------------<  95  3.3<L>   |  24  |  1.3    Ca    8.9      2022 06:29  Phos  3.3     06-07  Mg     1.9     06-07    TPro  6.1  /  Alb  3.6  /  TBili  0.4  /  DBili  x   /  AST  22  /  ALT  17  /  AlkPhos  55  06-07    LIVER FUNCTIONS - ( 2022 06:29 )  Alb: 3.6 g/dL / Pro: 6.1 g/dL / ALK PHOS: 55 U/L / ALT: 17 U/L / AST: 22 U/L / GGT: x                           Culture - Urine (collected 2022 13:47)  Source: Catheterized Catheterized  Preliminary Report (2022 22:58):    >100,000 CFU/ml Escherichia coli      Consultant Notes Reviewed:  [x ] YES  [ ] NO  Care Discussed with Consultants/Other Providers/ Housestaff [ x] YES  [ ] NO  Radiology, labs, new studies personally reviewed.

## 2022-06-07 NOTE — PROGRESS NOTE ADULT - ASSESSMENT
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 Pt s/p DSA pipeline stenting R ICA aneurysm on 5/23. S/P 1 UNIT PRBC for hb 7.3. No acute change in neuro status. Patient received another unit of blood  for Hb 7.6     #Anemia/Melena/hx recent UGIB 2/2 bleeding duodenal ulcer (recent [facility] admission for melena)  She had multiple bowl movements,   - Stopped bowl regime   - Started on pantoprazole infusion as per GI.   - Senna/Miralax  - Currently clear liquid diet as per GI   - track CBC  - GI is considering EGD if unstable  - s/p 2 unit prbc. on 27 and 30 of May  - Lovenox 40 qd    - SCDs  - Hold Anagrelide for now (low Hb)  - Negative LE dopplers from 5/19. Repeat 6/1 also negative.  - track CBC keep Hgb>7.5  - advanced diet as per GI and pt tolerating    #SAH, 2/2 5mm ruptured R supraclinoid aneurysm s/p angio & pipeline stent embo 5/23  #Ischemic strokes on MRI   #new L1 compression fx, CPB 2/2 spinal stenosis (follows w Dr. العلي)  - Neuro Checks Q4H  - Brilinta BID   - Hold aspirin for now (low Hb)  - Nimodipine   - Normovolemia  - Keppra for seizure ppx  - Video EEG negative  - MRA showed decreased aneurysm (5.9 -> 3.5mm), repeat MRI or CTA in 3 weeks per NSG  - Pain control: Tylenol, Lido patch, flexeril (new L1 compression fx, HA) on MR L-spine  - OOB to chair  - PT/OT/OOB     #HTN  - -140  - nimodipine for 21 days  - encourage PO intake  - continue metoprolol    if needed ACE or ARB for better BP control     #Sinus tachycardia (at one point >150)  - Continue metoprolol and titrate PRN for better HR control  - Started Multaq 400mg bid       #SEEMA not on CPAP, ILD  she was tachypneic and wheezing 5/30, that improved after diuresis and blood transfusion.   - Aspiration precautions   - Prednisone 15mg qd for ILD; pulm following  - Incentive spirometry  - Strict I/Os     #Chronic thrombocytosis/ET  -anagrelide on hold due to GIB/anemia  -as per neuro who spoke to heme team, can hold as long as Plts are not going very high  -heme f/u    #Leukocytosis  -likely due to ET.   -back to baseline    #hypothyroidism  - Synthroid PO 100mcg qD  - TSH 70, free t4 1.0; repeat in 2-4 weeks    Urinary retention  passed voiding trial    Resp alkolosis ?Etiology ?ILD, ?anxiety. No evidence of pain  -CTA chest negative  to r/o PE  -improving    Suspected baseline early dementia  stable    Covid Exposure  -was exposed last week. Thereafter, pt and another roommate who was exposed moved to isolation. Exposed roommate tested positive on 6/5.  - pt is a-Sx  -monitor for Sx  -epidemiology f/u if pt still needs to be under droplet isolation    A-Sx bacteuria  -monitor off ABx unless becomes wysmptomatic    Very high risk pt. Px is guarded        My note supersedes the residents note should a discrepancy arise.    Chart and notes personally reviewed.  Care Discussed with Consultants/Other Providers/ Housestaff [ x] YES [ ] NO   Radiology, labs, old records personally reviewed.    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 Pt s/p DSA pipeline stenting R ICA aneurysm on 5/23. S/P 1 UNIT PRBC for hb 7.3. No acute change in neuro status. Patient received another unit of blood  for Hb 7.6     #Anemia/Melena/hx recent UGIB 2/2 bleeding duodenal ulcer (recent [facility] admission for melena)  She had multiple bowl movements,   - Stopped bowl regime   - Started on pantoprazole infusion as per GI.   - Senna/Miralax  - Currently clear liquid diet as per GI   - track CBC  - GI is considering EGD if unstable  - s/p 2 unit prbc. on 27 and 30 of May  - Lovenox 40 qd    - SCDs  - Hold Anagrelide for now (low Hb)  - Negative LE dopplers from 5/19. Repeat 6/1 also negative.  - track CBC keep Hgb>7.5  - advanced diet as per GI and pt tolerating    #SAH, 2/2 5mm ruptured R supraclinoid aneurysm s/p angio & pipeline stent embo 5/23  #Ischemic strokes on MRI   #new L1 compression fx, CPB 2/2 spinal stenosis (follows w Dr. العلي)  - Neuro Checks Q4H  - Brilinta BID   - Hold aspirin for now (low Hb)  - Nimodipine   - Normovolemia  - Keppra for seizure ppx  - Video EEG negative  - MRA showed decreased aneurysm (5.9 -> 3.5mm), repeat MRI or CTA in 3 weeks per NSG  - Pain control: Tylenol, Lido patch, flexeril (new L1 compression fx, HA) on MR L-spine  - OOB to chair  - PT/OT/OOB     #HTN  - -140  - nimodipine for 21 days  - encourage PO intake  - continue metoprolol    if needed ACE or ARB for better BP control     #Sinus tachycardia (at one point >150)  - Continue metoprolol and titrate PRN for better HR control  - Started Multaq 400mg bid       #SEEMA not on CPAP, ILD  she was tachypneic and wheezing 5/30, that improved after diuresis and blood transfusion.   - Aspiration precautions   - Prednisone 15mg qd for ILD; pulm following  - Incentive spirometry  - Strict I/Os     #Chronic thrombocytosis/ET  -anagrelide on hold due to GIB/anemia  -as per neuro who spoke to heme team, can hold as long as Plts are not going very high  -heme f/u    #Leukocytosis  -likely due to ET.   -back to baseline    #hypothyroidism  - Synthroid PO 100mcg qD  - TSH 70, free t4 1.0; repeat in 2-4 weeks    Urinary retention  passed voiding trial    Resp alkolosis ?Etiology ?ILD, ?anxiety. No evidence of pain  -CTA chest negative  to r/o PE  -improving    Suspected baseline early dementia  stable    Covid Exposure  -was exposed last week. Thereafter, pt and another roommate who was exposed moved to isolation. Exposed roommate tested positive on 6/5.  - pt is a-Sx  -monitor for Sx  -epidemiology f/u if pt still needs to be under droplet isolation    A-Sx bacteuria  -monitor off ABx unless becomes wysmptomatic    ?Peripancreatic LAD on CT   -recommend outpt f/u w/ either reimaging or EUS         My note supersedes the residents note should a discrepancy arise.    Chart and notes personally reviewed.  Care Discussed with Consultants/Other Providers/ Housestaff [ x] YES [ ] NO   Radiology, labs, old records personally reviewed.    discussed w/ housestaff, nursing, case management, neuro team

## 2022-06-07 NOTE — DISCHARGE NOTE NURSING/CASE MANAGEMENT/SOCIAL WORK - NSSCCARECORD_GEN_ALL_CORE
[facility] [address]  [address]  [address]  [facility] [address]  Phone: [phone]  Fax: [phone]                  Amber Zhang   3/23/2017 3:00 PM   Office Visit    Description:  Female : 1988   Provider:  Mary Lou Sorto NP   Department:  [facility] [address]           Reason for Visit     Gynecologic Exam                To Do List           Future Appointments        Provider Department Dept Phone    3/23/2017 3:30 PM ULTRASOUND, [facility] GYN [facility] [address] [phone]    3/23/2017 4:40 PM LAB, APPOINTMENT [place] [facility] [phone]    3/29/2017 7:00 AM DRE Newton, FNP [facility] - Endocrinology [phone]    2017 8:30 AM Karla Mcfarlane MD [facility] [address] [phone]      Goals (5 Years of Data)     None      [facility] On Call     [facility] -  Assistance  Registered nurses in the [facility] provide clinical advisement, health education, appointment booking, and other advisory services.  Call for this free service at [phone].             Medications           Message regarding Medications     Verify the changes and/or additions to your medication regime listed below are the same as discussed with your clinician today.  If any of these changes or additions are incorrect, please notify your healthcare provider.             Verify that the below list of medications is an accurate representation of the medications you are currently taking.  If none reported, the list may be blank. If incorrect, please contact your healthcare provider. Carry this list with you in case of emergency.           Current Medications     albuterol 90 mcg/actuation inhaler Inhale 2 puffs into the lungs every 4 (four) hours as needed for Wheezing.    fluticasone (FLONASE) 50 mcg/actuation nasal spray 1 spray by Each Nare route once daily.    glucagon (human recombinant) inj 1mg/mL kit  "Inject 1 mL (1 mg total) into the muscle as needed.    insulin aspart (NOVOLOG) 100 unit/mL injection Pt uses medtronic insulin pump, max daily dose 30-40 units.    lancets Misc Contour next meter used, bg monitoring 6-8 times a day.    LANTUS SOLOSTAR 100 unit/mL (3 mL) InPn pen INJECT 40 UNITS SQ QPM    NOVOLOG FLEXPEN 100 unit/mL InPn pen USE AS DIRECTED PER DOCTOR UP TO 70 UNITS PER DAY    pen needle, diabetic (BD ULTRA-FINE ADRIANNE PEN NEEDLES) 32 gauge x 5/32" Ndle Uses 4 times a day, back up insulin.    amoxicillin (AMOXIL) 500 MG capsule TK 1 C PO TID TAT    CONTOUR NEXT STRIPS Strp USE TO TEST BLOOD SUGAR 8 TIMES    dextroamphetamine-amphetamine (ADDERALL XR) 20 MG 24 hr capsule Take 20 mg by mouth.    dextroamphetamine-amphetamine 10 mg Tab Take 10 mg by mouth.    ethynodiol-ethinyl estradiol (ZOVIA 1/35E, 28,) 1-35 mg-mcg per tablet Take 1 tablet by mouth once daily.    fluconazole (DIFLUCAN) 150 MG Tab TAKE 1 TABLET(150 MG) BY MOUTH EVERY DAY    PREVIDENT 5000 DRY MOUTH 1.1 % Gel            Clinical Reference Information           Your Vitals Were     BP Height Weight Last Period BMI    110/70 5' 6" (1.676 m) 70 kg (154 lb 5.2 oz) 01/23/2017 24.91 kg/m2      Blood Pressure          Most Recent Value    BP  110/70      Allergies as of 3/23/2017     Benadryl [Diphenhydramine Hcl]      Immunizations Administered on Date of Encounter - 3/23/2017     None      Language Assistance Services     ATTENTION: Language assistance services are available, free of charge. Please call [phone].      ATENCIÓN: Si habla español, tiene a arrington disposición servicios gratuitos de asistencia lingüística. Llame al [phone].     CHÚ Ý: N?u b?n nói Ti?ng Vi?t, có các d?ch v? h? tr? ngôn ng? mi?n phí dành cho b?n. G?i s? [phone].         [facility] [address] complies with applicable Federal civil rights laws and does not discriminate on the basis of race, color, national origin, age, disability, or sex.        " [facility]

## 2022-06-07 NOTE — PROGRESS NOTE ADULT - ATTENDING COMMENTS
Pt is a 75 yo F with PMHX of chronic back pain, SEEMA, HTN, depression, essential thrombocytosis, recent admission for GIB 2/2 duodenal ulcer, who presented with AMS. 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 Now s/p pipeline stent embolization . MRI brain on  with multifocal punctate acute ischemic stroke. Favor periprocedural. However in light of bilateral carotid disease, CUS pending. Continue brillinta 90mg BID. PT/OT/ST, tele. Nimotop last day . no recent bloody BM, Hb stable.   PT/OT/ST, tele, -160.   Dispo planninA Pt is a 75 yo F with PMHX of chronic back pain, SEEMA, HTN, depression, essential thrombocytosis, recent admission for GIB 2/2 duodenal ulcer, who presented with AMS. 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 Now s/p pipeline stent embolization . MRI brain on  with multifocal punctate acute ischemic stroke. Favor periprocedural. However in light of bilateral carotid disease, CUS pending. Continue brillinta 90mg BID. PT/OT/ST, tele. Nimotop last day . no recent bloody BM, Hb stable.   PT/OT/ST, tele, -160.   Dispo planninA vs home with outpatient therapies

## 2022-06-07 NOTE — PROGRESS NOTE ADULT - NS ATTEST RISK PROBLEM GEN_ALL_CORE FT
GIB, SAH, acidosis, very high risk for decompensation

## 2022-06-07 NOTE — PROGRESS NOTE ADULT - SUBJECTIVE AND OBJECTIVE BOX
Neurology Progress Note    Interval History:  Patient seen and examined at bedside. There were no acute events overnight. Patient feels well, denied any complaints.       PAST MEDICAL & SURGICAL HISTORY:  HTN (hypertension)  Hypothyroid  Depression  Interstitial lung disease  Essential thrombocytopenia  History of ovarian cyst      Medications:  acetaminophen     Tablet .. 650 milliGRAM(s) Oral every 6 hours PRN  albuterol/ipratropium for Nebulization. 3 milliLiter(s) Nebulizer once  chlorhexidine 4% Liquid 1 Application(s) Topical <User Schedule>  cyclobenzaprine 10 milliGRAM(s) Oral three times a day PRN  dronedarone 400 milliGRAM(s) Oral two times a day  enoxaparin Injectable 40 milliGRAM(s) SubCutaneous every 24 hours  levothyroxine 100 MICROGram(s) Oral daily  lidocaine   4% Patch 1 Patch Transdermal daily  magnesium oxide 400 milliGRAM(s) Oral three times a day with meals  melatonin 5 milliGRAM(s) Oral at bedtime  metoprolol tartrate 25 milliGRAM(s) Oral every 12 hours  niMODipine 60 milliGRAM(s) Oral every 4 hours  pantoprazole    Tablet 40 milliGRAM(s) Oral two times a day  predniSONE   Tablet 15 milliGRAM(s) Oral daily  ticagrelor 90 milliGRAM(s) Oral two times a day      Vital Signs Last 24 Hrs  T(C): 36.2 (2022 04:43), Max: 36.2 (2022 04:43)  T(F): 97.2 (2022 04:43), Max: 97.2 (2022 04:43)  HR: 80 (2022 04:43) (73 - 97)  BP: 145/65 (2022 10:00) (123/56 - 166/69)  BP(mean): 96 (2022 17:08) (89 - 96)  RR: 18 (2022 01:35) (18 - 18)      Neurological Exam:   Mental status: Awake, alert and oriented x3. No dysarthria, no aphasia.   Cranial nerves: Pupils equally round and reactive to light, visual fields full, no nystagmus, extraocular muscles intact, V1 through V3 intact bilaterally and symmetric, face symmetric, hearing intact to finger rub, palate elevation symmetric, tongue was midline.  Motor: MRC grading 5/5 B/L UE/LE.  strength 5/5. Normal tone and bulk. No abnormal movements.    Sensation: Intact to light touch, proprioception, and pinprick.   Coordination: No dysmetria on finger-to-nose and heel-to-shin.  No dysdiadokinesia.  Reflexes: 2+ in bilateral UE/LE, downgoing toes bilaterally. (-) Robles.  Gait: Narrow and steady. No ataxia.  Romberg negative      NIHSS 0      Labs:  CBC Full  -  ( 2022 06:29 )  WBC Count : 25.21 K/uL  RBC Count : 3.10 M/uL  Hemoglobin : 9.6 g/dL  Hematocrit : 29.2 %  Platelet Count - Automated : 386 K/uL  Mean Cell Volume : 94.2 fL  Mean Cell Hemoglobin : 31.0 pg  Mean Cell Hemoglobin Concentration : 32.9 g/dL      -    140  |  100  |  16  ----------------------------<  95  3.3<L>   |  24  |  1.3    Ca    8.9      2022 06:29  Phos  3.3     06-07  Mg     1.9     06-07    TPro  6.1  /  Alb  3.6  /  TBili  0.4  /  DBili  x   /  AST  22  /  ALT  17  /  AlkPhos  55  06-07    LIVER FUNCTIONS - ( 2022 06:29 )  Alb: 3.6 g/dL / Pro: 6.1 g/dL / ALK PHOS: 55 U/L / ALT: 17 U/L / AST: 22 U/L / GGT: x             Urinalysis Basic - ( 2022 13:47 )    Color: Yellow / Appearance: Slightly Turbid / S.022 / pH: x  Gluc: x / Ketone: Negative  / Bili: Negative / Urobili: <2 mg/dL   Blood: x / Protein: 30 mg/dL / Nitrite: Negative   Leuk Esterase: Large / RBC: 46 /HPF / WBC 55 /HPF   Sq Epi: x / Non Sq Epi: 0 /HPF / Bacteria: Negative

## 2022-06-07 NOTE — PROGRESS NOTE ADULT - REASON FOR ADMISSION
Worsening Back Pain and Metabolic Encephalopathy

## 2022-06-07 NOTE — DISCHARGE NOTE NURSING/CASE MANAGEMENT/SOCIAL WORK - PATIENT PORTAL LINK FT
You can access the FollowMyHealth Patient Portal offered by [facility] by registering at the following website: [email or web address]. By joining [facility]’s FollowMyHealth portal, you will also be able to view your health information using other applications (apps) compatible with our system.

## 2022-06-07 NOTE — PROGRESS NOTE ADULT - PROVIDER SPECIALTY LIST ADULT
Critical Care
Hospitalist
NSICU
Neurology
Gastroenterology
Hospitalist
Hospitalist
Neurology
Neurosurgery
Pulmonology
Gastroenterology
Hospitalist
Internal Medicine
Internal Medicine
Neurology
Gastroenterology
NSICU
Neurology
Neurosurgery
Pulmonology
Hospitalist
NSICU
Hospitalist
NSICU
Hospitalist
Hospitalist

## 2022-06-09 DIAGNOSIS — I67.1 CEREBRAL ANEURYSM, NONRUPTURED: ICD-10-CM

## 2022-06-09 LAB
-  AMPICILLIN: SIGNIFICANT CHANGE UP
-  CIPROFLOXACIN: SIGNIFICANT CHANGE UP
-  LEVOFLOXACIN: SIGNIFICANT CHANGE UP
-  NITROFURANTOIN: SIGNIFICANT CHANGE UP
-  TETRACYCLINE: SIGNIFICANT CHANGE UP
-  VANCOMYCIN: SIGNIFICANT CHANGE UP
CULTURE RESULTS: SIGNIFICANT CHANGE UP
METHOD TYPE: SIGNIFICANT CHANGE UP
ORGANISM # SPEC MICROSCOPIC CNT: SIGNIFICANT CHANGE UP
SPECIMEN SOURCE: SIGNIFICANT CHANGE UP

## 2022-06-10 DIAGNOSIS — I50.9 HEART FAILURE, UNSPECIFIED: ICD-10-CM

## 2022-06-10 DIAGNOSIS — T38.0X5A ADVERSE EFFECT OF GLUCOCORTICOIDS AND SYNTHETIC ANALOGUES, INITIAL ENCOUNTER: ICD-10-CM

## 2022-06-10 DIAGNOSIS — I95.9 HYPOTENSION, UNSPECIFIED: ICD-10-CM

## 2022-06-10 DIAGNOSIS — D64.9 ANEMIA, UNSPECIFIED: ICD-10-CM

## 2022-06-10 DIAGNOSIS — E87.4 MIXED DISORDER OF ACID-BASE BALANCE: ICD-10-CM

## 2022-06-10 DIAGNOSIS — G47.33 OBSTRUCTIVE SLEEP APNEA (ADULT) (PEDIATRIC): ICD-10-CM

## 2022-06-10 DIAGNOSIS — F05 DELIRIUM DUE TO KNOWN PHYSIOLOGICAL CONDITION: ICD-10-CM

## 2022-06-10 DIAGNOSIS — E03.9 HYPOTHYROIDISM, UNSPECIFIED: ICD-10-CM

## 2022-06-10 DIAGNOSIS — F03.90 UNSPECIFIED DEMENTIA WITHOUT BEHAVIORAL DISTURBANCE: ICD-10-CM

## 2022-06-10 DIAGNOSIS — I63.89 OTHER CEREBRAL INFARCTION: ICD-10-CM

## 2022-06-10 DIAGNOSIS — I65.23 OCCLUSION AND STENOSIS OF BILATERAL CAROTID ARTERIES: ICD-10-CM

## 2022-06-10 DIAGNOSIS — I16.1 HYPERTENSIVE EMERGENCY: ICD-10-CM

## 2022-06-10 DIAGNOSIS — D72.828 OTHER ELEVATED WHITE BLOOD CELL COUNT: ICD-10-CM

## 2022-06-10 DIAGNOSIS — D47.3 ESSENTIAL (HEMORRHAGIC) THROMBOCYTHEMIA: ICD-10-CM

## 2022-06-10 DIAGNOSIS — I11.0 HYPERTENSIVE HEART DISEASE WITH HEART FAILURE: ICD-10-CM

## 2022-06-10 DIAGNOSIS — R62.7 ADULT FAILURE TO THRIVE: ICD-10-CM

## 2022-06-10 DIAGNOSIS — J84.9 INTERSTITIAL PULMONARY DISEASE, UNSPECIFIED: ICD-10-CM

## 2022-06-10 DIAGNOSIS — F32.A DEPRESSION, UNSPECIFIED: ICD-10-CM

## 2022-06-10 DIAGNOSIS — N17.9 ACUTE KIDNEY FAILURE, UNSPECIFIED: ICD-10-CM

## 2022-06-10 DIAGNOSIS — K59.00 CONSTIPATION, UNSPECIFIED: ICD-10-CM

## 2022-06-10 DIAGNOSIS — G93.41 METABOLIC ENCEPHALOPATHY: ICD-10-CM

## 2022-06-10 DIAGNOSIS — K92.1 MELENA: ICD-10-CM

## 2022-06-10 DIAGNOSIS — I47.1 SUPRAVENTRICULAR TACHYCARDIA: ICD-10-CM

## 2022-06-10 DIAGNOSIS — R33.9 RETENTION OF URINE, UNSPECIFIED: ICD-10-CM

## 2022-06-10 DIAGNOSIS — F43.20 ADJUSTMENT DISORDER, UNSPECIFIED: ICD-10-CM

## 2022-06-10 DIAGNOSIS — I77.3 ARTERIAL FIBROMUSCULAR DYSPLASIA: ICD-10-CM

## 2022-06-10 DIAGNOSIS — J44.9 CHRONIC OBSTRUCTIVE PULMONARY DISEASE, UNSPECIFIED: ICD-10-CM

## 2022-06-10 DIAGNOSIS — M48.061 SPINAL STENOSIS, LUMBAR REGION WITHOUT NEUROGENIC CLAUDICATION: ICD-10-CM

## 2022-06-10 DIAGNOSIS — M48.56XA COLLAPSED VERTEBRA, NOT ELSEWHERE CLASSIFIED, LUMBAR REGION, INITIAL ENCOUNTER FOR FRACTURE: ICD-10-CM

## 2022-06-10 DIAGNOSIS — I60.6 NONTRAUMATIC SUBARACHNOID HEMORRHAGE FROM OTHER INTRACRANIAL ARTERIES: ICD-10-CM

## 2022-06-10 DIAGNOSIS — R41.0 DISORIENTATION, UNSPECIFIED: ICD-10-CM

## 2022-06-13 ENCOUNTER — NON-APPOINTMENT (OUTPATIENT)
Age: 77
End: 2022-06-13

## 2022-06-23 ENCOUNTER — APPOINTMENT (OUTPATIENT)
Dept: NEUROSURGERY | Facility: CLINIC | Age: 77
End: 2022-06-23

## 2022-07-12 ENCOUNTER — APPOINTMENT (OUTPATIENT)
Dept: NEUROLOGY | Facility: CLINIC | Age: 77
End: 2022-07-12

## 2024-08-30 NOTE — ED PROVIDER NOTE - CHIEF COMPLAINT
[FreeTextEntry1] : 72 yo F w/ HTN, HLD, Afib on Warfarin, CVA, mitral regurgitation, recurrent UTI, nephrolithiasis presenting as hospital follow up. CT showed L intrarenal stone as well as UPJ stone. She underwent stent placement on 8/15 for L flank pain and ZACHARY and was discharged home the same day.  Gross hematuria has improved and now only has blood-tinged urine. Denies dysuria. Tolerating stent well with flomax. No straining, incomplete bladder emptying. Has had 3 UTIs in past 6 months. She has previously had kidney stones which she has passed.  She is on Warfarin, which she has been on since 1987. Sees Cori Alvarado and Freida.  PMH: HTN, HLD, Afib, CVA, nephrolithiasis PSH: Cholecystectomy, fallopian tube removal, tonsillectomy Medications: Warfarin (3 mg and 5 mg every other day), metoprolol tartrate 50 mg BID, furosemide 20 mg, enalapril maleate 10 mg BID, ASA 81, vitamin D The patient is a 76y Female complaining of hypertension.